# Patient Record
Sex: FEMALE | Race: WHITE | NOT HISPANIC OR LATINO | Employment: OTHER | URBAN - METROPOLITAN AREA
[De-identification: names, ages, dates, MRNs, and addresses within clinical notes are randomized per-mention and may not be internally consistent; named-entity substitution may affect disease eponyms.]

---

## 2017-02-27 DIAGNOSIS — K59.00 CONSTIPATION: ICD-10-CM

## 2017-02-27 DIAGNOSIS — G25.81 RESTLESS LEG SYNDROME: ICD-10-CM

## 2017-02-27 RX ORDER — PRAMIPEXOLE DIHYDROCHLORIDE 0.12 MG/1
0.12 TABLET ORAL AT BEDTIME
Qty: 30 TABLET | Refills: 1 | Status: SHIPPED | OUTPATIENT
Start: 2017-02-27 | End: 2017-05-01

## 2017-02-27 NOTE — TELEPHONE ENCOUNTER
Request for medication refill:    Date of last visit at clinic: 12/14/16    Please complete refill if appropriate and CLOSE ENCOUNTER.    Closing the encounter signifies the refill is complete.    If refill has been denied, please complete the smart phrase .smirefuse and route it to the [facility] RN TRIAGE pool to inform the patient and the pharmacy.    Elen Chinchilla, CMA

## 2017-03-01 ENCOUNTER — PRE VISIT (OUTPATIENT)
Dept: ORTHOPEDICS | Facility: CLINIC | Age: 82
End: 2017-03-01

## 2017-03-01 ENCOUNTER — OFFICE VISIT (OUTPATIENT)
Dept: ORTHOPEDICS | Facility: CLINIC | Age: 82
End: 2017-03-01

## 2017-03-01 VITALS — WEIGHT: 166 LBS | BODY MASS INDEX: 29.41 KG/M2 | HEIGHT: 63 IN

## 2017-03-01 DIAGNOSIS — M17.0 PRIMARY OSTEOARTHRITIS OF BOTH KNEES: Primary | ICD-10-CM

## 2017-03-01 RX ORDER — HYDROCODONE BITARTRATE AND ACETAMINOPHEN 5; 325 MG/1; MG/1
2 TABLET ORAL EVERY 6 HOURS PRN
Qty: 45 TABLET | Refills: 0 | Status: ON HOLD | OUTPATIENT
Start: 2017-03-01 | End: 2017-04-10

## 2017-03-01 NOTE — MR AVS SNAPSHOT
After Visit Summary   3/1/2017    Zaira Firero    MRN: [id number]           Patient Information     Date Of Birth          1935        Visit Information        Provider Department      3/1/2017 1:00 PM Leyda Garcia MD [facility]        Today's Diagnoses     Primary osteoarthritis of both knees    -  1       Follow-ups after your visit        Additional Services     ORTHOPEDICS ADULT REFERRAL       Your provider has referred you to: [facility]: Orthopaedic Clinic [facility] [phone]   [email or web address]      Dr. Sutton, knee replacement discussion    Please be aware that coverage of these services is subject to the terms and limitations of your health insurance plan.  Call member services at your health plan with any benefit or coverage questions.      Please bring the following to your appointment:    >>   Any x-rays, CTs or MRIs which have been performed.  Contact the facility where they were done to arrange for  prior to your scheduled appointment.    >>   List of current medications   >>   This referral request   >>   Any documents/labs given to you for this referral                  Your next 10 appointments already scheduled     Mar 06, 2017  8:45 AM CST   (Arrive by 8:30 AM)   NEW KNEE with Aamir Sutton MD   [facility] ([facility])    [address] [place] [id number] [phone]              Who to contact     Please call your clinic at [phone] to:    Ask questions about your health    Make or cancel appointments    Discuss your medicines    Learn about your test results    Speak to your doctor   If you have compliments or concerns about an experience at your clinic, or if you wish to file a complaint, please contact [facility] Patient Relations at [phone] or email us at  [email or web address]         Additional Information About Your Visit        [employer] Information     [employer] gives you secure access to your electronic health record. If you see a primary care provider, you can also send messages to your care team and make appointments. If you have questions, please call your primary care clinic.  If you do not have a primary care provider, please call [phone] and they will assist you.      Model[employer] is an electronic gateway that provides easy, online access to your medical records. With [employer], you can request a clinic appointment, read your test results, renew a prescription or communicate with your care team.     To access your existing account, please contact your [facility] or call [phone] for assistance.        Care EveryWhere ID     This is your Care EveryWhere ID. This could be used by other organizations to access your [facility] medical records  [id number]        Your Vitals Were     Height BMI (Body Mass Index)                5' 3\" (1.6 m) 29.41 kg/m2           Blood Pressure from Last 3 Encounters:   12/14/16 137/82   08/12/16 138/84   07/22/16 139/84    Weight from Last 3 Encounters:   03/01/17 166 lb (75.3 kg)   12/14/16 166 lb (75.3 kg)   08/29/16 166 lb (75.3 kg)              We Performed the Following     ORTHOPEDICS ADULT REFERRAL          Today's Medication Changes          These changes are accurate as of: 3/1/17  1:55 PM.  If you have any questions, ask your nurse or doctor.               Start taking these medicines.        Dose/Directions    HYDROcodone-acetaminophen 5-325 MG per tablet   Commonly known as:  NORCO   Used for:  Primary osteoarthritis of both knees        Dose:  2 tablet   Take 2 tablets by mouth every 6 hours as needed for pain maximum 4 tablet(s) per day   Quantity:  45 tablet   Refills:  0            Where to get your medicines      Some of these will need a paper prescription and others " can be bought over the counter.  Ask your nurse if you have questions.     Bring a paper prescription for each of these medications     HYDROcodone-acetaminophen 5-325 MG per tablet                Primary Care Provider Office Phone # Fax #    Ange Crespo [phone]       [facility] [address] [address]        Thank you!     Thank you for choosing [facility]  for your care. Our goal is always to provide you with excellent care. Hearing back from our patients is one way we can continue to improve our services. Please take a few minutes to complete the written survey that you may receive in the mail after your visit with us. Thank you!             Your Updated Medication List - Protect others around you: Learn how to safely use, store and throw away your medicines at www.disposemymeds.org.          This list is accurate as of: 3/1/17  1:55 PM.  Always use your most recent med list.                   Brand Name Dispense Instructions for use    acetaminophen 325 MG tablet    TYLENOL    250 tablet    Take 1-2 tablets (325-650 mg) by mouth every 6 hours as needed for pain       calcium carb 1250 mg (500 mg [place])/vitamin D 200 units 500-200 MG-UNIT per tablet    OSCAL with D    100 tablet    Take 1 tablet by mouth 2 times daily (with meals)       * CENTRUM SILVER per tablet      Take 1 tablet by mouth daily.       * multivitamin Tabs tablet     30 each    Take 1 tablet by mouth daily       hydrochlorothiazide 25 MG tablet    HYDRODIURIL    90 tablet    Take 1 tablet (25 mg) by mouth daily       HYDROcodone-acetaminophen 5-325 MG per tablet    NORCO    45 tablet    Take 2 tablets by mouth every 6 hours as needed for pain maximum 4 tablet(s) per day       ibuprofen 200 MG tablet    ADVIL/MOTRIN    60 tablet    Take 3 tablets (600 mg) by mouth every 8 hours as needed for pain       latanoprost 0.005 % ophthalmic solution    XALATAN     1 drop At Bedtime        losartan 100 MG tablet    COZAAR    90 tablet    Take 1 tablet (100 mg) by mouth daily       nortriptyline 25 MG capsule    PAMELOR    90 capsule    Take 1 capsule (25 mg) by mouth At Bedtime       polyethylene glycol powder    MIRALAX    510 g    Take 17 g by mouth daily       pramipexole 0.125 MG tablet    MIRAPEX    30 tablet    Take 1 tablet (0.125 mg) by mouth At Bedtime       * PRAVASTATIN SODIUM PO      Take  by mouth.       * pravastatin 20 MG tablet    PRAVACHOL    90 tablet    Take 1 tablet (20 mg) by mouth daily       * Notice:  This list has 4 medication(s) that are the same as other medications prescribed for you. Read the directions carefully, and ask your doctor or other care provider to review them with you.

## 2017-03-01 NOTE — LETTER
"  3/1/2017      RE: Zaira Fierro  [address]  [facility] [address]        Subjective:   Zaira Fierro is a 81 year old female who is here for follow up right knee pain. Synvisc injection lasted 2.5 months. CSI in right knee 12/2016.  Her LEFT knee is hurting some as well as her RIGHT knee has gotten much worse.  She notices that she is becoming very inactive due to the pain.  Dr. Crespo, her PCP, gave her a RIGHT knee CSI in Dec that lasted only 2 weeks or so.  She has never had her LEFT knee injected.  Wearing her medial  knee brace with walking that is not particularly helpful.        PAST MEDICAL, SOCIAL, SURGICAL AND FAMILY HISTORY: Past medical, surgical, family and social history was reviewed and unchanged since last visit.  ALLERGIES: She has No Known Allergies.    CURRENT MEDICATIONS: She has a current medication list which includes the following prescription(s): pramipexole, pravastatin, nortriptyline, losartan, hydrochlorothiazide, calcium carb 1250 mg (500 mg [place])/vitamin d 200 units, acetaminophen, latanoprost, polyethylene glycol, ibuprofen, multivitamin, centrum silver, and pravastatin sodium.          Exam:   Ht 5' 3\" (1.6 m)  Wt 166 lb (75.3 kg)  BMI 29.41 kg/m2           CONSTITUTIONIAL: healthy, alert and no distress  GAIT: antalgic  NEUROLOGIC: Non-focal  PSYCHIATRIC: affect normal/bright and mentation appears normal.    MUSCULOSKELETAL:   RIGHT knee:  No effusion, no warmth, no skin breakdown, patellofemoral crepitus, quad atrophy compared to the LEFT side.  Nttp over the lateral or patellofemoral compartments.  Mild ttp over the medial knee compartment.  ROM:  5-105    LEFT knee:  Mild effusion but no warmth, nttp, patellofemoral crepitus, 0-115 ROM         Assessment/Plan:   End stage RIGHT KNEE OA that is not responding well to injections, bracing, medications: Refer to Dr Sutton for consideration of TKA.  I gave her a prescription for vicodin to use sparingly at night " if her pain is keeping her awake or is severe.  She will need 6 foot long leg xrays AP, lateral and sunrise at her appt with Dr. Sutton as her last set is from 12/2015.      LEFT KNEE OA:  Consider CSI in the future.      Leyda Garcia MD, CAQ, FACSM, CCD  [facility]  Sports Medicine and Bone Health  Team Physician;  Athletics

## 2017-03-01 NOTE — TELEPHONE ENCOUNTER
1.  Date/reason for appt: 3/6/17 - Bilat Osteoarthritis of Knees  2.  Referring provider: Dr. Garcia  3.  Call to patient (Yes / No - short description): No, pt is referred  4.  Previous care at / records requested from:    - [facility] -- Records and referral in [facility], imaging in Pacs   - [facility] (Dr. Ange Crespo) -- Records in Epic   - PT notes are in [facility]

## 2017-03-01 NOTE — PROGRESS NOTES
" Subjective:   Zaira Fierro is a 81 year old female who is here for follow up right knee pain. Synvisc injection lasted 2.5 months. CSI in right knee 12/2016.  Her LEFT knee is hurting some as well as her RIGHT knee has gotten much worse.  She notices that she is becoming very inactive due to the pain.  Dr. Crespo, her PCP, gave her a RIGHT knee CSI in Dec that lasted only 2 weeks or so.  She has never had her LEFT knee injected.  Wearing her medial  knee brace with walking that is not particularly helpful.        PAST MEDICAL, SOCIAL, SURGICAL AND FAMILY HISTORY: Past medical, surgical, family and social history was reviewed and unchanged since last visit.  ALLERGIES: She has No Known Allergies.    CURRENT MEDICATIONS: She has a current medication list which includes the following prescription(s): pramipexole, pravastatin, nortriptyline, losartan, hydrochlorothiazide, calcium carb 1250 mg (500 mg [place])/vitamin d 200 units, acetaminophen, latanoprost, polyethylene glycol, ibuprofen, multivitamin, centrum silver, and pravastatin sodium.          Exam:   Ht 5' 3\" (1.6 m)  Wt 166 lb (75.3 kg)  BMI 29.41 kg/m2           CONSTITUTIONIAL: healthy, alert and no distress  GAIT: antalgic  NEUROLOGIC: Non-focal  PSYCHIATRIC: affect normal/bright and mentation appears normal.    MUSCULOSKELETAL:   RIGHT knee:  No effusion, no warmth, no skin breakdown, patellofemoral crepitus, quad atrophy compared to the LEFT side.  Nttp over the lateral or patellofemoral compartments.  Mild ttp over the medial knee compartment.  ROM:  5-105    LEFT knee:  Mild effusion but no warmth, nttp, patellofemoral crepitus, 0-115 ROM         Assessment/Plan:   End stage RIGHT KNEE OA that is not responding well to injections, bracing, medications: Refer to Dr Sutton for consideration of TKA.  I gave her a prescription for vicodin to use sparingly at night if her pain is keeping her awake or is severe.  She will need 6 foot long leg xrays AP, " lateral and sunrise at her appt with Dr. Sutton as her last set is from 12/2015.      LEFT KNEE OA:  Consider CSI in the future.      Leyda Garcia MD, CAQ, FACSM, CCD  [facility]  Sports Medicine and Bone Health  Team Physician;  Athletics

## 2017-03-02 DIAGNOSIS — M17.10 ARTHRITIS OF KNEE: Primary | ICD-10-CM

## 2017-03-02 ASSESSMENT — ENCOUNTER SYMPTOMS
NECK PAIN: 1
JOINT SWELLING: 0
MUSCLE CRAMPS: 1
ARTHRALGIAS: 1
BACK PAIN: 1
MUSCLE WEAKNESS: 0
STIFFNESS: 1
MYALGIAS: 1

## 2017-03-06 ENCOUNTER — OFFICE VISIT (OUTPATIENT)
Dept: ORTHOPEDICS | Facility: CLINIC | Age: 82
End: 2017-03-06

## 2017-03-06 VITALS — WEIGHT: 168.7 LBS | BODY MASS INDEX: 29.89 KG/M2 | HEIGHT: 63 IN

## 2017-03-06 DIAGNOSIS — M17.11 PRIMARY OSTEOARTHRITIS OF RIGHT KNEE: Primary | ICD-10-CM

## 2017-03-06 DIAGNOSIS — M17.0 PRIMARY OSTEOARTHRITIS OF BOTH KNEES: Primary | ICD-10-CM

## 2017-03-06 NOTE — NURSING NOTE
Teaching Flowsheet   Relevant Diagnosis: DJD right knee  Teaching Topic: Pre-op:  RIght total knee replacement     Person(s) involved in teaching:   Patient and Sister     Motivation Level:  Asks Questions: Yes  Eager to Learn: Yes  Cooperative: Yes  Receptive (willing/able to accept information): Yes  Any cultural factors/Denominational beliefs that may influence understanding or compliance? No       Patient and Family demonstrates understanding of the following:  Reason for the appointment, diagnosis and treatment plan: Yes  Knowledge of proper use of medications and conditions for which they are ordered (with special attention to potential side effects or drug interactions): Yes  Which situations necessitate calling provider and whom to contact: Yes       Teaching Concerns Addressed: Patient believes she will need to go to a TCU post-op.  She would like to consider [facility].  She will attend the Joint Replacement Class     Proper use and care of  (medical equip, care aids, etc.): NA  Nutritional needs and diet plan: Yes  Pain management techniques: Yes  Wound Care: Yes  How and/when to access community resources: NA     Instructional Materials Used/Given: Surgery folder     Time spent with patient: 15 minutes.

## 2017-03-06 NOTE — LETTER
"3/6/2017       RE: Zaira Fierro  [address]  [facility] [address]     Dear Colleague,    Thank you for referring your patient, Zaira Fierro, to the [facility] at [facility]. Please see a copy of my visit note below.        [facility], Orthopaedic Surgery Consultation  by Aamir Sutton M.D.    Zaira Fierro MRN# [id number]   Age: 81 year old YOB: 1935     Requesting physician: MD Dr. Ange Barnard [facility]              Assessment and Plan:   Assessment:  Severe end stage DJD both knees.     Plan:  1. Advised she consider TKA.    2. R knee is most symptomatic.  3. I discussed the risks, benefits, alternatives regarding the proposed surgery with the patient who both demonstrated understanding and indicated a desire to proceed with the surgery as planned.  4. Pt wants to proceed with R TKA  5. TJA preop class  6. Dental eval  7. PAC clinic visit  8. Preop H and P             History of Present Illness:   81 year old female  chief complaint    R knee pain x many years.  Had steroid and synvisc shots.  Does stairs in non-reciprocating fashion.  Has used many analgesics.  Uses a cane intermittently.  Was able to run errands.  Now cannot grocery shop or do routine activities, [employer], etc.   Quality of life and function has declined.    Current symptoms:    Awakens from sleep due to sx's:  Yes  Precipitating Injury:  No    Other joints or sites painful:  No  Fever: No  Appetite change or weight loss: No           Physical Exam:     EXAMINATION pertinent findings:   VITAL SIGNS: Height 1.59 m (5' 2.6\"), weight 76.5 kg (168 lb 11.2 oz), not currently breastfeeding.  Body mass index is 30.27 kg/(m^2).  RESP: non labored breathing   ABD: benign   SKIN: grossly normal   LYMPHATIC: grossly normal   NEURO: grossly normal   VASCULAR: satisfactory perfusion of all extremities. 2+ DP pulse " biaterally.  MUSCULOSKELETAL:   Gait: slightly antalgic  R knee:   ROM.  No effusion, increased warmth.  Medial crepitance.  Lig stable.  L knee   5-120 ROM. No effusion, minimal warmth.  Medial Crepitance.  Lig stable.    Hips nl             Data:   All laboratory data reviewed  All imaging studies reviewed by me     XR: severe end stage DJD both knees with complete cartilage loss, bone on bone medially      Aamir Sutton MD  [facility] Family Professor  Oncology and Adult Reconstructive Surgery  Dept Orthopaedic Surgery, [facility]  [phone] office, [phone] pager  [email or web address]            DATA for DOCUMENTATION:         Past Medical History:     Patient Active Problem List   Diagnosis     Health Care Home     Diverticulosis of large intestine     Hyperlipidemia     Obesity     Disorder of bone and cartilage     Prediabetes     Advanced directives, counseling/discussion     Macular degeneration (senile) of retina     Cataract     Primary osteoarthritis of both knees     Arthritis of knee     Essential hypertension with goal blood pressure less than 140/90     Past Medical History   Diagnosis Date     Arthritis      Asthma      Degenerative joint disease      Hearing loss      Hyperlipidaemia      Hypertension      Ringing in ears        Also see scanned health assessment forms.       Past Surgical History:     Past Surgical History   Procedure Laterality Date     No history of surgery              Social History:     Social History     Social History     Marital status: Single     Spouse name: N/A     Number of children: N/A     Years of education: N/A     Occupational History     Not on file.     Social History Main Topics     Smoking status: Never Smoker     Smokeless tobacco: Never Used     Alcohol use Yes      Comment: wine occasionally     Drug use: No     Sexual activity: No     Other Topics Concern     Not on file     Social History Narrative            Family History:       Family  History   Problem Relation Age of Onset     Coronary Artery Disease Father      Age 62 when      Cardiovascular Father      DIABETES Mother      Occurred when older     Hypertension Mother      Arthritis Mother      Eye Disorder Mother      Arthritis Sister      Eye Disorder Sister      Obesity Sister      Asthma Sister      Hypertension Sister      Hyperlipidemia Sister      Obesity Sister      Hypertension Sister      Asthma Sister      Has it since childhood     OSTEOPOROSIS Sister      Obesity Sister      Spine Problems Sister      Scoliosis            Medications:     Current Outpatient Prescriptions   Medication Sig     HYDROcodone-acetaminophen (NORCO) 5-325 MG per tablet Take 2 tablets by mouth every 6 hours as needed for pain maximum 4 tablet(s) per day     pramipexole (MIRAPEX) 0.125 MG tablet Take 1 tablet (0.125 mg) by mouth At Bedtime     pravastatin (PRAVACHOL) 20 MG tablet Take 1 tablet (20 mg) by mouth daily     nortriptyline (PAMELOR) 25 MG capsule Take 1 capsule (25 mg) by mouth At Bedtime     losartan (COZAAR) 100 MG tablet Take 1 tablet (100 mg) by mouth daily     hydrochlorothiazide (HYDRODIURIL) 25 MG tablet Take 1 tablet (25 mg) by mouth daily     calcium carb 1250 mg, 500 mg [place],/vitamin D 200 units (OSCAL WITH D) 500-200 MG-UNIT per tablet Take 1 tablet by mouth 2 times daily (with meals)     acetaminophen (TYLENOL) 325 MG tablet Take 1-2 tablets (325-650 mg) by mouth every 6 hours as needed for pain     latanoprost (XALATAN) 0.005 % ophthalmic solution 1 drop At Bedtime     polyethylene glycol (MIRALAX) powder Take 17 g by mouth daily     ibuprofen (ADVIL,MOTRIN) 200 MG tablet Take 3 tablets (600 mg) by mouth every 8 hours as needed for pain     multivitamin (OCUVITE) TABS Take 1 tablet by mouth daily     Multiple Vitamins-Minerals (CENTRUM SILVER) per tablet Take 1 tablet by mouth daily.     PRAVASTATIN SODIUM PO Take  by mouth.     No current facility-administered medications for  this visit.               Review of Systems:   A comprehensive 10 point review of systems (constitutional, ENT, cardiac, peripheral vascular, lymphatic, respiratory, GI, , Musculoskeletal, skin, Neurological) was performed and found to be negative except as described in this note.     See intake form completed by patient        Again, thank you for allowing me to participate in the care of your patient.      Sincerely,    Aamir Sutton MD

## 2017-03-06 NOTE — MR AVS SNAPSHOT
"              After Visit Summary   3/6/2017    Zaira Fierro    MRN: [id number]           Patient Information     Date Of Birth          1935        Visit Information        Provider Department      3/6/2017 8:45 AM Aamir Sutton MD [facility]        Today's Diagnoses     Primary osteoarthritis of both knees    -  1       Follow-ups after your visit        Who to contact     Please call your clinic at [phone] to:    Ask questions about your health    Make or cancel appointments    Discuss your medicines    Learn about your test results    Speak to your doctor   If you have compliments or concerns about an experience at your clinic, or if you wish to file a complaint, please contact [facility] Patient Relations at [phone] or email us at [email or web address]         Additional Information About Your Visit        MyChart Information     Workbookst gives you secure access to your electronic health record. If you see a primary care provider, you can also send messages to your care team and make appointments. If you have questions, please call your primary care clinic.  If you do not have a primary care provider, please call [phone] and they will assist you.      uShip is an electronic gateway that provides easy, online access to your medical records. With uShip, you can request a clinic appointment, read your test results, renew a prescription or communicate with your care team.     To access your existing account, please contact your [facility] or call [phone] for assistance.        Care EveryWhere ID     This is your Care EveryWhere ID. This could be used by other organizations to access your [facility] medical records  [id number]        Your Vitals Were     Height BMI (Body Mass Index)                1.59 m (5' 2.6\") 30.27 kg/m2           Blood Pressure from Last 3 Encounters:   12/14/16 137/82 "   08/12/16 138/84   07/22/16 139/84    Weight from Last 3 Encounters:   03/06/17 76.5 kg (168 lb 11.2 oz)   03/01/17 75.3 kg (166 lb)   12/14/16 75.3 kg (166 lb)              Today, you had the following     No orders found for display       Primary Care Provider Office Phone # Fax #    Ange Crespo [phone]       [facility] [address] [address]        Thank you!     Thank you for choosing [facility]  for your care. Our goal is always to provide you with excellent care. Hearing back from our patients is one way we can continue to improve our services. Please take a few minutes to complete the written survey that you may receive in the mail after your visit with us. Thank you!             Your Updated Medication List - Protect others around you: Learn how to safely use, store and throw away your medicines at www.disposemymeds.org.          This list is accurate as of: 3/6/17  9:39 AM.  Always use your most recent med list.                   Brand Name Dispense Instructions for use    acetaminophen 325 MG tablet    TYLENOL    250 tablet    Take 1-2 tablets (325-650 mg) by mouth every 6 hours as needed for pain       calcium carb 1250 mg (500 mg [place])/vitamin D 200 units 500-200 MG-UNIT per tablet    OSCAL with D    100 tablet    Take 1 tablet by mouth 2 times daily (with meals)       * CENTRUM SILVER per tablet      Take 1 tablet by mouth daily.       * multivitamin Tabs tablet     30 each    Take 1 tablet by mouth daily       hydrochlorothiazide 25 MG tablet    HYDRODIURIL    90 tablet    Take 1 tablet (25 mg) by mouth daily       HYDROcodone-acetaminophen 5-325 MG per tablet    NORCO    45 tablet    Take 2 tablets by mouth every 6 hours as needed for pain maximum 4 tablet(s) per day       ibuprofen 200 MG tablet    ADVIL/MOTRIN    60 tablet    Take 3 tablets (600 mg) by mouth every 8 hours as needed for pain       latanoprost 0.005 % ophthalmic  solution    XALATAN     1 drop At Bedtime       losartan 100 MG tablet    COZAAR    90 tablet    Take 1 tablet (100 mg) by mouth daily       nortriptyline 25 MG capsule    PAMELOR    90 capsule    Take 1 capsule (25 mg) by mouth At Bedtime       polyethylene glycol powder    MIRALAX    510 g    Take 17 g by mouth daily       pramipexole 0.125 MG tablet    MIRAPEX    30 tablet    Take 1 tablet (0.125 mg) by mouth At Bedtime       * PRAVASTATIN SODIUM PO      Take  by mouth.       * pravastatin 20 MG tablet    PRAVACHOL    90 tablet    Take 1 tablet (20 mg) by mouth daily       * Notice:  This list has 4 medication(s) that are the same as other medications prescribed for you. Read the directions carefully, and ask your doctor or other care provider to review them with you.

## 2017-03-06 NOTE — PROGRESS NOTES
"    [facility], Orthopaedic Surgery Consultation  by Aamir Sutton M.D.    Zaira Fierro MRN# [id number]   Age: 81 year old YOB: 1935     Requesting physician: MD Dr. Ange Barnard [facility]              Assessment and Plan:   Assessment:  Severe end stage DJD both knees.     Plan:  1. Advised she consider TKA.    2. R knee is most symptomatic.  3. I discussed the risks, benefits, alternatives regarding the proposed surgery with the patient who both demonstrated understanding and indicated a desire to proceed with the surgery as planned.  4. Pt wants to proceed with R TKA  5. TJA preop class  6. Dental eval  7. PAC clinic visit  8. Preop H and P             History of Present Illness:   81 year old female  chief complaint    R knee pain x many years.  Had steroid and synvisc shots.  Does stairs in non-reciprocating fashion.  Has used many analgesics.  Uses a cane intermittently.  Was able to run errands.  Now cannot grocery shop or do routine activities, Protestant, etc.   Quality of life and function has declined.    Current symptoms:    Awakens from sleep due to sx's:  Yes  Precipitating Injury:  No    Other joints or sites painful:  No  Fever: No  Appetite change or weight loss: No           Physical Exam:     EXAMINATION pertinent findings:   VITAL SIGNS: Height 1.59 m (5' 2.6\"), weight 76.5 kg (168 lb 11.2 oz), not currently breastfeeding.  Body mass index is 30.27 kg/(m^2).  RESP: non labored breathing   ABD: benign   SKIN: grossly normal   LYMPHATIC: grossly normal   NEURO: grossly normal   VASCULAR: satisfactory perfusion of all extremities. 2+ DP pulse biaterally.  MUSCULOSKELETAL:   Gait: slightly antalgic  R knee:   ROM.  No effusion, increased warmth.  Medial crepitance.  Lig stable.  L knee   5-120 ROM. No effusion, minimal warmth.  Medial Crepitance.  Lig stable.    Hips nl             Data:   All laboratory data reviewed  All " imaging studies reviewed by me     XR: severe end stage DJD both knees with complete cartilage loss, bone on bone medially      Aamir Sutton MD  [employer]  Oncology and Adult Reconstructive Surgery  Dept Orthopaedic Surgery, [facility]  [phone] office, [phone] pager  [email or web address]            DATA for DOCUMENTATION:         Past Medical History:     Patient Active Problem List   Diagnosis     Health Care Home     Diverticulosis of large intestine     Hyperlipidemia     Obesity     Disorder of bone and cartilage     Prediabetes     Advanced directives, counseling/discussion     Macular degeneration (senile) of retina     Cataract     Primary osteoarthritis of both knees     Arthritis of knee     Essential hypertension with goal blood pressure less than 140/90     Past Medical History   Diagnosis Date     Arthritis      Asthma      Degenerative joint disease      Hearing loss      Hyperlipidaemia      Hypertension      Ringing in ears        Also see scanned health assessment forms.       Past Surgical History:     Past Surgical History   Procedure Laterality Date     No history of surgery              Social History:     Social History     Social History     Marital status: Single     Spouse name: N/A     Number of children: N/A     Years of education: N/A     Occupational History     Not on file.     Social History Main Topics     Smoking status: Never Smoker     Smokeless tobacco: Never Used     Alcohol use Yes      Comment: wine occasionally     Drug use: No     Sexual activity: No     Other Topics Concern     Not on file     Social History Narrative            Family History:       Family History   Problem Relation Age of Onset     Coronary Artery Disease Father      Age 62 when      Cardiovascular Father      DIABETES Mother      Occurred when older     Hypertension Mother      Arthritis Mother      Eye Disorder Mother      Arthritis Sister      Eye Disorder Sister       Obesity Sister      Asthma Sister      Hypertension Sister      Hyperlipidemia Sister      Obesity Sister      Hypertension Sister      Asthma Sister      Has it since childhood     OSTEOPOROSIS Sister      Obesity Sister      Spine Problems Sister      Scoliosis            Medications:     Current Outpatient Prescriptions   Medication Sig     HYDROcodone-acetaminophen (NORCO) 5-325 MG per tablet Take 2 tablets by mouth every 6 hours as needed for pain maximum 4 tablet(s) per day     pramipexole (MIRAPEX) 0.125 MG tablet Take 1 tablet (0.125 mg) by mouth At Bedtime     pravastatin (PRAVACHOL) 20 MG tablet Take 1 tablet (20 mg) by mouth daily     nortriptyline (PAMELOR) 25 MG capsule Take 1 capsule (25 mg) by mouth At Bedtime     losartan (COZAAR) 100 MG tablet Take 1 tablet (100 mg) by mouth daily     hydrochlorothiazide (HYDRODIURIL) 25 MG tablet Take 1 tablet (25 mg) by mouth daily     calcium carb 1250 mg, 500 mg [place],/vitamin D 200 units (OSCAL WITH D) 500-200 MG-UNIT per tablet Take 1 tablet by mouth 2 times daily (with meals)     acetaminophen (TYLENOL) 325 MG tablet Take 1-2 tablets (325-650 mg) by mouth every 6 hours as needed for pain     latanoprost (XALATAN) 0.005 % ophthalmic solution 1 drop At Bedtime     polyethylene glycol (MIRALAX) powder Take 17 g by mouth daily     ibuprofen (ADVIL,MOTRIN) 200 MG tablet Take 3 tablets (600 mg) by mouth every 8 hours as needed for pain     multivitamin (OCUVITE) TABS Take 1 tablet by mouth daily     Multiple Vitamins-Minerals (CENTRUM SILVER) per tablet Take 1 tablet by mouth daily.     PRAVASTATIN SODIUM PO Take  by mouth.     No current facility-administered medications for this visit.               Review of Systems:   A comprehensive 10 point review of systems (constitutional, ENT, cardiac, peripheral vascular, lymphatic, respiratory, GI, , Musculoskeletal, skin, Neurological) was performed and found to be negative except as described in this note.     See  intake form completed by patient      Answers for HPI/ROS submitted by the patient on 3/2/2017   General Symptoms: No  Skin Symptoms: No  HENT Symptoms: No  EYE SYMPTOMS: No  HEART SYMPTOMS: No  LUNG SYMPTOMS: No  INTESTINAL SYMPTOMS: No  URINARY SYMPTOMS: No  GYNECOLOGIC SYMPTOMS: No  BREAST SYMPTOMS: No  SKELETAL SYMPTOMS: Yes  BLOOD SYMPTOMS: No  NERVOUS SYSTEM SYMPTOMS: No  MENTAL HEALTH SYMPTOMS: No  Back pain: Yes  Muscle aches: Yes  Neck pain: Yes  Swollen joints: No  Joint pain: Yes  Bone pain: Yes  Muscle cramps: Yes  Muscle weakness: No  Joint stiffness: Yes  Bone fracture: No

## 2017-03-06 NOTE — NURSING NOTE
"Reason For Visit:   Chief Complaint   Patient presents with     Consult     Pt states that she is here today to Discuss having a Right TKA. Referring:  BENTLEY GAITAN           Ht 1.59 m (5' 2.6\")  Wt 76.5 kg (168 lb 11.2 oz)  BMI 30.27 kg/m2                    Current Outpatient Prescriptions   Medication     HYDROcodone-acetaminophen (NORCO) 5-325 MG per tablet     pramipexole (MIRAPEX) 0.125 MG tablet     pravastatin (PRAVACHOL) 20 MG tablet     nortriptyline (PAMELOR) 25 MG capsule     losartan (COZAAR) 100 MG tablet     hydrochlorothiazide (HYDRODIURIL) 25 MG tablet     calcium carb 1250 mg, 500 mg [place],/vitamin D 200 units (OSCAL WITH D) 500-200 MG-UNIT per tablet     acetaminophen (TYLENOL) 325 MG tablet     latanoprost (XALATAN) 0.005 % ophthalmic solution     polyethylene glycol (MIRALAX) powder     ibuprofen (ADVIL,MOTRIN) 200 MG tablet     multivitamin (OCUVITE) TABS     Multiple Vitamins-Minerals (CENTRUM SILVER) per tablet     PRAVASTATIN SODIUM PO     No current facility-administered medications for this visit.        No Known Allergies    Lori Curtis C.M.A.             "

## 2017-03-31 ENCOUNTER — OFFICE VISIT (OUTPATIENT)
Dept: SURGERY | Facility: CLINIC | Age: 82
End: 2017-03-31

## 2017-03-31 ENCOUNTER — ALLIED HEALTH/NURSE VISIT (OUTPATIENT)
Dept: SURGERY | Facility: CLINIC | Age: 82
End: 2017-03-31

## 2017-03-31 ENCOUNTER — ANESTHESIA EVENT (OUTPATIENT)
Dept: SURGERY | Facility: CLINIC | Age: 82
DRG: 470 | End: 2017-03-31
Payer: COMMERCIAL

## 2017-03-31 VITALS
HEIGHT: 62 IN | RESPIRATION RATE: 16 BRPM | DIASTOLIC BLOOD PRESSURE: 75 MMHG | BODY MASS INDEX: 31.12 KG/M2 | TEMPERATURE: 98.4 F | WEIGHT: 169.1 LBS | HEART RATE: 104 BPM | OXYGEN SATURATION: 98 % | SYSTOLIC BLOOD PRESSURE: 140 MMHG

## 2017-03-31 DIAGNOSIS — Z01.818 PREOP EXAMINATION: Primary | ICD-10-CM

## 2017-03-31 DIAGNOSIS — Z01.818 PREOP EXAMINATION: ICD-10-CM

## 2017-03-31 LAB
ALBUMIN SERPL-MCNC: 3.8 G/DL (ref 3.4–5)
ALBUMIN UR-MCNC: NEGATIVE MG/DL
ALP SERPL-CCNC: 106 U/L (ref 40–150)
ALT SERPL W P-5'-P-CCNC: 22 U/L (ref 0–50)
ANION GAP SERPL CALCULATED.3IONS-SCNC: 9 MMOL/L (ref 3–14)
APPEARANCE UR: CLEAR
AST SERPL W P-5'-P-CCNC: 18 U/L (ref 0–45)
BILIRUB SERPL-MCNC: 0.3 MG/DL (ref 0.2–1.3)
BILIRUB UR QL STRIP: NEGATIVE
BUN SERPL-MCNC: 21 MG/DL (ref 7–30)
CALCIUM SERPL-MCNC: 9.3 MG/DL (ref 8.5–10.1)
CHLORIDE SERPL-SCNC: 104 MMOL/L (ref 94–109)
CO2 SERPL-SCNC: 30 MMOL/L (ref 20–32)
COLOR UR AUTO: YELLOW
CREAT SERPL-MCNC: 0.63 MG/DL (ref 0.52–1.04)
ERYTHROCYTE [DISTWIDTH] IN BLOOD BY AUTOMATED COUNT: 12.2 % (ref 10–15)
GFR SERPL CREATININE-BSD FRML MDRD: ABNORMAL ML/MIN/1.7M2
GLUCOSE SERPL-MCNC: 122 MG/DL (ref 70–99)
GLUCOSE UR STRIP-MCNC: NEGATIVE MG/DL
HCT VFR BLD AUTO: 42.3 % (ref 35–47)
HGB BLD-MCNC: 14.4 G/DL (ref 11.7–15.7)
HGB UR QL STRIP: NEGATIVE
KETONES UR STRIP-MCNC: NEGATIVE MG/DL
LEUKOCYTE ESTERASE UR QL STRIP: NEGATIVE
MCH RBC QN AUTO: 33.2 PG (ref 26.5–33)
MCHC RBC AUTO-ENTMCNC: 34 G/DL (ref 31.5–36.5)
MCV RBC AUTO: 98 FL (ref 78–100)
NITRATE UR QL: NEGATIVE
PH UR STRIP: 6 PH (ref 5–7)
PLATELET # BLD AUTO: 275 10E9/L (ref 150–450)
POTASSIUM SERPL-SCNC: 4.2 MMOL/L (ref 3.4–5.3)
PROT SERPL-MCNC: 7.6 G/DL (ref 6.8–8.8)
RBC # BLD AUTO: 4.34 10E12/L (ref 3.8–5.2)
SODIUM SERPL-SCNC: 142 MMOL/L (ref 133–144)
SP GR UR STRIP: 1.03 (ref 1–1.03)
URN SPEC COLLECT METH UR: NORMAL
UROBILINOGEN UR STRIP-MCNC: 0.2 MG/DL (ref 0–2)
WBC # BLD AUTO: 9.5 10E9/L (ref 4–11)

## 2017-03-31 ASSESSMENT — LIFESTYLE VARIABLES: TOBACCO_USE: 0

## 2017-03-31 NOTE — PATIENT INSTRUCTIONS
Preparing for Your Surgery      Name:  Zaira Fierro   MRN:  [id number]   :  1935   Today's Date:  3/31/2017     Arriving for surgery:  Surgery date: 2017  Surgery time:  11:15 am  Arrival time:  08:45 am  Please come to:     [facility] [address]  [address] [place]  [address]    - parking is available in front of [facility] from 5:15AM to 8:00PM. If you prefer, park your car in the Green Lot.    -Proceed to the 3rd floor, check in at the Adult Surgery Waiting Lounge. [phone]    If an escort is needed stop at the Information Desk in the lobby. Inform the information person that you are here for surgery. An escort to the Adult Surgery Waiting Lounge will be provided.        What can I eat or drink?  -  You may have solid food or milk products until 8 hours prior to your surgery.  -  You may have water, apple juice or 7up/Sprite until 2 hours prior to your surgery.    Which medicines can I take?  -  Do NOT take these medications in the morning, the day of surgery:        Please hold Losartan and hydrochlorothiazide,   Keep holding aspirin and stop ibuprofen 2 days before.        No vitamins or herbs until surgery.    -  Please take these medications the day of surgery:     Ok to take pain medicine in the morning and have your eye drops along.    How do I prepare myself?  -  Take two showers: one the night before surgery; and one the morning of surgery.         Use Scrubcare or Hibiclens to wash from neck down.  You may use your own shampoo and conditioner. No other hair products.   -  Do NOT use lotion, powder, deodorant, or antiperspirant the day of your surgery.  -  Do NOT wear any makeup, fingernail polish or jewelry.    -Do not bring your own medications to the hospital, except for inhalers and eye drops.  -  Bring your ID and insurance card.    Questions or Concerns:  If you have questions or concerns, please call  the  Preoperative Assessment Center, Monday-Friday 7AM-7PM:  [phone]          AFTER YOUR SURGERY  Breathing exercises   Breathing exercises help you recover faster. Take deep breaths and let the air out slowly. This will:     Help you wake up after surgery.    Help prevent complications like pneumonia.  Preventing complications will help you go home sooner.   We may give you a breathing device (incentive spirometer) to encourage you to breathe deeply.   Nausea and vomiting   You may feel sick to your stomach after surgery; if so, let your nurse know.    Pain control:  After surgery, you may have pain. Our goal is to help you manage your pain. Pain medicine will help you feel comfortable enough to do activities that will help you heal.  These activities may include breathing exercises, walking and physical therapy.   To help your health care team treat your pain we will ask: 1) If you have pain  2) where it is located 3) describe your pain in your words  Methods of pain control include medications given by mouth, vein or by nerve block for some surgeries.  We may give you a pain control pump that will:  1) Deliver the medicine through a tube placed in your vein  2) Control the amount of medicine you receive  3) Allow you to push a button to deliver a dose of pain medicine  Sequential Compression Device (SCD) or Pneumo Boots:  You may need to wear SCD S on your legs or feet. These are wraps connected to a machine that pumps in air and releases it. The repeated pumping helps prevent blood clots from forming.

## 2017-03-31 NOTE — PROGRESS NOTES
Preoperative Assessment Center medication history for March 31, 2017 is complete.  See Epic admission navigator for allergy information, pharmacy, prior to admission medications and immunization status.    Operating room staff will still need to confirm medications and last dose information on day of surgery.     Medication history interview sources:  patient    Changes made to PTA medication list (reason)  Added: aspirin added - but currently on hold d/t upcoming surgery,   Deleted: ibuprofen - no longer taking, duplicate of pravastatin  Changed: sig updated on Ca/Vit D, Norco dose updated, xalatan,       Additional medication history information (including reliability of information, actions taken by pharmacist):None  Aspirin on hold in preparation for surgery (started holding 5-7 days ago).     Prior to Admission medications    Medication Sig Last Dose Taking? Auth Provider   ASPIRIN PO Take 81 mg by mouth every morning Taking Yes Unknown, Entered By History   HYDROcodone-acetaminophen (NORCO) 5-325 MG per tablet Take 2 tablets by mouth every 6 hours as needed for pain maximum 4 tablet(s) per day  Patient taking differently: Take 0.5-1 tablets by mouth every 6 hours as needed for pain maximum 4 tablet(s) per day Taking Yes Leyda Garcia MD   pramipexole (MIRAPEX) 0.125 MG tablet Take 1 tablet (0.125 mg) by mouth At Bedtime Taking Yes Ange Crespo MD   pravastatin (PRAVACHOL) 20 MG tablet Take 1 tablet (20 mg) by mouth daily  Patient taking differently: Take 20 mg by mouth every evening  Taking Yes Ange Crespo MD   nortriptyline (PAMELOR) 25 MG capsule Take 1 capsule (25 mg) by mouth At Bedtime Taking Yes Ange Crespo MD   losartan (COZAAR) 100 MG tablet Take 1 tablet (100 mg) by mouth daily Taking Yes Ange Crespo MD   hydrochlorothiazide (HYDRODIURIL) 25 MG tablet Take 1 tablet (25 mg) by mouth daily Taking Yes Ange Crespo MD   calcium carb 1250 mg, 500 mg [place],/vitamin D 200  units (OSCAL WITH D) 500-200 MG-UNIT per tablet Take 1 tablet by mouth 2 times daily (with meals)  Patient taking differently: Take 1 tablet by mouth every morning  Taking Yes Ange Crespo MD   acetaminophen (TYLENOL) 325 MG tablet Take 1-2 tablets (325-650 mg) by mouth every 6 hours as needed for pain Taking Yes Ange Crespo MD   latanoprost (XALATAN) 0.005 % ophthalmic solution Place 1 drop into both eyes At Bedtime  Taking Yes Reported, Patient   polyethylene glycol (MIRALAX) powder Take 17 g by mouth daily  Patient taking differently: Take 1 capful by mouth daily as needed  Taking Yes Ange Crespo MD   multivitamin (OCUVITE) TABS Take 1 tablet by mouth daily Taking Yes Ange Crespo MD   Multiple Vitamins-Minerals (CENTRUM SILVER) per tablet Take 1 tablet by mouth daily. Taking Yes Reported, Patient           Medication history completed by: Zacarias Reeves, [facility]

## 2017-03-31 NOTE — ANESTHESIA PREPROCEDURE EVALUATION
Anesthesia Evaluation     . Pt has had prior anesthetic. Type: General    No history of anesthetic complications          ROS/MED HX    ENT/Pulmonary:     (+)MARILOU risk factors hypertension, , . .   (-) tobacco use   Neurologic:     (+)other neuro restless leg syndrome    Cardiovascular:     (+) Dyslipidemia, hypertension-range: unknown, ---. : . . . :. . Previous cardiac testing date:results:date: results:ECG reviewed date:8/12/2016 results:Sinus tachycardia date: results:          METS/Exercise Tolerance:  3 - Able to walk 1-2 blocks without stopping   Hematologic:  - neg hematologic  ROS       Musculoskeletal:   (+) arthritis, , , other musculoskeletal- multiple joint pain secondary to arthritis      GI/Hepatic:  - neg GI/hepatic ROS       Renal/Genitourinary:  - ROS Renal section negative       Endo:     (+) Obesity, .      Psychiatric:  - neg psychiatric ROS       Infectious Disease:  - neg infectious disease ROS       Malignancy:      - no malignancy   Other:    - neg other ROS                 Physical Exam  Normal systems: cardiovascular, pulmonary and dental    Airway   Mallampati: II  TM distance: >3 FB  Neck ROM: full    Dental     Cardiovascular   Rhythm and rate: regular and normal      Pulmonary    breath sounds clear to auscultation               PAC Discussion and Assessment    ASA Classification: 2  Case is suitable for:   Anesthetic techniques and relevant risks discussed: GA with regional block for post-op pain control  Invasive monitoring and risk discussed: No  Types:   Possibility and Risk of blood transfusion discussed: No  NPO instructions given: No solids 6 hours before surgery, stop clear liquids 2 hours before surgery  Additional anesthetic preparation and risks discussed:   Needs early admission to pre-op area:   Other:     PAC Resident/NP Anesthesia Assessment:  Zaira Fierro is an 81 year old female scheduled for a right total knee replacement on 4/7/2017 by Dr. Sutton in treatment of  right knee osteoarthritis.  PAC referral for risk assessment and optimization for anesthesia with comorbid conditions of :  Hypertension, hyperlipidemia, restless leg syndrome, macular degeneration, glaucoma, and multiple joint osteoarthritis.     Pre-operative considerations:  1.  Cardiac:  Functional status- METS 3.  The patient reports that her exercise tolerance is limited to to her right knee currently, but that she can walk at least a block and has no cardiopulmonary complications with that. Hypertension is managed with HCTZ and losartan; will hold both the DOS.  Intermediate risk surgery with 0.4% risk of major adverse cardiac event. No further cardiac evaluation needed per 2014 ACC/AHA guidelines for non-cardiac surgery.  2.  Pulm:  Airway feasible.  MARILOU risk: low  3.  GI:  Risk of PONV score = 3.  If > 2, anti-emetic intervention recommended.  4. Musculoskeletal:  The gait is slightly impaired currently due to the right knee pain.  Consider fall risk precautions.  She is taking norco only very sparingly for the pain.    5. VTE risk: 0.5%.  She notes that she stopped her aspirin on 3/26/2017 in preparation for the surgery.     Patient is optimized and is acceptable candidate for the proposed procedure.  No further diagnostic evaluation is needed.     Patient also evaluated by Dr. Freeman. See recommendations below.     For further details of assessment, testing, and physical exam please see H and P completed on same date.          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 Chart reviewed, patient seen and evaluated; agree with above assessment. Patient has no known cardiac or pulmonary disease, exercise is limited by her knee pain. We discussed possibility of block for postop pain. Patient  likely will need rehab, but has made arrangements already.    Patient is appropriate for the planned procedure without further workup or medical management change. The final anesthesia plan will be determined by the physician anesthesiologist caring for the patient on the day of surgery.      Reviewed and Signed by PAC Anesthesiologist  Anesthesiologist: JENNIFER  Date: 3/31/2017  Time:   Pass/Fail: Pass  Disposition:     PAC Pharmacist Assessment:        Pharmacist:   Date:   Time:      Anesthesia Plan      History & Physical Review  History and physical reviewed and following examination; no interval change.    ASA Status:  3 .    NPO Status:  > 8 hours and > 4 hours    Plan for General and ETT with Intravenous and Propofol induction. Maintenance will be Inhalation and Balanced.    PONV prophylaxis:  Ondansetron (or other 5HT-3) and Dexamethasone or Solumedrol  Pt's only other surgery was a tonsillectomy at 6 years old. Reviewed GA risks and benefits with pt. All questions answered. Pt agrees with plan and wishes to proceed.      Postoperative Care  Postoperative pain management:  IV analgesics, Oral pain medications and Multi-modal analgesia.      Consents  Anesthetic plan, risks, benefits and alternatives discussed with:  Patient..        ANESTHESIA PREOP EVALUATION    Procedure: Procedure(s):  Right Total Knee Replacement - Wound Class:     HPI: Zaira Fierro is a 81 year old female with HTN, HLD, RLS, and pre-diabetes presenting for above procedure.    PMHx/PSHx/ROS:  Past Medical History:   Diagnosis Date     Glaucoma      Hearing loss     doesn't wear hearing aids     Hyperlipidaemia      Hypertension      Macular degeneration      Multiple joint pain     secondary to arthritis     Obesity      Osteoarthritis involving multiple joints on both sides of body      Restless leg syndrome        Past Surgical History:   Procedure Laterality Date     TONSILLECTOMY  1941         Past Anes Hx: No personal or family  h/o anesthesia problems    Soc Hx:   Social History   Substance Use Topics     Smoking status: Never Smoker     Smokeless tobacco: Never Used     Alcohol use No       Allergies: No Known Allergies    Meds:   No prescriptions prior to admission.       Current Outpatient Prescriptions   Medication Sig Dispense Refill     ASPIRIN PO Take 81 mg by mouth every morning       HYDROcodone-acetaminophen (NORCO) 5-325 MG per tablet Take 2 tablets by mouth every 6 hours as needed for pain maximum 4 tablet(s) per day (Patient taking differently: Take 0.5-1 tablets by mouth every 6 hours as needed for pain maximum 4 tablet(s) per day) 45 tablet 0     pramipexole (MIRAPEX) 0.125 MG tablet Take 1 tablet (0.125 mg) by mouth At Bedtime 30 tablet 1     pravastatin (PRAVACHOL) 20 MG tablet Take 1 tablet (20 mg) by mouth daily (Patient taking differently: Take 20 mg by mouth every evening ) 90 tablet 2     nortriptyline (PAMELOR) 25 MG capsule Take 1 capsule (25 mg) by mouth At Bedtime 90 capsule 1     losartan (COZAAR) 100 MG tablet Take 1 tablet (100 mg) by mouth daily 90 tablet 1     hydrochlorothiazide (HYDRODIURIL) 25 MG tablet Take 1 tablet (25 mg) by mouth daily 90 tablet 1     calcium carb 1250 mg, 500 mg [place],/vitamin D 200 units (OSCAL WITH D) 500-200 MG-UNIT per tablet Take 1 tablet by mouth 2 times daily (with meals) (Patient taking differently: Take 1 tablet by mouth every morning ) 100 tablet 3     acetaminophen (TYLENOL) 325 MG tablet Take 1-2 tablets (325-650 mg) by mouth every 6 hours as needed for pain 250 tablet 11     latanoprost (XALATAN) 0.005 % ophthalmic solution Place 1 drop into both eyes At Bedtime        polyethylene glycol (MIRALAX) powder Take 17 g by mouth daily (Patient taking differently: Take 1 capful by mouth daily as needed ) 510 g 11     multivitamin (OCUVITE) TABS Take 1 tablet by mouth daily 30 each 6     Multiple Vitamins-Minerals (CENTRUM SILVER) per tablet Take 1 tablet by mouth daily.          Physical Exam:  Vitals: There were no vitals taken for this visit.  BMI= There is no height or weight on file to calculate BMI.      Labs:  UPT: No results found for: HCGQUANT      BMP:  Recent Labs   Lab Test  03/31/17   1120   NA  142   POTASSIUM  4.2   CHLORIDE  104   CO2  30   BUN  21   CR  0.63   GLC  122*   ANDRE  9.3     CBC:   Recent Labs   Lab Test  03/31/17   1120   WBC  9.5   RBC  4.34   HGB  14.4   HCT  42.3   MCV  98   MCH  33.2*   MCHC  34.0   RDW  12.2   PLT  275     Coags:  No results for input(s): INR, PTT, FIBR in the last 88678 hours.    Assessment/Plan:  - ASA 3  - GETA with standard ASA monitors, IV induction, balanced anesthetic  -Pre-op   -Acetaminophen & Gabapentin  - Pre-induction:   - Versed 0-1 mg   - PIVx1   - No arterial line, No central line   - Antibiotics per surgeon  - Induction:   - Mac 3   - ETT 7.0   - Propofol, Rocuronium, Fentanyl, Lidocaine  - Maintenance:   - Sevoflurane   - Analgesia: Fentanyl, Ketorolac   - Fluids: LR 1 mL/kg/hr maint  - Emergence:   - Reversal: Sugammadex   - PONV prophylaxis: Mavis Prieto Jr., MD  Anesthesia Resident - [facility]    4/6/2017  1:28 PM

## 2017-03-31 NOTE — MR AVS SNAPSHOT
After Visit Summary   3/31/2017    Zaira Fierro    MRN: [id number]           Patient Information     Date Of Birth          1935        Visit Information        Provider Department      3/31/2017 9:30 AM Rn, [facility] Pre[facility]        Care Instructions    Preparing for Your Surgery      Name:  Zaira Fierro   MRN:  [id number]   :  1935   Today's Date:  3/31/2017     Arriving for surgery:  Surgery date: 2017  Surgery time:  11:15 am  Arrival time:  08:45 am  Please come to:     [facility] [address]  [address] [place]  [address]    - parking is available in front of [facility] from 5:15AM to 8:00PM. If you prefer, park your car in the Green Lot.    -Proceed to the 3rd floor, check in at the Adult Surgery Waiting Lounge. [phone]    If an escort is needed stop at the Information Desk in the lobby. Inform the information person that you are here for surgery. An escort to the Adult Surgery Waiting Lounge will be provided.        What can I eat or drink?  -  You may have solid food or milk products until 8 hours prior to your surgery.  -  You may have water, apple juice or 7up/Sprite until 2 hours prior to your surgery.    Which medicines can I take?  -  Do NOT take these medications in the morning, the day of surgery:        Please hold Losartan and hydrochlorothiazide,   Keep holding aspirin and stop ibuprofen 2 days before.        No vitamins or herbs until surgery.    -  Please take these medications the day of surgery:     Ok to take pain medicine in the morning and have your eye drops along.    How do I prepare myself?  -  Take two showers: one the night before surgery; and one the morning of surgery.         Use Scrubcare or Hibiclens to wash from neck down.  You may use your own shampoo and conditioner. No other hair products.   -  Do NOT use lotion, powder, deodorant, or  antiperspirant the day of your surgery.  -  Do NOT wear any makeup, fingernail polish or jewelry.    -Do not bring your own medications to the hospital, except for inhalers and eye drops.  -  Bring your ID and insurance card.    Questions or Concerns:  If you have questions or concerns, please call the  Preoperative Assessment Center, Monday-Friday 7AM-7PM:  [phone]          AFTER YOUR SURGERY  Breathing exercises   Breathing exercises help you recover faster. Take deep breaths and let the air out slowly. This will:     Help you wake up after surgery.    Help prevent complications like pneumonia.  Preventing complications will help you go home sooner.   We may give you a breathing device (incentive spirometer) to encourage you to breathe deeply.   Nausea and vomiting   You may feel sick to your stomach after surgery; if so, let your nurse know.    Pain control:  After surgery, you may have pain. Our goal is to help you manage your pain. Pain medicine will help you feel comfortable enough to do activities that will help you heal.  These activities may include breathing exercises, walking and physical therapy.   To help your health care team treat your pain we will ask: 1) If you have pain  2) where it is located 3) describe your pain in your words  Methods of pain control include medications given by mouth, vein or by nerve block for some surgeries.  We may give you a pain control pump that will:  1) Deliver the medicine through a tube placed in your vein  2) Control the amount of medicine you receive  3) Allow you to push a button to deliver a dose of pain medicine  Sequential Compression Device (SCD) or Pneumo Boots:  You may need to wear SCD S on your legs or feet. These are wraps connected to a machine that pumps in air and releases it. The repeated pumping helps prevent blood clots from forming.         Follow-ups after your visit        Your next 10 appointments already scheduled     Mar 31, 2017 11:00 AM  CDT   (Arrive by 10:45 AM)   PAC Anesthesia Consult with  Pac Anesthesiologist   [facility] Preoperative Assessment Center ([facility])    [address]  4th Floor  [facility] [address] [phone]            Mar 31, 2017 11:15 AM CDT   LAB with  LAB   [facility] Lab ([facility])    [address]  1st Floor  [facility] [address] [phone]           Patient must bring picture ID.  Patient should be prepared to give a urine specimen  Please do not eat 10-12 hours before your appointment if you are coming in fasting for labs on lipids, cholesterol, or glucose (sugar).  Pregnant women should follow their Care Team instructions. Water with medications is okay. Do not drink coffee or other fluids.   If you have concerns about taking  your medications, please ask at office or if scheduling via [employer], send a message by clicking on Secure Messaging, Message Your Care Team.            Apr 07, 2017   Procedure with Aamir Sutton MD   [facility], [place], Same Day Surgery (--)    [address] [id number] [phone]              Future tests that were ordered for you today     Open Future Orders        Priority Expected Expires Ordered    ABO/Rh type and screen Routine 3/31/2017 4/30/2017 3/31/2017    CBC with platelets Routine 3/31/2017 4/30/2017 3/31/2017    Comprehensive metabolic panel Routine 3/31/2017 4/30/2017 3/31/2017    UA reflex to Microscopic and Culture Routine 3/31/2017 4/30/2017 3/31/2017            Who to contact     Please call your clinic at [phone] to:    Ask questions about your health    Make or cancel appointments    Discuss your medicines    Learn about your test results    Speak to your doctor   If you have compliments or concerns about an experience at your clinic, or if you wish to file a complaint, please contact [facility] Patient Relations at [phone] or email us at  [email or web address]         Additional Information About Your Visit        [employer] Information     [employer] gives you secure access to your electronic health record. If you see a primary care provider, you can also send messages to your care team and make appointments. If you have questions, please call your primary care clinic.  If you do not have a primary care provider, please call [phone] and they will assist you.      [employer] is an electronic gateway that provides easy, online access to your medical records. With [employer], you can request a clinic appointment, read your test results, renew a prescription or communicate with your care team.     To access your existing account, please contact your [facility] or call [phone] for assistance.        Care EveryWhere ID     This is your Care EveryWhere ID. This could be used by other organizations to access your [place] medical records  [id number]         Blood Pressure from Last 3 Encounters:   03/31/17 140/75   12/14/16 137/82   08/12/16 138/84    Weight from Last 3 Encounters:   03/31/17 76.7 kg (169 lb 1.6 oz)   03/06/17 76.5 kg (168 lb 11.2 oz)   03/01/17 75.3 kg (166 lb)              Today, you had the following     No orders found for display         Today's Medication Changes          These changes are accurate as of: 3/31/17 10:24 AM.  If you have any questions, ask your nurse or doctor.               These medicines have changed or have updated prescriptions.        Dose/Directions    calcium carb 1250 mg (500 mg [place] IRA)/vitamin D 200 units 500-200 MG-UNIT per tablet   Commonly known as:  OSCAL with D   This may have changed:  when to take this   Used for:  Nutrition disorder, Arthritis of knee        Dose:  1 tablet   Take 1 tablet by mouth 2 times daily (with meals)   Quantity:  100 tablet   Refills:  3       HYDROcodone-acetaminophen 5-325 MG per tablet   Commonly known as:  NORCO   This may have  changed:    - how much to take  - additional instructions   Used for:  Primary osteoarthritis of both knees        Dose:  2 tablet   Take 2 tablets by mouth every 6 hours as needed for pain maximum 4 tablet(s) per day   Quantity:  45 tablet   Refills:  0       polyethylene glycol powder   Commonly known as:  MIRALAX   This may have changed:    - when to take this  - reasons to take this   Used for:  Constipation        Dose:  1 capful   Take 17 g by mouth daily   Quantity:  510 g   Refills:  11       pravastatin 20 MG tablet   Commonly known as:  PRAVACHOL   This may have changed:    - when to take this  - Another medication with the same name was removed. Continue taking this medication, and follow the directions you see here.   Used for:  Hyperlipidemia LDL goal <160   Changed by:  Ange Crespo MD        Dose:  20 mg   Take 1 tablet (20 mg) by mouth daily   Quantity:  90 tablet   Refills:  2         Stop taking these medicines if you haven't already. Please contact your care team if you have questions.     ibuprofen 200 MG tablet   Commonly known as:  ADVIL/MOTRIN   Stopped by:  Pharmacist, [facility]                    Primary Care Provider Office Phone # Fax #    Ange Crespo [phone]       [facility] [address] [address]        Thank you!     Thank you for choosing [facility] PREOPERAT[facility]  for your care. Our goal is always to provide you with excellent care. Hearing back from our patients is one way we can continue to improve our services. Please take a few minutes to complete the written survey that you may receive in the mail after your visit with us. Thank you!             Your Updated Medication List - Protect others around you: Learn how to safely use, store and throw away your medicines at www.disposemymeds.org.          This list is accurate as of: 3/31/17 10:24 AM.  Always use your most recent med list.                   Brand Name  Dispense Instructions for use    acetaminophen 325 MG tablet    TYLENOL    250 tablet    Take 1-2 tablets (325-650 mg) by mouth every 6 hours as needed for pain       ASPIRIN PO      Take 81 mg by mouth every morning       calcium carb 1250 mg (500 mg [place])/vitamin D 200 units 500-200 MG-UNIT per tablet    OSCAL with D    100 tablet    Take 1 tablet by mouth 2 times daily (with meals)       * CENTRUM SILVER per tablet      Take 1 tablet by mouth daily.       * multivitamin Tabs tablet     30 each    Take 1 tablet by mouth daily       hydrochlorothiazide 25 MG tablet    HYDRODIURIL    90 tablet    Take 1 tablet (25 mg) by mouth daily       HYDROcodone-acetaminophen 5-325 MG per tablet    NORCO    45 tablet    Take 2 tablets by mouth every 6 hours as needed for pain maximum 4 tablet(s) per day       latanoprost 0.005 % ophthalmic solution    XALATAN     Place 1 drop into both eyes At Bedtime       losartan 100 MG tablet    COZAAR    90 tablet    Take 1 tablet (100 mg) by mouth daily       nortriptyline 25 MG capsule    PAMELOR    90 capsule    Take 1 capsule (25 mg) by mouth At Bedtime       polyethylene glycol powder    MIRALAX    510 g    Take 17 g by mouth daily       pramipexole 0.125 MG tablet    MIRAPEX    30 tablet    Take 1 tablet (0.125 mg) by mouth At Bedtime       pravastatin 20 MG tablet    PRAVACHOL    90 tablet    Take 1 tablet (20 mg) by mouth daily       * Notice:  This list has 2 medication(s) that are the same as other medications prescribed for you. Read the directions carefully, and ask your doctor or other care provider to review them with you.

## 2017-03-31 NOTE — MR AVS SNAPSHOT
After Visit Summary   3/31/2017    Zaira Fierro    MRN: [id number]           Patient Information     Date Of Birth          1935        Visit Information        Provider Department      3/31/2017 9:00 AM Pharmacist, Maya Lowe [facility] Assessment [place]        Today's Diagnoses     Preop examination    -  1       Follow-ups after your visit        Your next 10 appointments already scheduled     Mar 31, 2017 10:00 AM CDT   (Arrive by 9:45 AM)   PAC EVALUATION with Maya Pac Barrett 8   [facility] Preoperative [facility] ([facility])    [address] [place] [address]  4th [facility] [address] [phone]            Mar 31, 2017 11:00 AM CDT   (Arrive by 10:45 AM)   PAC Anesthesia Consult with Maya Pac Anesthesiologist   [facility] Preoperative Assessment [place] ([facility])    [address] [place] [id number] [phone]            Mar 31, 2017 11:15 AM CDT   LAB with MAYA LAB   [facility] Lab [facility])    [address] [place] [id number] [phone]           Patient must bring picture ID.  Patient should be prepared to give a urine specimen  Please do not eat 10-12 hours before your appointment if you are coming in fasting for labs on lipids, cholesterol, or glucose (sugar).  Pregnant women should follow their Care Team instructions. Water with medications is okay. Do not drink coffee or other fluids.   If you have concerns about taking  your medications, please ask at office or if scheduling via Populrhart, send a message by clicking on Secure Messaging, Message Your Care Team.            Apr 07, 2017   Procedure with Aamir Sutton MD   [facility] [place], [place], Same Day Surgery (--)    [address] [facility] [id number] [phone]              Who to contact     Please call your clinic at [phone] to:    Ask questions about your health    Make or  cancel appointments    Discuss your medicines    Learn about your test results    Speak to your doctor   If you have compliments or concerns about an experience at your clinic, or if you wish to file a complaint, please contact [facility] Patient Relations at [phone] or email us at [email or web address]         Additional Information About Your Visit        ALICE Apphart Information     ALICE Apphart gives you secure access to your electronic health record. If you see a primary care provider, you can also send messages to your care team and make appointments. If you have questions, please call your primary care clinic.  If you do not have a primary care provider, please call [phone] and they will assist you.      tsumobi is an electronic gateway that provides easy, online access to your medical records. With tsumobi, you can request a clinic appointment, read your test results, renew a prescription or communicate with your care team.     To access your existing account, please contact your [facility] or call [phone] for assistance.        Care EveryWhere ID     This is your Care EveryWhere ID. This could be used by other organizations to access your [place] medical records  [id number]         Blood Pressure from Last 3 Encounters:   12/14/16 137/82   08/12/16 138/84   07/22/16 139/84    Weight from Last 3 Encounters:   03/06/17 76.5 kg (168 lb 11.2 oz)   03/01/17 75.3 kg (166 lb)   12/14/16 75.3 kg (166 lb)              Today, you had the following     No orders found for display         Today's Medication Changes          These changes are accurate as of: 3/31/17  9:42 AM.  If you have any questions, ask your nurse or doctor.               These medicines have changed or have updated prescriptions.        Dose/Directions    calcium carb 1250 mg (500 mg [place])/vitamin D 200 units 500-200 MG-UNIT per tablet   Commonly known as:  OSCAL with D    This may have changed:  when to take this   Used for:  Nutrition disorder, Arthritis of knee        Dose:  1 tablet   Take 1 tablet by mouth 2 times daily (with meals)   Quantity:  100 tablet   Refills:  3       HYDROcodone-acetaminophen 5-325 MG per tablet   Commonly known as:  NORCO   This may have changed:    - how much to take  - additional instructions   Used for:  Primary osteoarthritis of both knees        Dose:  2 tablet   Take 2 tablets by mouth every 6 hours as needed for pain maximum 4 tablet(s) per day   Quantity:  45 tablet   Refills:  0       polyethylene glycol powder   Commonly known as:  MIRALAX   This may have changed:    - when to take this  - reasons to take this   Used for:  Constipation        Dose:  1 capful   Take 17 g by mouth daily   Quantity:  510 g   Refills:  11       pravastatin 20 MG tablet   Commonly known as:  PRAVACHOL   This may have changed:    - when to take this  - Another medication with the same name was removed. Continue taking this medication, and follow the directions you see here.   Used for:  Hyperlipidemia LDL goal <160   Changed by:  Ange Crespo MD        Dose:  20 mg   Take 1 tablet (20 mg) by mouth daily   Quantity:  90 tablet   Refills:  2         Stop taking these medicines if you haven't already. Please contact your care team if you have questions.     ibuprofen 200 MG tablet   Commonly known as:  ADVIL/MOTRIN   Stopped by:  Pharmacist, [facility]                    Primary Care Provider Office Phone # Fax #    Ange Crespo [phone]       [facility] [address] [address]        Thank you!     Thank you for choosing [facility] PREOPERAT[facility]  for your care. Our goal is always to provide you with excellent care. Hearing back from our patients is one way we can continue to improve our services. Please take a few minutes to complete the written survey that you may receive in the mail after your visit  with us. Thank you!             Your Updated Medication List - Protect others around you: Learn how to safely use, store and throw away your medicines at www.disposemymeds.org.          This list is accurate as of: 3/31/17  9:42 AM.  Always use your most recent med list.                   Brand Name Dispense Instructions for use    acetaminophen 325 MG tablet    TYLENOL    250 tablet    Take 1-2 tablets (325-650 mg) by mouth every 6 hours as needed for pain       ASPIRIN PO      Take 81 mg by mouth every morning       calcium carb 1250 mg (500 mg [place])/vitamin D 200 units 500-200 MG-UNIT per tablet    OSCAL with D    100 tablet    Take 1 tablet by mouth 2 times daily (with meals)       * CENTRUM SILVER per tablet      Take 1 tablet by mouth daily.       * multivitamin Tabs tablet     30 each    Take 1 tablet by mouth daily       hydrochlorothiazide 25 MG tablet    HYDRODIURIL    90 tablet    Take 1 tablet (25 mg) by mouth daily       HYDROcodone-acetaminophen 5-325 MG per tablet    NORCO    45 tablet    Take 2 tablets by mouth every 6 hours as needed for pain maximum 4 tablet(s) per day       latanoprost 0.005 % ophthalmic solution    XALATAN     Place 1 drop into both eyes At Bedtime       losartan 100 MG tablet    COZAAR    90 tablet    Take 1 tablet (100 mg) by mouth daily       nortriptyline 25 MG capsule    PAMELOR    90 capsule    Take 1 capsule (25 mg) by mouth At Bedtime       polyethylene glycol powder    MIRALAX    510 g    Take 17 g by mouth daily       pramipexole 0.125 MG tablet    MIRAPEX    30 tablet    Take 1 tablet (0.125 mg) by mouth At Bedtime       pravastatin 20 MG tablet    PRAVACHOL    90 tablet    Take 1 tablet (20 mg) by mouth daily       * Notice:  This list has 2 medication(s) that are the same as other medications prescribed for you. Read the directions carefully, and ask your doctor or other care provider to review them with you.

## 2017-03-31 NOTE — H&P
Pre-Operative H & P     CC:  Preoperative exam to assess for increased cardiopulmonary risk while undergoing surgery and anesthesia.    Date of Encounter: 3/31/2017  Primary Care Physician:  Ange Crespo  Zaira Fierro is a 81 year old female who presents for pre-operative H & P in preparation for a right total knee replacement with Dr. Sutton on 4/7/2017 at [facility].     Zaira Fierro is an 81 year old female with hypertension, hyperlipidemia, restless leg syndrome, macular degeneration, glaucoma, and multiple joint osteoarthritis that has been having gradually worsening symptoms of right knee OA.  She has done physical therapy, had an injection, and tried pain medications but nothing has resulted in long term relief.  She has elected to proceed with the above listed procedure as recommended.     History is obtained from the patient.     Past Medical History  Past Medical History:   Diagnosis Date     Glaucoma      Hearing loss     doesn't wear hearing aids     Hyperlipidaemia      Hypertension      Macular degeneration      Multiple joint pain     secondary to arthritis     Obesity      Osteoarthritis involving multiple joints on both sides of body      Restless leg syndrome        Past Surgical History  Past Surgical History:   Procedure Laterality Date     TONSILLECTOMY  1941       Hx of Blood transfusions/reactions: none    Hx of abnormal bleeding or anti-platelet use: none    Menstrual history: No LMP recorded. Patient is postmenopausal.:    Steroid use in the last year: none    Personal or FH with difficulty with Anesthesia:  none    Prior to Admission Medications  Current Outpatient Prescriptions   Medication Sig Dispense Refill     ASPIRIN PO Take 81 mg by mouth every morning       HYDROcodone-acetaminophen (NORCO) 5-325 MG per tablet Take 2 tablets by mouth every 6 hours as needed for pain maximum 4 tablet(s) per day (Patient taking differently:  Take 0.5-1 tablets by mouth every 6 hours as needed for pain maximum 4 tablet(s) per day) 45 tablet 0     pramipexole (MIRAPEX) 0.125 MG tablet Take 1 tablet (0.125 mg) by mouth At Bedtime 30 tablet 1     pravastatin (PRAVACHOL) 20 MG tablet Take 1 tablet (20 mg) by mouth daily (Patient taking differently: Take 20 mg by mouth every evening ) 90 tablet 2     nortriptyline (PAMELOR) 25 MG capsule Take 1 capsule (25 mg) by mouth At Bedtime 90 capsule 1     losartan (COZAAR) 100 MG tablet Take 1 tablet (100 mg) by mouth daily 90 tablet 1     hydrochlorothiazide (HYDRODIURIL) 25 MG tablet Take 1 tablet (25 mg) by mouth daily 90 tablet 1     calcium carb 1250 mg, 500 mg [place],/vitamin D 200 units (OSCAL WITH D) 500-200 MG-UNIT per tablet Take 1 tablet by mouth 2 times daily (with meals) (Patient taking differently: Take 1 tablet by mouth every morning ) 100 tablet 3     acetaminophen (TYLENOL) 325 MG tablet Take 1-2 tablets (325-650 mg) by mouth every 6 hours as needed for pain 250 tablet 11     latanoprost (XALATAN) 0.005 % ophthalmic solution Place 1 drop into both eyes At Bedtime        polyethylene glycol (MIRALAX) powder Take 17 g by mouth daily (Patient taking differently: Take 1 capful by mouth daily as needed ) 510 g 11     multivitamin (OCUVITE) TABS Take 1 tablet by mouth daily 30 each 6     Multiple Vitamins-Minerals (CENTRUM SILVER) per tablet Take 1 tablet by mouth daily.       [DISCONTINUED] PRAVASTATIN SODIUM PO Take  by mouth.         Allergies  No Known Allergies    Social History  Social History     Social History     Marital status: Single     Spouse name: N/A     Number of children: N/A     Years of education: N/A     Occupational History     retired teacher      Social History Main Topics     Smoking status: Never Smoker     Smokeless tobacco: Never Used     Alcohol use No     Drug use: No     Sexual activity: No     Other Topics Concern     Not on file     Social History Narrative    Belongs to  "Noe Upton Sisters. Lives with her sister Rosi.        Family History  Family History   Problem Relation Age of Onset     Coronary Artery Disease Father      Age 62 when      Cardiovascular Father      Myocardial Infarction Father      DIABETES Mother      Occurred when older     Hypertension Mother      Arthritis Mother      Eye Disorder Mother      Arthritis Sister      Asthma Sister      Hypertension Sister      OSTEOPOROSIS Sister      Hypertension Sister      Hyperlipidemia Sister      Obesity Sister      Spine Problems Sister      Scoliosis     KIDNEY DISEASE Brother              ROS/MED HX  The complete review of systems is negative other than noted in the HPI or here.     ENT/Pulmonary:     (+)MARILOU risk factors hypertension, , . .   (-) tobacco use   Neurologic:     (+)other neuro restless leg syndrome    Cardiovascular:     (+) Dyslipidemia, hypertension-range: unknown, ---. : . . . :. . Previous cardiac testing date:results:date: results:ECG reviewed date: results:Sinus tachycardia date: results:          METS/Exercise Tolerance:  3 - Able to walk 1-2 blocks without stopping   Hematologic:  - neg hematologic  ROS       Musculoskeletal:   (+) arthritis, , , other musculoskeletal- multiple joint pain secondary to arthritis      GI/Hepatic:  - neg GI/hepatic ROS       Renal/Genitourinary:  - ROS Renal section negative       Endo:     (+) Obesity, .      Psychiatric:  - neg psychiatric ROS       Infectious Disease:  - neg infectious disease ROS       Malignancy:      - no malignancy   Other:    - neg other ROS           Temp: 98.4  F (36.9  C) Temp src: Oral BP: 140/75 Pulse: 104   Resp: 16 SpO2: 98 %         169 lbs 1.6 oz  5' 2\"   Body mass index is 30.93 kg/(m^2).       Physical Exam  Constitutional: Awake, alert, cooperative, no apparent distress, and appears stated age.  Eyes: Pupils equal, round and reactive to light, extra ocular muscles intact, sclera clear, conjunctiva normal.  HENT: " Normocephalic, oral pharynx with moist mucus membranes, good dentition. No goiter appreciated.   Respiratory: Clear to auscultation bilaterally, no crackles or wheezing.  Cardiovascular: Regular rate and rhythm, normal S1 and S2, and no murmur noted.  Carotids +2, no bruits. Trace BLE edema. Palpable pulses to radial  DP and PT arteries.   GI: Normal bowel sounds, soft, non-distended, non-tender, no masses palpated, no hepatosplenomegaly.    Lymph/Hematologic: No cervical lymphadenopathy and no supraclavicular lymphadenopathy.  Genitourinary:  deferred  Skin: Warm and dry.  No rashes at anticipated surgical site.   Musculoskeletal: Full ROM of neck. There is no redness, warmth, or swelling of the exposed joints. There is right knee bony enlargement.  Gross motor strength is normal.    Neurologic: Awake, alert, oriented to name, place and time. Cranial nerves II-XII are grossly intact. Gait is slightly impaired.  Neuropsychiatric: Calm, cooperative. Normal affect.     Labs: (personally reviewed)   Component      Latest Ref Rng & Units 3/31/2017   Sodium      133 - 144 mmol/L 142   Potassium      3.4 - 5.3 mmol/L 4.2   Chloride      94 - 109 mmol/L 104   Carbon Dioxide      20 - 32 mmol/L 30   Anion Gap      3 - 14 mmol/L 9   Glucose      70 - 99 mg/dL 122 (H)   Urea Nitrogen      7 - 30 mg/dL 21   Creatinine      0.52 - 1.04 mg/dL 0.63   GFR Estimate      >60 mL/min/1.7m2 >90 . . .   GFR Estimate If Black      >60 mL/min/1.7m2 >90 . . .   Calcium      8.5 - 10.1 mg/dL 9.3   Bilirubin Total      0.2 - 1.3 mg/dL 0.3   Albumin      3.4 - 5.0 g/dL 3.8   Protein Total      6.8 - 8.8 g/dL 7.6   Alkaline Phosphatase      40 - 150 U/L 106   ALT      0 - 50 U/L 22   AST      0 - 45 U/L 18   Color Urine       Yellow   Appearance Urine       Clear   Glucose Urine      NEG mg/dL Negative   Bilirubin Urine      NEG Negative   Ketones Urine      NEG mg/dL Negative   Specific Gravity Urine      1.003 - 1.035 1.030   Blood Urine       NEG Negative   pH Urine      5.0 - 7.0 pH 6.0   Protein Albumin Urine      NEG mg/dL Negative   Urobilinogen mg/dL      0.0 - 2.0 mg/dL 0.2   Nitrite Urine      NEG Negative   Leukocyte Esterase Urine      NEG Negative   Source       Midstream Urine   WBC      4.0 - 11.0 10e9/L 9.5   RBC Count      3.8 - 5.2 10e12/L 4.34   Hemoglobin      11.7 - 15.7 g/dL 14.4   Hematocrit      35.0 - 47.0 % 42.3   MCV      78 - 100 fl 98   MCH      26.5 - 33.0 pg 33.2 (H)   MCHC      31.5 - 36.5 g/dL 34.0   RDW      10.0 - 15.0 % 12.2   Platelet Count      150 - 450 10e9/L 275       EK  Sinus tachycardia          ASSESSMENT and PLAN  Zaira Fierro is an 81 year old female scheduled for a right total knee replacement on 2017 by Dr. Sutton in treatment of right knee osteoarthritis.  PAC referral for risk assessment and optimization for anesthesia with comorbid conditions of :  Hypertension, hyperlipidemia, restless leg syndrome, macular degeneration, glaucoma, and multiple joint osteoarthritis.     Pre-operative considerations:  1.  Cardiac:  Functional status- METS 3.  The patient reports that her exercise tolerance is limited to to her right knee currently, but that she can walk at least a block and has no cardiopulmonary complications with that. Hypertension is managed with HCTZ and losartan; will hold both the DOS.  Intermediate risk surgery with 0.4% risk of major adverse cardiac event. No further cardiac evaluation needed per 2014 ACC/AHA guidelines for non-cardiac surgery.  2.  Pulm:  Airway feasible.  MARILOU risk: low  3.  GI:  Risk of PONV score = 3.  If > 2, anti-emetic intervention recommended.  4. Musculoskeletal:  The gait is slightly impaired currently due to the right knee pain.  Consider fall risk precautions.  She is taking norco only very sparingly for the pain.    5. VTE risk: 0.5%.  She notes that she stopped her aspirin on 3/26/2017 in preparation for the surgery.     Patient is optimized and is  acceptable candidate for the proposed procedure.  No further diagnostic evaluation is needed.     Patient also evaluated by Dr. Freeman.           Rose Aguilar, REMEDIOS, RN, APRN  Preoperative Assessment Center  [facility]  Clinic and Surgery Center  Phone: [phone]  Fax: [phone]

## 2017-04-07 ENCOUNTER — HOSPITAL ENCOUNTER (INPATIENT)
Facility: CLINIC | Age: 82
LOS: 3 days | Discharge: NURSING, REHAB OR LONG-TERM CARE | DRG: 470 | End: 2017-04-10
Attending: ORTHOPAEDIC SURGERY | Admitting: ORTHOPAEDIC SURGERY
Payer: COMMERCIAL

## 2017-04-07 ENCOUNTER — ANESTHESIA (OUTPATIENT)
Dept: SURGERY | Facility: CLINIC | Age: 82
DRG: 470 | End: 2017-04-07
Payer: COMMERCIAL

## 2017-04-07 ENCOUNTER — APPOINTMENT (OUTPATIENT)
Dept: GENERAL RADIOLOGY | Facility: CLINIC | Age: 82
DRG: 470 | End: 2017-04-07
Attending: ORTHOPAEDIC SURGERY
Payer: COMMERCIAL

## 2017-04-07 DIAGNOSIS — Z98.890 STATUS POST KNEE SURGERY: Primary | ICD-10-CM

## 2017-04-07 DIAGNOSIS — R52 PAIN: ICD-10-CM

## 2017-04-07 LAB
ABO + RH BLD: NORMAL
ABO + RH BLD: NORMAL
BLD GP AB SCN SERPL QL: NORMAL
BLOOD BANK CMNT PATIENT-IMP: NORMAL
BLOOD BANK CMNT PATIENT-IMP: NORMAL
INR PPP: 1 (ref 0.86–1.14)
SPECIMEN EXP DATE BLD: NORMAL

## 2017-04-07 PROCEDURE — 25000132 ZZH RX MED GY IP 250 OP 250 PS 637: Performed by: STUDENT IN AN ORGANIZED HEALTH CARE EDUCATION/TRAINING PROGRAM

## 2017-04-07 PROCEDURE — 25000566 ZZH SEVOFLURANE, EA 15 MIN: Performed by: ORTHOPAEDIC SURGERY

## 2017-04-07 PROCEDURE — 37000009 ZZH ANESTHESIA TECHNICAL FEE, EACH ADDTL 15 MIN: Performed by: ORTHOPAEDIC SURGERY

## 2017-04-07 PROCEDURE — 85610 PROTHROMBIN TIME: CPT | Performed by: ORTHOPAEDIC SURGERY

## 2017-04-07 PROCEDURE — 25000125 ZZHC RX 250: Performed by: ORTHOPAEDIC SURGERY

## 2017-04-07 PROCEDURE — 25800025 ZZH RX 258: Performed by: ORTHOPAEDIC SURGERY

## 2017-04-07 PROCEDURE — 27810169 ZZH OR IMPLANT GENERAL: Performed by: ORTHOPAEDIC SURGERY

## 2017-04-07 PROCEDURE — 36000064 ZZH SURGERY LEVEL 4 EA 15 ADDTL MIN - UMMC: Performed by: ORTHOPAEDIC SURGERY

## 2017-04-07 PROCEDURE — 25000128 H RX IP 250 OP 636: Performed by: ORTHOPAEDIC SURGERY

## 2017-04-07 PROCEDURE — 40000170 ZZH STATISTIC PRE-PROCEDURE ASSESSMENT II: Performed by: ORTHOPAEDIC SURGERY

## 2017-04-07 PROCEDURE — 71000014 ZZH RECOVERY PHASE 1 LEVEL 2 FIRST HR: Performed by: ORTHOPAEDIC SURGERY

## 2017-04-07 PROCEDURE — 25000128 H RX IP 250 OP 636: Performed by: NURSE ANESTHETIST, CERTIFIED REGISTERED

## 2017-04-07 PROCEDURE — 37000008 ZZH ANESTHESIA TECHNICAL FEE, 1ST 30 MIN: Performed by: ORTHOPAEDIC SURGERY

## 2017-04-07 PROCEDURE — 0SRC0J9 REPLACEMENT OF RIGHT KNEE JOINT WITH SYNTHETIC SUBSTITUTE, CEMENTED, OPEN APPROACH: ICD-10-PCS | Performed by: ORTHOPAEDIC SURGERY

## 2017-04-07 PROCEDURE — 12000001 ZZH R&B MED SURG/OB UMMC

## 2017-04-07 PROCEDURE — 36000062 ZZH SURGERY LEVEL 4 1ST 30 MIN - UMMC: Performed by: ORTHOPAEDIC SURGERY

## 2017-04-07 PROCEDURE — 40000940 XR KNEE PORT RT 1/2 VW: Mod: RT

## 2017-04-07 PROCEDURE — 25000125 ZZHC RX 250: Performed by: NURSE ANESTHETIST, CERTIFIED REGISTERED

## 2017-04-07 PROCEDURE — 25000128 H RX IP 250 OP 636: Performed by: STUDENT IN AN ORGANIZED HEALTH CARE EDUCATION/TRAINING PROGRAM

## 2017-04-07 PROCEDURE — 36415 COLL VENOUS BLD VENIPUNCTURE: CPT | Performed by: ORTHOPAEDIC SURGERY

## 2017-04-07 PROCEDURE — 25800025 ZZH RX 258: Performed by: ANESTHESIOLOGY

## 2017-04-07 PROCEDURE — C1776 JOINT DEVICE (IMPLANTABLE): HCPCS | Performed by: ORTHOPAEDIC SURGERY

## 2017-04-07 PROCEDURE — 25000132 ZZH RX MED GY IP 250 OP 250 PS 637: Performed by: INTERNAL MEDICINE

## 2017-04-07 PROCEDURE — 25000132 ZZH RX MED GY IP 250 OP 250 PS 637: Performed by: ORTHOPAEDIC SURGERY

## 2017-04-07 PROCEDURE — 27210794 ZZH OR GENERAL SUPPLY STERILE: Performed by: ORTHOPAEDIC SURGERY

## 2017-04-07 DEVICE — IMP COMP PATELLA BIOM ARCM MED 34MM 11-150842: Type: IMPLANTABLE DEVICE | Site: KNEE | Status: FUNCTIONAL

## 2017-04-07 DEVICE — BONE CEMENT SIMPLEX FULL DOSE 6191-1-001: Type: IMPLANTABLE DEVICE | Site: KNEE | Status: FUNCTIONAL

## 2017-04-07 DEVICE — IMP COMP TIBIAL KNEE ASCENT 71MM 141233: Type: IMPLANTABLE DEVICE | Site: KNEE | Status: FUNCTIONAL

## 2017-04-07 DEVICE — IMPLANTABLE DEVICE: Type: IMPLANTABLE DEVICE | Site: KNEE | Status: FUNCTIONAL

## 2017-04-07 DEVICE — IMP COMP FEMORAL VANGARD BIOM RT 65MM 183008: Type: IMPLANTABLE DEVICE | Site: KNEE | Status: FUNCTIONAL

## 2017-04-07 RX ORDER — GABAPENTIN 300 MG/1
300 CAPSULE ORAL ONCE
Status: COMPLETED | OUTPATIENT
Start: 2017-04-07 | End: 2017-04-07

## 2017-04-07 RX ORDER — NALOXONE HYDROCHLORIDE 0.4 MG/ML
.1-.4 INJECTION, SOLUTION INTRAMUSCULAR; INTRAVENOUS; SUBCUTANEOUS
Status: DISCONTINUED | OUTPATIENT
Start: 2017-04-07 | End: 2017-04-10 | Stop reason: HOSPADM

## 2017-04-07 RX ORDER — HYDROMORPHONE HYDROCHLORIDE 1 MG/ML
.3-.5 INJECTION, SOLUTION INTRAMUSCULAR; INTRAVENOUS; SUBCUTANEOUS EVERY 5 MIN PRN
Status: DISCONTINUED | OUTPATIENT
Start: 2017-04-07 | End: 2017-04-07 | Stop reason: HOSPADM

## 2017-04-07 RX ORDER — SODIUM CHLORIDE, SODIUM LACTATE, POTASSIUM CHLORIDE, CALCIUM CHLORIDE 600; 310; 30; 20 MG/100ML; MG/100ML; MG/100ML; MG/100ML
INJECTION, SOLUTION INTRAVENOUS CONTINUOUS
Status: DISCONTINUED | OUTPATIENT
Start: 2017-04-07 | End: 2017-04-07 | Stop reason: HOSPADM

## 2017-04-07 RX ORDER — ACETAMINOPHEN 325 MG/1
650 TABLET ORAL EVERY 4 HOURS PRN
Status: DISCONTINUED | OUTPATIENT
Start: 2017-04-10 | End: 2017-04-10 | Stop reason: HOSPADM

## 2017-04-07 RX ORDER — CEFAZOLIN SODIUM 1 G/3ML
INJECTION, POWDER, FOR SOLUTION INTRAMUSCULAR; INTRAVENOUS PRN
Status: DISCONTINUED | OUTPATIENT
Start: 2017-04-07 | End: 2017-04-07

## 2017-04-07 RX ORDER — ONDANSETRON 2 MG/ML
4 INJECTION INTRAMUSCULAR; INTRAVENOUS EVERY 6 HOURS PRN
Status: DISCONTINUED | OUTPATIENT
Start: 2017-04-07 | End: 2017-04-10 | Stop reason: HOSPADM

## 2017-04-07 RX ORDER — LOSARTAN POTASSIUM 100 MG/1
100 TABLET ORAL DAILY
Status: DISCONTINUED | OUTPATIENT
Start: 2017-04-08 | End: 2017-04-08

## 2017-04-07 RX ORDER — DEXAMETHASONE SODIUM PHOSPHATE 4 MG/ML
INJECTION, SOLUTION INTRA-ARTICULAR; INTRALESIONAL; INTRAMUSCULAR; INTRAVENOUS; SOFT TISSUE PRN
Status: DISCONTINUED | OUTPATIENT
Start: 2017-04-07 | End: 2017-04-07

## 2017-04-07 RX ORDER — ACETAMINOPHEN 325 MG/1
975 TABLET ORAL ONCE
Status: COMPLETED | OUTPATIENT
Start: 2017-04-07 | End: 2017-04-07

## 2017-04-07 RX ORDER — FENTANYL CITRATE 50 UG/ML
25-50 INJECTION, SOLUTION INTRAMUSCULAR; INTRAVENOUS
Status: DISCONTINUED | OUTPATIENT
Start: 2017-04-07 | End: 2017-04-07 | Stop reason: HOSPADM

## 2017-04-07 RX ORDER — PRAMIPEXOLE DIHYDROCHLORIDE 0.12 MG/1
0.12 TABLET ORAL AT BEDTIME
Status: DISCONTINUED | OUTPATIENT
Start: 2017-04-07 | End: 2017-04-07

## 2017-04-07 RX ORDER — WARFARIN SODIUM 2.5 MG/1
2.5 TABLET ORAL
Status: COMPLETED | OUTPATIENT
Start: 2017-04-07 | End: 2017-04-07

## 2017-04-07 RX ORDER — LIDOCAINE 40 MG/G
CREAM TOPICAL
Status: DISCONTINUED | OUTPATIENT
Start: 2017-04-07 | End: 2017-04-07 | Stop reason: HOSPADM

## 2017-04-07 RX ORDER — ONDANSETRON 2 MG/ML
4 INJECTION INTRAMUSCULAR; INTRAVENOUS EVERY 30 MIN PRN
Status: DISCONTINUED | OUTPATIENT
Start: 2017-04-07 | End: 2017-04-07

## 2017-04-07 RX ORDER — ONDANSETRON 2 MG/ML
4 INJECTION INTRAMUSCULAR; INTRAVENOUS EVERY 30 MIN PRN
Status: DISCONTINUED | OUTPATIENT
Start: 2017-04-07 | End: 2017-04-07 | Stop reason: HOSPADM

## 2017-04-07 RX ORDER — LATANOPROST 50 UG/ML
1 SOLUTION/ DROPS OPHTHALMIC AT BEDTIME
Status: DISCONTINUED | OUTPATIENT
Start: 2017-04-07 | End: 2017-04-10 | Stop reason: HOSPADM

## 2017-04-07 RX ORDER — HYDROXYZINE HYDROCHLORIDE 10 MG/1
10 TABLET, FILM COATED ORAL EVERY 6 HOURS PRN
Status: DISCONTINUED | OUTPATIENT
Start: 2017-04-07 | End: 2017-04-10 | Stop reason: HOSPADM

## 2017-04-07 RX ORDER — FENTANYL CITRATE 50 UG/ML
INJECTION, SOLUTION INTRAMUSCULAR; INTRAVENOUS PRN
Status: DISCONTINUED | OUTPATIENT
Start: 2017-04-07 | End: 2017-04-07

## 2017-04-07 RX ORDER — PRAMIPEXOLE DIHYDROCHLORIDE 0.12 MG/1
0.12 TABLET ORAL
Status: DISCONTINUED | OUTPATIENT
Start: 2017-04-07 | End: 2017-04-10 | Stop reason: HOSPADM

## 2017-04-07 RX ORDER — PROPOFOL 10 MG/ML
INJECTION, EMULSION INTRAVENOUS PRN
Status: DISCONTINUED | OUTPATIENT
Start: 2017-04-07 | End: 2017-04-07

## 2017-04-07 RX ORDER — LABETALOL HYDROCHLORIDE 5 MG/ML
10 INJECTION, SOLUTION INTRAVENOUS
Status: DISCONTINUED | OUTPATIENT
Start: 2017-04-07 | End: 2017-04-07 | Stop reason: HOSPADM

## 2017-04-07 RX ORDER — SODIUM CHLORIDE 9 MG/ML
INJECTION, SOLUTION INTRAVENOUS CONTINUOUS
Status: DISCONTINUED | OUTPATIENT
Start: 2017-04-07 | End: 2017-04-08

## 2017-04-07 RX ORDER — ACETAMINOPHEN 325 MG/1
975 TABLET ORAL EVERY 8 HOURS
Status: DISCONTINUED | OUTPATIENT
Start: 2017-04-07 | End: 2017-04-10 | Stop reason: HOSPADM

## 2017-04-07 RX ORDER — LABETALOL HYDROCHLORIDE 5 MG/ML
INJECTION, SOLUTION INTRAVENOUS PRN
Status: DISCONTINUED | OUTPATIENT
Start: 2017-04-07 | End: 2017-04-07

## 2017-04-07 RX ORDER — ACETAMINOPHEN 325 MG/1
325-650 TABLET ORAL EVERY 6 HOURS PRN
Status: DISCONTINUED | OUTPATIENT
Start: 2017-04-07 | End: 2017-04-10 | Stop reason: HOSPADM

## 2017-04-07 RX ORDER — ONDANSETRON 2 MG/ML
INJECTION INTRAMUSCULAR; INTRAVENOUS PRN
Status: DISCONTINUED | OUTPATIENT
Start: 2017-04-07 | End: 2017-04-07

## 2017-04-07 RX ORDER — SODIUM CHLORIDE, SODIUM LACTATE, POTASSIUM CHLORIDE, CALCIUM CHLORIDE 600; 310; 30; 20 MG/100ML; MG/100ML; MG/100ML; MG/100ML
INJECTION, SOLUTION INTRAVENOUS CONTINUOUS
Status: DISCONTINUED | OUTPATIENT
Start: 2017-04-07 | End: 2017-04-07

## 2017-04-07 RX ORDER — OXYCODONE HYDROCHLORIDE 5 MG/1
5-10 TABLET ORAL EVERY 4 HOURS PRN
Status: DISCONTINUED | OUTPATIENT
Start: 2017-04-07 | End: 2017-04-08

## 2017-04-07 RX ORDER — ONDANSETRON 4 MG/1
4 TABLET, ORALLY DISINTEGRATING ORAL EVERY 30 MIN PRN
Status: DISCONTINUED | OUTPATIENT
Start: 2017-04-07 | End: 2017-04-07

## 2017-04-07 RX ORDER — POLYETHYLENE GLYCOL 3350 17 G/17G
17 POWDER, FOR SOLUTION ORAL DAILY PRN
Status: DISCONTINUED | OUTPATIENT
Start: 2017-04-07 | End: 2017-04-10 | Stop reason: HOSPADM

## 2017-04-07 RX ORDER — NORTRIPTYLINE HCL 25 MG
25 CAPSULE ORAL AT BEDTIME
Status: DISCONTINUED | OUTPATIENT
Start: 2017-04-07 | End: 2017-04-10 | Stop reason: HOSPADM

## 2017-04-07 RX ORDER — AMOXICILLIN 250 MG
1-2 CAPSULE ORAL 2 TIMES DAILY
Status: DISCONTINUED | OUTPATIENT
Start: 2017-04-07 | End: 2017-04-10 | Stop reason: HOSPADM

## 2017-04-07 RX ORDER — CEFAZOLIN SODIUM 2 G/100ML
2 INJECTION, SOLUTION INTRAVENOUS
Status: DISCONTINUED | OUTPATIENT
Start: 2017-04-07 | End: 2017-04-07 | Stop reason: HOSPADM

## 2017-04-07 RX ORDER — LIDOCAINE 40 MG/G
CREAM TOPICAL
Status: DISCONTINUED | OUTPATIENT
Start: 2017-04-07 | End: 2017-04-10 | Stop reason: HOSPADM

## 2017-04-07 RX ORDER — GLYCOPYRROLATE 0.2 MG/ML
INJECTION, SOLUTION INTRAMUSCULAR; INTRAVENOUS PRN
Status: DISCONTINUED | OUTPATIENT
Start: 2017-04-07 | End: 2017-04-07

## 2017-04-07 RX ORDER — ONDANSETRON 4 MG/1
4 TABLET, ORALLY DISINTEGRATING ORAL EVERY 30 MIN PRN
Status: DISCONTINUED | OUTPATIENT
Start: 2017-04-07 | End: 2017-04-07 | Stop reason: HOSPADM

## 2017-04-07 RX ORDER — ALBUTEROL SULFATE 0.83 MG/ML
2.5 SOLUTION RESPIRATORY (INHALATION) EVERY 4 HOURS PRN
Status: DISCONTINUED | OUTPATIENT
Start: 2017-04-07 | End: 2017-04-07 | Stop reason: HOSPADM

## 2017-04-07 RX ORDER — HYDROCHLOROTHIAZIDE 25 MG/1
25 TABLET ORAL DAILY
Status: DISCONTINUED | OUTPATIENT
Start: 2017-04-08 | End: 2017-04-08

## 2017-04-07 RX ORDER — NEOSTIGMINE METHYLSULFATE 1 MG/ML
VIAL (ML) INJECTION PRN
Status: DISCONTINUED | OUTPATIENT
Start: 2017-04-07 | End: 2017-04-07

## 2017-04-07 RX ORDER — CEFAZOLIN SODIUM 1 G/3ML
1 INJECTION, POWDER, FOR SOLUTION INTRAMUSCULAR; INTRAVENOUS EVERY 8 HOURS
Status: COMPLETED | OUTPATIENT
Start: 2017-04-08 | End: 2017-04-08

## 2017-04-07 RX ORDER — LIDOCAINE HYDROCHLORIDE 20 MG/ML
INJECTION, SOLUTION INFILTRATION; PERINEURAL PRN
Status: DISCONTINUED | OUTPATIENT
Start: 2017-04-07 | End: 2017-04-07

## 2017-04-07 RX ORDER — ONDANSETRON 4 MG/1
4 TABLET, ORALLY DISINTEGRATING ORAL EVERY 6 HOURS PRN
Status: DISCONTINUED | OUTPATIENT
Start: 2017-04-07 | End: 2017-04-10 | Stop reason: HOSPADM

## 2017-04-07 RX ADMIN — HYDROMORPHONE HYDROCHLORIDE 0.3 MG: 1 INJECTION, SOLUTION INTRAMUSCULAR; INTRAVENOUS; SUBCUTANEOUS at 18:20

## 2017-04-07 RX ADMIN — SENNOSIDES AND DOCUSATE SODIUM 1 TABLET: 8.6; 5 TABLET ORAL at 20:47

## 2017-04-07 RX ADMIN — SODIUM CHLORIDE, POTASSIUM CHLORIDE, SODIUM LACTATE AND CALCIUM CHLORIDE: 600; 310; 30; 20 INJECTION, SOLUTION INTRAVENOUS at 16:30

## 2017-04-07 RX ADMIN — Medication 20 MG: at 15:20

## 2017-04-07 RX ADMIN — FENTANYL CITRATE 50 MCG: 50 INJECTION, SOLUTION INTRAMUSCULAR; INTRAVENOUS at 15:23

## 2017-04-07 RX ADMIN — PHENYLEPHRINE HYDROCHLORIDE 50 MCG: 10 INJECTION, SOLUTION INTRAMUSCULAR; INTRAVENOUS; SUBCUTANEOUS at 15:09

## 2017-04-07 RX ADMIN — LABETALOL HYDROCHLORIDE 5 MG: 5 INJECTION, SOLUTION INTRAVENOUS at 15:39

## 2017-04-07 RX ADMIN — LIDOCAINE HYDROCHLORIDE 20 MG: 20 INJECTION, SOLUTION INFILTRATION; PERINEURAL at 14:37

## 2017-04-07 RX ADMIN — SODIUM CHLORIDE: 9 INJECTION, SOLUTION INTRAVENOUS at 20:47

## 2017-04-07 RX ADMIN — LATANOPROST 1 DROP: 50 SOLUTION/ DROPS OPHTHALMIC at 22:10

## 2017-04-07 RX ADMIN — HYDROMORPHONE HYDROCHLORIDE 0.4 MG: 1 INJECTION, SOLUTION INTRAMUSCULAR; INTRAVENOUS; SUBCUTANEOUS at 18:46

## 2017-04-07 RX ADMIN — GLYCOPYRROLATE 0.2 MG: 0.2 INJECTION, SOLUTION INTRAMUSCULAR; INTRAVENOUS at 17:06

## 2017-04-07 RX ADMIN — CEFAZOLIN 1 G: 1 INJECTION, POWDER, FOR SOLUTION INTRAMUSCULAR; INTRAVENOUS at 16:30

## 2017-04-07 RX ADMIN — PROPOFOL 150 MG: 10 INJECTION, EMULSION INTRAVENOUS at 14:37

## 2017-04-07 RX ADMIN — WARFARIN SODIUM 2.5 MG: 2.5 TABLET ORAL at 22:05

## 2017-04-07 RX ADMIN — ACETAMINOPHEN 975 MG: 325 TABLET, FILM COATED ORAL at 11:20

## 2017-04-07 RX ADMIN — Medication 30 MG: at 14:37

## 2017-04-07 RX ADMIN — FENTANYL CITRATE 100 MCG: 50 INJECTION, SOLUTION INTRAMUSCULAR; INTRAVENOUS at 15:08

## 2017-04-07 RX ADMIN — DEXAMETHASONE SODIUM PHOSPHATE 4 MG: 4 INJECTION, SOLUTION INTRAMUSCULAR; INTRAVENOUS at 14:54

## 2017-04-07 RX ADMIN — HYDROMORPHONE HYDROCHLORIDE 0.2 MG: 1 INJECTION, SOLUTION INTRAMUSCULAR; INTRAVENOUS; SUBCUTANEOUS at 16:53

## 2017-04-07 RX ADMIN — GABAPENTIN 300 MG: 300 CAPSULE ORAL at 11:20

## 2017-04-07 RX ADMIN — PROPOFOL 50 MG: 10 INJECTION, EMULSION INTRAVENOUS at 15:15

## 2017-04-07 RX ADMIN — CEFAZOLIN 2 G: 1 INJECTION, POWDER, FOR SOLUTION INTRAMUSCULAR; INTRAVENOUS at 14:43

## 2017-04-07 RX ADMIN — NEOSTIGMINE METHYLSULFATE 2.5 MG: 1 INJECTION INTRAMUSCULAR; INTRAVENOUS; SUBCUTANEOUS at 16:57

## 2017-04-07 RX ADMIN — NORTRIPTYLINE HYDROCHLORIDE 25 MG: 25 CAPSULE ORAL at 22:05

## 2017-04-07 RX ADMIN — LABETALOL HYDROCHLORIDE 5 MG: 5 INJECTION, SOLUTION INTRAVENOUS at 15:44

## 2017-04-07 RX ADMIN — ONDANSETRON 4 MG: 2 INJECTION INTRAMUSCULAR; INTRAVENOUS at 16:55

## 2017-04-07 RX ADMIN — HYDROMORPHONE HYDROCHLORIDE 0.3 MG: 1 INJECTION, SOLUTION INTRAMUSCULAR; INTRAVENOUS; SUBCUTANEOUS at 17:00

## 2017-04-07 RX ADMIN — SODIUM CHLORIDE 1 G: 9 INJECTION, SOLUTION INTRAVENOUS at 14:52

## 2017-04-07 RX ADMIN — SODIUM CHLORIDE, POTASSIUM CHLORIDE, SODIUM LACTATE AND CALCIUM CHLORIDE: 600; 310; 30; 20 INJECTION, SOLUTION INTRAVENOUS at 14:28

## 2017-04-07 RX ADMIN — FENTANYL CITRATE 50 MCG: 50 INJECTION, SOLUTION INTRAMUSCULAR; INTRAVENOUS at 14:37

## 2017-04-07 RX ADMIN — OXYCODONE HYDROCHLORIDE 5 MG: 5 TABLET ORAL at 18:39

## 2017-04-07 RX ADMIN — HYDROMORPHONE HYDROCHLORIDE 0.3 MG: 1 INJECTION, SOLUTION INTRAMUSCULAR; INTRAVENOUS; SUBCUTANEOUS at 18:01

## 2017-04-07 RX ADMIN — FENTANYL CITRATE 50 MCG: 50 INJECTION, SOLUTION INTRAMUSCULAR; INTRAVENOUS at 15:15

## 2017-04-07 RX ADMIN — GLYCOPYRROLATE 0.2 MG: 0.2 INJECTION, SOLUTION INTRAMUSCULAR; INTRAVENOUS at 16:57

## 2017-04-07 NOTE — IP AVS SNAPSHOT
` ` Patient Information     Patient Name Sex     Zaira Fierro ([id number]) Female 1935       Room Bed    [address] [place] [address]      Patient Demographics     Address Phone E-mail Address    [address] [place] [address] [phone] (Home) *Preferred* [email or web address]      Patient Ethnicity & Race     Ethnic Group Patient Race     White      Emergency Contact(s)     Name Relation Home Work Mobile    MARY KAY FIERRO Sister [phone]      LU KIRK Sister   [phone]      Documents on File        Status Date Received Description       Documents for the Patient    Privacy Notice - [place]  05     GAIL Face Sheet Received () 09     Insurance Card  09     GAIL Face Sheet Received () 09     Waiver - Payment  09     Privacy Notice - [place]  09     External Medication Information Consent       Patient ID Received 12     Consent for Services - Hospital/Clinic       Privacy Notice - [facility] Received 11     Face Sheet Received () 11     Consent for Services - Hospital/Clinic Received () 11     CMS IM for Patient Signature       Insurance Card Received 12     Consent for Services - Hospital/Clinic Received () 12     Privacy Notice - [place] Received 12     Consent for Services - [facility] Received 10/16/12     Consent for Services - [facility]  10/19/12 GENERAL CONSENT FORM: SHARED EHR - MELCHOR AARON, 10/16/12    Consent for EHR Access  13 Copied from existing Consent for services - C/HOD collected on 2012    Advance Directives and Living Will Received 10/07/13 PROVIDER ORDERS FOR LIFE SUSTAINING TREATMENT (POLST)    [facility] Specified Other       Consent for Services - Hospital/Clinic Received () 02/03/15     Consent for Services - Hospital/Clinic  () 02/04/15 CONSENT FOR SERVICE    Consent for Services/Privacy Notice - Hospital/Clinic Received ()  16     Consent for Services/Privacy Notice - Hospital/Clinic  () 16 CONSENT FOR SERVICES/PRIVACY NOTICE    Consent for Services/Privacy Notice - Hospital/Clinic-Esign       Consent for Services/Privacy Notice - Hospital/Clinic Received 17     Insurance Card Received 17 [facility]    Consent for Services/Privacy Notice - Hospital/Clinic  (Deleted) 16 CONSENT FOR SERVICES/PRIVACY NOTICE       Documents for the Encounter    CMS IM for Patient Signature Received 17 DATE/TIME    H/P - History and Physical  17 PREOP ASSESSMENT [facility]     PREOP H/P   3/31/17      Admission Information     Attending Provider Admitting Provider Admission Type Admission Date/Time    Aamir Sutton MD Cheng, Edward Y, MD Elective 17  1029    Discharge Date Hospital Service Auth/Cert Status Service Area     [facility]    Unit Room/Bed Admission Status       UR 8A  Admission (Confirmed)       Admission     Complaint    Osteoarthritis Right Knee, Status post knee surgery      Hospital Account     Name Acct ID Class Status Primary Coverage    Zaira Fierro [id number] Inpatient Open [facility]            Guarantor Account (for Hospital Account #[id number])     Name Relation to Pt Service Area Active? Acct Type    Zaira Fierro  FCS Yes Personal/Family    Address Phone          [address] [address] [phone](H)              Coverage Information (for Hospital Account #[id number])     F/O Payor/Plan Precert #    [facility]     Subscriber Subscriber #    Zaira Fierro [id number]    Address Phone    [address]  [place] [address] [phone]

## 2017-04-07 NOTE — IP AVS SNAPSHOT
"` `           UR 8A: [phone]                                              INTERAGENCY TRANSFER FORM - NURSING   2017                    Hospital Admission Date: 2017  MAYCO GUY   : 1935  Sex: Female        Attending Provider: Aamir Sutton MD     Allergies:  No Known Allergies    Infection:  None   Service:  ORTHOPEDICS    Ht:  1.575 m (5' 2\")   Wt:  76.6 kg (168 lb 14 oz)   Admission Wt:  76.6 kg (168 lb 14 oz)    BMI:  30.89 kg/m 2   BSA:  1.83 m 2            Patient PCP Information     Provider PCP Type    Ange Crespo MD General      Current Code Status     Date Active Code Status Order ID Comments User Context       Prior      Code Status History     Date Active Date Inactive Code Status Order ID Comments User Context    4/10/2017  9:15 AM  Full Code [id number]  Aniket Carmichael MD Outpatient    2017  7:57 PM 4/10/2017  9:15 AM Full Code [id number]  Aniket Carmichael MD Inpatient      Advance Directives        Does patient have a scanned Advance Directive/ACP document in EPIC?           Yes        Hospital Problems as of 4/10/2017              Priority Class Noted POA    Status post knee surgery Medium  2017 Yes      Non-Hospital Problems as of 4/10/2017              Priority Class Noted    Health Care Home   10/29/2012    Diverticulosis of large intestine   10/29/2012    Hyperlipidemia   10/29/2012    Obesity   10/29/2012    Disorder of bone and cartilage   10/29/2012    Prediabetes   10/29/2012    Advanced directives, counseling/discussion   2013    Cataract Medium  2015    Macular degeneration (senile) of retina   2015    Primary osteoarthritis of both knees Medium  10/17/2015    Arthritis of knee Medium  2015    Essential hypertension with goal blood pressure less than 140/90 Medium  2016      Immunizations     Name Date      Influenza (High Dose) 3 valent vaccine 16     Influenza (High Dose) 3 valent vaccine 10/16/15     " Influenza (IIV3) 11/15/13     Influenza (IIV3) 10/16/12     Influenza (IIV3) 12/06/11     Influenza (IIV3) 12/15/10     Influenza (IIV3) 09/09/08     Influenza (IIV3) 11/01/07     Influenza (IIV3) 11/06/06     Influenza (IIV3) 12/09/04     Influenza Vaccine, 3 YRS +, IM (QUADRIVALENT W/PRESERVATIVES) 11/05/14     Pneumococcal (PCV 13) 07/22/16     Pneumococcal 23 valent 11/25/03     TD (ADULT, 7+) 09/14/06     TD (ADULT, 7+) 08/15/96     TDAP Vaccine (Boostrix) 12/14/16          END      ASSESSMENT     Discharge Profile Flowsheet     DISCHARGE NEEDS ASSESSMENT     Inspection  Partial (identify areas NOT inspected) (All areas assessed except under incisional dressing.) 04/10/17 0910    Anticipated Changes Related to Illness  none 04/07/17 2015   Procedural focused assessment (identify areas inspected)   Nose;Cheek, left;Cheek, right;Hip, right;Hip, left;Knee, left;Knee, right 04/07/17 1501    Equipment Currently Used at Home  cane, straight 04/08/17 1344   Skin WDL  ex 04/10/17 0910    Transportation Available  car;family or friend will provide 04/08/17 0935   Skin areas NOT inspected  -- (All areas assessed except under incisional dressing.) 04/10/17 0910    GASTROINTESTINAL (ADULT,PEDIATRIC,OB)     Skin Color/Characteristics  without discoloration 04/10/17 0910    GI WDL  WDL 04/10/17 0910   Skin Temperature  warm 04/10/17 0910    Last Bowel Movement  04/09/17 (Per pt report) 04/10/17 0910   Skin Moisture  dry 04/10/17 0910    COMMUNICATION ASSESSMENT     Skin Elasticity  quick return to original state 04/10/17 0910    Patient's communication style  spoken language (English or Bilingual) 04/07/17 2015   Skin Integrity  incision(s) 04/10/17 0910    Patient's primary language  English 04/07/17 2015   SAFETY       services offered to the patient? (Install  services phone or TTY, if applicable)  no 04/07/17 2015   Safety WDL  WDL 04/10/17 1241    SKIN     Safety Factors  bed in low position;wheels  "locked;call light in reach;upper side rails raised x 2;ID band on 04/10/17 0910                 Assessment WDL (Within Defined Limits) Definitions           Safety WDL     Effective: 09/28/15    Row Information: <b>WDL Definition:</b> Bed in low position, wheels locked; call light in reach; upper side rails up x 2; ID band on<br> <font color=\"gray\"><i>Item=AS safety wdl>>List=AS safety wdl>>Version=F14</i></font>      Skin WDL     Effective: 09/28/15    Row Information: <b>WDL Definition:</b> Warm; dry; intact; elastic; without discoloration; pressure points without redness<br> <font color=\"gray\"><i>Item=AS skin wdl>>List=AS skin wdl>>Version=F14</i></font>      Vitals     Vital Signs Flowsheet     VITAL SIGNS     Additional Documentation  CAPA (Group) 04/10/17 0609    Temp  99.2  F (37.3  C) 04/10/17 1241   CLINICALLY ALIGNED PAIN ASSESSMENT (CAPA) ([facility], [facility] AND [facility] ADULTS ONLY)      Temp src  Oral 04/10/17 1241   Comfort  tolerable with discomfort 04/10/17 1405    Resp  16 04/10/17 1241   Change in Pain  getting worse 04/10/17 1405    Pulse  103 04/09/17 2241   Pain Control  partially effective 04/10/17 1405    Heart Rate  99 04/10/17 1241   Functioning  can do most things, but pain gets in the way of some 04/10/17 1405    Pulse/Heart Rate Source  Monitor 04/10/17 1241   Sleep  awake with occasional pain 04/10/17 1405    BP  142/59 04/10/17 1241   ANALGESIA SIDE EFFECTS MONITORING      BP Location  Right arm 04/10/17 1241   Side Effects Monitoring: Respiratory Quality  R 04/10/17 1405    Patient Position  Sitting 04/07/17 1844   Side Effects Monitoring: Respiratory Depth  N 04/10/17 1405    SITTING ORTHOSTATIC BP     Side Effects Monitoring: Sedation Level  1 04/10/17 1405    Sitting Orthostatic BP  155/57 04/08/17 1348   HEIGHT AND WEIGHT      OXYGEN THERAPY     Height  1.575 m (5' 2\") 04/07/17 1053    SpO2  92 % 04/10/17 1241   Weight  76.6 kg (168 lb 14 oz) 04/07/17 1053    O2 Device  None (Room air) " 04/10/17 1241   BSA (Calculated - sq m)  1.83 04/07/17 1053    Oxygen Delivery  2 LPM 04/09/17 0647   BMI (Calculated)  30.95 04/07/17 1053    RESPIRATORY MONITORING     POSITIONING      Respiratory Monitoring (EtCO2)  30 mmHg 04/08/17 1451   Body Position  independently positioning;supine, head elevated 04/10/17 0910    Integrated Pulmonary Index (IPI)  8-9 04/08/17 1451   Head of Bed (HOB)  HOB at 30-45 degrees 04/10/17 0910    PAIN/COMFORT     DAILY CARE      Patient Currently in Pain  yes 04/10/17 0751   Activity Type  activity adjusted per tolerance;ambulated to bathroom;activity encouraged;dorsiflexion, plantar flexion encouraged 04/10/17 0910    Preferred Pain Scale  CAPA (Clinically Aligned Pain Assessment) ([facility], [facility] and [facility] Adults Only) 04/10/17 0751   Activity Level of Assistance  assistance, 1 person 04/10/17 0910    Pain Location  Knee 04/10/17 0751   ECG      Pain Orientation  Right 04/10/17 0751   ECG Rhythm  Normal sinus rhythm 04/07/17 1851    Pain Descriptors  Sore 04/10/17 0751   Ectopy  None 04/07/17 1851    Pain Intervention(s)  Medication (See eMAR) 04/10/17 1405   Lead Monitored  Lead II 04/07/17 1851    Response to Interventions  Decrease in pain 04/10/17 0609                 Patient Lines/Drains/Airways Status    Active LINES/DRAINS/AIRWAYS     Name: Placement date: Placement time: Site: Days: Last dressing change:    Closed/Suction Drain 1 Right Knee Bulb 15 [place] 04/07/17   1645   Knee   2     Incision/Surgical Site 04/07/17 Right Knee 04/07/17   1525    2             Patient Lines/Drains/Airways Status    Active PICC/CVC     None            Intake/Output Detail Report     Date Intake     Output     Net    Shift P.O. I.V. IV Piggyback Total Urine Drains Blood Total       Day 04/09/17 0000 - 04/09/17 0659 -- -- -- -- 450 100 -- 550 -550    Liat 04/09/17 0700 - 04/09/17 1459 -- -- -- -- -- -- -- -- 0    Noc 04/09/17 1500 - 04/09/17 2359 -- -- -- -- -- -- -- -- 0    Day 04/10/17 0000  - 04/10/17 0659 -- -- -- -- -- -- -- -- 0    Liat 04/10/17 0700 - 04/10/17 1459 -- -- -- -- -- -- -- -- 0      Last Void/BM       Most Recent Value    Urine Occurrence 1 at 04/09/2017 2146    Stool Occurrence       Case Management/Discharge Planning     Case Management/Discharge Planning Flowsheet     REFERRAL INFORMATION     Transportation Available  car;family or friend will provide 04/08/17 0935    Arrived From  admitted as an inpatient 04/07/17 2005   FINAL RESOURCES      LIVING ENVIRONMENT     Equipment Currently Used at Home  cane, straight 04/08/17 1344    Lives With  sibling(s) 04/08/17 1344   ABUSE RISK SCREEN      Living Arrangements  house 04/08/17 1344   QUESTION TO PATIENT:  Has a member of your family or a partner(now or in the past) intimidated, hurt, manipulated, or controlled you in any way?  no 04/08/17 2143    Provides Primary Care For  no one 04/07/17 2015   QUESTION TO PATIENT: Do you feel safe going back to the place where you are living?  yes 04/08/17 2143    COPING/STRESS     OBSERVATION: Is there reason to believe there has been maltreatment of a vulnerable adult (ie. Physical/Sexual/Emotional abuse, self neglect, lack of adequate food, shelter, medical care, or financial exploitation)?  no 04/08/17 2143    Major Change/Loss/Stressor  none 03/31/17 1820   HOMICIDE RISK      Patient Personal Strengths  able to adapt;motivated 04/07/17 2122   Homicidal Ideation  no 04/07/17 2122    DISCHARGE PLANNING     (R) MENTAL HEALTH SUICIDE RISK      Anticipated Changes Related to Illness  none 04/07/17 2015   Are you depressed or being treated for depression?  No 04/08/17 2158

## 2017-04-07 NOTE — LETTER
Transition Communication Hand-off for Care Transitions to Next Level of Care Provider    Name: Zaira Fierro  MRN #: [id number]  Primary Care Provider: Ange Crespo     Primary Clinic[facility] [address]   [facility] [address]     Reason for Hospitalization:  Osteoarthritis Right Knee  Status post knee surgery  Admit Date/Time: 4/7/2017 10:29 AM  Discharge Date: 4/10/17  Payor Source: Payor: [employer] / Plan: [employer] [facility] [place] / Product Type: HMO            Discharge Plan: [facility] [phone]. Will be followed by  [facility], Dr. Montano and Suzan Hung, [phone]       Concern for non-adherence with plan of care:   Y/N N  Discharge Needs Assessment:  Needs       Most Recent Value    Anticipated Changes Related to Illness none    Equipment Currently Used at Home cane, straight    Transportation Available car, family or friend will provide            Date Time Provider Department Center   4/10/2017 2:15 PM Apoorva Palmer, PT [facility] [place]   4/11/2017 7:30 AM Jennifer Walters, OT [facility] [place]       Any outstanding tests or procedures:        Referrals     Future Labs/Procedures    Occupational Therapy Adult Consult     Comments:    Evaluate and treat as clinically indicated.    Reason:  eval and treat    Physical Therapy Adult Consult     Comments:    Evaluate and treat as clinically indicated.    Reason:  eval and treat            Key Recommendations:      CASANDRA Casanova  10A Med/Surg and Adult [facility] ED  [address] [address]    [phone]    AVS/Discharge Summary is the source of truth; this is a helpful guide for improved communication of patient story

## 2017-04-07 NOTE — IP AVS SNAPSHOT
"          UR 8A: [phone]                                              INTERAGENCY TRANSFER FORM - LAB / IMAGING / EKG / EMG RESULTS   2017                    Hospital Admission Date: 2017  MAYCO GUY   : 1935  Sex: Female        Attending Provider: Aamir Sutton MD     Allergies:  No Known Allergies    Infection:  None   Service:  ORTHOPEDICS    Ht:  1.575 m (5' 2\")   Wt:  76.6 kg (168 lb 14 oz)   Admission Wt:  76.6 kg (168 lb 14 oz)    BMI:  30.89 kg/m 2   BSA:  1.83 m 2            Patient PCP Information     Provider PCP Type    Ange Crespo MD General         Lab Results - 3 Days      INR [[id number]] (Abnormal)  Resulted: 04/10/17 0606, Result status: Final result    Ordering provider: Aamir Sutton MD  04/10/17 0000 Resulting lab: [facility]    Specimen Information    Type Source Collected On   Blood  04/10/17 0539          Components       Value Reference Range Flag Lab   INR 1.31 0.86 - 1.14 H 13            INR [[id number]] (Abnormal)  Resulted: 17 0624, Result status: Final result    Ordering provider: Aamir Sutton MD  17 0001 Resulting lab: [facility]    Specimen Information    Type Source Collected On   Blood  17 0601          Components       Value Reference Range Flag Lab   INR 1.28 0.86 - 1.14 H 13            Hemoglobin [[id number]] (Abnormal)  Resulted: 17 0616, Result status: Final result    Ordering provider: Aamir Sutton MD  17 0001 Resulting lab: [facility]    Specimen Information    Type Source Collected On   Blood  17 0601          Components       Value Reference Range Flag Lab   Hemoglobin 10.5 11.7 - 15.7 g/dL L 13            Glucose by meter [[id number]] (Abnormal)  Resulted: 17 1240, Result status: Final result    Ordering provider: Aamir Sutton MD  17 1227 Resulting lab: POINT OF CARE TEST, GLUCOSE    " Specimen Information    Type Source Collected On     04/08/17 1227          Components       Value Reference Range Flag Lab   Glucose 145 70 - 99 mg/dL H 170            Glucose by meter [[id number]] (Abnormal)  Resulted: 04/08/17 1125, Result status: Final result    Ordering provider: Aamir Sutton MD  04/08/17 1017 Resulting lab: POINT OF CARE TEST, GLUCOSE    Specimen Information    Type Source Collected On     04/08/17 1017          Components       Value Reference Range Flag Lab   Glucose 106 70 - 99 mg/dL H 170            INR [[id number]]  Resulted: 04/08/17 0612, Result status: Final result    Ordering provider: Aamir Sutton MD  04/08/17 0000 Resulting lab: [facility]    Specimen Information    Type Source Collected On   Blood  04/08/17 0546          Components       Value Reference Range Flag Lab   INR 1.09 0.86 - 1.14  13            Hemoglobin [[id number]] (Abnormal)  Resulted: 04/08/17 0606, Result status: Final result    Ordering provider: Aamir Sutton MD  04/08/17 0000 Resulting lab: [facility]    Specimen Information    Type Source Collected On   Blood  04/08/17 0546          Components       Value Reference Range Flag Lab   Hemoglobin 11.0 11.7 - 15.7 g/dL L 13            INR [[id number]]  Resulted: 04/07/17 2131, Result status: Final result    Ordering provider: Aamir Sutton MD  04/07/17 2008 Resulting lab: [facility]    Specimen Information    Type Source Collected On   Blood  04/07/17 2102          Components       Value Reference Range Flag Lab   INR 1.00 0.86 - 1.14  13            Testing Performed By     Lab - Abbreviation Name Director Address Valid Date Range    13 - Unknown [facility] Unknown [address] [facility] [id number] 01/15/15 0916 - Present    170 - Unknown POINT OF CARE TEST, GLUCOSE Unknown Unknown 10/31/11 1114 - Present            Unresulted  Labs (24h ago through future)    Start       Ordered    04/08/17 0600  INR  (warfarin (COUMADIN) Pharmacy Consult  )  DAILY,   Routine      04/07/17 1957    Unscheduled  Hemoglobin  CONDITIONAL X 2,   Routine     Comments:  Release on POD 1 and POD 2 if the morning Hgb is less than 8.0    04/07/17 1957         Imaging Results - 3 Days      XR Knee Port Right 1/2 Views [[id number]]  Resulted: 04/07/17 2011, Result status: Final result    Ordering provider: Aamir Sutton MD  04/07/17 1812 Resulted by: Patrice Esquivel MD    Performed: 04/07/17 1815 - 04/07/17 1842 Resulting lab: RADIOLOGY RESULTS    Narrative:       XR KNEE PORT RT 1/2 VW  4/7/2017 6:42 PM     HISTORY:  Post-Op Total Knee    COMPARISON: None.    FINDINGS: 2 views. Right total knee arthroplasty has been performed.  The components are in good position. No postoperative complications  are identified.    NINFA ESQUIVEL MD      Testing Performed By     Lab - Abbreviation Name Director Address Valid Date Range    104 - Rad Rslts RADIOLOGY RESULTS Unknown Unknown 02/16/05 1553 - Present            Encounter-Level Documents:     There are no encounter-level documents.      Order-Level Documents:     There are no order-level documents.

## 2017-04-07 NOTE — OR NURSING
Pt brought eye drops because she was told to bring today. They need to be refrigerated; talked w/ 8A charge and meds were tubed to 8A

## 2017-04-07 NOTE — PROGRESS NOTES
Orthopaedic Surgery Brief Op-Note     Patient: Zaira Fierro  : 1935  Date of Service: 2017 6:12 PM    Pre-operative Diagnosis: right knee end stage osteoarthritis  Post-operative Diagnosis: same    Procedure(s) Performed: right total knee arthroplasty    Staff: Dr. Sutton  Resident: Amol    Anesthesia: General  EBL: 20 cc  Tourniquet Time: 125 minutes at 250 mmHg    Implants:   1.  [employer]   -Tibial component 71 mm   -CR femoral Right 65 mm   -Patella 34 x 9 mm   -CR tibial bearing PE 10 mm  Drains: TRINIDAD drain  Intra-op Labs/Cxs/Specimens: none  Complications: none apparent  Findings: diffuse tricompartment osteoarthritis    Dispo: Stable to PACU, then to floor.    Post-Op Plan:  Assessment/Plan: Zaira Fierro is a 81F s/p right TKA on 2017 with Dr. Sutton.    Activity: Up with assist.  Weight bearing status: WBAT RLE   Antibiotics: Ancef x 24 hours.  Diet: Begin with clear fluids and progress diet as tolerated.   DVT prophylaxis: Coumadin x 4 weeks, INR goal 1.5-2.5 and mechanical while in the hospital  Bracing/Splinting: none   Elevation: bridge knee with pillows under ankle  CPM: at least 8 hours per day, advance 5 degrees every hour  Wound Care: keep c/d/i.   Drains: Document output per shift, ortho to remove POD #1  Pain management: transition from IV to orals as tolerated.   X-rays: XR AP/lat right knee PACU  Physical Therapy: eval and treat, ROM, ADL's.   Occupational Therapy: eval and treat  Labs: Trend Hgb on POD #1, 2, 3   Cultures: none   Consults: PT, OT. medicine, appreciate assistance in caring for this patient.   Follow-up: Clinic with Dr. Sutton in 4 weeks with XR     Disposition: Pending progress with therapies, pain control on orals, and medical stability, anticipate discharge to home vs TCU on POD #2-4.    Future Appointments  Date Time Provider Department Center   2017 8:30 AM Apoorva Palmer, PT [facility] [place]   2017 11:00 AM Jennifer Walters, [facility] [place]        Aniket Carmichael MD  Orthopaedic Surgery PGY-4  [phone]      For questions about this patient, please attempt to contact me at my pager prior to contacting the orthopaedics resident on call. Thank you!

## 2017-04-07 NOTE — IP AVS SNAPSHOT
"    UR 8A: [phone]                                              INTERAGENCY TRANSFER FORM - PHYSICIAN ORDERS   2017                    Hospital Admission Date: 2017  MAYCO GUY   : 1935  Sex: Female        Attending Provider: (none)    Allergies:  No Known Allergies    Infection:  None   Service:  ORTHOPEDICS    Ht:  1.575 m (5' 2\")   Wt:  76.6 kg (168 lb 14 oz)   Admission Wt:  76.6 kg (168 lb 14 oz)    BMI:  30.89 kg/m 2   BSA:  1.83 m 2            Patient PCP Information     Provider PCP Type    Ange Crespo MD General      ED Clinical Impression     Diagnosis Description Comment Added By Time Added    Status post knee surgery [Z98.890] Status post knee surgery [Z98.890]  Aniket Carmichael MD 4/10/2017  9:15 AM    Pain [R52] Pain [R52]  Jeimy Lacy, APRN CNP 4/10/2017  9:58 AM      Hospital Problems as of 4/10/2017              Priority Class Noted POA    Status post knee surgery Medium  2017 Yes      Non-Hospital Problems as of 4/10/2017              Priority Class Noted    Health Care Home   10/29/2012    Diverticulosis of large intestine   10/29/2012    Hyperlipidemia   10/29/2012    Obesity   10/29/2012    Disorder of bone and cartilage   10/29/2012    Prediabetes   10/29/2012    Advanced directives, counseling/discussion   2013    Cataract Medium  2015    Macular degeneration (senile) of retina   2015    Primary osteoarthritis of both knees Medium  10/17/2015    Arthritis of knee Medium  2015    Essential hypertension with goal blood pressure less than 140/90 Medium  2016      Code Status History     Date Active Date Inactive Code Status Order ID Comments User Context    4/10/2017  9:15 AM  Full Code [id number]  Aniket Carmichael MD Outpatient    2017  7:57 PM 4/10/2017  9:15 AM Full Code [id number]  Aniket Carmichael MD Inpatient         Medication Review      START taking        Dose / Directions Comments    HYDROmorphone 2 MG " tablet   Commonly known as:  DILAUDID   Used for:  Status post knee surgery        For pain complaints of 1-5 take 1  tablets ( 1 mg), for pain complaints of 6-10 take 2 tablets ( 4 mg)  Every 4 hours as needed for pain.   Quantity:  80 tablet   Refills:  0        senna-docusate 8.6-50 MG per tablet   Commonly known as:  SENOKOT-S;PERICOLACE   Used for:  Status post knee surgery        Dose:  1-2 tablet   Take 1-2 tablets by mouth 2 times daily   Quantity:  100 tablet   Refills:  1          CONTINUE these medications which may have CHANGED, or have new prescriptions. If we are uncertain of the size of tablets/capsules you have at home, strength may be listed as something that might have changed.        Dose / Directions Comments    acetaminophen 325 MG tablet   Commonly known as:  TYLENOL   This may have changed:  how much to take   Used for:  Pain        Dose:  650 mg   Take 2 tablets (650 mg) by mouth every 6 hours as needed for pain   Quantity:  250 tablet   Refills:  11        calcium carb 1250 mg (500 mg [place])/vitamin D 200 units 500-200 MG-UNIT per tablet   Commonly known as:  OSCAL with D   This may have changed:  when to take this   Used for:  Nutrition disorder, Arthritis of knee        Dose:  1 tablet   Take 1 tablet by mouth 2 times daily (with meals)   Quantity:  100 tablet   Refills:  3        polyethylene glycol powder   Commonly known as:  MIRALAX   This may have changed:    - when to take this  - reasons to take this   Used for:  Constipation        Dose:  1 capful   Take 17 g by mouth daily   Quantity:  510 g   Refills:  11        pravastatin 20 MG tablet   Commonly known as:  PRAVACHOL   This may have changed:  when to take this   Used for:  Hyperlipidemia LDL goal <160        Dose:  20 mg   Take 1 tablet (20 mg) by mouth daily   Quantity:  90 tablet   Refills:  2          CONTINUE these medications which have NOT CHANGED        Dose / Directions Comments    ASPIRIN PO        Dose:  81 mg   Take 81  mg by mouth every morning   Refills:  0        * CENTRUM SILVER per tablet        Dose:  1 tablet   Take 1 tablet by mouth daily.   Refills:  0        * multivitamin Tabs tablet   Used for:  Cataract        Dose:  1 tablet   Take 1 tablet by mouth daily   Quantity:  30 each   Refills:  6        hydrochlorothiazide 25 MG tablet   Commonly known as:  HYDRODIURIL   Used for:  Essential hypertension with goal blood pressure less than 140/90        Dose:  25 mg   Take 1 tablet (25 mg) by mouth daily   Quantity:  90 tablet   Refills:  1        latanoprost 0.005 % ophthalmic solution   Commonly known as:  XALATAN        Dose:  1 drop   Place 1 drop into both eyes At Bedtime   Refills:  0        losartan 100 MG tablet   Commonly known as:  COZAAR   Used for:  Benign essential hypertension        Dose:  100 mg   Take 1 tablet (100 mg) by mouth daily   Quantity:  90 tablet   Refills:  1        nortriptyline 25 MG capsule   Commonly known as:  PAMELOR   Used for:  Nutrition disorder, Arthritis of knee        Dose:  25 mg   Take 1 capsule (25 mg) by mouth At Bedtime   Quantity:  90 capsule   Refills:  1        pramipexole 0.125 MG tablet   Commonly known as:  MIRAPEX   Used for:  Restless leg syndrome        Dose:  0.125 mg   Take 1 tablet (0.125 mg) by mouth At Bedtime   Quantity:  30 tablet   Refills:  1        * Notice:  This list has 2 medication(s) that are the same as other medications prescribed for you. Read the directions carefully, and ask your doctor or other care provider to review them with you.      STOP taking     HYDROcodone-acetaminophen 5-325 MG per tablet   Commonly known as:  NORCO                   Summary of Visit     Reason for your hospital stay       81F s/p right TKA             After Care     Additional Discharge Instructions       Post Operative Instructions for Zaira Fierro is a 81 year old female    Surgery: Right Total Knee Replacement on 4/7/2017.    If you have any questions contact   "[facility] at the following numbers: [facility] call [phone].      Follow up appointment:   You are scheduled to follow up with Dr. Sutton approximately 4 weeks after your surgery.  Contact clinic if you have any questions about the date or time.    Anticoagulation:   Take coumadin x 4 weeks with goal INR of 2.0. This is given to help minimize your risk of blood clot.    Activity:   -Continue to weight bear on your operative leg as tolerated by pain.  As you are more comfortable, you can discontinue use of the walker and transition to a cane.  Once you are comfortable with the cane, you can also wean from the cane and progress to using no assistive devices.  Do not transition until you are comfortable and have good balance.      -Continue to work on knee range of motion to prevent stiffness.      -When you are sitting, \"bridge the leg\" and keep the leg fully straight, with a bump under the heel to prevent a flexion contracture and ensure that you can fully straighten the knee.    -Work on getting your leg fully straight while walking - landing on your heel and progressing over the knee normally to prevent a flexion contracture.        Pain Medications:   -Take pain medications as prescribed.  Wean off the the narcotic pain medications (Oxycodone, Dilaudid, etc) as tolerated by pain.  Only take the narcotic pain medication if not relieved by the other medications.  To help with this, take the Tylenol and Celebrex (if prescribed) to minimize the amount of narcotic pain medication you need to take.      -Do not drive while on pain medications or until your leg feels well enough to do so.      Bandage Care and Showering:   -Please keep the dressings clean/dry/intact until follow up.  Ok to change as needed after POD #5.  No soaking/scrubbing/submerging until follow up.    --Contact Dr. Sutton or his staff if there is any drainage from the incision or redness.    Leg Swelling and Icing  -Continue icing the knee 5-6 times " per day for approximately 20 minutes each time.  This will help with swelling.    -Some swelling in the leg is normal after surgery.  This type of swelling is usually gravity dependent and is improved in the morning after sleeping.  If the swelling is painful in the calf or the swelling is getting worse, contact Dr. Sutton's office.  We may recommend presenting to the Emergency Department to obtain an ultrasound of the leg if there is concern for a DVT.      -Elevate the leg if you are sitting as this will help reduce swelling.    Contact clinic if you note any of the following:  -Fevers greater than 101.5 chills  -Increased pain, redness, swelling or discharge at the surgical site.   -During regular business hours call Dr Sutton's office and request to speak with his nurse or the triage nurses.   -After hours or on weekends call the hospital  at [phone] and ask to speak with the Orthopaedic resident on call.       General info for SNF       Length of Stay Estimate: Short Term Care: Estimated # of Days <30  Condition at Discharge: Improving  Level of care:skilled   Rehabilitation Potential: Excellent  Admission H&P remains valid and up-to-date: Yes  Recent Chemotherapy: N/A  Use Nursing Home Standing Orders: Yes       Mantoux instructions       Give two-step Mantoux (PPD) Per Facility Policy Yes       Weight bearing status                 Referrals     Occupational Therapy Adult Consult       Evaluate and treat as clinically indicated.    Reason:  eval and treat       Physical Therapy Adult Consult       Evaluate and treat as clinically indicated.    Reason:  eval and treat             Follow-Up Appointment Instructions     Future Labs/Procedures    Follow Up and recommended labs and tests     Comments:    Follow up with Dr Sutton in 4 weeks with repeat XR.      Follow-Up Appointment Instructions     Follow Up and recommended labs and tests       Follow up with Dr Sutton in 4 weeks with repeat XR.

## 2017-04-07 NOTE — IP AVS SNAPSHOT
` `     UR 8A: [phone]                 INTERAGENCY TRANSFER FORM - NOTES (H&P, Discharge Summary, Consults, Procedures, Therapies)   2017                    Hospital Admission Date: 2017  ZAIRA FIERRO   : 1935  Sex: Female        Patient PCP Information     Provider PCP Type    Ange Crespo MD General         History & Physicals      H&P signed by Kamryn Marshall at 2017 11:24 AM      Author:  Kamryn Marshall Service:  (none) Author Type:  Physician    Filed:  2017 11:24 AM Date of Service:  2017  4:23 AM Note Created:  2017 11:24 AM    Status:  Signed :  Rolando Provider (Physician)     Scan on 2017 11:24 AM by Rolando Provider : PREOP ASSESSMENT CTR     PREOP H/P   3/31/17          Revision History        User Key Date/Time User Provider Type Action    > [N/A] 2017 11:24 AM Rolando, Provider Physician Sign            H&P by Rose Aguilar APRN CNP at 3/31/2017 10:00 AM     Author:  Rose Aguilar APRN CNP Service:  (none) Author Type:  Nurse Practitioner    Filed:  3/31/2017  1:21 PM Encounter Date:  3/31/2017 Status:  Signed    :  Rose Aguilar APRN CNP (Nurse Practitioner)             Pre-Operative H & P     CC:  Preoperative exam to assess for increased cardiopulmonary risk while undergoing surgery and anesthesia.    Date of Encounter: 3/31/2017  Primary Care Physician:  Ange Crespo  Zaira Fierro is a 81 year old female who presents for pre-operative H & P in preparation for a right total knee replacement with Dr. Sutton on 2017 at [facility].     Zaira Fierro is an 81 year old female with hypertension, hyperlipidemia, restless leg syndrome, macular degeneration, glaucoma, and multiple joint osteoarthritis that has been having gradually worsening symptoms of right knee OA.  She has done physical therapy, had an injection, and tried pain medications but nothing has  resulted in long term relief.  She has elected to proceed with the above listed procedure as recommended.     History is obtained from the patient.     Past Medical History  Past Medical History:   Diagnosis Date     Glaucoma      Hearing loss     doesn't wear hearing aids     Hyperlipidaemia      Hypertension      Macular degeneration      Multiple joint pain     secondary to arthritis     Obesity      Osteoarthritis involving multiple joints on both sides of body      Restless leg syndrome        Past Surgical History  Past Surgical History:   Procedure Laterality Date     TONSILLECTOMY  1941       Hx of Blood transfusions/reactions: none    Hx of abnormal bleeding or anti-platelet use: none    Menstrual history: No LMP recorded. Patient is postmenopausal.:    Steroid use in the last year: none    Personal or FH with difficulty with Anesthesia:  none    Prior to Admission Medications  Current Outpatient Prescriptions   Medication Sig Dispense Refill     ASPIRIN PO Take 81 mg by mouth every morning       HYDROcodone-acetaminophen (NORCO) 5-325 MG per tablet Take 2 tablets by mouth every 6 hours as needed for pain maximum 4 tablet(s) per day (Patient taking differently: Take 0.5-1 tablets by mouth every 6 hours as needed for pain maximum 4 tablet(s) per day) 45 tablet 0     pramipexole (MIRAPEX) 0.125 MG tablet Take 1 tablet (0.125 mg) by mouth At Bedtime 30 tablet 1     pravastatin (PRAVACHOL) 20 MG tablet Take 1 tablet (20 mg) by mouth daily (Patient taking differently: Take 20 mg by mouth every evening ) 90 tablet 2     nortriptyline (PAMELOR) 25 MG capsule Take 1 capsule (25 mg) by mouth At Bedtime 90 capsule 1     losartan (COZAAR) 100 MG tablet Take 1 tablet (100 mg) by mouth daily 90 tablet 1     hydrochlorothiazide (HYDRODIURIL) 25 MG tablet Take 1 tablet (25 mg) by mouth daily 90 tablet 1     calcium carb 1250 mg, 500 mg [place],/vitamin D 200 units (OSCAL WITH D) 500-200 MG-UNIT per tablet Take 1 tablet by  mouth 2 times daily (with meals) (Patient taking differently: Take 1 tablet by mouth every morning ) 100 tablet 3     acetaminophen (TYLENOL) 325 MG tablet Take 1-2 tablets (325-650 mg) by mouth every 6 hours as needed for pain 250 tablet 11     latanoprost (XALATAN) 0.005 % ophthalmic solution Place 1 drop into both eyes At Bedtime        polyethylene glycol (MIRALAX) powder Take 17 g by mouth daily (Patient taking differently: Take 1 capful by mouth daily as needed ) 510 g 11     multivitamin (OCUVITE) TABS Take 1 tablet by mouth daily 30 each 6     Multiple Vitamins-Minerals (CENTRUM SILVER) per tablet Take 1 tablet by mouth daily.       [DISCONTINUED] PRAVASTATIN SODIUM PO Take  by mouth.         Allergies  No Known Allergies    Social History  Social History     Social History     Marital status: Single     Spouse name: N/A     Number of children: N/A     Years of education: N/A     Occupational History     retired teacher      Social History Main Topics     Smoking status: Never Smoker     Smokeless tobacco: Never Used     Alcohol use No     Drug use: No     Sexual activity: No     Other Topics Concern     Not on file     Social History Narrative    Belongs to [facility]. Lives with her sister Rosi.        Family History  Family History   Problem Relation Age of Onset     Coronary Artery Disease Father      Age 62 when      Cardiovascular Father      Myocardial Infarction Father      DIABETES Mother      Occurred when older     Hypertension Mother      Arthritis Mother      Eye Disorder Mother      Arthritis Sister      Asthma Sister      Hypertension Sister      OSTEOPOROSIS Sister      Hypertension Sister      Hyperlipidemia Sister      Obesity Sister      Spine Problems Sister      Scoliosis     KIDNEY DISEASE Brother              ROS/MED HX  The complete review of systems is negative other than noted in the HPI or here.     ENT/Pulmonary:     (+)MARILOU risk factors hypertension, , . .  "  (-) tobacco use   Neurologic:     (+)other neuro restless leg syndrome    Cardiovascular:     (+) Dyslipidemia, hypertension-range: unknown, ---. : . . . :. . Previous cardiac testing date:results:date: results:ECG reviewed date: results:Sinus tachycardia date: results:          METS/Exercise Tolerance:  3 - Able to walk 1-2 blocks without stopping   Hematologic:  - neg hematologic  ROS       Musculoskeletal:   (+) arthritis, , , other musculoskeletal- multiple joint pain secondary to arthritis      GI/Hepatic:  - neg GI/hepatic ROS       Renal/Genitourinary:  - ROS Renal section negative       Endo:     (+) Obesity, .      Psychiatric:  - neg psychiatric ROS       Infectious Disease:  - neg infectious disease ROS       Malignancy:      - no malignancy   Other:    - neg other ROS           Temp: 98.4  F (36.9  C) Temp src: Oral BP: 140/75 Pulse: 104   Resp: 16 SpO2: 98 %         169 lbs 1.6 oz  5' 2\"   Body mass index is 30.93 kg/(m^2).       Physical Exam  Constitutional: Awake, alert, cooperative, no apparent distress, and appears stated age.  Eyes: Pupils equal, round and reactive to light, extra ocular muscles intact, sclera clear, conjunctiva normal.  HENT: Normocephalic, oral pharynx with moist mucus membranes, good dentition. No goiter appreciated.   Respiratory: Clear to auscultation bilaterally, no crackles or wheezing.  Cardiovascular: Regular rate and rhythm, normal S1 and S2, and no murmur noted.  Carotids +2, no bruits. Trace BLE edema. Palpable pulses to radial  DP and PT arteries.   GI: Normal bowel sounds, soft, non-distended, non-tender, no masses palpated, no hepatosplenomegaly.    Lymph/Hematologic: No cervical lymphadenopathy and no supraclavicular lymphadenopathy.  Genitourinary:  deferred  Skin: Warm and dry.  No rashes at anticipated surgical site.   Musculoskeletal: Full ROM of neck. There is no redness, warmth, or swelling of the exposed joints. There is right knee bony enlargement.  Gross " motor strength is normal.    Neurologic: Awake, alert, oriented to name, place and time. Cranial nerves II-XII are grossly intact. Gait is slightly impaired.  Neuropsychiatric: Calm, cooperative. Normal affect.     Labs: (personally reviewed)[AJ1.1]   Component      Latest Ref Rng & Units 3/31/2017   Sodium      133 - 144 mmol/L 142   Potassium      3.4 - 5.3 mmol/L 4.2   Chloride      94 - 109 mmol/L 104   Carbon Dioxide      20 - 32 mmol/L 30   Anion Gap      3 - 14 mmol/L 9   Glucose      70 - 99 mg/dL 122 (H)   Urea Nitrogen      7 - 30 mg/dL 21   Creatinine      0.52 - 1.04 mg/dL 0.63   GFR Estimate      >60 mL/min/1.7m2 >90 . . .   GFR Estimate If Black      >60 mL/min/1.7m2 >90 . . .   Calcium      8.5 - 10.1 mg/dL 9.3   Bilirubin Total      0.2 - 1.3 mg/dL 0.3   Albumin      3.4 - 5.0 g/dL 3.8   Protein Total      6.8 - 8.8 g/dL 7.6   Alkaline Phosphatase      40 - 150 U/L 106   ALT      0 - 50 U/L 22   AST      0 - 45 U/L 18   Color Urine       Yellow   Appearance Urine       Clear   Glucose Urine      NEG mg/dL Negative   Bilirubin Urine      NEG Negative   Ketones Urine      NEG mg/dL Negative   Specific Gravity Urine      1.003 - 1.035 1.030   Blood Urine      NEG Negative   pH Urine      5.0 - 7.0 pH 6.0   Protein Albumin Urine      NEG mg/dL Negative   Urobilinogen mg/dL      0.0 - 2.0 mg/dL 0.2   Nitrite Urine      NEG Negative   Leukocyte Esterase Urine      NEG Negative   Source       Midstream Urine   WBC      4.0 - 11.0 10e9/L 9.5   RBC Count      3.8 - 5.2 10e12/L 4.34   Hemoglobin      11.7 - 15.7 g/dL 14.4   Hematocrit      35.0 - 47.0 % 42.3   MCV      78 - 100 fl 98   MCH      26.5 - 33.0 pg 33.2 (H)   MCHC      31.5 - 36.5 g/dL 34.0   RDW      10.0 - 15.0 % 12.2   Platelet Count      150 - 450 10e9/L 275[AJ1.2]       EK  Sinus tachycardia          ASSESSMENT and PLAN  Zaira Fierro is an 81 year old female scheduled for a right total knee replacement on 2017 by Dr. Sutton in  treatment of right knee osteoarthritis.  PAC referral for risk assessment and optimization for anesthesia with comorbid conditions of :  Hypertension, hyperlipidemia, restless leg syndrome, macular degeneration, glaucoma, and multiple joint osteoarthritis.     Pre-operative considerations:  1.  Cardiac:  Functional status- METS 3.  The patient reports that her exercise tolerance is limited to to her right knee currently, but that she can walk at least a block and has no cardiopulmonary complications with that. Hypertension is managed with HCTZ and losartan; will hold both the DOS.  Intermediate risk surgery with 0.4% risk of major adverse cardiac event. No further cardiac evaluation needed per 2014 ACC/AHA guidelines for non-cardiac surgery.  2.  Pulm:  Airway feasible.  MARILOU risk: low  3.  GI:  Risk of PONV score = 3.  If > 2, anti-emetic intervention recommended.  4. Musculoskeletal:  The gait is slightly impaired currently due to the right knee pain.  Consider fall risk precautions.  She is taking norco only very sparingly for the pain.    5. VTE risk: 0.5%.  She notes that she stopped her aspirin on 3/26/2017 in preparation for the surgery.     Patient is optimized and is acceptable candidate for the proposed procedure.  No further diagnostic evaluation is needed.     Patient also evaluated by Dr. Freeman.           Rose Aguilar DNP, RN, APRN  Preoperative Assessment Center  [facility]  [facility]  Phone: [phone]  Fax: [phone][AJ1.1]     Revision History        User Key Date/Time User Provider Type Action    > AJ1.2 3/31/2017  1:21 PM Rose Aguilar APRN CNP Nurse Practitioner Sign     AJ1.1 3/31/2017 11:42 AM Rose Aguilar APRN CNP Nurse Practitioner                      Discharge Summaries      Discharge Summaries by Jeimy Lacy APRN CNP at 4/10/2017  9:16 AM     Author:  Jeimy Lacy APRN CNP Service:  Orthopedics Author Type:  Nurse  Practitioner    Filed:  4/10/2017 10:02 AM Date of Service:  4/10/2017  9:16 AM Note Created:  4/10/2017  9:15 AM    Status:  Cosign Needed :  Jeimy Lacy APRN CNP (Nurse Practitioner)    Cosign Required:  Yes             Orthopaedic Surgery  Discharge Summary    Zaira Fierro  : 1935    Date of Service: 4/10/2017 9:16 AM      Date of Admission:  2017  Date of Discharge::[BT1.1]  4/10/2017[MB1.1]  Attending Physician:  Herman    Discharge Diagnosis:  S/p TKA    Procedures Performed:  Right total knee arthroplasty on 2017 with Dr Sutton    Future Appointments:  Date Time Provider Department Center   4/10/2017 9:30 AM Jovita Bullard, PT [facility]   4/10/2017 2:15 PM Apoorva Palmer, PT [facility]       Medications Prior to Admission:[BT1.1]  Prescriptions Prior to Admission   Medication Sig Dispense Refill Last Dose     ASPIRIN PO Take 81 mg by mouth every morning   Past Month at Unknown time     pramipexole (MIRAPEX) 0.125 MG tablet Take 1 tablet (0.125 mg) by mouth At Bedtime 30 tablet 1 2017 at 2100     pravastatin (PRAVACHOL) 20 MG tablet Take 1 tablet (20 mg) by mouth daily (Patient taking differently: Take 20 mg by mouth every evening ) 90 tablet 2 2017 at 2100     nortriptyline (PAMELOR) 25 MG capsule Take 1 capsule (25 mg) by mouth At Bedtime 90 capsule 1 2017 at 2100     losartan (COZAAR) 100 MG tablet Take 1 tablet (100 mg) by mouth daily 90 tablet 1 2017 at 0800     hydrochlorothiazide (HYDRODIURIL) 25 MG tablet Take 1 tablet (25 mg) by mouth daily 90 tablet 1 2017 at 0800     calcium carb 1250 mg, 500 mg [place],/vitamin D 200 units (OSCAL WITH D) 500-200 MG-UNIT per tablet Take 1 tablet by mouth 2 times daily (with meals) (Patient taking differently: Take 1 tablet by mouth every morning ) 100 tablet 3 2017 at 0800     latanoprost (XALATAN) 0.005 % ophthalmic solution Place 1 drop into both eyes At Bedtime    2017 at 2200      polyethylene glycol (MIRALAX) powder Take 17 g by mouth daily (Patient taking differently: Take 1 capful by mouth daily as needed ) 510 g 11 Past Month at Unknown time     multivitamin (OCUVITE) TABS Take 1 tablet by mouth daily 30 each 6 4/6/2017 at 0800     Multiple Vitamins-Minerals (CENTRUM SILVER) per tablet Take 1 tablet by mouth daily.   4/6/2017 at 0800     [DISCONTINUED] HYDROcodone-acetaminophen (NORCO) 5-325 MG per tablet Take 2 tablets by mouth every 6 hours as needed for pain maximum 4 tablet(s) per day (Patient taking differently: Take 0.5-1 tablets by mouth every 6 hours as needed for pain maximum 4 tablet(s) per day) 45 tablet 0 Past Month at Unknown time[MB1.2]       Discharge Medications:[BT1.1]  Current Discharge Medication List      START taking these medications    Details   senna-docusate (SENOKOT-S;PERICOLACE) 8.6-50 MG per tablet Take 1-2 tablets by mouth 2 times daily  Qty: 100 tablet, Refills: 1    Associated Diagnoses: Status post knee surgery      HYDROmorphone (DILAUDID) 2 MG tablet For pain complaints of 1-5 take 1  tablets ( 1 mg), for pain complaints of 6-10 take 2 tablets ( 4 mg)  Every 4 hours as needed for pain.  Qty: 80 tablet, Refills: 0    Associated Diagnoses: Status post knee surgery         CONTINUE these medications which have CHANGED    Details   acetaminophen (TYLENOL) 325 MG tablet Take 2 tablets (650 mg) by mouth every 6 hours as needed for pain  Qty: 250 tablet, Refills: 11    Associated Diagnoses: Pain         CONTINUE these medications which have NOT CHANGED    Details   ASPIRIN PO Take 81 mg by mouth every morning      pramipexole (MIRAPEX) 0.125 MG tablet Take 1 tablet (0.125 mg) by mouth At Bedtime  Qty: 30 tablet, Refills: 1    Associated Diagnoses: Restless leg syndrome      pravastatin (PRAVACHOL) 20 MG tablet Take 1 tablet (20 mg) by mouth daily  Qty: 90 tablet, Refills: 2    Associated Diagnoses: Hyperlipidemia LDL goal <160      nortriptyline (PAMELOR) 25 MG  capsule Take 1 capsule (25 mg) by mouth At Bedtime  Qty: 90 capsule, Refills: 1    Associated Diagnoses: Nutrition disorder; Arthritis of knee      losartan (COZAAR) 100 MG tablet Take 1 tablet (100 mg) by mouth daily  Qty: 90 tablet, Refills: 1    Associated Diagnoses: Benign essential hypertension      hydrochlorothiazide (HYDRODIURIL) 25 MG tablet Take 1 tablet (25 mg) by mouth daily  Qty: 90 tablet, Refills: 1    Associated Diagnoses: Essential hypertension with goal blood pressure less than 140/90      calcium carb 1250 mg, 500 mg [place],/vitamin D 200 units (OSCAL WITH D) 500-200 MG-UNIT per tablet Take 1 tablet by mouth 2 times daily (with meals)  Qty: 100 tablet, Refills: 3    Associated Diagnoses: Nutrition disorder; Arthritis of knee      latanoprost (XALATAN) 0.005 % ophthalmic solution Place 1 drop into both eyes At Bedtime       polyethylene glycol (MIRALAX) powder Take 17 g by mouth daily  Qty: 510 g, Refills: 11    Associated Diagnoses: Constipation      !! multivitamin (OCUVITE) TABS Take 1 tablet by mouth daily  Qty: 30 each, Refills: 6    Associated Diagnoses: Cataract      !! Multiple Vitamins-Minerals (CENTRUM SILVER) per tablet Take 1 tablet by mouth daily.       !! - Potential duplicate medications found. Please discuss with provider.      STOP taking these medications       HYDROcodone-acetaminophen (NORCO) 5-325 MG per tablet Comments:   Reason for Stopping:[MB1.2]               Brief History of Presenting Illness:  81F who failed non op management for end stage knee OA, elected to proceed with TKA.     Hospital Course:  Uncomplicated.  Admitted to hospital for routine post-op cares. Drain was removed POD # 2.  Physical and occupational therapy both worked with the patient.  On the day of discharge she had adequate pain control on oral meds, was tolerating a diet, voiding independently, and was ambulating safely.  The patient was determined to be safe to d/c TCU      Discharge  "Instructions[BT1.1]    Discharge Procedure Orders  General info for SNF   Order Comments: Length of Stay Estimate: Short Term Care: Estimated # of Days <30  Condition at Discharge: Improving  Level of care:skilled   Rehabilitation Potential: Excellent  Admission H&P remains valid and up-to-date: Yes  Recent Chemotherapy: N/A  Use Nursing Home Standing Orders: Yes     Mantoux instructions   Order Comments: Give two-step Mantoux (PPD) Per Facility Policy Yes     Reason for your hospital stay   Order Comments: 81F s/p right TKA     Follow Up and recommended labs and tests   Order Comments: Follow up with Dr Sutton in 4 weeks with repeat XR.     Additional Discharge Instructions   Order Comments: Post Operative Instructions for Zaira Fierro is a 81 year old female    Surgery: Right Total Knee Replacement on 4/7/2017.    If you have any questions contact Dr. Sutton at the following numbers: [facility] call [phone].      Follow up appointment:   You are scheduled to follow up with Dr. Sutton approximately 4 weeks after your surgery.  Contact clinic if you have any questions about the date or time.    Anticoagulation:   Take coumadin x 4 weeks with goal INR of 2.0. This is given to help minimize your risk of blood clot.    Activity:   -Continue to weight bear on your operative leg as tolerated by pain.  As you are more comfortable, you can discontinue use of the walker and transition to a cane.  Once you are comfortable with the cane, you can also wean from the cane and progress to using no assistive devices.  Do not transition until you are comfortable and have good balance.      -Continue to work on knee range of motion to prevent stiffness.      -When you are sitting, \"bridge the leg\" and keep the leg fully straight, with a bump under the heel to prevent a flexion contracture and ensure that you can fully straighten the knee.    -Work on getting your leg fully straight while walking - landing on your heel and " progressing over the knee normally to prevent a flexion contracture.        Pain Medications:   -Take pain medications as prescribed.  Wean off the the narcotic pain medications (Oxycodone, Dilaudid, etc) as tolerated by pain.  Only take the narcotic pain medication if not relieved by the other medications.  To help with this, take the Tylenol and Celebrex (if prescribed) to minimize the amount of narcotic pain medication you need to take.      -Do not drive while on pain medications or until your leg feels well enough to do so.      Bandage Care and Showering:   -Please keep the dressings clean/dry/intact until follow up.  Ok to change as needed after POD #5.  No soaking/scrubbing/submerging until follow up.    --Contact Dr. Sutton or his staff if there is any drainage from the incision or redness.    Leg Swelling and Icing  -Continue icing the knee 5-6 times per day for approximately 20 minutes each time.  This will help with swelling.    -Some swelling in the leg is normal after surgery.  This type of swelling is usually gravity dependent and is improved in the morning after sleeping.  If the swelling is painful in the calf or the swelling is getting worse, contact Dr. Sutton's office.  We may recommend presenting to the Emergency Department to obtain an ultrasound of the leg if there is concern for a DVT.      -Elevate the leg if you are sitting as this will help reduce swelling.    Contact clinic if you note any of the following:  -Fevers greater than 101.5 chills  -Increased pain, redness, swelling or discharge at the surgical site.   -During regular business hours call Dr Sutton's office and request to speak with his nurse or the triage nurses.   -After hours or on weekends call the hospital  at [phone] and ask to speak with the Orthopaedic resident on call.     Weight bearing status     Full Code     Physical Therapy Adult Consult   Order Comments: Evaluate and treat as clinically  indicated.    Reason:  eval and treat     Occupational Therapy Adult Consult   Order Comments: Evaluate and treat as clinically indicated.    Reason:  eval and treat[MB1.2]         Discharge Disposition:  TCU    Aniket Carmichael MD  Orthopaedic Surgery PGY-4  [phone][BT1.1]    Discharge summary updated by me to reflect changes in medications/plan of care.[MB1.1]     Revision History        User Key Date/Time User Provider Type Action    > MB1.2 4/10/2017 10:02 AM Jeimy Lacy APRN CNP Nurse Practitioner Sign     MB1.1 4/10/2017 10:00 AM Jeimy Lacy APRN CNP Nurse Practitioner      BT1.1 4/10/2017  9:17 AM Aniket Carmichael MD Resident Share                     Consult Notes      Consults by Yonatan Leslie MD at 4/7/2017  7:10 PM     Author:  Yonatan Leslie MD Service:  Internal Medicine Author Type:  Resident    Filed:  4/7/2017  9:36 PM Date of Service:  4/7/2017  7:10 PM Note Created:  4/7/2017  7:10 PM    Status:  Attested :  Yonatan Leslie MD (Resident)    Cosigner:  Paras Hernandez MD at 4/8/2017  2:16 PM         Consult Orders:    1. Internal Medicine Hospitalist Adult IP Consult - [facility]: Patient to be seen: Routine within 24 hrs; Call back #: [phone]; perioperative management; Consultant may enter orders: Yes [[id number]] ordered by Aamir Sutton MD at 04/07/17 1811           Attestation signed by Paras Hernandez MD at 4/8/2017  2:16 PM        Attestation:  Physician Attestation   Paras FERNANDO, saw and evaluated Zaira Fierro as part of a shared visit.  I have reviewed and discussed with the advanced practice provider their history, physical and plan.    I personally reviewed the vital signs, medications, labs and imaging.    My key history or physical exam findings:   Admitted after Right TKA on 04/07  Currently having issues with pain control. No chest pain, shortness of breath lightheadedness or dizziness. No fever, chills,  nausea, vomiting    PMH: HTN, Insomnia, Restless leg syndrome    Social, and Famil hx reviewed    Alert, awake, and oriented  S1, S2 normal  B/L CTA  Soft, NT, BS+    Key management decisions made by me:   # S/P Right TKA 04/07/17  Management per primary team- Surgical wound care, dressings, Drain cares, Operative pain, DVT prophylaxis, sonia operative antibiotics per Ortho.   Will give one dose of IV Hydromorphone as she is having severe pain now.   -Monitor Hg for acute blood loss anemia, hemodynamics  - Incentive spirometry, Bowel regimen to prevent constipation/ileus  -PT/OT Consult     Rest as outlined.     Paras Hernandez  Date of Service (when I saw the patient): 04/08/17                                 Hospitalist Consult      Patient Name: Zaira Fierro MRN# [id number]   Age: 81 year old YOB: 1935     Date of Admission:4/7/2017  Primary care provider: Ange [place]  Date of Service: 4/7/2017  Admitting Team: Orthopedics, Hospitalist [facility]         Assessment and Plan:   Zaira Fierro is a 81yM w/ PMHx significant for osteoarthritis and HTN who is admitted following R TKA w/ Dr. Sutton on 4/7/17.     ## R knee OA, s/p R TKA 4/7/17 - pt underwent R TKA w/ Dr. Sutton which was well tolerated.    -- Post operative cares per ortho (ancef x24hrs, WBAT, PT/OT, TRINIDAD drain in place, f/u w/ Dr. Sutton in 4 weeks)  -- Warfarin for 4 weeks, goal INR 1.5-2.5, mechanical prophylaxis while inpatient  -- Pain control w/[WH1.1] tylenol, oxycodone, IV dilaudid for breakthrough.[WH1.2]     Chronic Medical Issues  # HTN -[WH1.1] will resume home[WH1.2] HCTZ/losartan[WH1.1] given her being mildy hypertensive[WH1.2]  # Mental health - cont nortryptiline  # RLS- cont pramipexole[WH1.1], takes PRN per her report[WH1.2]      CODE: full code  Diet/IVF: ADAT, mIVFs until taking PO  DVT ppx: mechanical, initiate warfarin  Disposition/Admission Status: inpatient admit, dispo pending PT/OT    Patient was admitted  overnight, to be staffed in AM.    Manuel Leslie MD  [facility]  [phone]           Chief Complaint:   R knee pain, s/p R TKA         HPI:   Zaira Fierro is a 81yM w/ PMHx significant for osteoarthritis and HTN who is admitted following R TKA w/ Dr. Sutton on 17.[WH1.1]     Patient reports long standing hx of R knee pain, s/p multiple injections and attempts at conservative management which were no longer effective. She reports tolerating surgery well, no complaints post-operatively. Denies pain, no nausea or vomiting, reporting she is already tolerating PO intake. Denies any CP, palpitations, no SOB or cough, no abdominal pain.[WH1.2]            Past Medical History:[WH1.1]     Past Medical History:   Diagnosis Date     Glaucoma      Hearing loss     doesn't wear hearing aids     Hyperlipidaemia      Hypertension      Macular degeneration      Multiple joint pain     secondary to arthritis     Obesity      Osteoarthritis involving multiple joints on both sides of body      Restless leg syndrome[WH1.3]           Past Surgical History:[WH1.1]     Past Surgical History:   Procedure Laterality Date     TONSILLECTOMY  [WH1.3]          Social History:[WH1.1]     Social History     Social History     Marital status: Single     Spouse name: N/A     Number of children: N/A     Years of education: N/A     Occupational History     retired teacher      Social History Main Topics     Smoking status: Never Smoker     Smokeless tobacco: Never Used     Alcohol use No     Drug use: No     Sexual activity: No     Other Topics Concern     Not on file     Social History Narrative    Belongs to [facility]. Lives with her sister Rosi.[WH1.3]           Family History:[WH1.1]     Family History   Problem Relation Age of Onset     Coronary Artery Disease Father      Age 62 when      Cardiovascular Father      Myocardial Infarction Father      DIABETES Mother      Occurred when older      Hypertension Mother      Arthritis Mother      Eye Disorder Mother      Arthritis Sister      Asthma Sister      Hypertension Sister      OSTEOPOROSIS Sister      Hypertension Sister      Hyperlipidemia Sister      Obesity Sister      Spine Problems Sister      Scoliosis     KIDNEY DISEASE Brother[WH1.3]             Immunizations:[WH1.1]     Immunization History   Administered Date(s) Administered     Influenza (High Dose) 3 valent vaccine 10/16/2015, 12/01/2016     Influenza (IIV3) 12/09/2004, 11/06/2006, 11/01/2007, 09/09/2008, 12/15/2010, 12/06/2011, 10/16/2012, 11/15/2013     Influenza Vaccine, 3 YRS +, IM (QUADRIVALENT W/PRESERVATIVES) 11/05/2014     Pneumococcal (PCV 13) 07/22/2016     Pneumococcal 23 valent 11/25/2003     TD (ADULT, 7+) 08/15/1996, 09/14/2006     TDAP Vaccine (Boostrix) 12/14/2016[WH1.3]              Allergies:[WH1.1]    No Known Allergies[WH1.3]         Medications:[1.1]     Prior to Admission Medications   Prescriptions Last Dose Informant Patient Reported? Taking?   ASPIRIN PO Past Month at Unknown time Self Yes Yes   Sig: Take 81 mg by mouth every morning   HYDROcodone-acetaminophen (NORCO) 5-325 MG per tablet Past Month at Unknown time Self No Yes   Sig: Take 2 tablets by mouth every 6 hours as needed for pain maximum 4 tablet(s) per day   Patient taking differently: Take 0.5-1 tablets by mouth every 6 hours as needed for pain maximum 4 tablet(s) per day   Multiple Vitamins-Minerals (CENTRUM SILVER) per tablet 4/6/2017 at 0800 Self Yes Yes   Sig: Take 1 tablet by mouth daily.   acetaminophen (TYLENOL) 325 MG tablet 4/7/2017 at 0700 Self No Yes   Sig: Take 1-2 tablets (325-650 mg) by mouth every 6 hours as needed for pain   calcium carb 1250 mg, 500 mg [place],/vitamin D 200 units (OSCAL WITH D) 500-200 MG-UNIT per tablet 4/6/2017 at 0800 Self No Yes   Sig: Take 1 tablet by mouth 2 times daily (with meals)   Patient taking differently: Take 1 tablet by mouth every morning   "  hydrochlorothiazide (HYDRODIURIL) 25 MG tablet 4/6/2017 at 0800 Self No Yes   Sig: Take 1 tablet (25 mg) by mouth daily   latanoprost (XALATAN) 0.005 % ophthalmic solution 4/6/2017 at 2200 Self Yes Yes   Sig: Place 1 drop into both eyes At Bedtime    losartan (COZAAR) 100 MG tablet 4/6/2017 at 0800 Self No Yes   Sig: Take 1 tablet (100 mg) by mouth daily   multivitamin (OCUVITE) TABS 4/6/2017 at 0800 Self No Yes   Sig: Take 1 tablet by mouth daily   nortriptyline (PAMELOR) 25 MG capsule 4/6/2017 at 2100 Self No Yes   Sig: Take 1 capsule (25 mg) by mouth At Bedtime   polyethylene glycol (MIRALAX) powder Past Month at Unknown time Self No Yes   Sig: Take 17 g by mouth daily   Patient taking differently: Take 1 capful by mouth daily as needed    pramipexole (MIRAPEX) 0.125 MG tablet 4/6/2017 at 2100 Self No Yes   Sig: Take 1 tablet (0.125 mg) by mouth At Bedtime   pravastatin (PRAVACHOL) 20 MG tablet 4/6/2017 at 2100 Self No Yes   Sig: Take 1 tablet (20 mg) by mouth daily   Patient taking differently: Take 20 mg by mouth every evening       Facility-Administered Medications: None[WH1.3]             Review of Systems:   A complete ROS was performed and is negative other than what is stated in the HPI.         Physical Exam:[WH1.1]   Blood pressure (!) 141/100, temperature 97.9  F (36.6  C), resp. rate 10, height 1.575 m (5' 2\"), weight 76.6 kg (168 lb 14 oz), SpO2 93 %, not currently breastfeeding.[WH1.3]  General:[WH1.1] sitting up in bed, comfortable and conversant, NAD[WH1.2]  HEENT:[WH1.1] nc, at, EOMI, oral mucosa moist[WH1.2]  Neck:[WH1.1] supple[WH1.2]  Chest/Resp:[WH1.1] breathing comfortable on RA, clear in anterior fields[WH1.2]  Heart/CV:[WH1.1] RRR[WH1.2]  Abdomen/GI:[WH1.1] soft, nt, nd[WH1.2]  :[WH1.1] deferred[WH1.2]  Extremities/MSK:[WH1.1] WWP, RLE in cast w/ TRINIDAD drain in place, LLE w/o edema[WH1.2]  Skin:[WH1.1] no rashes on gross exam[WH1.2]  Neuro:[WH1.1] CNs II-XII grossly " "intact[WH1.2]  Psych:[WH1.1] affect appropriate[WH1.2]           Data:   All labs and imaging reviewed by me in Epic.    Labs 3/31  CBC wnl  CMP wnl  UA wnl    R knee XR on 3/6/17  Impression:  1. Severe medial compartment osteoarthritis of bilateral knees.  2. Moderate right knee joint effusion.    Manuel Leslie MD  [facility]  [phone][WH1.1]       Revision History        User Key Date/Time User Provider Type Action    > WH1.2 4/7/2017  9:36 PM Yonatan Leslie MD Resident Sign     WH1.3 4/7/2017  7:11 PM Yonatan Leslie MD Resident      WH1.1 4/7/2017  7:10 PM Yonatan Leslie MD Resident                      Progress Notes - Physician (Notes from 04/07/17 through 04/10/17)      Progress Notes by Nicole Llanos PA-C at 4/10/2017 11:59 AM     Author:  Nicole Llanos PA-C Service:  Internal Medicine Author Type:  Physician Assistant    Filed:  4/10/2017 12:12 PM Date of Service:  4/10/2017 11:59 AM Note Created:  4/10/2017 11:59 AM    Status:  Signed :  Nicole Llanos PA-C (Physician Assistant)                   [facility] - Internal Medicine Daily Note   Date of Service: 4/10/2017  Patient: Zaira Fierro  MRN: [id number]  Admission Date: 4/7/2017  Hospital Day #[RB1.1] 3[RB1.2]    Assessment & Plan    Zaira \"Deborah\" Sri is an 81 year old female with a history of PA, RLS, insomnia, and HTN who is s/p R TKA w/ Dr. Sutton on 4/7/17.     R Knee OA, s/p R TKA 4/7/1/7.   -F/u with Dr. Sutton in 4 weeks  -Warfarin x 4 weeks, goal INR 1.5-2.5  - Pain control with Tylenol, PO Dilaudid    HTN. Managed PTA on HCTZ and Losartan, both held post-op. Restarted Losartan 4/9. /66. If BP continues to climb, would recommend resuming PTA HCTZ.     Acute Anemia. BL hgb normal, 10.5 4/9 in the setting of blood loss 2/2 to surgery (above) and dilution with IVF. No s/s of active bleeding.     Insomnia. Continue PTA nortriptyline     RLS. " "Continue PTA pramipexole    HLD. Statin held post op, may resume at discharge.      CODE: FULL   DVT: Coumadin  Diet/fluids: Regular diet  Disposition: Transfer to TCU, likely today      Nicole Llanos PA-C  Internal Medicine MEL Hospitalist   [facility]   Pager: [phone]  ___________________________________________________________________    Subjective & Interval Hx:  Doing well today. Good appetite, bowels moving. Able to participate in PT with \"some pain, but I expect that\". Feels comfortable to transfer to TCU today. Denies chest pain, sob or difficulty breathing. No abdominal pain, nausea, vomiting or diarrhea. Denies fevers or chills.     Last 24 hr care team notes reviewed.   ROS:  4 point ROS including Respiratory, CV, GI and , other than that noted in the HPI, is negative.    Medications: Reviewed in EPIC. List below for reference    Physical Exam:[RB1.1]    /66 (BP Location: Right arm)  Pulse 103  Temp 98.3  F (36.8  C) (Oral)  Resp 15  Ht 1.575 m (5' 2\")  Wt 76.6 kg (168 lb 14 oz)  SpO2 96%  BMI 30.89 kg/m2[RB1.2]     GENERAL: Alert and oriented x 3. NAD.   HEENT: Anicteric sclera. Mucous membranes moist.   CV: RRR. S1, S2. No murmurs appreciated.   RESPIRATORY: Effort normal on room air. Lungs CTAB with no wheezing, rales, rhonchi.   GI: Abdomen soft and non distended with normoactive bowel sounds present in all quadrants. No tenderness, rebound, guarding.   NEUROLOGICAL: No focal deficits. Moves all extremities.    EXTREMITIES: No peripheral edema. Intact bilateral pedal pulses. Right leg wrapped.   SKIN: No jaundice. No rashes.[RB1.1]          Revision History        User Key Date/Time User Provider Type Action    > RB1.2 4/10/2017 12:12 PM Nicole Llanos PA-C Physician Assistant Sign     RB1.1 4/10/2017 11:59 AM Nicole Llanos PA-C Physician Assistant             Progress Notes by Alana Fitzgerald LSW at 4/10/2017 10:22 AM     " Author:  Alana Fitzgerald LSW Service:  Social Work Author Type:      Filed:  4/10/2017 11:49 AM Date of Service:  4/10/2017 10:22 AM Note Created:  4/10/2017 10:22 AM    Status:  Addendum :  Alana Fitzgerald LSW ()         Social Work Services Discharge Note      Patient Name:  Zaira Fierro     Anticipated Discharge Date:  4/10/17    Discharge Disposition:   TCU:  [facility] [phone] F: [phone]    Following MD:[MK1.1]  Dr. Montano &[MK1.2] Suzan Hung CNP  Geriatric Services[MK1.1] [phone][MK1.2]     Pre-Admission Screening (PAS) online form has been completed.  The Level of Care (LOC) is:  Determined  Confirmation Code is:  [id number]  Patient/caregiver informed of referral to [facility] Line for Pre-Admission Screening for skilled nursing facility (SNF) placement and to expect a phone call post discharge from SNF.     Additional Services/Equipment Arranged:   [facility] [phone] 1445     Patient / Family response to discharge plan:  agreeable     Persons notified of above discharge plan:  Pt, bedside RN Maria C Sainz 8A Charge RN, Jeimy Lacy CNp    Staff Discharge Instructions:  Please fax discharge orders and signed hard scripts for any controlled substances.  Please print a packet and send with patient.     CTS Handoff completed:  YES    Medicare Notice of Rights provided to the patient/family:  YES[MK1.1]         Revision History        User Key Date/Time User Provider Type Action    > MK1.2 4/10/2017 11:49 AM Alana Fitzgerald LSW  Addend     MK1.1 4/10/2017 11:44 AM Alana Fitzgerald LSW  Sign            Progress Notes by Aniket Carmichael MD at 4/10/2017  8:08 AM     Author:  Aniket Carmichael MD Service:  Orthopedics Author Type:  Resident    Filed:  4/10/2017  8:09 AM Date of Service:  4/10/2017  8:08 AM Note Created:  4/10/2017  8:08 AM    Status:  Signed :   Aniket Carmichael MD (Resident)         Orthopaedic Surgery Progress Note    E:  No acute issues overnight.      S: comfortable this morning, did much better with dilaudid PO vs oxycodone.  Denies any new n/t/weakness.  Plan for TCU today.      O:      Vitals:    04/09/17 0700 04/09/17 1300 04/09/17 2241 04/10/17 0750   BP: 131/58 132/49 143/60 139/66   BP Location: Right arm  Right arm Right arm   Pulse: 93 107 103    Resp: 18 18 16 15   Temp: 96.7  F (35.9  C) 98.8  F (37.1  C) 97.1  F (36.2  C) 98.3  F (36.8  C)   TempSrc: Oral Oral Oral Oral   SpO2:    96%   Weight:       Height:               Exam:  Gen: No acute distress, resting comfortably in bed.  Resp: Non-labored breathing  RLE:    -dressing is c/d/i   -thigh/calf is soft/compressible   -drain site c/d/i   --CMS intact    -motor intact to ehl/fhl/ta/gsc    -SILT to sp/dp/sural/saph/tibial    -foot wwp, cap refill <2 sec, DP 2+ palpable  Drain output: removed    Post-Op Plan:  Zaira Fierro is a 81F s/p right TKA on 4/7/2017 with Dr. Sutton.    Activity: Up with assist.  Weight bearing status: WBAT RLE   Antibiotics: Ancef x 24 hours.  Diet: Begin with clear fluids and progress diet as tolerated.   DVT prophylaxis: Coumadin x 4 weeks, INR goal 1.5-2.5 and mechanical while in the hospital  Bracing/Splinting: none   Elevation: bridge knee with pillows under ankle  CPM: at least 8 hours per day, advance 5 degrees every hour  Wound Care: keep c/d/i.   Drains: Document output per shift, ortho to remove POD #1  Pain management: transition from IV to orals as tolerated.   X-rays: XR AP/lat right knee PACU  Physical Therapy: eval and treat, ROM, ADL's.   Occupational Therapy: eval and treat  Labs: Trend Hgb on POD #1, 2, 3   Cultures: none   Consults: PT, OT. medicine, appreciate assistance in caring for this patient.   Follow-up: Clinic with Dr. Sutton in 4 weeks with XR     Disposition: Pending progress with therapies, pain control on orals, and medical  stability, anticipate discharge to home vs TCU on POD #2-4.    Future Appointments  Date Time Provider Department Center   4/8/2017 8:30 AM Apoorva Palmer, PT [facility] [place]   4/8/2017 11:00 AM Jennifer Walters, OT [facility] [place]       Aniket Carmichael MD  Orthopaedic Surgery PGY-4  [phone]      For questions about this patient, please attempt to contact me at my pager prior to contacting the orthopaedics resident on call. Thank you![BT1.1]         Revision History        User Key Date/Time User Provider Type Action    > BT1.1 4/10/2017  8:09 AM Aniket Carmichael MD Resident Sign            Progress Notes by Edgard Leyva at 4/9/2017  2:16 PM     Author:  Edgard Leyva Service:  Spiritual Health Author Type:      Filed:  4/9/2017  2:20 PM Date of Service:  4/9/2017  2:16 PM Note Created:  4/9/2017  2:16 PM    Status:  Signed :  Edgard Leyva ()         SPIRITUAL HEALTH SERVICES   [facility] ([facility] 8A   ON-CALL VISIT   REFERRAL SOURCE: request on admission   Zaira was joined by two sisters, one of whom is a Sikh nun.  They spoke of their history with this hospital and chaplains and priests whom they know in the area.  In response to Zaira's request for a blessing, offered prayer, closing with an Our Father.   After reviewing the schedule of eucharistic ministers with her, she expressed no further needs at this time.   PLAN: Will notify the unit  of our visit.     AMINATA Jacques  Staff   Pager [phone][KJ1.1]       Revision History        User Key Date/Time User Provider Type Action    > KJ1.1 4/9/2017  2:20 PM Edgard Leyva  Sign            Progress Notes by Gauri Shepherd PA-C at 4/9/2017  8:01 AM     Author:  Gauri Shepherd PA-C Service:  Internal Medicine Author Type:  Physician Assistant - C    Filed:  4/9/2017 12:06 PM Date of Service:  4/9/2017  8:01 AM Note Created:  4/9/2017  8:01 AM    Status:  Attested :  Joao  Gauri Moss PA-C (Physician Assistant - C)    Cosigner:  Paras Hernandez MD at 4/9/2017  1:25 PM        Attestation signed by Paras Hernandez MD at 4/9/2017  1:25 PM        Attestation:  Physician Attestation   Paras FERNANDO, saw and evaluated Zaira Fierro as part of a shared visit.  I have reviewed and discussed with the advanced practice provider their history, physical and plan.    I personally reviewed the vital signs, medications, labs and imaging.    My key history or physical exam findings: Reports doing well. Pain controlled. No chest pain, shortness of breath. Tolerating diet well.   Alert, awake,   S1, S2 normal  B/L CTA  Soft, NT, BS+    Key management decisions made by me:   Right Knee OA s/p R TKA: Pain controlled with adjustments.   Resume PTA Losartan  Acute blood loss and hemodilution anemia  Rest as outlined    Paras Hernandez  Date of Service (when I saw the patient): 04/09/17                                        Internal Medicine Daily Note   Patient: Zaira Fierro  MRN: [id number]  Admission Date: 4/7/2017  Hospital Day # 2    Assessment & Plan: Zaira Fierro is a 81y female with history of osteoarthritis, RLS, insomnia, and HTN who was admitted following R TKA w/ Dr. Sutton on 4/7/17.      R knee OA, s/p R TKA 4/7/17: Underwent R TKA w/ Dr. Sutton, tolerated well. Post operative cares per ortho- ancef x24hrs, WBAT, PT/OT, TRINIDAD drain[GR1.1] removed per ortho 4/9[GR1.2]. IV dilaudid stopped 4/7, pain increased 4/8, transitioned to oral[GR1.1] and IV[GR1.2] dilaudid per ortho[GR1.1].[GR1.2]   - Follow up with Dr. Sutton in 4 weeks  - Warfarin for 4 weeks, goal INR 1.5-2.5, mechanical prophylaxis   - Pain control w/ tylenol, oral dilaudid[GR1.1]  - Decrease IV dilaudid to q3h for 4 more doses, then stop as patient tolerating orals[GR1.2]     Vasovagal event: Occurred 4/8 following PT.[GR1.1] Patient reports she was feeling tired, then got weak. Did not pass out. Was seated in a  "wheelchair and symptoms resolved. No further events. Describes intermittent vasovagal events PTA  - Monitor[GR1.2]     HTN - PTA on HCTZ and losartan, mildly hypertensive following surgery, BPs trending up to 150s-160s/50s-70s.  - Resume PTA losartan  - Continue to hold HCTZ, will resume pending pressures  - Hydralazine prn for SBP>175 or DBP>110[GR1.1]    Acute anemia: BL hgb normal, 10.5 4/9 in the setting of BL 2/2 surgery and dilution with IVF  - Trend in am[GR1.3]     Insomnia: Stable, PTA on nortriptyline, continue    RLS: PTA on prn pramipexole, continue     HLD: PTA on statin. Hold, may resume on d/c      CODE: Full   DVT: Coumadin, mechanical   Diet/fluids: Regular diet   Disposition: Per primary, likely to TCU    Plan discussed with attending physician, Dr. Mary Shepherd  Internal Medicine MEL Hospitalist   [facility]   Pager: [phone]    ___________________________________________________________________    Subjective & Interval Hx:[GR1.1]  Feels better this morning. Reports negative effects to oral oxycodone including restlessness, agitation. Was transitioned to oral and IV dilaudid per primary. Pain adequately controlled at this time. Vasovagal event x1 yesterday as above. No further events. Tolerating regular diet. No dyspnea, chest pain.[GR1.2]    Last 24 hr care team notes reviewed.   ROS:  4 point ROS including Respiratory, CV, GI and , other than that noted in the HPI, is negative.    Medications: Reviewed in EPIC. List below for reference    Physical Exam:    /82 mmHg  Pulse 86  Temp(Src) 98.8  F (37.1  C) (Oral)  Resp 16  Ht 1.778 m (5' 10\")  Wt 87.4 kg (192 lb 10.9 oz)  BMI 27.65 kg/m2  SpO2 99%     GENERAL: Alert and oriented x 3. NAD. Pleasant  HEENT: Anicteric sclera. Mucous membranes moist.   CV: RRR. S1, S2. No murmurs appreciated.   RESPIRATORY: Effort normal on room air. Lungs CTAB with no wheezing, rales, rhonchi.   GI: Abdomen soft " and non distended with normoactive bowel sounds present in all quadrants. No tenderness, rebound, guarding.   NEUROLOGICAL: No focal deficits. Moves all extremities.    EXTREMITIES: No peripheral edema. Intact bilateral pedal pulses. Righ[GR1.1]t leg wrapped per ortho[GR1.2].   SKIN: No jaundice. No rashes.     Labs & Studies of Note: I personally pertinent labs and procedures.[GR1.1]         Revision History        User Key Date/Time User Provider Type Action    > [N/A] 4/9/2017 12:06 PM Gauri Shepherd PA-C Physician Assistant - NEAL Sign     GR1.3 4/9/2017 12:05 PM Gauri Shepherd PA-C Physician Assistant - NEAL Sign     GR1.2 4/9/2017 12:00 PM Gauri Shepherd PA-C Physician Assistant - C      GR1.1 4/9/2017  8:01 AM Gauri Shepherd PA-C Physician Assistant - C             Progress Notes by Aniket Carmichael MD at 4/9/2017  6:52 AM     Author:  Aniket Carmichael MD Service:  Orthopedics Author Type:  Resident    Filed:  4/9/2017  7:57 AM Date of Service:  4/9/2017  6:52 AM Note Created:  4/9/2017  6:52 AM    Status:  Signed :  Aniket Carmichael MD (Resident)         Orthopaedic Surgery Progress Note    E:  Vasovagal episode with PT yesterday.  CPM 0-70.  Increased anxiety/nausea with oxycodone.     S: no acute issues this AM.  Denies any new n/t/weakness.  Discussed switching to dilaudid PO, patient agreeable.  Plan for TCU, discussed with patient.   O:      Vitals:    04/09/17 0200 04/09/17 0245 04/09/17 0302 04/09/17 0630   BP:       BP Location:       Pulse:       Resp: 16  18    Temp:       TempSrc:       SpO2: 98% 99% 98% 98%   Weight:       Height:               Exam:  Gen: No acute distress, resting comfortably in bed.  Resp: Non-labored breathing  RLE:    -dressing is c/d/i   -thigh/calf is soft/compressible   -drain site c/d/i   --CMS intact    -motor intact to ehl/fhl/ta/gsc    -SILT to sp/dp/sural/saph/tibial    -foot wwp, cap refill <2 sec, DP 2+  palpable  Drain output: 45/100    Post-Op Plan:  Zaira Fierro is a 81F s/p right TKA on 4/7/2017 with Dr. Sutton.    Activity: Up with assist.  Weight bearing status: WBAT RLE   Antibiotics: Ancef x 24 hours.  Diet: Begin with clear fluids and progress diet as tolerated.   DVT prophylaxis: Coumadin x 4 weeks, INR goal 1.5-2.5 and mechanical while in the hospital  Bracing/Splinting: none   Elevation: bridge knee with pillows under ankle  CPM: at least 8 hours per day, advance 5 degrees every hour  Wound Care: keep c/d/i.   Drains: Document output per shift, ortho to remove POD #1  Pain management: transition from IV to orals as tolerated.   X-rays: XR AP/lat right knee PACU  Physical Therapy: eval and treat, ROM, ADL's.   Occupational Therapy: eval and treat  Labs: Trend Hgb on POD #1, 2, 3   Cultures: none   Consults: PT, OT. medicine, appreciate assistance in caring for this patient.   Follow-up: Clinic with Dr. Sutton in 4 weeks with XR     Disposition: Pending progress with therapies, pain control on orals, and medical stability, anticipate discharge to home vs TCU on POD #2-4.    Future Appointments  Date Time Provider Department Center   4/8/2017 8:30 AM Apoorva Palmer, PT [facility]   4/8/2017 11:00 AM Jennifer Walters, OT UR[facility] [place]       Aniket Carmichael MD  Orthopaedic Surgery PGY-4  [phone]      For questions about this patient, please attempt to contact me at my pager prior to contacting the orthopaedics resident on call. Thank you![BT1.1]         Revision History        User Key Date/Time User Provider Type Action    > BT1.1 4/9/2017  7:57 AM Aniket Carmichael MD Resident Sign            Progress Notes by Risa Thompson BSW at 4/8/2017  2:57 PM     Author:  Risa Thompson BSW Service:  Social Work Author Type:      Filed:  4/8/2017  3:31 PM Date of Service:  4/8/2017  2:57 PM Note Created:  4/8/2017  2:57 PM    Status:  Addendum :  Risa Thompson BSW (Social  Worker)         Social Work: Assessment with Discharge Plan    Patient Name:  Zaira Fierro  :  1935  Age:  81 year old  MRN:  [id number]  Risk/Complexity Score:     Completed assessment with:      Presenting Information   Reason for Referral:  Potential need for community services upon discharge  Date of Intake:  2017  Referral Source:  Physician  Decision Maker:  Pt  Alternate Decision Maker:  SisterNandini  Health Care Directive:  POSLT  Living Situation:  House  Previous Functional Status:  Independent  Patient and family understanding of hospitalization:  Yes  Cultural/Language/Spiritual Considerations:  None expressed  Adjustment to Illness:  Yes    Physical Health  Reason for Admission:[JV1.1]  Osteoarthritis Right Knee  Status post knee surgery[JV1.2]    Services Needed/Recommended:  TCU    Mental Health/Chemical Dependency  Diagnosis:  None  Support/Services in Place:  NA  Services Needed/Recommended:  NA    Support System  Significant relationship at present time:  No  Family of origin is available for support:  Yes, lives with sister  Other support available:  None  Gaps in support system:  None  Patient is caregiver to:  None     Provider Information   Primary Care Physician:  Ange Crespo   [phone]   Clinic:  [facility] [address] [address] / [facility]      :  None    Financial   Income Source:  Retired  Financial Concerns:  None  Insurance:    Payor/Plan Subscriber Name Rel Member # Group #   UCARE - UC[employer]SENIORS* ZAIRA FIERRO  [id number] [facility]      [address]       Discharge Plan   Patient and family discharge goal:  TCU.  Pt [facility] or [facility]  Provided education on discharge plan:  YES  Patient agreeable to discharge plan:  YES  A list of Medicare Certified Facilities was provided to the patient and/or family to encourage patient choice. Patient's choices for facility are:   TCU as first choice and [facility] as second option.   Will NH provide Skilled rehabilitation or complex medical:  YES  General information regarding anticipated insurance coverage and possible out of pocket cost was discussed. Patient and patient's family are aware patient may incur the cost of transportation to the facility, pending insurance payment: YES  Barriers to discharge:  Likely d/c Monday    Discharge Recommendations   Anticipated Disposition:  Facility:  First choice  TCU and second choice [facility]  Transportation Needs:  Medical:  Wheelchair  Name of Transportation Company and Phone:  No preference    Additional comments   SANDRA met with pt and her friends.  She states she pre-registered at [facility] and spoke to someone in intake last week.  [facility]. She also spoke to a rep at [facility].  She is open to having referrals sent to both place.  SW called referral over to [facility] and also faxed info to [place] at this time.[JV1.1]    PAS completed but must call senior linkage line on Monday.  This is the message received: There was a problem submitting your referral. Please contact the Senior LinkAge Line  at [phone] with your confirmation number [id number] and they will help you figure it out.[JV1.3]       Revision History        User Key Date/Time User Provider Type Action    > JV1.3 4/8/2017  3:31 PM Risa Thompson BSW  Addend     JV1.2 4/8/2017  3:06 PM Risa Thompson BSW  Sign     JV1.1 4/8/2017  2:57 PM Risa Thompson BSW              Progress Notes by Jennifer Walters OT at 4/8/2017  1:51 PM     Author:  Jennifer Walters OT Service:  (none) Author Type:  Occupational Therapist    Filed:  4/8/2017  1:51 PM Date of Service:  4/8/2017  1:51 PM Note Created:  4/8/2017  1:51 PM    Status:  Signed :  Jennifer Walters OT (Occupational Therapist)          04/08/17 1340   Quick Adds   Type of Visit Initial Occupational Therapy Evaluation   Living Environment    Lives With sibling(s)   Living Arrangements house   Home Accessibility stairs to enter home;stairs within home;tub/shower is not walk in   Number of Stairs to Enter Home 4   Number of Stairs Within Home 16   Living Environment Comment Bathroom upstairs 18 steps, tub/shower - no grab bars, has shower chair and higher toilet   Self-Care   Usual Activity Tolerance good   Current Activity Tolerance fair   Equipment Currently Used at Home cane, straight   Activity/Exercise/Self-Care Comment Patient independent in self-cares/mobility with SEC, reports increased difficulty with going to [employer]/community.   Functional Level Prior   Ambulation 1-->assistive equipment   Transferring 1-->assistive equipment   Toileting 0-->independent   Bathing 0-->independent   Dressing 0-->independent   Eating 0-->independent   Communication 0-->understands/communicates without difficulty   Swallowing 0-->swallows foods/liquids without difficulty   Cognition 0 - no cognition issues reported   Fall history within last six months no   Prior Functional Level Comment Patient independent in self-cares/mobility with SEC, reports increased difficulty with going to [employer]/community.   General Information   Onset of Illness/Injury or Date of Surgery - Date 04/07/17   Referring Physician Dr Sutton   Patient/Family Goals Statement return home to baseline   Additional Occupational Profile Info/Pertinent History of Current Problem POD #1 s/p R TKA   Weight-Bearing Status - RLE weight-bearing as tolerated   Cognitive Status Examination   Cognitive Comment No cognitive concerns   Sensory Examination   Sensory Comments No N/T noted   Pain Assessment   Patient Currently in Pain Yes, see Vital Sign flowsheet   Range of Motion (ROM)   ROM Comment B UE AROM WFL   Strength   Strength Comments B UE MMT WFL   Mobility   Bed Mobility Bed mobility skill: Sit to supine;Bed mobility skill: Supine to sit   Bed Mobility Skill: Supine to Sit   Level of [place]:  "Supine/Sit stand-by assist   Transfer Skill: Bed to Chair/Chair to Bed   Level of [place]: Bed to Chair contact guard   Weight-Bearing Restrictions weight-bearing as tolerated   Assistive Device - Transfer Skill Bed to Chair Chair to Bed Rehab Eval standard walker   Transfer Skill: Sit to Stand   Level of [place]: Sit/Stand contact guard   Transfer Skill: Sit to Stand weight-bearing as tolerated   Assistive Device for Transfer: Sit/Stand standard walker   Transfer Skill: Toilet Transfer   Level of [place]: Toilet contact guard   Assistive Device standard walker;seat riser;grab bars   Bathing   Level of [place] unable to perform   Upper Body Dressing   Level of [place]: Dress Upper Body independent   Lower Body Dressing   Level of [place]: Dress Lower Body unable to perform   Toileting   Level of [place]: Toilet stand-by assist   Grooming   Level of [place]: Grooming independent   Eating/Self Feeding   Level of [place]: Eating independent   Activities of Daily Living Analysis   Impairments Contributing to Impaired Activities of Daily Living pain;post surgical precautions;ROM decreased   General Therapy Interventions   Planned Therapy Interventions ADL retraining   Clinical Impression   Criteria for Skilled Therapeutic Interventions Met yes, treatment indicated   OT Diagnosis impaired self-cares/mobility   Influenced by the following impairments pain; decreased ROM   Assessment of Occupational Performance 1-3 Performance Deficits   Identified Performance Deficits dressing, bathing, toileting   Clinical Decision Making (Complexity) Low complexity   Therapy Frequency daily   Predicted Duration of Therapy Intervention (days/wks) 2-3 days   Anticipated Discharge Disposition Transitional Care Facility   Risks and Benefits of Treatment have been explained. Yes   Patient, Family & other staff in agreement with plan of care Yes   [facility] AM-PAC TM \"6 Clicks\"   2016, " "Trustees of [facility], under license to [employer].  All rights reserved.   6 Clicks Short Forms Daily Activity Inpatient Short Form   [facility] AM-PAC  \"6 Clicks\" Daily Activity Inpatient Short Form   1. Putting on and taking off regular lower body clothing? 2 - A Lot   2. Bathing (including washing, rinsing, drying)? 2 - A Lot   3. Toileting, which includes using toilet, bedpan or urinal? 3 - A Little   4. Putting on and taking off regular upper body clothing? 4 - None   5. Taking care of personal grooming such as brushing teeth? 4 - None   6. Eating meals? 4 - None   Daily Activity Raw Score (Score out of 24.Lower scores equate to lower levels of function) 19   Total Evaluation Time   Total Evaluation Time (Minutes) 9[MW1.1]        Revision History        User Key Date/Time User Provider Type Action    > MW1.1 4/8/2017  1:51 PM Jennifer Walters OT Occupational Therapist Sign            Progress Notes by Maday Montejo PT at 4/8/2017 10:48 AM     Author:  Maday Montejo PT Service:  (none) Author Type:  Physical Therapist    Filed:  4/8/2017 10:48 AM Date of Service:  4/8/2017 10:48 AM Note Created:  4/8/2017 10:48 AM    Status:  Signed :  Maday Montejo PT (Physical Therapist)          04/08/17 0900   Quick Adds   Type of Visit Initial PT Evaluation       Present no   Living Environment   Lives With sibling(s)  (sister)   Living Arrangements house   Home Accessibility bed and bath are not on the first floor;stairs within home;stairs to enter home;stairs (1 railing present)   Number of Stairs to Enter Home 8   Number of Stairs Within Home 16   Stair Railings at Home outside, present on right side;inside, present on right side   Transportation Available car;family or friend will provide   Living Environment Comment Pt lives in big house. One bathroom on second level   Self-Care   Dominant Hand right   Usual Activity Tolerance moderate   Current Activity Tolerance " fair   Regular Exercise no   Equipment Currently Used at Home cane, straight   Activity/Exercise/Self-Care Comment Pt reports not walking a lot d/t pain. Pt reports using cane when walking and only walking short distances.   Functional Level Prior   Ambulation 1-->assistive equipment   Transferring 0-->independent   Toileting 0-->independent   Bathing 0-->independent   Dressing 0-->independent   Eating 0-->independent   Communication 0-->understands/communicates without difficulty   Swallowing 0-->swallows foods/liquids without difficulty   Cognition 0 - no cognition issues reported   Fall history within last six months no   Which of the above functional risks had a recent onset or change? none   General Information   Onset of Illness/Injury or Date of Surgery - Date 04/07/17   Referring Physician Aamir Sutton MD   Patient/Family Goals Statement To be able to go on long walks again   Pertinent History of Current Problem (include personal factors and/or comorbidities that impact the POC) S/p RTKA on 4/7/17   Precautions/Limitations fall precautions   Weight-Bearing Status - LUE full weight-bearing   Weight-Bearing Status - RUE full weight-bearing   Weight-Bearing Status - LLE full weight-bearing   Weight-Bearing Status - RLE weight-bearing as tolerated   General Observations CPM in place, O2, TRINIDAD drain   Cognitive Status Examination   Orientation orientation to person, place and time   Level of Consciousness alert   Follows Commands and Answers Questions 100% of the time   Personal Safety and Judgment intact   Memory intact   Cognitive Comment Pt slightly confused with mobilizing using walker and needs step by step direction   Pain Assessment   Patient Currently in Pain Yes, see Vital Sign flowsheet   Integumentary/Edema   Integumentary/Edema Comments Normal post op swelling   Posture    Posture Not impaired   Range of Motion (ROM)   ROM Comment R knee AROM: 0-16-78   Strength   Strength Comments Not formall  "tested. Pts RLE demonstarted 2/5 strength in quad and 3/5 in hip   Bed Mobility   Bed Mobility Comments Pt is SBA for bed mobility with VC for body mechanics   Transfer Skills   Transfer Comments Pt is CGA for sit<>stand transfer with FWW and VC   Gait   Gait Comments Pt amb with CGA and FWW 10 ft adn 30 ft   Balance   Balance Comments Impaired standing balance d/t decreased strength and ROM, high pain levels   Sensory Examination   Sensory Perception no deficits were identified   General Therapy Interventions   Planned Therapy Interventions bed mobility training;balance training;gait training;ROM;strengthening;transfer training;progressive activity/exercise;home program guidelines;risk factor education   Clinical Impression   Criteria for Skilled Therapeutic Intervention yes, treatment indicated   PT Diagnosis Impaired functional mobility   Influenced by the following impairments pain, decreased strength, decreased ROM   Functional limitations due to impairments Bed mobility, transfer training, gait, stairs   Clinical Presentation Stable/Uncomplicated   Clinical Presentation Rationale Per pts PMHx and current medical and functional status   Clinical Decision Making (Complexity) Low complexity   Therapy Frequency` 2 times/day   Predicted Duration of Therapy Intervention (days/wks) 4 days   Anticipated Discharge Disposition Home with Outpatient Therapy;Home with Assist;Transitional Care Facility   Risk & Benefits of therapy have been explained Yes   Patient, Family & other staff in agreement with plan of care Yes   [facility] Cloudability TM \"6 Clicks\"   2016, Trustees of [facility], under license to [employer].  All rights reserved.   6 Clicks Short Forms Basic Mobility Inpatient Short Form   [facility] EventRegist-PAC  \"6 Clicks\" V.2 Basic Mobility Inpatient Short Form   1. Turning from your back to your side while in a flat bed without using bedrails? 4 - None   2. Moving from lying on your back to sitting " on the side of a flat bed without using bedrails? 3 - A Little   3. Moving to and from a bed to a chair (including a wheelchair)? 3 - A Little   4. Standing up from a chair using your arms (e.g., wheelchair, or bedside chair)? 3 - A Little   5. To walk in hospital room? 3 - A Little   6. Climbing 3-5 steps with a railing? 2 - A Lot   Basic Mobility Raw Score (Score out of 24.Lower scores equate to lower levels of function) 18   Total Evaluation Time   Total Evaluation Time (Minutes) 10[SD1.1]        Revision History        User Key Date/Time User Provider Type Action    > SD1.1 4/8/2017 10:48 AM Maday Montejo PT Physical Therapist Sign            Progress Notes by Aniket Carmichael MD at 4/8/2017  7:23 AM     Author:  Aniket Carmichael MD Service:  Orthopedics Author Type:  Resident    Filed:  4/8/2017  9:13 AM Date of Service:  4/8/2017  7:23 AM Note Created:  4/8/2017  7:23 AM    Status:  Signed :  Aniket Carmichael MD (Resident)         Orthopaedic Surgery Progress Note    E:  No acute issues overnight.      S: comfortable this AM.  Denies any new n/t/weakness.  Denies any sob/cp.  No n/v.  Tolerating CPM.  Discussed POC.     O:      Vitals:    04/07/17 2135 04/07/17 2150 04/07/17 2252 04/08/17 0449   BP: 152/69 149/61 119/45 142/66   BP Location:   Left arm Left arm   Resp:   14 16   Temp:   98.1  F (36.7  C) 98.7  F (37.1  C)   TempSrc:   Oral Oral   SpO2:   96% 95%   Weight:       Height:               Exam:  Gen: No acute distress, resting comfortably in bed.  Resp: Non-labored breathing  RLE:    -dressing is c/d/i   -thigh/calf is soft/compressible   -drain site c/d/i   --CMS intact    -motor intact to ehl/fhl/ta/gsc    -SILT to sp/dp/sural/saph/tibial    -foot wwp, cap refill <2 sec, DP 2+ palpable  Drain output: 100/75    Post op imaging: well aligned TKA implants.  No acute fractures    Post-Op Plan:  Zaira ABRAHAM Fierro is a 81F s/p right TKA on 4/7/2017 with Dr. Sutton.    Activity:  Up with assist.  Weight bearing status: WBAT RLE   Antibiotics: Ancef x 24 hours.  Diet: Begin with clear fluids and progress diet as tolerated.   DVT prophylaxis: Coumadin x 4 weeks, INR goal 1.5-2.5 and mechanical while in the hospital  Bracing/Splinting: none   Elevation: bridge knee with pillows under ankle  CPM: at least 8 hours per day, advance 5 degrees every hour  Wound Care: keep c/d/i.   Drains: Document output per shift, ortho to remove POD #1  Pain management: transition from IV to orals as tolerated.   X-rays: XR AP/lat right knee PACU  Physical Therapy: eval and treat, ROM, ADL's.   Occupational Therapy: eval and treat  Labs: Trend Hgb on POD #1, 2, 3   Cultures: none   Consults: PT, OT. medicine, appreciate assistance in caring for this patient.   Follow-up: Clinic with Dr. Sutton in 4 weeks with XR     Disposition: Pending progress with therapies, pain control on orals, and medical stability, anticipate discharge to home vs TCU on POD #2-4.    Future Appointments  Date Time Provider Department [place]   4/8/2017 8:30 AM Apoorva Palmer, PT URPT [place]   4/8/2017 11:00 AM Jennifer Walters, TER[facility] [place]       Aniket Carmichael MD  Orthopaedic Surgery PGY-4  [phone]      For questions about this patient, please attempt to contact me at my pager prior to contacting the orthopaedics resident on call. Thank you![BT1.1]         Revision History        User Key Date/Time User Provider Type Action    > BT1.1 4/8/2017  9:13 AM Aniket Carmichael MD Resident Sign            Progress Notes by Aniket Carmichael MD at 4/7/2017  6:19 PM     Author:  Aniket Carmichael MD Service:  Orthopedics Author Type:  Resident    Filed:  4/7/2017  6:21 PM Date of Service:  4/7/2017  6:19 PM Note Created:  4/7/2017  6:19 PM    Status:  Signed :  Aniket Carmichael MD (Resident)         Orthopaedic Surgery Post op check    S: comfortable resting in PACU.  No acute issues.  Denies any new  "n/t/weakness.     O:    Intake/Output Summary (Last 24 hours) at 04/07/17 1819  Last data filed at 04/07/17 1715   Gross per 24 hour   Intake             1200 ml   Output               70 ml   Net             1130 ml     Vitals:    04/07/17 1048 04/07/17 1747   BP: 157/80 156/84   Resp: 16 20   Temp: 98.2  F (36.8  C) 97.5  F (36.4  C)   TempSrc: Oral Oral   SpO2: 98%    Weight: 76.6 kg (168 lb 14 oz)    Height: 1.575 m (5' 2\")            Exam:  Gen: No acute distress, resting comfortably in bed.  Resp: Non-labored breathing  RLE:    -dressing is c/d/i   -thigh/calf is soft/compressible   -drain site c/d/i   --CMS intact    -motor intact to ehl/fhl/ta/gsc    -SILT to sp/dp/sural/saph/tibial    -foot wwp, cap refill <2 sec, DP 2+ palpable  Drain output: NR    Post-Op Plan:  Zaira Fierro is a 81F s/p right TKA on 4/7/2017 with Dr. Sutton.    Activity: Up with assist.  Weight bearing status: WBAT RLE   Antibiotics: Ancef x 24 hours.  Diet: Begin with clear fluids and progress diet as tolerated.   DVT prophylaxis: Coumadin x 4 weeks, INR goal 1.5-2.5 and mechanical while in the hospital  Bracing/Splinting: none   Elevation: bridge knee with pillows under ankle  CPM: at least 8 hours per day, advance 5 degrees every hour  Wound Care: keep c/d/i.   Drains: Document output per shift, ortho to remove POD #1  Pain management: transition from IV to orals as tolerated.   X-rays: XR AP/lat right knee PACU  Physical Therapy: eval and treat, ROM, ADL's.   Occupational Therapy: eval and treat  Labs: Trend Hgb on POD #1, 2, 3   Cultures: none   Consults: PT, OT. medicine, appreciate assistance in caring for this patient.   Follow-up: Clinic with Dr. Sutton in 4 weeks with XR     Disposition: Pending progress with therapies, pain control on orals, and medical stability, anticipate discharge to home vs TCU on POD #2-4.    Future Appointments  Date Time Provider Department Center   4/8/2017 8:30 AM Apoorva Palmer, PT [facility] [place] "   2017 11:00 AM Jennifer Walters, [facility] [place]       Aniket Carmichael MD  Orthopaedic Surgery PGY-4  [phone]      For questions about this patient, please attempt to contact me at my pager prior to contacting the orthopaedics resident on call. Thank you![BT1.1]         Revision History        User Key Date/Time User Provider Type Action    > BT1.1 2017  6:21 PM Aniket Carmichael MD Resident Sign            Progress Notes by Aniket Carmichael MD at 2017  6:12 PM     Author:  Aniket Carmichael MD Service:  Orthopedics Author Type:  Resident    Filed:  2017  6:19 PM Date of Service:  2017  6:12 PM Note Created:  2017  6:12 PM    Status:  Signed :  Aniket Carmichael MD (Resident)         Orthopaedic Surgery Brief Op-Note     Patient: Zaira Fierro  : 1935  Date of Service: 2017 6:12 PM    Pre-operative Diagnosis: right knee end stage osteoarthritis  Post-operative Diagnosis: same    Procedure(s) Performed: right total knee arthroplasty    Staff: Dr. Sutton  Resident: Amol    Anesthesia: General  EBL: 20 cc  Tourniquet Time: 125 minutes at 250 mmHg    Implants:   1.  [employer]   -Tibial component 71 mm   -CR femoral Right 65 mm   -Patella 34 x 9 mm   -CR tibial bearing PE 10 mm  Drains: TRINIDAD drain  Intra-op Labs/Cxs/Specimens: none  Complications: none apparent  Findings: diffuse tricompartment osteoarthritis    Dispo: Stable to PACU, then to floor.    Post-Op Plan:  Assessment/Plan: Zaira Fierro is a 81F s/p right TKA on[BT1.1] 2017[BT1.2] with Dr. Sutton.    Activity: Up with assist.  Weight bearing status: WBAT RLE   Antibiotics: Ancef x 24 hours.  Diet: Begin with clear fluids and progress diet as tolerated.   DVT prophylaxis: Coumadin x 4 weeks, INR goal 1.5-2.5 and mechanical while in the hospital  Bracing/Splinting: none   Elevation: bridge knee with pillows under ankle  CPM: at least 8 hours per day, advance 5 degrees every  hour  Wound Care: keep c/d/i.   Drains: Document output per shift, ortho to remove POD #1  Pain management: transition from IV to orals as tolerated.   X-rays: XR AP/lat right knee PACU  Physical Therapy: eval and treat, ROM, ADL's.   Occupational Therapy: eval and treat  Labs: Trend Hgb on POD #1, 2, 3   Cultures: none   Consults: PT, OT. medicine, appreciate assistance in caring for this patient.   Follow-up: Clinic with Dr. Sutton in 4 weeks with XR     Disposition: Pending progress with therapies, pain control on orals, and medical stability, anticipate discharge to home vs TCU on POD #2-4.    Future Appointments  Date Time Provider Department [place]   4/8[facility]2017 8:30 AM [facility]   4/8/2017 11:00 AM Jennifer Walters OT UROT [place]       Aniket Carmichael MD  Orthopaedic Surgery PGY-4  [phone]      For questions about this patient, please attempt to contact me at my pager prior to contacting the orthopaedics resident on call. Thank you![BT1.1]         Revision History        User Key Date/Time User Provider Type Action    > BT1.2 4/7/2017  6:19 PM Aniket Carmichael MD Resident Sign     BT1.1 4/7/2017  6:12 PM Aniket Carmichael MD Resident                   Procedure Notes     No notes of this type exist for this encounter.         Progress Notes - Therapies (Notes from 04/07/17 through 04/10/17)      Progress Notes by Jennifer Walters OT at 4/8/2017  1:51 PM     Author:  Jennifer Walters OT Service:  (none) Author Type:  Occupational Therapist    Filed:  4/8/2017  1:51 PM Date of Service:  4/8/2017  1:51 PM Note Created:  4/8/2017  1:51 PM    Status:  Signed :  Jennifer Walters OT (Occupational Therapist)          04/08/17 1340   Quick Adds   Type of Visit Initial Occupational Therapy Evaluation   Living Environment   Lives With sibling(s)   Living Arrangements house   Home Accessibility stairs to enter home;stairs within home;tub/shower is not walk in   Number of Stairs  to Enter Home 4   Number of Stairs Within Home 16   Living Environment Comment Bathroom upstairs 18 steps, tub/shower - no grab bars, has shower chair and higher toilet   Self-Care   Usual Activity Tolerance good   Current Activity Tolerance fair   Equipment Currently Used at Home cane, straight   Activity/Exercise/Self-Care Comment Patient independent in self-cares/mobility with SEC, reports increased difficulty with going to [facility]/community.   Functional Level Prior   Ambulation 1-->assistive equipment   Transferring 1-->assistive equipment   Toileting 0-->independent   Bathing 0-->independent   Dressing 0-->independent   Eating 0-->independent   Communication 0-->understands/communicates without difficulty   Swallowing 0-->swallows foods/liquids without difficulty   Cognition 0 - no cognition issues reported   Fall history within last six months no   Prior Functional Level Comment Patient independent in self-cares/mobility with SEC, reports increased difficulty with going to [facility]ism/community.   General Information   Onset of Illness/Injury or Date of Surgery - Date 04/07/17   Referring Physician Dr Sutton   Patient/Family Goals Statement return home to baseline   Additional Occupational Profile Info/Pertinent History of Current Problem POD #1 s/p R TKA   Weight-Bearing Status - RLE weight-bearing as tolerated   Cognitive Status Examination   Cognitive Comment No cognitive concerns   Sensory Examination   Sensory Comments No N/T noted   Pain Assessment   Patient Currently in Pain Yes, see Vital Sign flowsheet   Range of Motion (ROM)   ROM Comment B UE AROM WFL   Strength   Strength Comments B UE MMT WFL   Mobility   Bed Mobility Bed mobility skill: Sit to supine;Bed mobility skill: Supine to sit   Bed Mobility Skill: Supine to Sit   Level of [place]: Supine/Sit stand-by assist   Transfer Skill: Bed to Chair/Chair to Bed   Level of [place]: Bed to Chair contact guard   Weight-Bearing Restrictions  "weight-bearing as tolerated   Assistive Device - Transfer Skill Bed to Chair Chair to Bed Rehab Eval standard walker   Transfer Skill: Sit to Stand   Level of [place]: Sit/Stand contact guard   Transfer Skill: Sit to Stand weight-bearing as tolerated   Assistive Device for Transfer: Sit/Stand standard walker   Transfer Skill: Toilet Transfer   Level of [place]: Toilet contact guard   Assistive Device standard walker;seat riser;grab bars   Bathing   Level of [place] unable to perform   Upper Body Dressing   Level of [place]: Dress Upper Body independent   Lower Body Dressing   Level of [place]: Dress Lower Body unable to perform   Toileting   Level of [place]: Toilet stand-by assist   Grooming   Level of [place]: Grooming independent   Eating/Self Feeding   Level of [place]: Eating independent   Activities of Daily Living Analysis   Impairments Contributing to Impaired Activities of Daily Living pain;post surgical precautions;ROM decreased   General Therapy Interventions   Planned Therapy Interventions ADL retraining   Clinical Impression   Criteria for Skilled Therapeutic Interventions Met yes, treatment indicated   OT Diagnosis impaired self-cares/mobility   Influenced by the following impairments pain; decreased ROM   Assessment of Occupational Performance 1-3 Performance Deficits   Identified Performance Deficits dressing, bathing, toileting   Clinical Decision Making (Complexity) Low complexity   Therapy Frequency daily   Predicted Duration of Therapy Intervention (days/wks) 2-3 days   Anticipated Discharge Disposition Transitional Care Facility   Risks and Benefits of Treatment have been explained. Yes   Patient, Family & other staff in agreement with plan of care Yes   [facility] AM-PAC TM \"6 Clicks\"   2016, Trustees of [facility], under license to [employer].  All rights reserved.   6 Clicks Short Forms Daily Activity Inpatient Short [facility] " "[place] AM-PAC  \"6 Clicks\" Daily Activity Inpatient Short Form   1. Putting on and taking off regular lower body clothing? 2 - A Lot   2. Bathing (including washing, rinsing, drying)? 2 - A Lot   3. Toileting, which includes using toilet, bedpan or urinal? 3 - A Little   4. Putting on and taking off regular upper body clothing? 4 - None   5. Taking care of personal grooming such as brushing teeth? 4 - None   6. Eating meals? 4 - None   Daily Activity Raw Score (Score out of 24.Lower scores equate to lower levels of function) 19   Total Evaluation Time   Total Evaluation Time (Minutes) 9[MW1.1]        Revision History        User Key Date/Time User Provider Type Action    > MW1.1 4/8/2017  1:51 PM Jennifer Walters OT Occupational Therapist Sign            Progress Notes by Maday Montejo PT at 4/8/2017 10:48 AM     Author:  Maday Montejo PT Service:  (none) Author Type:  Physical Therapist    Filed:  4/8/2017 10:48 AM Date of Service:  4/8/2017 10:48 AM Note Created:  4/8/2017 10:48 AM    Status:  Signed :  Maday Montejo PT (Physical Therapist)          04/08/17 0900   Quick Adds   Type of Visit Initial PT Evaluation       Present no   Living Environment   Lives With sibling(s)  (sister)   Living Arrangements house   Home Accessibility bed and bath are not on the first floor;stairs within home;stairs to enter home;stairs (1 railing present)   Number of Stairs to Enter Home 8   Number of Stairs Within Home 16   Stair Railings at Home outside, present on right side;inside, present on right side   Transportation Available car;family or friend will provide   Living Environment Comment Pt lives in big house. One bathroom on second level   Self-Care   Dominant Hand right   Usual Activity Tolerance moderate   Current Activity Tolerance fair   Regular Exercise no   Equipment Currently Used at Home cane, straight   Activity/Exercise/Self-Care Comment Pt reports not walking a lot d/t " pain. Pt reports using cane when walking and only walking short distances.   Functional Level Prior   Ambulation 1-->assistive equipment   Transferring 0-->independent   Toileting 0-->independent   Bathing 0-->independent   Dressing 0-->independent   Eating 0-->independent   Communication 0-->understands/communicates without difficulty   Swallowing 0-->swallows foods/liquids without difficulty   Cognition 0 - no cognition issues reported   Fall history within last six months no   Which of the above functional risks had a recent onset or change? none   General Information   Onset of Illness/Injury or Date of Surgery - Date 04/07/17   Referring Physician Aamir Sutton MD   Patient/Family Goals Statement To be able to go on long walks again   Pertinent History of Current Problem (include personal factors and/or comorbidities that impact the POC) S/p RTKA on 4/7/17   Precautions/Limitations fall precautions   Weight-Bearing Status - LUE full weight-bearing   Weight-Bearing Status - RUE full weight-bearing   Weight-Bearing Status - LLE full weight-bearing   Weight-Bearing Status - RLE weight-bearing as tolerated   General Observations CPM in place, O2, TRINIDAD drain   Cognitive Status Examination   Orientation orientation to person, place and time   Level of Consciousness alert   Follows Commands and Answers Questions 100% of the time   Personal Safety and Judgment intact   Memory intact   Cognitive Comment Pt slightly confused with mobilizing using walker and needs step by step direction   Pain Assessment   Patient Currently in Pain Yes, see Vital Sign flowsheet   Integumentary/Edema   Integumentary/Edema Comments Normal post op swelling   Posture    Posture Not impaired   Range of Motion (ROM)   ROM Comment R knee AROM: [id number]   Strength   Strength Comments Not formall tested. Pts RLE demonstarted 2/5 strength in quad and 3/5 in hip   Bed Mobility   Bed Mobility Comments Pt is SBA for bed mobility with VC for body  "mechanics   Transfer Skills   Transfer Comments Pt is CGA for sit<>stand transfer with FWW and VC   Gait   Gait Comments Pt amb with CGA and FWW 10 ft adn 30 ft   Balance   Balance Comments Impaired standing balance d/t decreased strength and ROM, high pain levels   Sensory Examination   Sensory Perception no deficits were identified   General Therapy Interventions   Planned Therapy Interventions bed mobility training;balance training;gait training;ROM;strengthening;transfer training;progressive activity/exercise;home program guidelines;risk factor education   Clinical Impression   Criteria for Skilled Therapeutic Intervention yes, treatment indicated   PT Diagnosis Impaired functional mobility   Influenced by the following impairments pain, decreased strength, decreased ROM   Functional limitations due to impairments Bed mobility, transfer training, gait, stairs   Clinical Presentation Stable/Uncomplicated   Clinical Presentation Rationale Per pts PMHx and current medical and functional status   Clinical Decision Making (Complexity) Low complexity   Therapy Frequency` 2 times/day   Predicted Duration of Therapy Intervention (days/wks) 4 days   Anticipated Discharge Disposition Home with Outpatient Therapy;Home with Assist;Transitional Care Facility   Risk & Benefits of therapy have been explained Yes   Patient, Family & other staff in agreement with plan of care Yes   [facility] TM \"6 Clicks\"   2016, Trustees of [facility], under license to [employer].  All rights reserved.   6 Clicks Short Forms Basic Mobility Inpatient Short Form   [facility]  \"6 Clicks\" V.2 Basic Mobility Inpatient Short Form   1. Turning from your back to your side while in a flat bed without using bedrails? 4 - None   2. Moving from lying on your back to sitting on the side of a flat bed without using bedrails? 3 - A Little   3. Moving to and from a bed to a chair (including a wheelchair)? 3 - A Little   4. " Standing up from a chair using your arms (e.g., wheelchair, or bedside chair)? 3 - A Little   5. To walk in hospital room? 3 - A Little   6. Climbing 3-5 steps with a railing? 2 - A Lot   Basic Mobility Raw Score (Score out of 24.Lower scores equate to lower levels of function) 18   Total Evaluation Time   Total Evaluation Time (Minutes) 10[SD1.1]        Revision History        User Key Date/Time User Provider Type Action    > SD1.1 4/8/2017 10:48 AM Maday Montejo, PT Physical Therapist Sign

## 2017-04-07 NOTE — IP AVS SNAPSHOT
MRN:[id number]                      After Visit Summary   4/7/2017    Zaira Fierro    MRN: [id number]           Thank you!     Thank you for choosing [facility] for your care. Our goal is always to provide you with excellent care. Hearing back from our patients is one way we can continue to improve our services. Please take a few minutes to complete the written survey that you may receive in the mail after you visit with us. Thank you!        Patient Information     Date Of Birth          1935        Designated Caregiver       Most Recent Value    Caregiver    Will someone help with your care after discharge? no      About your hospital stay     You were admitted on:  April 7, 2017 You last received care in the:  [facility]    You were discharged on:  April 10, 2017        Reason for your hospital stay       81F s/p right TKA                  Who to Call     For medical emergencies, please call [phone].  For non-urgent questions about your medical care, please call your primary care provider or clinic, [phone]  For questions related to your surgery, please call your surgery clinic        Attending Provider     Provider Specialty    Aamir Sutton MD Orthopedics       Primary Care Provider Office Phone # Fax #    Ange Crespo [phone]       [facility] [address] [address]        After Care Instructions     Additional Discharge Instructions       Post Operative Instructions for Zaira Fierro is a 81 year old female    Surgery: Right Total Knee Replacement on 4/7/2017.    If you have any questions contact Dr. Sutton at the following numbers: [facility] call [phone].      Follow up appointment:   You are scheduled to follow up with Dr. Sutton approximately 4 weeks after your surgery.  Contact clinic if you have any questions about the date or time.    Anticoagulation:   Take coumadin x 4 weeks with goal INR of 2.0. This is given to  "help minimize your risk of blood clot.    Activity:   -Continue to weight bear on your operative leg as tolerated by pain.  As you are more comfortable, you can discontinue use of the walker and transition to a cane.  Once you are comfortable with the cane, you can also wean from the cane and progress to using no assistive devices.  Do not transition until you are comfortable and have good balance.      -Continue to work on knee range of motion to prevent stiffness.      -When you are sitting, \"bridge the leg\" and keep the leg fully straight, with a bump under the heel to prevent a flexion contracture and ensure that you can fully straighten the knee.    -Work on getting your leg fully straight while walking - landing on your heel and progressing over the knee normally to prevent a flexion contracture.        Pain Medications:   -Take pain medications as prescribed.  Wean off the the narcotic pain medications (Oxycodone, Dilaudid, etc) as tolerated by pain.  Only take the narcotic pain medication if not relieved by the other medications.  To help with this, take the Tylenol and Celebrex (if prescribed) to minimize the amount of narcotic pain medication you need to take.      -Do not drive while on pain medications or until your leg feels well enough to do so.      Bandage Care and Showering:   -Please keep the dressings clean/dry/intact until follow up.  Ok to change as needed after POD #5.  No soaking/scrubbing/submerging until follow up.    --Contact Dr. Sutton or his staff if there is any drainage from the incision or redness.    Leg Swelling and Icing  -Continue icing the knee 5-6 times per day for approximately 20 minutes each time.  This will help with swelling.    -Some swelling in the leg is normal after surgery.  This type of swelling is usually gravity dependent and is improved in the morning after sleeping.  If the swelling is painful in the calf or the swelling is getting worse, contact Dr. Sutton's " "office.  We may recommend presenting to the Emergency Department to obtain an ultrasound of the leg if there is concern for a DVT.      -Elevate the leg if you are sitting as this will help reduce swelling.    Contact clinic if you note any of the following:  -Fevers greater than 101.5 chills  -Increased pain, redness, swelling or discharge at the surgical site.   -During regular business hours call Dr Sutton's office and request to speak with his nurse or the triage nurses.   -After hours or on weekends call the hospital  at [phone] and ask to speak with the Orthopaedic resident on call.            General info for SNF       Length of Stay Estimate: Short Term Care: Estimated # of Days <30  Condition at Discharge: Improving  Level of care:skilled   Rehabilitation Potential: Excellent  Admission H&P remains valid and up-to-date: Yes  Recent Chemotherapy: N/A  Use Nursing Home Standing Orders: Yes            Mantoux instructions       Give two-step Mantoux (PPD) Per Facility Policy Yes            Weight bearing status                 Follow-up Appointments     Follow Up and recommended labs and tests       Follow up with Dr Sutton in 4 weeks with repeat XR.                  Additional Services     Occupational Therapy Adult Consult       Evaluate and treat as clinically indicated.    Reason:  eval and treat            Physical Therapy Adult Consult       Evaluate and treat as clinically indicated.    Reason:  eval and treat                  Pending Results     No orders found from 4/5/2017 to 4/8/2017.            Admission Information     Date & Time Provider Department Dept. Phone    4/7/2017 Aamir Sutton MD [facility] 8A [phone]      Your Vitals Were     Blood Pressure Pulse Temperature Respirations Height Weight    142/59 (BP Location: Right arm) 103 99.2  F (37.3  C) (Oral) 16 1.575 m (5' 2\") 76.6 kg (168 lb 14 oz)    Pulse Oximetry BMI (Body Mass Index)                92% 30.89 kg/m2          MyChart " Information     [employer] gives you secure access to your electronic health record. If you see a primary care provider, you can also send messages to your care team and make appointments. If you have questions, please call your primary care clinic.  If you do not have a primary care provider, please call [phone] and they will assist you.        Care EveryWhere ID     This is your Care EveryWhere ID. This could be used by other organizations to access your [facility] medical records  [id number]           Review of your medicines      START taking        Dose / Directions    HYDROmorphone 2 MG tablet   Commonly known as:  DILAUDID        For pain complaints of 1-5 take 1  tablets ( 1 mg), for pain complaints of 6-10 take 2 tablets ( 4 mg)  Every 4 hours as needed for pain.   Quantity:  80 tablet   Refills:  0       senna-docusate 8.6-50 MG per tablet   Commonly known as:  SENOKOT-S;PERICOLACE        Dose:  1-2 tablet   Take 1-2 tablets by mouth 2 times daily   Quantity:  100 tablet   Refills:  1         CONTINUE these medicines which may have CHANGED, or have new prescriptions. If we are uncertain of the size of tablets/capsules you have at home, strength may be listed as something that might have changed.        Dose / Directions    acetaminophen 325 MG tablet   Commonly known as:  TYLENOL   This may have changed:  how much to take   Used for:  Pain        Dose:  650 mg   Take 2 tablets (650 mg) by mouth every 6 hours as needed for pain   Quantity:  250 tablet   Refills:  11       calcium carb 1250 mg (500 mg [place])/vitamin D 200 units 500-200 MG-UNIT per tablet   Commonly known as:  OSCAL with D   This may have changed:  when to take this   Used for:  Nutrition disorder, Arthritis of knee        Dose:  1 tablet   Take 1 tablet by mouth 2 times daily (with meals)   Quantity:  100 tablet   Refills:  3       polyethylene glycol powder   Commonly known as:  MIRALAX   This may have changed:    - when to take this  -  reasons to take this   Used for:  Constipation        Dose:  1 capful   Take 17 g by mouth daily   Quantity:  510 g   Refills:  11       pravastatin 20 MG tablet   Commonly known as:  PRAVACHOL   This may have changed:  when to take this   Used for:  Hyperlipidemia LDL goal <160        Dose:  20 mg   Take 1 tablet (20 mg) by mouth daily   Quantity:  90 tablet   Refills:  2         CONTINUE these medicines which have NOT CHANGED        Dose / Directions    ASPIRIN PO        Dose:  81 mg   Take 81 mg by mouth every morning   Refills:  0       * CENTRUM SILVER per tablet        Dose:  1 tablet   Take 1 tablet by mouth daily.   Refills:  0       * multivitamin Tabs tablet   Used for:  Cataract        Dose:  1 tablet   Take 1 tablet by mouth daily   Quantity:  30 each   Refills:  6       hydrochlorothiazide 25 MG tablet   Commonly known as:  HYDRODIURIL   Used for:  Essential hypertension with goal blood pressure less than 140/90        Dose:  25 mg   Take 1 tablet (25 mg) by mouth daily   Quantity:  90 tablet   Refills:  1       latanoprost 0.005 % ophthalmic solution   Commonly known as:  XALATAN        Dose:  1 drop   Place 1 drop into both eyes At Bedtime   Refills:  0       losartan 100 MG tablet   Commonly known as:  COZAAR   Used for:  Benign essential hypertension        Dose:  100 mg   Take 1 tablet (100 mg) by mouth daily   Quantity:  90 tablet   Refills:  1       nortriptyline 25 MG capsule   Commonly known as:  PAMELOR   Used for:  Nutrition disorder, Arthritis of knee        Dose:  25 mg   Take 1 capsule (25 mg) by mouth At Bedtime   Quantity:  90 capsule   Refills:  1       pramipexole 0.125 MG tablet   Commonly known as:  MIRAPEX   Used for:  Restless leg syndrome        Dose:  0.125 mg   Take 1 tablet (0.125 mg) by mouth At Bedtime   Quantity:  30 tablet   Refills:  1       * Notice:  This list has 2 medication(s) that are the same as other medications prescribed for you. Read the directions carefully,  and ask your doctor or other care provider to review them with you.      STOP taking     HYDROcodone-acetaminophen 5-325 MG per tablet   Commonly known as:  NORCO                Where to get your medicines      These medications were sent to [facility] [place], [place] - [address]  [address], [facility] [address]     Phone:  [phone]     acetaminophen 325 MG tablet    senna-docusate 8.6-50 MG per tablet         Some of these will need a paper prescription and others can be bought over the counter. Ask your nurse if you have questions.     Bring a paper prescription for each of these medications     HYDROmorphone 2 MG tablet                Protect others around you: Learn how to safely use, store and throw away your medicines at www.disposemymeds.org.             Medication List: This is a list of all your medications and when to take them. Check marks below indicate your daily home schedule. Keep this list as a reference.      Medications           Morning Afternoon Evening Bedtime As Needed    acetaminophen 325 MG tablet   Commonly known as:  TYLENOL   Take 2 tablets (650 mg) by mouth every 6 hours as needed for pain   Last time this was given:  975 mg on 4/10/2017 11:01 AM                                ASPIRIN PO   Take 81 mg by mouth every morning                                calcium carb 1250 mg (500 mg [place])/vitamin D 200 units 500-200 MG-UNIT per tablet   Commonly known as:  OSCAL with D   Take 1 tablet by mouth 2 times daily (with meals)                                * CENTRUM SILVER per tablet   Take 1 tablet by mouth daily.                                * multivitamin Tabs tablet   Take 1 tablet by mouth daily                                hydrochlorothiazide 25 MG tablet   Commonly known as:  HYDRODIURIL   Take 1 tablet (25 mg) by mouth daily                                HYDROmorphone 2 MG tablet   Commonly known as:  DILAUDID   For pain complaints of 1-5  take 1  tablets ( 1 mg), for pain complaints of 6-10 take 2 tablets ( 4 mg)  Every 4 hours as needed for pain.   Last time this was given:  4 mg on 4/10/2017  2:05 PM                                latanoprost 0.005 % ophthalmic solution   Commonly known as:  XALATAN   Place 1 drop into both eyes At Bedtime   Last time this was given:  1 drop on 4/9/2017  9:43 PM                                losartan 100 MG tablet   Commonly known as:  COZAAR   Take 1 tablet (100 mg) by mouth daily   Last time this was given:  100 mg on 4/10/2017  7:55 AM                                nortriptyline 25 MG capsule   Commonly known as:  PAMELOR   Take 1 capsule (25 mg) by mouth At Bedtime   Last time this was given:  25 mg on 4/9/2017  8:01 PM                                polyethylene glycol powder   Commonly known as:  MIRALAX   Take 17 g by mouth daily                                pramipexole 0.125 MG tablet   Commonly known as:  MIRAPEX   Take 1 tablet (0.125 mg) by mouth At Bedtime                                pravastatin 20 MG tablet   Commonly known as:  PRAVACHOL   Take 1 tablet (20 mg) by mouth daily                                senna-docusate 8.6-50 MG per tablet   Commonly known as:  SENOKOT-S;PERICOLACE   Take 1-2 tablets by mouth 2 times daily   Last time this was given:  2 tablets on 4/10/2017  7:55 AM                                * Notice:  This list has 2 medication(s) that are the same as other medications prescribed for you. Read the directions carefully, and ask your doctor or other care provider to review them with you.

## 2017-04-07 NOTE — IP AVS SNAPSHOT
UR 8A    [address]    [place] [address]    Phone:  [phone]                                       After Visit Summary   4/7/2017    Zaira Fierro    MRN: [id number]           After Visit Summary Signature Page     I have received my discharge instructions, and my questions have been answered. I have discussed any challenges I see with this plan with the nurse or doctor.    ..........................................................................................................................................  Patient/Patient Representative Signature      ..........................................................................................................................................  Patient Representative Print Name and Relationship to Patient    ..................................................               ................................................  Date                                            Time    ..........................................................................................................................................  Reviewed by Signature/Title    ...................................................              ..............................................  Date                                                            Time

## 2017-04-07 NOTE — PROGRESS NOTES
"Orthopaedic Surgery Post op check    S: comfortable resting in PACU.  No acute issues.  Denies any new n/t/weakness.     O:    Intake/Output Summary (Last 24 hours) at 04/07/17 1819  Last data filed at 04/07/17 1715   Gross per 24 hour   Intake             1200 ml   Output               70 ml   Net             1130 ml     Vitals:    04/07/17 1048 04/07/17 1747   BP: 157/80 156/84   Resp: 16 20   Temp: 98.2  F (36.8  C) 97.5  F (36.4  C)   TempSrc: Oral Oral   SpO2: 98%    Weight: 76.6 kg (168 lb 14 oz)    Height: 1.575 m (5' 2\")            Exam:  Gen: No acute distress, resting comfortably in bed.  Resp: Non-labored breathing  RLE:    -dressing is c/d/i   -thigh/calf is soft/compressible   -drain site c/d/i   --CMS intact    -motor intact to ehl/fhl/ta/gsc    -SILT to sp/dp/sural/saph/tibial    -foot wwp, cap refill <2 sec, DP 2+ palpable  Drain output: NR    Post-Op Plan:  Zaira Fierro is a 81F s/p right TKA on 4/7/2017 with Dr. Sutton.    Activity: Up with assist.  Weight bearing status: WBAT RLE   Antibiotics: Ancef x 24 hours.  Diet: Begin with clear fluids and progress diet as tolerated.   DVT prophylaxis: Coumadin x 4 weeks, INR goal 1.5-2.5 and mechanical while in the hospital  Bracing/Splinting: none   Elevation: bridge knee with pillows under ankle  CPM: at least 8 hours per day, advance 5 degrees every hour  Wound Care: keep c/d/i.   Drains: Document output per shift, ortho to remove POD #1  Pain management: transition from IV to orals as tolerated.   X-rays: XR AP/lat right knee PACU  Physical Therapy: eval and treat, ROM, ADL's.   Occupational Therapy: eval and treat  Labs: Trend Hgb on POD #1, 2, 3   Cultures: none   Consults: PT, OT. medicine, appreciate assistance in caring for this patient.   Follow-up: Clinic with Dr. Sutton in 4 weeks with XR     Disposition: Pending progress with therapies, pain control on orals, and medical stability, anticipate discharge to home vs TCU on POD #2-4.    Future " Appointments  Date Time Provider Department [place]   4/8/2017 8:30 AM Apoorva Palmer, PT [facility] [place]   4/8/2017 11:00 AM Jennifer Walters, OT [facility] [place]       Aniket Carmichael MD  Orthopaedic Surgery PGY-4  [phone]      For questions about this patient, please attempt to contact me at my pager prior to contacting the orthopaedics resident on call. Thank you!

## 2017-04-07 NOTE — IP AVS SNAPSHOT
` `     [facility] 8A: [phone]            Medication Administration Report for Zaira Fierro as of 04/10/17 1437   Legend:    Given Hold Not Given Due Canceled Entry Other Actions    Time Time (Time) Time  Time-Action       Inactive    Active    Linked        Medications 04/04/17 04/05/17 04/06/17 04/07/17 04/08/17 04/09/17 04/10/17    acetaminophen (TYLENOL) tablet 325-650 mg  Dose: 325-650 mg Freq: EVERY 6 HOURS PRN Route: PO  PRN Reason: mild pain  Start: 04/07/17 2021   Admin Instructions: Maximum acetaminophen dose from all sources = 75 mg/kg/day not to exceed 4 grams/day.               acetaminophen (TYLENOL) tablet 650 mg  Dose: 650 mg Freq: EVERY 4 HOURS PRN Route: PO  PRN Reason: other  PRN Comment: surgical pain  Start: 04/10/17 0000   Admin Instructions: May give first dose 4 hours after last scheduled dose of acetaminophen.  Maximum acetaminophen dose from all sources = 75 mg/kg/day not to exceed 4 grams/day.               acetaminophen (TYLENOL) tablet 975 mg  Dose: 975 mg Freq: EVERY 8 HOURS Route: PO  Start: 04/07/17 1830   End: 04/10/17 1829   Admin Instructions: Do not use if patient has an active opioid/acetaminophen analgesic order for pain  Maximum acetaminophen dose from all sources = 75 mg/kg/day not to exceed 4 grams/day.        (2039)-Not Given [C]        0229 (975 mg)-Given       1040 (975 mg)-Given       1751 (975 mg)-Given        0245 (975 mg)-Given       1138 (975 mg)-Given       1820 (975 mg)-Given        0220 (975 mg)-Given       1101 (975 mg)-Given       1829-Med Discontinued       benzocaine-menthol (CHLORASEPTIC) 6-10 MG lozenge 1-2 lozenge  Dose: 1-2 lozenge Freq: EVERY 1 HOUR PRN Route: BU  PRN Reason: sore throat  PRN Comment: sore throat without fever  Start: 04/07/17 1957              cyclobenzaprine (FLEXERIL) tablet 10 mg  Dose: 10 mg Freq: 3 TIMES DAILY PRN Route: PO  PRN Reason: muscle spasms  Start: 04/08/17 1830        1903 (10 mg)-Given       2301 (10 mg)-Given         "1454 (10 mg)-Given            hydrALAZINE (APRESOLINE) half-tab 12.5 mg  Dose: 12.5 mg Freq: 4 TIMES DAILY PRN Route: PO  PRN Comment: SBP>175 or DBP>110  Start: 04/08/17 0904        1903 (12.5 mg)-Given             hydrochlorothiazide (HYDRODIURIL) tablet 25 mg  Dose: 25 mg Freq: HOLD Route: PO  PRN Comment: Post op  Start: 04/08/17 0757        (0817)-Not Given             HYDROmorphone (DILAUDID) tablet 2-4 mg  Dose: 2-4 mg Freq: EVERY 3 HOURS PRN Route: PO  PRN Reason: moderate to severe pain  Start: 04/09/17 0807         0829 (2 mg)-Given       1245 (2 mg)-Given       1552 (4 mg)-Given       1857 (4 mg)-Given       2241 (4 mg)-Given        0219 (4 mg)-Given       0755 (4 mg)-Given       1101 (4 mg)-Given       1405 (4 mg)-Given           HYDROmorphone (PF) (DILAUDID) injection 0.3-0.5 mg  Dose: 0.3-0.5 mg Freq: EVERY 3 HOURS PRN Route: IV  PRN Reason: moderate to severe pain  Start: 04/09/17 1215              hydrOXYzine (ATARAX) tablet 10 mg  Dose: 10 mg Freq: EVERY 6 HOURS PRN Route: PO  PRN Reason: itching  Start: 04/07/17 1815   Admin Instructions: Caution to be used when administering multiple CNS depressing meds within a short time frame.         1708 (10 mg)-Given        1207 (10 mg)-Given            latanoprost (XALATAN) 0.005 % ophthalmic solution 1 drop  Dose: 1 drop Freq: AT BEDTIME Route: Both Eyes  Start: 04/07/17 2200   Admin Instructions: Refrigerated product.        2210 (1 drop)-Given        2038 (1 drop)-Given        2143 (1 drop)-Given        [ ] 2100           lidocaine (LMX4) kit  Freq: EVERY 1 HOUR PRN Route: Top  PRN Reason: pain  PRN Comment: with VAD insertion or accessing implanted port.  Start: 04/07/17 1957   Admin Instructions: Do NOT give if patient has a history of allergy to any local anesthetic or any \"erika\" product.   Apply 30 minutes prior to VAD insertion or port access.  MAX Dose:  2.5 g (  of 5 g tube)               lidocaine 1 % 1 mL  Dose: 1 mL Freq: EVERY 1 HOUR PRN " "Route: OTHER  PRN Comment: mild pain with VAD insertion or accessing implanted port  Start: 04/07/17 1957   Admin Instructions: Do NOT give if patient has a history of allergy to any local anesthetic or any \"erika\" product. MAX dose 1 mL subcutaneous OR intradermal in divided doses.               LORazepam (ATIVAN) injection 0.5 mg  Dose: 0.5 mg Freq: ONCE PRN Route: IV  PRN Reason: anxiety  Start: 04/08/17 2251   Admin Instructions: If PO ineffective at 30 mins  For IV PUSH: Dilute with equal volume of NS.               losartan (COZAAR) tablet 100 mg  Dose: 100 mg Freq: DAILY Route: PO  Start: 04/09/17 0815   Admin Instructions: Hold for SBP<105          0811 (100 mg)-Given        0755 (100 mg)-Given           naloxone (NARCAN) injection 0.1-0.4 mg  Dose: 0.1-0.4 mg Freq: EVERY 2 MIN PRN Route: IV  PRN Reason: opioid reversal  Start: 04/07/17 1815   Admin Instructions: For respiratory rate LESS than or EQUAL to 8.  Partial reversal dose:  0.1 mg titrated q 2 minutes for Analgesia Side Effects Monitoring Sedation Level of 3 (frequently drowsy, arousable, drifts to sleep during conversation).Full reversal dose:  0.4 mg bolus for Analgesia Side Effects Monitoring Sedation Level of 4 (somnolent, minimal or no response to stimulation).               nortriptyline (PAMELOR) capsule 25 mg  Dose: 25 mg Freq: AT BEDTIME Route: PO  Start: 04/07/17 2200 2205 (25 mg)-Given        2038 (25 mg)-Given        2001 (25 mg)-Given        [ ] 2100           ondansetron (ZOFRAN-ODT) ODT tab 4 mg  Dose: 4 mg Freq: EVERY 6 HOURS PRN Route: PO  PRN Reason: nausea  Start: 04/07/17 1815   Admin Instructions: This is Step 1 of nausea and vomiting management.  If nausea not resolved in 15 minutes, go to Step 2 prochlorperazine (COMPAZINE). Do not push through foil backing. Peel back foil and gently remove. Place on tongue immediately. Administration with liquid unnecessary              Or  ondansetron (ZOFRAN) injection 4 mg  Dose: 4 " mg Freq: EVERY 6 HOURS PRN Route: IV  PRN Reasons: nausea,vomiting  Start: 04/07/17 1815   Admin Instructions: This is Step 1 of nausea and vomiting management.  If nausea not resolved in 15 minutes, go to Step 2 prochlorperazine (COMPAZINE).  Irritant.               polyethylene glycol (MIRALAX/GLYCOLAX) Packet 17 g  Dose: 17 g Freq: DAILY PRN Route: PO  PRN Reason: constipation  Start: 04/07/17 2021   Admin Instructions: 1 Packet = 17 grams. Mixed prescribed dose in 8 ounces of water.  1 Packet = 17 grams. Mixed prescribed dose in 8 ounces of water. Follow with 8 oz. of water.               pramipexole (MIRAPEX) tablet 0.125 mg  Dose: 0.125 mg Freq: AT BEDTIME PRN Route: PO  PRN Comment: restless legs  Start: 04/07/17 2132              senna-docusate (SENOKOT-S;PERICOLACE) 8.6-50 MG per tablet 1-2 tablet  Dose: 1-2 tablet Freq: 2 TIMES DAILY Route: PO  Start: 04/07/17 2000   Admin Instructions: Start with 1 tablet PO BID, If no bowel movement in 24 hours, increase to 2 tablets PO BID.  Hold for loose stools.        2047 (1 tablet)-Given        0811 (2 tablet)-Given       1903 (2 tablet)-Given        0811 (2 tablet)-Given       2001 (1 tablet)-Given        0755 (2 tablet)-Given       [ ] 2000           sodium chloride (PF) 0.9% PF flush 3 mL  Dose: 3 mL Freq: EVERY 8 HOURS Route: IK  Start: 04/07/17 2000   Admin Instructions: And Q1H PRN, to lock peripheral IV dormant line.        (2039)-Not Given        (0523)-Not Given       (1352)-Not Given       1904 (3 mL)-Given        0252 (3 mL)-Given              1141 (3 mL)-Given       2144 (3 mL)-Given        (0458)-Not Given       1102 (3 mL)-Given       [ ] 2000           sodium chloride (PF) 0.9% PF flush 3 mL  Dose: 3 mL Freq: EVERY 1 HOUR PRN Route: IK  PRN Reason: line flush  PRN Comment: for peripheral IV flush post IV meds  Start: 04/07/17 1957                     Warfarin Therapy Reminder (Check START DATE - warfarin may be starting in the FUTURE)  Freq:  CONTINUOUS PRN Route: XX  Start: 04/07/17 1957   Admin Instructions: Reorder warfarin (COUMADIN) daily  Patient is on Warfarin Therapy - check for daily order              Future Medications  Medications 04/04/17 04/05/17 04/06/17 04/07/17 04/08/17 04/09/17 04/10/17       warfarin (COUMADIN) tablet 5 mg  Dose: 5 mg Freq: ONCE AT 6PM Route: PO  Start: 04/10/17 1800          [ ] 1800          Completed Medications  Medications 04/04/17 04/05/17 04/06/17 04/07/17 04/08/17 04/09/17 04/10/17         Dose: 1 g Freq: EVERY 8 HOURS Route: IV  Indications of Use: SURGICAL PROPHYLAXIS  Start: 04/08/17 0000   End: 04/08/17 0829   Admin Instructions: First post-op dose due 8 hours after intra-op dose, see eMAR.         0014 (1 g)-New Bag       0759 (1 g)-New Bag               Dose: 0.2 mg Freq: ONCE Route: IV  Start: 04/08/17 1200   End: 04/08/17 1210        1210 (0.2 mg)-Given               Dose: 15 mg Freq: ONCE Route: IV  Start: 04/08/17 2345   End: 04/09/17 0007         0007 (15 mg)-Given              Dose: 0.5 mg Freq: ONCE Route: PO  Start: 04/08/17 2230   End: 04/08/17 2228        2228 (0.5 mg)-Given               Dose: 1 g Freq: ONCE Route: IV  Start: 04/07/17 1300   End: 04/07/17 1452   Admin Instructions: Administer after anesthesia induction  Each 1 gram to be infused over 10 minutes.        1452 (1 g)-New Bag                Dose: 2.5 mg Freq: ONCE AT 6PM Route: PO  Start: 04/09/17 1800   End: 04/09/17 1821         1821 (2.5 mg)-Given              Dose: 2.5 mg Freq: ONCE AT 6PM Route: PO  Start: 04/08/17 1800   End: 04/08/17 1757        1757 (2.5 mg)-Given               Dose: 2.5 mg Freq: ONCE AT 6PM Route: PO  Start: 04/07/17 2145   End: 04/07/17 2205       2205 (2.5 mg)-Given             Discontinued Medications  Medications 04/04/17 04/05/17 04/06/17 04/07/17 04/08/17 04/09/17 04/10/17         Rate: 100 mL/hr Freq: CONTINUOUS Route: IV  Start: 04/08/17 1045   End: 04/08/17 1152        1048 ( )-Rate/Dose Verify        1152-Med Discontinued           Rate: 75 mL/hr Freq: CONTINUOUS Route: IV  Last Dose: Stopped (04/08/17 1011)  Start: 04/07/17 1830   End: 04/08/17 0838   Admin Instructions: Change to saline lock when PO well tolerated.        2047 ( )-New Bag        0838-Med Discontinued  1011-Stopped               Dose: 2.5 mg Freq: EVERY 4 HOURS PRN Route: NEBULIZATION  PRN Reason: shortness of breath / dyspnea  Start: 04/07/17 1650   End: 04/07/17 1956 1956-Med Discontinued            Dose: 2 g Freq: PRE-OP/PRE-PROCEDURE Route: IV  Indications of Use: SURGICAL PROPHYLAXIS  Start: 04/07/17 1038   End: 04/07/17 1750   Admin Instructions: Give first dose within 1 hour PRIOR to incision. If patient weight is greater than or equal to 120 kg increase dose to 3 g.        1750-Med Discontinued            Dose: 25-50 mcg Freq: EVERY 2 MIN PRN Route: IV  PRN Reason: other  PRN Comment: acute pain  Start: 04/07/17 1650   End: 04/07/17 1956   Admin Instructions: MAX cumulative dose = 250 mcg.    Use Fentanyl initially, as a short acting agent for acute pain control.  If insufficient, or a longer acting agent is needed, begin Morphine or Hydromorphone if ordered.        1956-Med Discontinued            Dose: 25-50 mcg Freq: EVERY 2 MIN PRN Route: IV  PRN Reason: other  PRN Comment: acute pain  Start: 04/07/17 1650   End: 04/07/17 1956   Admin Instructions: MAX cumulative dose = 250 mcg.    Use Fentanyl initially, as a short acting agent for acute pain control.  If insufficient, or a longer acting agent is needed, begin Morphine or Hydromorphone if ordered.        1956-Med Discontinued            Dose: 25 mg Freq: DAILY Route: PO  Start: 04/08/17 0800   End: 04/08/17 0757        0757-Med Discontinued           Dose: 0.3-0.5 mg Freq: EVERY 2 HOURS PRN Route: IV  PRN Reason: moderate to severe pain  Start: 04/08/17 2339   End: 04/09/17 1206         0026 (0.5 mg)-Given       0247 (0.3 mg)-Given       0622 (0.3 mg)-Given       1206-Med  Discontinued          Dose: 0.3-0.5 mg Freq: EVERY 5 MIN PRN Route: IV  PRN Reason: other  PRN Comment: acute pain.  May administer if Respiratory Rate is greater than 10  Start: 04/07/17 1650   End: 04/07/17 1956   Admin Instructions: If fentanyl is also ordered, use HYDROmorphone if pain control insufficient with fentanyl or a longer acting agent is needed.   Max cumulative dose = 2 mg        1801 (0.3 mg)-Given       1820 (0.3 mg)-Given       1846 (0.4 mg)-Given       1956-Med Discontinued            Dose: 0.3-0.5 mg Freq: EVERY 5 MIN PRN Route: IV  PRN Reason: other  PRN Comment: acute pain.  May administer if Respiratory Rate is greater than 10  Start: 04/07/17 1650   End: 04/07/17 1956   Admin Instructions: If fentanyl is also ordered, use HYDROmorphone if pain control insufficient with fentanyl or a longer acting agent is needed.   Max cumulative dose = 2 mg        1956-Med Discontinued            Dose: 10 mg Freq: ONCE PRN Route: IV  PRN Reason: high blood pressure  PRN Comment: for Systemic Blood Pressure greater than 160 and Heart Rate greater than 60 bpm.    Start: 04/07/17 1650   End: 04/07/17 1956   Admin Instructions: For PACU USE ONLY.  DC WHEN TRANSFERRED TO FLOOR.        1956-Med Discontinued            Rate: 100 mL/hr Freq: CONTINUOUS Route: IV  Start: 04/07/17 1700   End: 04/07/17 1708   Admin Instructions: Continue until IV catheter is weaned               1708-Med Discontinued            Rate: 100 mL/hr Freq: CONTINUOUS Route: IV  Start: 04/07/17 1700   End: 04/07/17 1956   Admin Instructions: Continue until IV catheter is weaned               1956-Med Discontinued            Rate: 25 mL/hr Freq: CONTINUOUS Route: IV  Start: 04/07/17 1100   End: 04/07/17 1750   Admin Instructions: IF patient NOT on dialysis.        1428 ( )-New Bag       1430 ( )-Anesthesia Volume Adjustment       1630 ( )-New Bag       1645 ( )-Anesthesia Volume Adjustment       1750-Med Discontinued            Freq: EVERY 1  "HOUR PRN Route: Top  PRN Reason: pain  PRN Comment: with VAD insertion or accessing implanted port.  Start: 04/07/17 1048   End: 04/07/17 1750   Admin Instructions: Do NOT give if patient has a history of allergy to any local anesthetic or any \"erika\" product.   Apply 30 minutes prior to VAD insertion or port access.  MAX Dose:  2.5 g (  of 5 g tube)        1750-Med Discontinued            Dose: 1 mL Freq: EVERY 1 HOUR PRN Route: OTHER  PRN Comment: mild pain with VAD insertion or accessing implanted port  Start: 04/07/17 1048   End: 04/07/17 1750   Admin Instructions: Do NOT give if patient has a history of allergy to any local anesthetic or any \"erika\" product. MAX dose 1 mL subcutaneous OR intradermal in divided doses.        1750-Med Discontinued            Dose: 100 mg Freq: HOLD Route: PO  PRN Comment: Post op  Start: 04/08/17 0756   End: 04/09/17 0959        (0817)-Not Given        0959-Med Discontinued          Dose: 100 mg Freq: DAILY Route: PO  Start: 04/08/17 0800   End: 04/08/17 0757        0757-Med Discontinued           Freq: ONCE Route: IX  Start: 04/07/17 1500   End: 04/07/17 1956                     1956-Med Discontinued            Dose: 4 mg Freq: EVERY 30 MIN PRN Route: PO  PRN Reason: nausea  Start: 04/07/17 1651   End: 04/07/17 1709   Admin Instructions: MAX total dose = 8 mg, including OR dosing. If not resolved in 15 minutes, then go to step 2 (Prochlorperazine if ordered).        1709-Med Discontinued         Or    Dose: 4 mg Freq: EVERY 30 MIN PRN Route: IV  PRN Reason: nausea  Start: 04/07/17 1651   End: 04/07/17 1709   Admin Instructions: MAX total dose = 8 mg, including OR dosing. If not resolved in 15 minutes, then go to step 2 (Prochlorperazine if ordered).  Irritant.        1709-Med Discontinued            Dose: 4 mg Freq: EVERY 30 MIN PRN Route: PO  PRN Reason: nausea  Start: 04/07/17 1651   End: 04/07/17 1956   Admin Instructions: MAX total dose = 8 mg, including OR dosing. If not " resolved in 15 minutes, then go to step 2 (Prochlorperazine if ordered).        1956-Med Discontinued         Or    Dose: 4 mg Freq: EVERY 30 MIN PRN Route: IV  PRN Reason: nausea  Start: 04/07/17 1651   End: 04/07/17 1956   Admin Instructions: MAX total dose = 8 mg, including OR dosing. If not resolved in 15 minutes, then go to step 2 (Prochlorperazine if ordered).  Irritant.        1956-Med Discontinued            Dose: 5-10 mg Freq: EVERY 3 HOURS PRN Route: PO  PRN Reason: moderate to severe pain  Start: 04/08/17 1045   End: 04/09/17 0809   Admin Instructions: Hold while on PCA or with regular IV opioid dosing.  IF CrCl UNKNOWN start at lowest end of dosing range.         1047 (10 mg)-Given       1451 (5 mg)-Given       1603 (5 mg)-Given       1750 (5 mg)-Given       1825 (5 mg)-Given       2038 (10 mg)-Given        0809-Med Discontinued          Dose: 5-10 mg Freq: EVERY 4 HOURS PRN Route: PO  PRN Reason: moderate to severe pain  Start: 04/07/17 1815   End: 04/08/17 1043   Admin Instructions: Hold while on PCA or with regular IV opioid dosing.  IF CrCl UNKNOWN start at lowest end of dosing range.        1839 (5 mg)-Given        0229 (5 mg)-Given       0728 (10 mg)-Given       1043-Med Discontinued           Dose: 0.125 mg Freq: AT BEDTIME Route: PO  Start: 04/07/17 2200   End: 04/07/17 2132       2132-Med Discontinued            Dose: 5 mg Freq: EVERY 6 HOURS PRN Route: IV  PRN Reasons: nausea,vomiting  Start: 04/07/17 1650   End: 04/07/17 1709   Admin Instructions: This is Step 2 of the nausea and vomiting protocol.   If nausea not resolved in 15 minutes, give Metoclopramide if ordered (step 3 of nausea and vomiting protocol)          1709-Med Discontinued            Dose: 5 mg Freq: EVERY 6 HOURS PRN Route: IV  PRN Reasons: nausea,vomiting  Start: 04/07/17 1650   End: 04/07/17 1956   Admin Instructions: This is Step 2 of the nausea and vomiting protocol.   If nausea not resolved in 15 minutes, give  Metoclopramide if ordered (step 3 of nausea and vomiting protocol)          1956-Med Discontinued            Freq: PRN  Start: 04/07/17 1627   End: 04/07/17 1956       1627 (50 mL)-Given       1628 (50 mL)-Given       1956-Med Discontinued            Dose: 3 mL Freq: EVERY 8 HOURS Route: IK  Start: 04/07/17 1100   End: 04/07/17 1750   Admin Instructions: And Q1H PRN, to lock peripheral IV dormant line.               1750-Med Discontinued            Dose: 3 mL Freq: EVERY 1 HOUR PRN Route: IK  PRN Reason: line flush  PRN Comment: for peripheral IV flush post IV meds  Start: 04/07/17 1048   End: 04/07/17 1750       1750-Med Discontinued            Freq: PRN  Start: 04/07/17 1644   End: 04/07/17 1956       1644 (1,800 mL)-Given       1956-Med Discontinued       Medications 04/04/17 04/05/17 04/06/17 04/07/17 04/08/17 04/09/17 04/10/17

## 2017-04-07 NOTE — ANESTHESIA CARE TRANSFER NOTE
Patient: Zaira Fierro    Procedure(s):  Right Total Knee Replacement - Wound Class: I-Clean    Diagnosis: Osteoarthritis Right Knee  Diagnosis Additional Information: No value filed.    Anesthesia Type:   General, ETT     Note:  Airway :Face Mask  Patient transferred to:PACU  Comments: Spontaneous respirations, no s/s resp distress. Extubated to  4L, VSS. Transported to PACU, report given to RN.       Vitals: (Last set prior to Anesthesia Care Transfer)    CRNA VITALS  4/7/2017 1714 - 4/7/2017 1757      4/7/2017             Resp Rate (set): 10                Electronically Signed By: LINDEN Amaya CRNA  April 7, 2017  5:57 PM

## 2017-04-08 ENCOUNTER — APPOINTMENT (OUTPATIENT)
Dept: PHYSICAL THERAPY | Facility: CLINIC | Age: 82
DRG: 470 | End: 2017-04-08
Attending: ORTHOPAEDIC SURGERY
Payer: COMMERCIAL

## 2017-04-08 ENCOUNTER — APPOINTMENT (OUTPATIENT)
Dept: OCCUPATIONAL THERAPY | Facility: CLINIC | Age: 82
DRG: 470 | End: 2017-04-08
Attending: ORTHOPAEDIC SURGERY
Payer: COMMERCIAL

## 2017-04-08 LAB
GLUCOSE BLDC GLUCOMTR-MCNC: 106 MG/DL (ref 70–99)
GLUCOSE BLDC GLUCOMTR-MCNC: 145 MG/DL (ref 70–99)
HGB BLD-MCNC: 11 G/DL (ref 11.7–15.7)
INR PPP: 1.09 (ref 0.86–1.14)

## 2017-04-08 PROCEDURE — 25000128 H RX IP 250 OP 636: Performed by: INTERNAL MEDICINE

## 2017-04-08 PROCEDURE — 97116 GAIT TRAINING THERAPY: CPT | Mod: GP

## 2017-04-08 PROCEDURE — 40000133 ZZH STATISTIC OT WARD VISIT: Performed by: OCCUPATIONAL THERAPIST

## 2017-04-08 PROCEDURE — 25000132 ZZH RX MED GY IP 250 OP 250 PS 637: Performed by: PHYSICIAN ASSISTANT

## 2017-04-08 PROCEDURE — 25000128 H RX IP 250 OP 636: Performed by: ORTHOPAEDIC SURGERY

## 2017-04-08 PROCEDURE — 40000193 ZZH STATISTIC PT WARD VISIT

## 2017-04-08 PROCEDURE — 25000132 ZZH RX MED GY IP 250 OP 250 PS 637: Performed by: INTERNAL MEDICINE

## 2017-04-08 PROCEDURE — 36415 COLL VENOUS BLD VENIPUNCTURE: CPT | Performed by: ORTHOPAEDIC SURGERY

## 2017-04-08 PROCEDURE — 99207 ZZC APP CREDIT; MD BILLING SHARED VISIT: CPT | Performed by: PHYSICIAN ASSISTANT

## 2017-04-08 PROCEDURE — 97530 THERAPEUTIC ACTIVITIES: CPT | Mod: GO | Performed by: OCCUPATIONAL THERAPIST

## 2017-04-08 PROCEDURE — 00000146 ZZHCL STATISTIC GLUCOSE BY METER IP

## 2017-04-08 PROCEDURE — 97165 OT EVAL LOW COMPLEX 30 MIN: CPT | Mod: GO | Performed by: OCCUPATIONAL THERAPIST

## 2017-04-08 PROCEDURE — 85018 HEMOGLOBIN: CPT | Performed by: ORTHOPAEDIC SURGERY

## 2017-04-08 PROCEDURE — 25000132 ZZH RX MED GY IP 250 OP 250 PS 637: Performed by: ORTHOPAEDIC SURGERY

## 2017-04-08 PROCEDURE — 97535 SELF CARE MNGMENT TRAINING: CPT | Mod: GO | Performed by: OCCUPATIONAL THERAPIST

## 2017-04-08 PROCEDURE — 99221 1ST HOSP IP/OBS SF/LOW 40: CPT | Performed by: INTERNAL MEDICINE

## 2017-04-08 PROCEDURE — 85610 PROTHROMBIN TIME: CPT | Performed by: ORTHOPAEDIC SURGERY

## 2017-04-08 PROCEDURE — 97161 PT EVAL LOW COMPLEX 20 MIN: CPT | Mod: GP

## 2017-04-08 PROCEDURE — 12000001 ZZH R&B MED SURG/OB UMMC

## 2017-04-08 PROCEDURE — 97530 THERAPEUTIC ACTIVITIES: CPT | Mod: GP

## 2017-04-08 RX ORDER — LORAZEPAM 2 MG/ML
0.5 INJECTION INTRAMUSCULAR
Status: DISCONTINUED | OUTPATIENT
Start: 2017-04-08 | End: 2017-04-10 | Stop reason: HOSPADM

## 2017-04-08 RX ORDER — WARFARIN SODIUM 2.5 MG/1
2.5 TABLET ORAL
Status: COMPLETED | OUTPATIENT
Start: 2017-04-08 | End: 2017-04-08

## 2017-04-08 RX ORDER — HYDROMORPHONE HCL/0.9% NACL/PF 0.2MG/0.2
0.2 SYRINGE (ML) INTRAVENOUS ONCE
Status: COMPLETED | OUTPATIENT
Start: 2017-04-08 | End: 2017-04-08

## 2017-04-08 RX ORDER — CYCLOBENZAPRINE HCL 10 MG
10 TABLET ORAL 3 TIMES DAILY PRN
Status: DISCONTINUED | OUTPATIENT
Start: 2017-04-08 | End: 2017-04-10 | Stop reason: HOSPADM

## 2017-04-08 RX ORDER — SODIUM CHLORIDE 9 MG/ML
INJECTION, SOLUTION INTRAVENOUS CONTINUOUS
Status: DISCONTINUED | OUTPATIENT
Start: 2017-04-08 | End: 2017-04-08

## 2017-04-08 RX ORDER — OXYCODONE HYDROCHLORIDE 5 MG/1
5-10 TABLET ORAL
Status: DISCONTINUED | OUTPATIENT
Start: 2017-04-08 | End: 2017-04-09

## 2017-04-08 RX ORDER — HYDROCHLOROTHIAZIDE 25 MG/1
25 TABLET ORAL
Status: DISCONTINUED | OUTPATIENT
Start: 2017-04-08 | End: 2017-04-10 | Stop reason: HOSPADM

## 2017-04-08 RX ORDER — LORAZEPAM 0.5 MG/1
0.5 TABLET ORAL ONCE
Status: COMPLETED | OUTPATIENT
Start: 2017-04-08 | End: 2017-04-08

## 2017-04-08 RX ORDER — KETOROLAC TROMETHAMINE 15 MG/ML
15 INJECTION, SOLUTION INTRAMUSCULAR; INTRAVENOUS ONCE
Status: COMPLETED | OUTPATIENT
Start: 2017-04-08 | End: 2017-04-09

## 2017-04-08 RX ORDER — HYDROMORPHONE HYDROCHLORIDE 1 MG/ML
.3-.5 INJECTION, SOLUTION INTRAMUSCULAR; INTRAVENOUS; SUBCUTANEOUS
Status: DISCONTINUED | OUTPATIENT
Start: 2017-04-08 | End: 2017-04-09

## 2017-04-08 RX ORDER — LOSARTAN POTASSIUM 100 MG/1
100 TABLET ORAL
Status: DISCONTINUED | OUTPATIENT
Start: 2017-04-08 | End: 2017-04-09

## 2017-04-08 RX ORDER — SODIUM CHLORIDE 9 MG/ML
INJECTION, SOLUTION INTRAVENOUS
Status: DISPENSED
Start: 2017-04-08 | End: 2017-04-09

## 2017-04-08 RX ADMIN — CYCLOBENZAPRINE HYDROCHLORIDE 10 MG: 10 TABLET, FILM COATED ORAL at 19:03

## 2017-04-08 RX ADMIN — OXYCODONE HYDROCHLORIDE 5 MG: 5 TABLET ORAL at 18:25

## 2017-04-08 RX ADMIN — ACETAMINOPHEN 975 MG: 325 TABLET, FILM COATED ORAL at 10:40

## 2017-04-08 RX ADMIN — LATANOPROST 1 DROP: 50 SOLUTION/ DROPS OPHTHALMIC at 20:38

## 2017-04-08 RX ADMIN — WARFARIN SODIUM 2.5 MG: 2.5 TABLET ORAL at 17:57

## 2017-04-08 RX ADMIN — OXYCODONE HYDROCHLORIDE 10 MG: 5 TABLET ORAL at 07:28

## 2017-04-08 RX ADMIN — HYDROMORPHONE HYDROCHLORIDE 0.2 MG: 10 INJECTION, SOLUTION INTRAMUSCULAR; INTRAVENOUS; SUBCUTANEOUS at 12:10

## 2017-04-08 RX ADMIN — CEFAZOLIN 1 G: 1 INJECTION, POWDER, FOR SOLUTION INTRAMUSCULAR; INTRAVENOUS at 00:14

## 2017-04-08 RX ADMIN — OXYCODONE HYDROCHLORIDE 5 MG: 5 TABLET ORAL at 17:50

## 2017-04-08 RX ADMIN — NORTRIPTYLINE HYDROCHLORIDE 25 MG: 25 CAPSULE ORAL at 20:38

## 2017-04-08 RX ADMIN — OXYCODONE HYDROCHLORIDE 10 MG: 5 TABLET ORAL at 20:38

## 2017-04-08 RX ADMIN — OXYCODONE HYDROCHLORIDE 5 MG: 5 TABLET ORAL at 14:51

## 2017-04-08 RX ADMIN — SENNOSIDES AND DOCUSATE SODIUM 2 TABLET: 8.6; 5 TABLET ORAL at 19:03

## 2017-04-08 RX ADMIN — OXYCODONE HYDROCHLORIDE 10 MG: 5 TABLET ORAL at 10:47

## 2017-04-08 RX ADMIN — CEFAZOLIN 1 G: 1 INJECTION, POWDER, FOR SOLUTION INTRAMUSCULAR; INTRAVENOUS at 07:59

## 2017-04-08 RX ADMIN — ACETAMINOPHEN 975 MG: 325 TABLET, FILM COATED ORAL at 17:51

## 2017-04-08 RX ADMIN — ACETAMINOPHEN 975 MG: 325 TABLET, FILM COATED ORAL at 02:29

## 2017-04-08 RX ADMIN — OXYCODONE HYDROCHLORIDE 5 MG: 5 TABLET ORAL at 02:29

## 2017-04-08 RX ADMIN — SENNOSIDES AND DOCUSATE SODIUM 2 TABLET: 8.6; 5 TABLET ORAL at 08:11

## 2017-04-08 RX ADMIN — CYCLOBENZAPRINE HYDROCHLORIDE 10 MG: 10 TABLET, FILM COATED ORAL at 23:01

## 2017-04-08 RX ADMIN — HYDROXYZINE HYDROCHLORIDE 10 MG: 10 TABLET ORAL at 17:08

## 2017-04-08 RX ADMIN — Medication 12.5 MG: at 19:03

## 2017-04-08 RX ADMIN — LORAZEPAM 0.5 MG: 0.5 TABLET ORAL at 22:28

## 2017-04-08 RX ADMIN — OXYCODONE HYDROCHLORIDE 5 MG: 5 TABLET ORAL at 16:03

## 2017-04-08 NOTE — PLAN OF CARE
Problem: Goal Outcome Summary  Goal: Goal Outcome Summary  Patient admitted to the [facility] at 1945. Pt A&O x's 4. BP. 149/61 rest of vital signs are stable. Afebrile. O2 Sats  98% on  2LPM. Capnography on. Lungs bilateral clear. Denies SOB, chest pain and nausea. Tolerating regular diet. Bowel sounds active in all quadrants. No BM but passing gas. Renae catheter intact and patent. Dressing on right knee CDI. CPM machine on at 45 degrees flexion and 0 degrees extension. Currently denies having pain or discomfort. Ice pack utilized. CMS intact, denies numbness or tingling. PIV intact infusing NS at 75ml/hr. Able to make needs known, call light is in reach. Will continue to monitor.   0530: Patient slept well. Renae catheter intact and patent output clear yellow odorless 2300ml. States that pain was well controlled. Was calm and cooperative with cares.

## 2017-04-08 NOTE — PLAN OF CARE
Problem: Goal Outcome Summary  Goal: Goal Outcome Summary  OT:  Recommend rehab.  Patient feeling better this afternoon.  Supine to sit with CGA, ambulated to bathroom with walker, toilet transfer and g/h at sink.  No c/o of dizziness.

## 2017-04-08 NOTE — ANESTHESIA POSTPROCEDURE EVALUATION
Patient: Zaira Fierro    Procedure(s):  Right Total Knee Replacement - Wound Class: I-Clean    Diagnosis:Osteoarthritis Right Knee  Diagnosis Additional Information: No value filed.    Anesthesia Type:  General, ETT    Note:  Anesthesia Post Evaluation    Patient location during evaluation: PACU  Patient participation: Able to fully participate in evaluation  Level of consciousness: awake and alert  Pain management: adequate  Airway patency: patent  Cardiovascular status: acceptable  Respiratory status: acceptable  Hydration status: acceptable  PONV: none     Anesthetic complications: None          Last vitals:  Vitals:    04/07/17 1845 04/07/17 1900 04/07/17 1915   BP: (!) 154/93 (!) 141/100 137/78   Resp: 10 10 10   Temp:      SpO2: 97% 93% 99%         Electronically Signed By: Lennie Stewart MD  April 7, 2017  7:33 PM

## 2017-04-08 NOTE — PLAN OF CARE
"Problem: Goal Outcome Summary  Goal: Goal Outcome Summary  PT: Evaluation complete and treatment initiated. Pt performs all bed mobility with SBA and verbal cuing for body mechanics. Pt transfers sit<>stand with CGA and verbal cuing. Pt amb 10 ft to bathroom and 30 ft in mohan. Pt reported to feel \"tired\" and \"dizzy\" and slightly nauseous while amb and was immediately seated/wheeled back to room. Pts BP sitting was 139/62 and stranding 114/55 after ambulation. Pt denied feeling dizzy and tired after sitting and back in bed at end of session. PT recommends DC to TCU d/t pt has many stairs at home, requires assist with all OOB activity and high pain levels.      "

## 2017-04-08 NOTE — CONSULTS
Hospitalist Consult      Patient Name: Zaira Fierro MRN# [id number]   Age: 81 year old YOB: 1935     Date of Admission:4/7/2017  Primary care provider: Ange Crespo  Date of Service: 4/7/2017  Admitting Team: Orthopedics, Hospitalist [facility]         Assessment and Plan:   Zaira Fierro is a 81yM w/ PMHx significant for osteoarthritis and HTN who is admitted following R TKA w/ Dr. Sutton on 4/7/17.     ## R knee OA, s/p R TKA 4/7/17 - pt underwent R TKA w/ Dr. Sutton which was well tolerated.    -- Post operative cares per ortho (ancef x24hrs, WBAT, PT/OT, TRINIDAD drain in place, f/u w/ Dr. Sutton in 4 weeks)  -- Warfarin for 4 weeks, goal INR 1.5-2.5, mechanical prophylaxis while inpatient  -- Pain control w/ tylenol, oxycodone, IV dilaudid for breakthrough.     Chronic Medical Issues  # HTN - will resume home HCTZ/losartan given her being mildy hypertensive  # Mental health - cont nortryptiline  # RLS- cont pramipexole, takes PRN per her report      CODE: full code  Diet/IVF: ADAT, mIVFs until taking PO  DVT ppx: mechanical, initiate warfarin  Disposition/Admission Status: inpatient admit, dispo pending PT/OT    Patient was admitted overnight, to be staffed in AM.    Manuel Leslie MD  [facility]  [phone]           Chief Complaint:   R knee pain, s/p R TKA         HPI:   Zaira Fierro is a 81yM w/ PMHx significant for osteoarthritis and HTN who is admitted following R TKA w/ Dr. Sutton on 4/7/17.     Patient reports long standing hx of R knee pain, s/p multiple injections and attempts at conservative management which were no longer effective. She reports tolerating surgery well, no complaints post-operatively. Denies pain, no nausea or vomiting, reporting she is already tolerating PO intake. Denies any CP, palpitations, no SOB or cough, no abdominal pain.            Past Medical History:     Past Medical History:   Diagnosis Date     Glaucoma      Hearing loss     doesn't wear  hearing aids     Hyperlipidaemia      Hypertension      Macular degeneration      Multiple joint pain     secondary to arthritis     Obesity      Osteoarthritis involving multiple joints on both sides of body      Restless leg syndrome           Past Surgical History:     Past Surgical History:   Procedure Laterality Date     TONSILLECTOMY            Social History:     Social History     Social History     Marital status: Single     Spouse name: N/A     Number of children: N/A     Years of education: N/A     Occupational History     retired teacher      Social History Main Topics     Smoking status: Never Smoker     Smokeless tobacco: Never Used     Alcohol use No     Drug use: No     Sexual activity: No     Other Topics Concern     Not on file     Social History Narrative    Belongs to [place]. Lives with her sister Rosi.           Family History:     Family History   Problem Relation Age of Onset     Coronary Artery Disease Father      Age 62 when      Cardiovascular Father      Myocardial Infarction Father      DIABETES Mother      Occurred when older     Hypertension Mother      Arthritis Mother      Eye Disorder Mother      Arthritis Sister      Asthma Sister      Hypertension Sister      OSTEOPOROSIS Sister      Hypertension Sister      Hyperlipidemia Sister      Obesity Sister      Spine Problems Sister      Scoliosis     KIDNEY DISEASE Brother             Immunizations:     Immunization History   Administered Date(s) Administered     Influenza (High Dose) 3 valent vaccine 10/16/2015, 2016     Influenza (IIV3) 2004, 2006, 2007, 2008, 12/15/2010, 2011, 10/16/2012, 11/15/2013     Influenza Vaccine, 3 YRS +, IM (QUADRIVALENT W/PRESERVATIVES) 2014     Pneumococcal (PCV 13) 2016     Pneumococcal 23 valent 2003     TD (ADULT, 7+) 08/15/1996, 2006     TDAP Vaccine (Boostrix) 2016              Allergies:    No Known  Allergies         Medications:     Prior to Admission Medications   Prescriptions Last Dose Informant Patient Reported? Taking?   ASPIRIN PO Past Month at Unknown time Self Yes Yes   Sig: Take 81 mg by mouth every morning   HYDROcodone-acetaminophen (NORCO) 5-325 MG per tablet Past Month at Unknown time Self No Yes   Sig: Take 2 tablets by mouth every 6 hours as needed for pain maximum 4 tablet(s) per day   Patient taking differently: Take 0.5-1 tablets by mouth every 6 hours as needed for pain maximum 4 tablet(s) per day   Multiple Vitamins-Minerals (CENTRUM SILVER) per tablet 4/6/2017 at 0800 Self Yes Yes   Sig: Take 1 tablet by mouth daily.   acetaminophen (TYLENOL) 325 MG tablet 4/7/2017 at 0700 Self No Yes   Sig: Take 1-2 tablets (325-650 mg) by mouth every 6 hours as needed for pain   calcium carb 1250 mg, 500 mg [place],/vitamin D 200 units (OSCAL WITH D) 500-200 MG-UNIT per tablet 4/6/2017 at 0800 Self No Yes   Sig: Take 1 tablet by mouth 2 times daily (with meals)   Patient taking differently: Take 1 tablet by mouth every morning    hydrochlorothiazide (HYDRODIURIL) 25 MG tablet 4/6/2017 at 0800 Self No Yes   Sig: Take 1 tablet (25 mg) by mouth daily   latanoprost (XALATAN) 0.005 % ophthalmic solution 4/6/2017 at 2200 Self Yes Yes   Sig: Place 1 drop into both eyes At Bedtime    losartan (COZAAR) 100 MG tablet 4/6/2017 at 0800 Self No Yes   Sig: Take 1 tablet (100 mg) by mouth daily   multivitamin (OCUVITE) TABS 4/6/2017 at 0800 Self No Yes   Sig: Take 1 tablet by mouth daily   nortriptyline (PAMELOR) 25 MG capsule 4/6/2017 at 2100 Self No Yes   Sig: Take 1 capsule (25 mg) by mouth At Bedtime   polyethylene glycol (MIRALAX) powder Past Month at Unknown time Self No Yes   Sig: Take 17 g by mouth daily   Patient taking differently: Take 1 capful by mouth daily as needed    pramipexole (MIRAPEX) 0.125 MG tablet 4/6/2017 at 2100 Self No Yes   Sig: Take 1 tablet (0.125 mg) by mouth At Bedtime   pravastatin  "(PRAVACHOL) 20 MG tablet 4/6/2017 at 2100 Self No Yes   Sig: Take 1 tablet (20 mg) by mouth daily   Patient taking differently: Take 20 mg by mouth every evening       Facility-Administered Medications: None             Review of Systems:   A complete ROS was performed and is negative other than what is stated in the HPI.         Physical Exam:   Blood pressure (!) 141/100, temperature 97.9  F (36.6  C), resp. rate 10, height 1.575 m (5' 2\"), weight 76.6 kg (168 lb 14 oz), SpO2 93 %, not currently breastfeeding.  General: sitting up in bed, comfortable and conversant, NAD  HEENT: nc, at, EOMI, oral mucosa moist  Neck: supple  Chest/Resp: breathing comfortable on RA, clear in anterior fields  Heart/CV: RRR  Abdomen/GI: soft, nt, nd  : deferred  Extremities/MSK: WWP, RLE in cast w/ TRINIDAD drain in place, LLE w/o edema  Skin: no rashes on gross exam  Neuro: CNs II-XII grossly intact  Psych: affect appropriate           Data:   All labs and imaging reviewed by me in [facility].    Labs 3/31  CBC wnl  CMP wnl  UA wnl    R knee XR on 3/6/17  Impression:  1. Severe medial compartment osteoarthritis of bilateral knees.  2. Moderate right knee joint effusion.    Manuel Leslie MD  [facility]  [phone]    "

## 2017-04-08 NOTE — PROGRESS NOTES
04/08/17 0900   Quick Adds   Type of Visit Initial PT Evaluation       Present no   Living Environment   Lives With sibling(s)  (sister)   Living Arrangements house   Home Accessibility bed and bath are not on the first floor;stairs within home;stairs to enter home;stairs (1 railing present)   Number of Stairs to Enter Home 8   Number of Stairs Within Home 16   Stair Railings at Home outside, present on right side;inside, present on right side   Transportation Available car;family or friend will provide   Living Environment Comment Pt lives in big house. One bathroom on second level   Self-Care   Dominant Hand right   Usual Activity Tolerance moderate   Current Activity Tolerance fair   Regular Exercise no   Equipment Currently Used at Home cane, straight   Activity/Exercise/Self-Care Comment Pt reports not walking a lot d/t pain. Pt reports using cane when walking and only walking short distances.   Functional Level Prior   Ambulation 1-->assistive equipment   Transferring 0-->independent   Toileting 0-->independent   Bathing 0-->independent   Dressing 0-->independent   Eating 0-->independent   Communication 0-->understands/communicates without difficulty   Swallowing 0-->swallows foods/liquids without difficulty   Cognition 0 - no cognition issues reported   Fall history within last six months no   Which of the above functional risks had a recent onset or change? none   General Information   Onset of Illness/Injury or Date of Surgery - Date 04/07/17   Referring Physician Aamir Sutton MD   Patient/Family Goals Statement To be able to go on long walks again   Pertinent History of Current Problem (include personal factors and/or comorbidities that impact the POC) S/p RTKA on 4/7/17   Precautions/Limitations fall precautions   Weight-Bearing Status - LUE full weight-bearing   Weight-Bearing Status - RUE full weight-bearing   Weight-Bearing Status - LLE full weight-bearing   Weight-Bearing  Status - RLE weight-bearing as tolerated   General Observations CPM in place, O2, TRINIDAD drain   Cognitive Status Examination   Orientation orientation to person, place and time   Level of Consciousness alert   Follows Commands and Answers Questions 100% of the time   Personal Safety and Judgment intact   Memory intact   Cognitive Comment Pt slightly confused with mobilizing using walker and needs step by step direction   Pain Assessment   Patient Currently in Pain Yes, see Vital Sign flowsheet   Integumentary/Edema   Integumentary/Edema Comments Normal post op swelling   Posture    Posture Not impaired   Range of Motion (ROM)   ROM Comment R knee AROM: 0-16-78   Strength   Strength Comments Not formall tested. Pts RLE demonstarted 2/5 strength in quad and 3/5 in hip   Bed Mobility   Bed Mobility Comments Pt is SBA for bed mobility with VC for body mechanics   Transfer Skills   Transfer Comments Pt is CGA for sit<>stand transfer with FWW and VC   Gait   Gait Comments Pt amb with CGA and FWW 10 ft adn 30 ft   Balance   Balance Comments Impaired standing balance d/t decreased strength and ROM, high pain levels   Sensory Examination   Sensory Perception no deficits were identified   General Therapy Interventions   Planned Therapy Interventions bed mobility training;balance training;gait training;ROM;strengthening;transfer training;progressive activity/exercise;home program guidelines;risk factor education   Clinical Impression   Criteria for Skilled Therapeutic Intervention yes, treatment indicated   PT Diagnosis Impaired functional mobility   Influenced by the following impairments pain, decreased strength, decreased ROM   Functional limitations due to impairments Bed mobility, transfer training, gait, stairs   Clinical Presentation Stable/Uncomplicated   Clinical Presentation Rationale Per pts PMHx and current medical and functional status   Clinical Decision Making (Complexity) Low complexity   Therapy Frequency` 2  "times/day   Predicted Duration of Therapy Intervention (days/wks) 4 days   Anticipated Discharge Disposition Home with Outpatient Therapy;Home with Assist;Transitional Care Facility   Risk & Benefits of therapy have been explained Yes   Patient, Family & other staff in agreement with plan of care Yes   [facility] TM \"6 Clicks\"   2016, Trustees of [facility], under license to [employer].  All rights reserved.   6 Clicks Short Forms Basic Mobility Inpatient Short Form   [facility]  \"6 Clicks\" V.2 Basic Mobility Inpatient Short Form   1. Turning from your back to your side while in a flat bed without using bedrails? 4 - None   2. Moving from lying on your back to sitting on the side of a flat bed without using bedrails? 3 - A Little   3. Moving to and from a bed to a chair (including a wheelchair)? 3 - A Little   4. Standing up from a chair using your arms (e.g., wheelchair, or bedside chair)? 3 - A Little   5. To walk in hospital room? 3 - A Little   6. Climbing 3-5 steps with a railing? 2 - A Lot   Basic Mobility Raw Score (Score out of 24.Lower scores equate to lower levels of function) 18   Total Evaluation Time   Total Evaluation Time (Minutes) 10     "

## 2017-04-08 NOTE — PROGRESS NOTES
04/08/17 1340   Quick Adds   Type of Visit Initial Occupational Therapy Evaluation   Living Environment   Lives With sibling(s)   Living Arrangements house   Home Accessibility stairs to enter home;stairs within home;tub/shower is not walk in   Number of Stairs to Enter Home 4   Number of Stairs Within Home 16   Living Environment Comment Bathroom upstairs 18 steps, tub/shower - no grab bars, has shower chair and higher toilet   Self-Care   Usual Activity Tolerance good   Current Activity Tolerance fair   Equipment Currently Used at Home cane, straight   Activity/Exercise/Self-Care Comment Patient independent in self-cares/mobility with SEC, reports increased difficulty with going to Sikhism/community.   Functional Level Prior   Ambulation 1-->assistive equipment   Transferring 1-->assistive equipment   Toileting 0-->independent   Bathing 0-->independent   Dressing 0-->independent   Eating 0-->independent   Communication 0-->understands/communicates without difficulty   Swallowing 0-->swallows foods/liquids without difficulty   Cognition 0 - no cognition issues reported   Fall history within last six months no   Prior Functional Level Comment Patient independent in self-cares/mobility with SEC, reports increased difficulty with going to Sikhism/community.   General Information   Onset of Illness/Injury or Date of Surgery - Date 04/07/17   Referring Physician Dr Sutton   Patient/Family Goals Statement return home to baseline   Additional Occupational Profile Info/Pertinent History of Current Problem POD #1 s/p R TKA   Weight-Bearing Status - RLE weight-bearing as tolerated   Cognitive Status Examination   Cognitive Comment No cognitive concerns   Sensory Examination   Sensory Comments No N/T noted   Pain Assessment   Patient Currently in Pain Yes, see Vital Sign flowsheet   Range of Motion (ROM)   ROM Comment B UE AROM WFL   Strength   Strength Comments B UE MMT WFL   Mobility   Bed Mobility Bed mobility skill: Sit to  supine;Bed mobility skill: Supine to sit   Bed Mobility Skill: Supine to Sit   Level of [place]: Supine/Sit stand-by assist   Transfer Skill: Bed to Chair/Chair to Bed   Level of [place]: Bed to Chair contact guard   Weight-Bearing Restrictions weight-bearing as tolerated   Assistive Device - Transfer Skill Bed to Chair Chair to Bed Rehab Eval standard walker   Transfer Skill: Sit to Stand   Level of [place]: Sit/Stand contact guard   Transfer Skill: Sit to Stand weight-bearing as tolerated   Assistive Device for Transfer: Sit/Stand standard walker   Transfer Skill: Toilet Transfer   Level of [place]: Toilet contact guard   Assistive Device standard walker;seat riser;grab bars   Bathing   Level of [place] unable to perform   Upper Body Dressing   Level of [place]: Dress Upper Body independent   Lower Body Dressing   Level of [place]: Dress Lower Body unable to perform   Toileting   Level of [place]: Toilet stand-by assist   Grooming   Level of [place]: Grooming independent   Eating/Self Feeding   Level of [place]: Eating independent   Activities of Daily Living Analysis   Impairments Contributing to Impaired Activities of Daily Living pain;post surgical precautions;ROM decreased   General Therapy Interventions   Planned Therapy Interventions ADL retraining   Clinical Impression   Criteria for Skilled Therapeutic Interventions Met yes, treatment indicated   OT Diagnosis impaired self-cares/mobility   Influenced by the following impairments pain; decreased ROM   Assessment of Occupational Performance 1-3 Performance Deficits   Identified Performance Deficits dressing, bathing, toileting   Clinical Decision Making (Complexity) Low complexity   Therapy Frequency daily   Predicted Duration of Therapy Intervention (days/wks) 2-3 days   Anticipated Discharge Disposition Transitional Care Facility   Risks and Benefits of Treatment have been explained. Yes   Patient, Family &  "other staff in agreement with plan of care Yes   [facility] TM \"6 Clicks\"   2016, Trustees of [facility], under license to [employer].  All rights reserved.   6 Clicks Short Forms Daily Activity Inpatient Short Form   [facility]  \"6 Clicks\" Daily Activity Inpatient Short Form   1. Putting on and taking off regular lower body clothing? 2 - A Lot   2. Bathing (including washing, rinsing, drying)? 2 - A Lot   3. Toileting, which includes using toilet, bedpan or urinal? 3 - A Little   4. Putting on and taking off regular upper body clothing? 4 - None   5. Taking care of personal grooming such as brushing teeth? 4 - None   6. Eating meals? 4 - None   Daily Activity Raw Score (Score out of 24.Lower scores equate to lower levels of function) 19   Total Evaluation Time   Total Evaluation Time (Minutes) 9     "

## 2017-04-08 NOTE — PROGRESS NOTES
Social Work: Assessment with Discharge Plan    Patient Name:  Zaira Fierro  :  1935  Age:  81 year old  MRN:  [id number]  Risk/Complexity Score:     Completed assessment with:      Presenting Information   Reason for Referral:  Potential need for community services upon discharge  Date of Intake:  2017  Referral Source:  Physician  Decision Maker:  Pt  Alternate Decision Maker:  SisterNandini  Health Care Directive:  POSLT  Living Situation:  House  Previous Functional Status:  Independent  Patient and family understanding of hospitalization:  Yes  Cultural/Language/Spiritual Considerations:  None expressed  Adjustment to Illness:  Yes    Physical Health  Reason for Admission:  Osteoarthritis Right Knee  Status post knee surgery    Services Needed/Recommended:  TCU    Mental Health/Chemical Dependency  Diagnosis:  None  Support/Services in Place:  NA  Services Needed/Recommended:  NA    Support System  Significant relationship at present time:  No  Family of origin is available for support:  Yes, lives with sister  Other support available:  None  Gaps in support system:  None  Patient is caregiver to:  None     Provider Information   Primary Care Physician:  Ange Crespo   [phone]   Clinic:  [place] [address] [place] [address]*      :  None    Financial   Income Source:  Retired  Financial Concerns:  None  Insurance:    Payor/Plan Subscriber Name Rel Member # Group #   [employer] - [employer]* ZAIRA FIERRO  [id number] [facility]      [address]       Discharge Plan   Patient and family discharge goal:  TCU.  Pt would like [facility] or [facility]  Provided education on discharge plan:  YES  Patient agreeable to discharge plan:  YES  A list of Medicare Certified Facilities was provided to the patient and/or family to encourage patient choice. Patient's choices for facility are:  [facility] as first choice and [facility] as second option.    Will NH provide Skilled rehabilitation or complex medical:  YES  General information regarding anticipated insurance coverage and possible out of pocket cost was discussed. Patient and patient's family are aware patient may incur the cost of transportation to the facility, pending insurance payment: YES  Barriers to discharge:  Likely d/c Monday    Discharge Recommendations   Anticipated Disposition:  Facility:  First choice [facility] and second choice [facility]  Transportation Needs:  Medical:  Wheelchair  Name of Transportation Company and Phone:  No preference    Additional comments   SANDRA met with pt and her friends.  She states she pre-registered at [facility] and spoke to someone in intake last week.  She prefers [facility]. She also spoke to a rep at [facility].  She is open to having referrals sent to both place.  SANDRA called referral over to [facility] and also faxed info to [employer] at this time.    PAS completed but must call senior linkage line on Monday.  This is the message received: There was a problem submitting your referral. Please contact the Senior LinkAge Line  at [phone] with your confirmation number [id number] and they will help you figure it out.

## 2017-04-08 NOTE — PLAN OF CARE
Problem: Perioperative Period (Adult)  Goal: Signs and Symptoms of Listed Potential Problems Will be Absent or Manageable (Perioperative Period)  Signs and symptoms of listed potential problems will be absent or manageable by discharge/transition of care (reference Perioperative Period (Adult) CPG).  Outcome: No Change  Pt A&OX4, denies SOB or difficulty breathing, Adequate pain control on 10mg oxycodone q 3 hrs and IV dilaudid IV x1  CMS intact, Incisional dressing cdi and ice applied, TRINIDAD draining adequate amts and leakage around insertion site and reinforcement added, CPM on at 0-70 and pt ambulated with A1 and fww, Voiding adequate amounts on commode, BSAx4, + fl, denies feeling nauseated at this time, Pt. Tolerating a reg diet. Orthostatic hypotension after getting out of bed for the first time and fluids were started at 100ml/hr. Educated patient on IS use. Patient resting comfortably between cares. Will continue to monitor and offer pain medication as needed.

## 2017-04-08 NOTE — PLAN OF CARE
Problem: Goal Outcome Summary  Goal: Goal Outcome Summary  Outcome: Improving  VS'S, pain fairly managed,ice pack in place, CMP on at 64 degrees. TRINIDAD patent dressing and ace wrap shadowed with serosanguinous. Abd's reiforced Oxycodone 5 mg given at 1451, offered again at 1603 same dose, pain seemed bearable but after use of BR, c/o pain offered Atarax 10 mg with no effect.  1745: CPM stopped and removed, assisted to BR, was able to void. Assisted back to bed but continued to c/o pain. Oxycodone 5 mg plus Tylenol 975 mg  Given at 1750. Moonlighter notified, will order flexeril.  Meanwhile at 1825 writer gave another Oxycodone 5 mg to make it two with the previous one as pt was in tears.

## 2017-04-08 NOTE — PROGRESS NOTES
Orthopaedic Surgery Progress Note    E:  No acute issues overnight.      S: comfortable this AM.  Denies any new n/t/weakness.  Denies any sob/cp.  No n/v.  Tolerating CPM.  Discussed POC.     O:      Vitals:    04/07/17 2135 04/07/17 2150 04/07/17 2252 04/08/17 0449   BP: 152/69 149/61 119/45 142/66   BP Location:   Left arm Left arm   Resp:   14 16   Temp:   98.1  F (36.7  C) 98.7  F (37.1  C)   TempSrc:   Oral Oral   SpO2:   96% 95%   Weight:       Height:               Exam:  Gen: No acute distress, resting comfortably in bed.  Resp: Non-labored breathing  RLE:    -dressing is c/d/i   -thigh/calf is soft/compressible   -drain site c/d/i   --CMS intact    -motor intact to ehl/fhl/ta/gsc    -SILT to sp/dp/sural/saph/tibial    -foot wwp, cap refill <2 sec, DP 2+ palpable  Drain output: 100/75    Post op imaging: well aligned TKA implants.  No acute fractures    Post-Op Plan:  Zaira Fierro is a 81F s/p right TKA on 4/7/2017 with Dr. Sutton.    Activity: Up with assist.  Weight bearing status: WBAT RLE   Antibiotics: Ancef x 24 hours.  Diet: Begin with clear fluids and progress diet as tolerated.   DVT prophylaxis: Coumadin x 4 weeks, INR goal 1.5-2.5 and mechanical while in the hospital  Bracing/Splinting: none   Elevation: bridge knee with pillows under ankle  CPM: at least 8 hours per day, advance 5 degrees every hour  Wound Care: keep c/d/i.   Drains: Document output per shift, ortho to remove POD #1  Pain management: transition from IV to orals as tolerated.   X-rays: XR AP/lat right knee PACU  Physical Therapy: eval and treat, ROM, ADL's.   Occupational Therapy: eval and treat  Labs: Trend Hgb on POD #1, 2, 3   Cultures: none   Consults: PT, OT. medicine, appreciate assistance in caring for this patient.   Follow-up: Clinic with Dr. Sutton in 4 weeks with XR     Disposition: Pending progress with therapies, pain control on orals, and medical stability, anticipate discharge to home vs TCU on POD  #2-4.    Future Appointments  Date Time Provider Department [place]   4/8/2017 8:30 AM Apoorva Palmer, PT [facility] [place]   4/8/2017 11:00 AM Jennifer Walters, OT [facility] [place]       Aniket Carmichael MD  Orthopaedic Surgery PGY-4  [phone]      For questions about this patient, please attempt to contact me at my pager prior to contacting the orthopaedics resident on call. Thank you!

## 2017-04-08 NOTE — PROGRESS NOTES
"         Internal Medicine Daily Note   Patient: Zaira Fierro  MRN: [id number]  Admission Date: 4/7/2017  Hospital Day # 1    Assessment & Plan: Zaira Fierro is a 81y female with history of osteoarthritis, RLS, insomnia, and HTN who was admitted following R TKA w/ Dr. Sutton on 4/7/17.      R knee OA, s/p R TKA 4/7/17: Underwent R TKA w/ Dr. Sutton, tolerated well. Post operative cares per ortho- ancef x24hrs, WBAT, PT/OT, TRINIDAD drain in place. IV dilaudid stopped 4/7, pain adequately controlled with below regimen   - Follow up with Dr. Sutton in 4 weeks  - Warfarin for 4 weeks, goal INR 1.5-2.5, mechanical prophylaxis   - Pain control w/ tylenol, oxycodone     HTN - PTA on HCTZ and losartan, mildly hypertensive following surgery, BPs have since stabilized. - Holding HCTZ and losartan for now  - Hydralazine prn for SBP>175 or DBP>110    Insomnia: Stable, PTA on nortriptyline, continue    RLS: PTA on prn pramipexole, continue       CODE: Full   DVT: Coumadin, mechanical   Diet/fluids: ADAT, d/c IVF  Disposition: Per primary     Plan discussed with attending physician, Dr. Mary Shepherd  Internal Medicine MEL Hospitalist   [facility]   Pager: [phone]    ___________________________________________________________________    Subjective & Interval Hx:  Feeling well this morning. Pain increased early this am, but took pain meds and is now feeling better. Sleep intermittent throughout the night. Denies dyspnea, chest pain, or abdominal pain. No fever or chills. Denies confusion. Tolerated apple sauce last night. No N/V.    Last 24 hr care team notes reviewed.   ROS:  4 point ROS including Respiratory, CV, GI and , other than that noted in the HPI, is negative.    Medications: Reviewed in EPIC. List below for reference    Physical Exam:    /82 mmHg  Pulse 86  Temp(Src) 98.8  F (37.1  C) (Oral)  Resp 16  Ht 1.778 m (5' 10\")  Wt 87.4 kg (192 lb 10.9 oz)  BMI 27.65 " kg/m2  SpO2 99%     GENERAL: Alert and oriented x 3. NAD. Pleasant  HEENT: Anicteric sclera. Mucous membranes moist.   CV: RRR. S1, S2. No murmurs appreciated.   RESPIRATORY: Effort normal on room air. Lungs CTAB with no wheezing, rales, rhonchi.   GI: Abdomen soft and non distended with normoactive bowel sounds present in all quadrants. No tenderness, rebound, guarding.   NEUROLOGICAL: No focal deficits. Moves all extremities.    EXTREMITIES: No peripheral edema. Intact bilateral pedal pulses. Right knee immobilized.   SKIN: No jaundice. No rashes.     Labs & Studies of Note: I personally pertinent labs and procedures.

## 2017-04-09 ENCOUNTER — APPOINTMENT (OUTPATIENT)
Dept: OCCUPATIONAL THERAPY | Facility: CLINIC | Age: 82
DRG: 470 | End: 2017-04-09
Attending: ORTHOPAEDIC SURGERY
Payer: COMMERCIAL

## 2017-04-09 ENCOUNTER — APPOINTMENT (OUTPATIENT)
Dept: PHYSICAL THERAPY | Facility: CLINIC | Age: 82
DRG: 470 | End: 2017-04-09
Attending: ORTHOPAEDIC SURGERY
Payer: COMMERCIAL

## 2017-04-09 LAB
HGB BLD-MCNC: 10.5 G/DL (ref 11.7–15.7)
INR PPP: 1.28 (ref 0.86–1.14)

## 2017-04-09 PROCEDURE — 97110 THERAPEUTIC EXERCISES: CPT | Mod: GP

## 2017-04-09 PROCEDURE — 85610 PROTHROMBIN TIME: CPT | Performed by: ORTHOPAEDIC SURGERY

## 2017-04-09 PROCEDURE — 12000001 ZZH R&B MED SURG/OB UMMC

## 2017-04-09 PROCEDURE — 25000132 ZZH RX MED GY IP 250 OP 250 PS 637: Performed by: INTERNAL MEDICINE

## 2017-04-09 PROCEDURE — 40000133 ZZH STATISTIC OT WARD VISIT: Performed by: OCCUPATIONAL THERAPIST

## 2017-04-09 PROCEDURE — 99233 SBSQ HOSP IP/OBS HIGH 50: CPT | Performed by: INTERNAL MEDICINE

## 2017-04-09 PROCEDURE — 85018 HEMOGLOBIN: CPT | Performed by: ORTHOPAEDIC SURGERY

## 2017-04-09 PROCEDURE — 36415 COLL VENOUS BLD VENIPUNCTURE: CPT | Performed by: ORTHOPAEDIC SURGERY

## 2017-04-09 PROCEDURE — 99207 ZZC APP CREDIT; MD BILLING SHARED VISIT: CPT | Performed by: PHYSICIAN ASSISTANT

## 2017-04-09 PROCEDURE — 97535 SELF CARE MNGMENT TRAINING: CPT | Mod: GO | Performed by: OCCUPATIONAL THERAPIST

## 2017-04-09 PROCEDURE — 25000132 ZZH RX MED GY IP 250 OP 250 PS 637: Performed by: PHYSICIAN ASSISTANT

## 2017-04-09 PROCEDURE — 25000128 H RX IP 250 OP 636: Performed by: INTERNAL MEDICINE

## 2017-04-09 PROCEDURE — 97530 THERAPEUTIC ACTIVITIES: CPT | Mod: GP

## 2017-04-09 PROCEDURE — 97530 THERAPEUTIC ACTIVITIES: CPT | Mod: GO | Performed by: OCCUPATIONAL THERAPIST

## 2017-04-09 PROCEDURE — 97116 GAIT TRAINING THERAPY: CPT | Mod: GP

## 2017-04-09 PROCEDURE — 40000193 ZZH STATISTIC PT WARD VISIT

## 2017-04-09 PROCEDURE — 25000132 ZZH RX MED GY IP 250 OP 250 PS 637: Performed by: ORTHOPAEDIC SURGERY

## 2017-04-09 RX ORDER — WARFARIN SODIUM 2.5 MG/1
2.5 TABLET ORAL
Status: COMPLETED | OUTPATIENT
Start: 2017-04-09 | End: 2017-04-09

## 2017-04-09 RX ORDER — HYDROMORPHONE HYDROCHLORIDE 2 MG/1
2-4 TABLET ORAL
Status: DISCONTINUED | OUTPATIENT
Start: 2017-04-09 | End: 2017-04-10 | Stop reason: HOSPADM

## 2017-04-09 RX ORDER — LOSARTAN POTASSIUM 100 MG/1
100 TABLET ORAL DAILY
Status: DISCONTINUED | OUTPATIENT
Start: 2017-04-09 | End: 2017-04-10 | Stop reason: HOSPADM

## 2017-04-09 RX ORDER — HYDROMORPHONE HYDROCHLORIDE 1 MG/ML
.3-.5 INJECTION, SOLUTION INTRAMUSCULAR; INTRAVENOUS; SUBCUTANEOUS
Status: DISCONTINUED | OUTPATIENT
Start: 2017-04-09 | End: 2017-04-10 | Stop reason: HOSPADM

## 2017-04-09 RX ADMIN — HYDROXYZINE HYDROCHLORIDE 10 MG: 10 TABLET ORAL at 12:07

## 2017-04-09 RX ADMIN — ACETAMINOPHEN 975 MG: 325 TABLET, FILM COATED ORAL at 18:20

## 2017-04-09 RX ADMIN — SENNOSIDES AND DOCUSATE SODIUM 1 TABLET: 8.6; 5 TABLET ORAL at 20:01

## 2017-04-09 RX ADMIN — ACETAMINOPHEN 975 MG: 325 TABLET, FILM COATED ORAL at 11:38

## 2017-04-09 RX ADMIN — KETOROLAC TROMETHAMINE 15 MG: 15 INJECTION, SOLUTION INTRAMUSCULAR; INTRAVENOUS at 00:07

## 2017-04-09 RX ADMIN — HYDROMORPHONE HYDROCHLORIDE 2 MG: 2 TABLET ORAL at 08:29

## 2017-04-09 RX ADMIN — HYDROMORPHONE HYDROCHLORIDE 0.3 MG: 10 INJECTION, SOLUTION INTRAMUSCULAR; INTRAVENOUS; SUBCUTANEOUS at 02:47

## 2017-04-09 RX ADMIN — HYDROMORPHONE HYDROCHLORIDE 0.3 MG: 10 INJECTION, SOLUTION INTRAMUSCULAR; INTRAVENOUS; SUBCUTANEOUS at 06:22

## 2017-04-09 RX ADMIN — WARFARIN SODIUM 2.5 MG: 2.5 TABLET ORAL at 18:21

## 2017-04-09 RX ADMIN — HYDROMORPHONE HYDROCHLORIDE 4 MG: 2 TABLET ORAL at 18:57

## 2017-04-09 RX ADMIN — HYDROMORPHONE HYDROCHLORIDE 4 MG: 2 TABLET ORAL at 15:52

## 2017-04-09 RX ADMIN — HYDROMORPHONE HYDROCHLORIDE 4 MG: 2 TABLET ORAL at 22:41

## 2017-04-09 RX ADMIN — CYCLOBENZAPRINE HYDROCHLORIDE 10 MG: 10 TABLET, FILM COATED ORAL at 14:54

## 2017-04-09 RX ADMIN — ACETAMINOPHEN 975 MG: 325 TABLET, FILM COATED ORAL at 02:45

## 2017-04-09 RX ADMIN — LATANOPROST 1 DROP: 50 SOLUTION/ DROPS OPHTHALMIC at 21:43

## 2017-04-09 RX ADMIN — SENNOSIDES AND DOCUSATE SODIUM 2 TABLET: 8.6; 5 TABLET ORAL at 08:11

## 2017-04-09 RX ADMIN — HYDROMORPHONE HYDROCHLORIDE 2 MG: 2 TABLET ORAL at 12:45

## 2017-04-09 RX ADMIN — LOSARTAN POTASSIUM 100 MG: 100 TABLET, FILM COATED ORAL at 08:11

## 2017-04-09 RX ADMIN — NORTRIPTYLINE HYDROCHLORIDE 25 MG: 25 CAPSULE ORAL at 20:01

## 2017-04-09 RX ADMIN — HYDROMORPHONE HYDROCHLORIDE 0.5 MG: 10 INJECTION, SOLUTION INTRAMUSCULAR; INTRAVENOUS; SUBCUTANEOUS at 00:26

## 2017-04-09 NOTE — PROGRESS NOTES
Internal Medicine Daily Note   Patient: Zaira Fierro  MRN: [id number]  Admission Date: 4/7/2017  Hospital Day # 2    Assessment & Plan: Zaira Fierro is a 81y female with history of osteoarthritis, RLS, insomnia, and HTN who was admitted following R TKA w/ Dr. Sutton on 4/7/17.      R knee OA, s/p R TKA 4/7/17: Underwent R TKA w/ Dr. Sutton, tolerated well. Post operative cares per ortho- ancef x24hrs, WBAT, PT/OT, TRINIDAD drain removed per ortho 4/9. IV dilaudid stopped 4/7, pain increased 4/8, transitioned to oral and IV dilaudid per ortho.   - Follow up with Dr. Sutton in 4 weeks  - Warfarin for 4 weeks, goal INR 1.5-2.5, mechanical prophylaxis   - Pain control w/ tylenol, oral dilaudid  - Decrease IV dilaudid to q3h for 4 more doses, then stop as patient tolerating orals     Vasovagal event: Occurred 4/8 following PT. Patient reports she was feeling tired, then got weak. Did not pass out. Was seated in a wheelchair and symptoms resolved. No further events. Describes intermittent vasovagal events PTA  - Monitor     HTN - PTA on HCTZ and losartan, mildly hypertensive following surgery, BPs trending up to 150s-160s/50s-70s.  - Resume PTA losartan  - Continue to hold HCTZ, will resume pending pressures  - Hydralazine prn for SBP>175 or DBP>110    Acute anemia: BL hgb normal, 10.5 4/9 in the setting of BL 2/2 surgery and dilution with IVF  - Trend in am     Insomnia: Stable, PTA on nortriptyline, continue    RLS: PTA on prn pramipexole, continue     HLD: PTA on statin. Hold, may resume on d/c      CODE: Full   DVT: Coumadin, mechanical   Diet/fluids: Regular diet   Disposition: Per primary, likely to TCU    Plan discussed with attending physician, Dr. Mary Shepherd  Internal Medicine MEL Hospitalist   [facility]   Pager: [phone]    ___________________________________________________________________    Subjective & Interval Hx:  Feels better this morning. Reports  "negative effects to oral oxycodone including restlessness, agitation. Was transitioned to oral and IV dilaudid per primary. Pain adequately controlled at this time. Vasovagal event x1 yesterday as above. No further events. Tolerating regular diet. No dyspnea, chest pain.    Last 24 hr care team notes reviewed.   ROS:  4 point ROS including Respiratory, CV, GI and , other than that noted in the HPI, is negative.    Medications: Reviewed in EPIC. List below for reference    Physical Exam:    /82 mmHg  Pulse 86  Temp(Src) 98.8  F (37.1  C) (Oral)  Resp 16  Ht 1.778 m (5' 10\")  Wt 87.4 kg (192 lb 10.9 oz)  BMI 27.65 kg/m2  SpO2 99%     GENERAL: Alert and oriented x 3. NAD. Pleasant  HEENT: Anicteric sclera. Mucous membranes moist.   CV: RRR. S1, S2. No murmurs appreciated.   RESPIRATORY: Effort normal on room air. Lungs CTAB with no wheezing, rales, rhonchi.   GI: Abdomen soft and non distended with normoactive bowel sounds present in all quadrants. No tenderness, rebound, guarding.   NEUROLOGICAL: No focal deficits. Moves all extremities.    EXTREMITIES: No peripheral edema. Intact bilateral pedal pulses. Right leg wrapped per ortho.   SKIN: No jaundice. No rashes.     Labs & Studies of Note: I personally pertinent labs and procedures.      "

## 2017-04-09 NOTE — PLAN OF CARE
"Problem: Goal Outcome Summary  Goal: Goal Outcome Summary  Outcome: No Change  [id number]   A/O. VSS except for elevated BP - 170s/80s - prn hydrazaline given once. Pt is restless, anxious. Writer has been in contact with moonlighter throughout shift regarding anxiety and right knee pain. Oxycodone had not been effective and medication seems to make her feel 'anxious and shaky\" according to pt. Appears to be having right leg spasms as every few seconds pt lurches forward and grabs right knee. Updated Moonlighter regarding this. MD earlier ordered PRN flexeril TID. Flexeril was given at 1900 and worked very well according to pt. Pt slept for about an hour and half. At 2030 oxycodone 10 mg was administered and by 2130 pt's pain seemed to increase as well as her anxiety and complaints of feeling shaky. Pt could not describe how exactly she felt but stated that she \"felt strange\" and described it like her restless leg syndrome but different. MD ordered PRN ativan 0.5 mg PO once, dose given but did not seem to help. Updated oncoming nurse who is currently in contact with MD and discussing different pain medication options other than oxycodone that will be effective for pt.   "

## 2017-04-09 NOTE — PROGRESS NOTES
SPIRITUAL HEALTH SERVICES   [facility] ([facility] 8A   ON-CALL VISIT   REFERRAL SOURCE: request on admission   Zaira was joined by two sisters, one of whom is a [employer] nun.  They spoke of their history with this hospital and chaplains and priests whom they know in the area.  In response to Zaira's request for a blessing, offered prayer, closing with an Our Father.   After reviewing the schedule of eucharistic ministers with her, she expressed no further needs at this time.   PLAN: Will notify the unit  of our visit.     AMINATA Jacques.  Staff   Pager [phone]

## 2017-04-09 NOTE — PLAN OF CARE
Problem: Goal Outcome Summary  Goal: Goal Outcome Summary  OT:  Recommend rehab, patient limited with endurance and overall strength.  Alert, oriented, motivated and reports pain is improved today.  Patient improving with ADLs.

## 2017-04-09 NOTE — PLAN OF CARE
Problem: Goal Outcome Summary  Goal: Goal Outcome Summary  Outcome: Improving  Patient A/Ox4. VSS. Denies CP, SOB, dizziness/LH. LSCTA. +fl/BS. Voiding up to the bathroom with the assist of one person and the use of her walker.  CMS intact. Dressing to her right knee has some old bloody drainage from the TRINIDAD drain. CDI, ice applied. Tolerating a regular  diet without NV. IS encouraged. Activity she is up with the assist of one person.        . IV is saline locked. Pain has been managed with dilaudid every 3 hours.she has been having episodes of what she calls restlessness, during these times that last about 15 minutes she appears to be squirming in her bed, the first time she thought it was the tylenol, then she thought it might be the CPM, the third time she was not sure what it was from. She was given vistaril the first time, flexeril the second time, the third time it passed. Patient has demonstrated ability to call appropriately. Patient is resting with call light within reach. Will continue to monitor.

## 2017-04-09 NOTE — PLAN OF CARE
Problem: Goal Outcome Summary  Goal: Goal Outcome Summary  Outcome: No Change  Pt A/O X 4. VSS with elevated BP. PRN Hydralazine given per report, BP down to 156/59.  Lungs- clear, equal bilaterally. IS encouraged.  PP2+ DP2+. CMS and Neuro's are intact. Denies numbness and tingling in all extremities. Denies nausea, shortness of breath, and chest pain. Has pain in the right knee and given a one time dose of IV Toradol and PRN IV dilaudid 0.3-0.5mg, ICE applied, and is tolerating well. Patient would like an alternate to oxycodone for PO pain medication for during the day. Right knee ACE wrap in place with some dried drainage from TRINIDAD drain, no change overnight. TRINIDAD drain patent and draining red/bloody fluid, reinforced dressing is CDI. Voids spontaneously without difficulty in the bedside commode. Is on a regular diet, no meal this shift. Bowels- audible and active in all four quadrants, is passing flatus, last BM 4/6. CPM is OFF per patient refusal, previously set to 64 degrees flexion and 0 degrees extension. Pt up assist of 1 with walker and gaitbelt. PIV is patent in the left hand and saline locked. Bilateral heels are elevated off the bed. Pt is able to make needs known and the call light is within the pt's reach. Continue to monitor.

## 2017-04-09 NOTE — PLAN OF CARE
Problem: Goal Outcome Summary  Goal: Goal Outcome Summary  PT: patient required minimal assist with bed mobility and CGA with transfers this morning. She ambulated 110 feet using rolling walker with CGA and participated with AAROM of right knee to 0-12-85 degrees. Recommend d/c to TCU when medically stable per plan of care established by physical therapist.     Pt ambulated additional 175 feet using rolling walker with SBA this afternoon and tolerated supine exercises well. Continue to recommend d/c to TCU when medically stable.

## 2017-04-09 NOTE — PROGRESS NOTES
Orthopaedic Surgery Progress Note    E:  Vasovagal episode with PT yesterday.  CPM 0-70.  Increased anxiety/nausea with oxycodone.     S: no acute issues this AM.  Denies any new n/t/weakness.  Discussed switching to dilaudid PO, patient agreeable.  Plan for TCU, discussed with patient.   O:      Vitals:    04/09/17 0200 04/09/17 0245 04/09/17 0302 04/09/17 0630   BP:       BP Location:       Pulse:       Resp: 16  18    Temp:       TempSrc:       SpO2: 98% 99% 98% 98%   Weight:       Height:               Exam:  Gen: No acute distress, resting comfortably in bed.  Resp: Non-labored breathing  RLE:    -dressing is c/d/i   -thigh/calf is soft/compressible   -drain site c/d/i   --CMS intact    -motor intact to ehl/fhl/ta/gsc    -SILT to sp/dp/sural/saph/tibial    -foot wwp, cap refill <2 sec, DP 2+ palpable  Drain output: 45/100    Post-Op Plan:  Zaira Fierro is a 81F s/p right TKA on 4/7/2017 with Dr. Sutton.    Activity: Up with assist.  Weight bearing status: WBAT RLE   Antibiotics: Ancef x 24 hours.  Diet: Begin with clear fluids and progress diet as tolerated.   DVT prophylaxis: Coumadin x 4 weeks, INR goal 1.5-2.5 and mechanical while in the hospital  Bracing/Splinting: none   Elevation: bridge knee with pillows under ankle  CPM: at least 8 hours per day, advance 5 degrees every hour  Wound Care: keep c/d/i.   Drains: Document output per shift, ortho to remove POD #1  Pain management: transition from IV to orals as tolerated.   X-rays: XR AP/lat right knee PACU  Physical Therapy: eval and treat, ROM, ADL's.   Occupational Therapy: eval and treat  Labs: Trend Hgb on POD #1, 2, 3   Cultures: none   Consults: PT, OT. medicine, appreciate assistance in caring for this patient.   Follow-up: Clinic with Dr. Sutton in 4 weeks with XR     Disposition: Pending progress with therapies, pain control on orals, and medical stability, anticipate discharge to home vs TCU on POD #2-4.    Future Appointments  Date Time  Provider Department [place]   4/8/2017 8:30 AM Apoorva Palmer, PT [facility] [place]   4/8/2017 11:00 AM Jennifer Walters, OT [facility] [place]       Aniket Carmichael MD  Orthopaedic Surgery PGY-4  [phone]      For questions about this patient, please attempt to contact me at my pager prior to contacting the orthopaedics resident on call. Thank you!

## 2017-04-10 ENCOUNTER — APPOINTMENT (OUTPATIENT)
Dept: OCCUPATIONAL THERAPY | Facility: CLINIC | Age: 82
DRG: 470 | End: 2017-04-10
Attending: ORTHOPAEDIC SURGERY
Payer: COMMERCIAL

## 2017-04-10 ENCOUNTER — APPOINTMENT (OUTPATIENT)
Dept: PHYSICAL THERAPY | Facility: CLINIC | Age: 82
DRG: 470 | End: 2017-04-10
Attending: ORTHOPAEDIC SURGERY
Payer: COMMERCIAL

## 2017-04-10 VITALS
DIASTOLIC BLOOD PRESSURE: 59 MMHG | RESPIRATION RATE: 16 BRPM | HEART RATE: 103 BPM | WEIGHT: 168.87 LBS | TEMPERATURE: 99.2 F | SYSTOLIC BLOOD PRESSURE: 142 MMHG | HEIGHT: 62 IN | BODY MASS INDEX: 31.08 KG/M2 | OXYGEN SATURATION: 92 %

## 2017-04-10 LAB — INR PPP: 1.31 (ref 0.86–1.14)

## 2017-04-10 PROCEDURE — 99207 ZZC APP CREDIT; MD BILLING SHARED VISIT: CPT | Performed by: PHYSICIAN ASSISTANT

## 2017-04-10 PROCEDURE — 97116 GAIT TRAINING THERAPY: CPT | Mod: GP | Performed by: PHYSICAL THERAPIST

## 2017-04-10 PROCEDURE — 36415 COLL VENOUS BLD VENIPUNCTURE: CPT | Performed by: ORTHOPAEDIC SURGERY

## 2017-04-10 PROCEDURE — 99232 SBSQ HOSP IP/OBS MODERATE 35: CPT | Performed by: INTERNAL MEDICINE

## 2017-04-10 PROCEDURE — 97530 THERAPEUTIC ACTIVITIES: CPT | Mod: GP | Performed by: PHYSICAL THERAPIST

## 2017-04-10 PROCEDURE — 97110 THERAPEUTIC EXERCISES: CPT | Mod: GP | Performed by: PHYSICAL THERAPIST

## 2017-04-10 PROCEDURE — 99207 ZZC CDG-CODE CATEGORY CHANGED: CPT | Performed by: INTERNAL MEDICINE

## 2017-04-10 PROCEDURE — 25000132 ZZH RX MED GY IP 250 OP 250 PS 637: Performed by: PHYSICIAN ASSISTANT

## 2017-04-10 PROCEDURE — 40000133 ZZH STATISTIC OT WARD VISIT: Performed by: OCCUPATIONAL THERAPIST

## 2017-04-10 PROCEDURE — 97535 SELF CARE MNGMENT TRAINING: CPT | Mod: GO | Performed by: OCCUPATIONAL THERAPIST

## 2017-04-10 PROCEDURE — 40000193 ZZH STATISTIC PT WARD VISIT: Performed by: PHYSICAL THERAPIST

## 2017-04-10 PROCEDURE — 25000132 ZZH RX MED GY IP 250 OP 250 PS 637: Performed by: ORTHOPAEDIC SURGERY

## 2017-04-10 PROCEDURE — 85610 PROTHROMBIN TIME: CPT | Performed by: ORTHOPAEDIC SURGERY

## 2017-04-10 RX ORDER — HYDROMORPHONE HYDROCHLORIDE 2 MG/1
TABLET ORAL
Qty: 80 TABLET | Refills: 0 | Status: SHIPPED | OUTPATIENT
Start: 2017-04-10 | End: 2017-04-12

## 2017-04-10 RX ORDER — HYDROXYZINE HYDROCHLORIDE 10 MG/1
10 TABLET, FILM COATED ORAL EVERY 6 HOURS PRN
Qty: 120 TABLET | Refills: 0 | Status: SHIPPED | OUTPATIENT
Start: 2017-04-10 | End: 2017-04-10

## 2017-04-10 RX ORDER — ACETAMINOPHEN 325 MG/1
650 TABLET ORAL EVERY 6 HOURS PRN
Qty: 250 TABLET | Refills: 11 | Status: SHIPPED | OUTPATIENT
Start: 2017-04-10 | End: 2017-04-12

## 2017-04-10 RX ORDER — HYDROMORPHONE HYDROCHLORIDE 2 MG/1
2-4 TABLET ORAL
Qty: 90 TABLET | Refills: 0 | Status: SHIPPED | OUTPATIENT
Start: 2017-04-10 | End: 2017-04-10

## 2017-04-10 RX ORDER — WARFARIN SODIUM 5 MG/1
5 TABLET ORAL
Status: DISCONTINUED | OUTPATIENT
Start: 2017-04-10 | End: 2017-04-10 | Stop reason: HOSPADM

## 2017-04-10 RX ORDER — AMOXICILLIN 250 MG
1-2 CAPSULE ORAL 2 TIMES DAILY
Qty: 100 TABLET | Refills: 1 | Status: SHIPPED | OUTPATIENT
Start: 2017-04-10 | End: 2017-04-12

## 2017-04-10 RX ADMIN — ACETAMINOPHEN 975 MG: 325 TABLET, FILM COATED ORAL at 02:20

## 2017-04-10 RX ADMIN — SENNOSIDES AND DOCUSATE SODIUM 2 TABLET: 8.6; 5 TABLET ORAL at 07:55

## 2017-04-10 RX ADMIN — ACETAMINOPHEN 975 MG: 325 TABLET, FILM COATED ORAL at 11:01

## 2017-04-10 RX ADMIN — LOSARTAN POTASSIUM 100 MG: 100 TABLET, FILM COATED ORAL at 07:55

## 2017-04-10 RX ADMIN — HYDROMORPHONE HYDROCHLORIDE 4 MG: 2 TABLET ORAL at 02:19

## 2017-04-10 RX ADMIN — HYDROMORPHONE HYDROCHLORIDE 4 MG: 2 TABLET ORAL at 11:01

## 2017-04-10 RX ADMIN — HYDROMORPHONE HYDROCHLORIDE 4 MG: 2 TABLET ORAL at 07:55

## 2017-04-10 RX ADMIN — HYDROMORPHONE HYDROCHLORIDE 4 MG: 2 TABLET ORAL at 14:05

## 2017-04-10 NOTE — PLAN OF CARE
Problem: Goal Outcome Summary  Goal: Goal Outcome Summary  Patient discharging to rehab at 2:45PM.      Physical Therapy Discharge Summary     Reason for therapy discharge:    Discharged to transitional care facility.     Progress towards therapy goal(s). See goals on Care Plan in [facility] electronic health record for goal details.  Goals partially met.  Barriers to achieving goals:   limited tolerance for therapy.     Therapy recommendation(s):    Continued therapy is recommended.  Rationale/Recommendations:  to progress functional independence and safety with mobility..

## 2017-04-10 NOTE — PROGRESS NOTES
Social Work Services Discharge Note      Patient Name:  Zaira Fierro     Anticipated Discharge Date:  4/10/17    Discharge Disposition:   TCU:  [facility] [phone] F: [phone]    Following MD:  Dr. Montano & Suzan Hung CNP  Geriatric Services [phone]     Pre-Admission Screening (PAS) online form has been completed.  The Level of Care (LOC) is:  Determined  Confirmation Code is:  [id number]  Patient/caregiver informed of referral to [facility] Line for Pre-Admission Screening for skilled nursing facility (SNF) placement and to expect a phone call post discharge from SNF.     Additional Services/Equipment Arranged:   [facility] [phone]     Patient / Family response to discharge plan:  agreeable     Persons notified of above discharge plan:  Pt, bedside RN Maria C Sainz 8A Charge RN, Jeimy Lacy, [place]    Staff Discharge Instructions:  Please fax discharge orders and signed hard scripts for any controlled substances.  Please print a packet and send with patient.     CTS Handoff completed:  YES    Medicare Notice of Rights provided to the patient/family:  YES

## 2017-04-10 NOTE — OP NOTE
SURGERY DATE: 4/9/2017  PREOPERATIVE DIAGNOSIS: end stage Osteoarthritis of Right knee.   POSTOPERATIVE DIAGNOSIS: same  PROCEDURE PERFORMED: Right total knee arthroplasty.  STAFF: MD Herman   ASSISTANT: MD Amol.   COMPONENTS USED: Biomet Vanguard femoral size 65, tibial tray size 71, polyethylene insert size 10, all-poly button size 34x9mm.     DRAINS: TRINIDAD drain    TOURNIQUET TIME: 125 min at 250 mmHg    COMPLICATIONS: none apparent    FINDINGS: Diffuse tricompartmental end stage osteoarthritis    DESCRIPTION OF PROCEDURE: Patient was placed on the operating table in the supine position under general anesthesia. The operated knee was prepped and draped in standard manner. The tourniquet was inflated after ensanguination. The knee was exposed via a standard anterior incision with medial arthrotomy through the quadriceps tendon.  The proximal tibial cut was performed perpendicular to the long axis of the tibia using an external guide.  The distal femoral cut was performed in 5 degrees of valgus using an intramedullary guide removing 10 mm of bone.  Osteophytes were removed and the collateral ligaments were balanced and released as necessary given any existing deformity.  The tensioning device was used to measure the extension gap at 40 lbs of tension.  Flexion gap was measured at the same tension force and the size of the femoral component was determined while maintaining the same flexion gap for isometricity of the collateral ligaments.  The 4-in-1 cutting block for the selected femoral size was impacted onto the femur. The anterior, posterior and chamfer cuts were now made and the femoral component was placed in trial position. A trial reduction was performed.  The knee was found to be stable with balanced collateral ligaments throughout the entire range of motion.  The tibial component rotation was confirmed and marked.  Femoral component was removed and the tibial tray was used to prepare the tibial surface by  using the punch for the tibial keel and winged flange.  I now proceeded with preparation of the patella by performing a cut of the articular surface after measuring the patellar thickness in order to restore the anatomic thickness. Trial components were again placed to ensure proper patellofemoral kinematics.  A lateral release was was not necessary.  The bony surface of the patella and tibia were drilled with an 1/8 inch drill bit to enhance cement interdigitation with the bone.  Each of the real components were now cemented in place after pulsatile lavage and cleansing of the bony surfaces. Excess cement was removed.  A 50 cc dose of the anesthetic cocktail using bupicaine, ketorolac, and morphine was injected into the posterior capsule, medial and lateral gutters with care taken to avoid intravascular injection.  The tibial polyethelene insert was locked in place.  An additional 50 cc dose of the anesthetic cocktail was injected into the quadriceps tendon and muscle tissue.    Wound closure was performed after placing a drain in the knee.  Standard technique using 2-0 vicryl running suture for the fat pad and synovium, interrupted figure-of-8 0-vicryl sutures for arthrotomy and quadriceps, 2-0 vicryl for subcutaneous tissue, and 3-0 PDS running subcuticular skin closure.  Steristrips, adaptic, gauze and ace wrap dressing applied.   Dr Sutton was present for all key portions of the procedure    Post-operative plan:    Postoperative plan will be weightbearing as tolerated with continuous passive range of motion device and Coumadin anticoagulation DVT prophylaxis x4 weeks, target INR 2.0.     nAiket Carmichael MD  Orthopaedic Surgery PGY-4  [phone]

## 2017-04-10 NOTE — PLAN OF CARE
Problem: Goal Outcome Summary  Goal: Goal Outcome Summary  Outcome: Therapy, progress toward functional goals as expected  OT: Patient hoping to go to TCU later today if bed available. Completed bed mobility with Min A; LE dressing with cues, SBA-CGA; Transfers with CGA and cues. Will benefit from TCU stay to increase (I), safety, and activity tolerance prior to DC home.

## 2017-04-10 NOTE — PLAN OF CARE
Problem: Goal Outcome Summary  Goal: Goal Outcome Summary  Pt seen by PT. She reports pain at the knee cap and in thigh limiting gait distance compared to yesterday afternoon.  Pt ACE bandage seems tight and bunching under her pants but PT unable to access. Pt ambulates 75'x2, not ready for stairs due to pain with R weight bearing. She completed exercises and doing well with transfers.  Pt pleasant and motivated as she wants to fully recover and be able to help her sister.  Recommend rehab for greater independence with bed mobility, gait tolerance and stairs.

## 2017-04-10 NOTE — PLAN OF CARE
Problem: Individualization  Goal: Patient Preferences  Pt A/O X 4. Afebrile. VSS. Lungs- Clear bilaterally with both anterior and posterior. IS encouraged. Bowels- Hyperactive in all four quadrants. CMS and Neuro's are intact. Denies numbness and tingling in all extremities. Denies nausea, shortness of breath, and chest pain. Has pain in the right knee and given scheduled Tylenol, 4 mg PO Dilaudid, and is tolerating well. Voids spontaneously without difficulty in the bathroom. Is on a Regular diet and appetite was Good this shift. Right knee incisional dressing has a scant amount of dried serosanguinous drainage. CPM is ON and set to 70 degrees flexion and 0 degrees extension. Pt up to the bathroom with SBA and a FWW. PIV was removed from the left hand for discharge today to [facility] @ 1445PM. Bilateral heels are elevated off the bed. Pt is able to make needs known and the call light is within the pt's reach. Pt. Discharged/transferred at 1445PM to [facility] via [facility] [employer] [facility]. Pt. was accompanied by her sister, and left with personal belongings. Pt. received complete hardcopy discharge paperwork for [facility]. Pt. had no further questions at the time of discharge/transfer and no unmet needs were identified.

## 2017-04-10 NOTE — PROGRESS NOTES
"          [facility] - Internal Medicine Daily Note   Date of Service: 4/10/2017  Patient: Zaira Fierro  MRN: [id number]  Admission Date: 4/7/2017  Hospital Day # 3    Assessment & Plan    Zaira \"Deborah\" Sri is an 81 year old female with a history of PA, RLS, insomnia, and HTN who is s/p R TKA w/ Dr. Sutton on 4/7/17.     R Knee OA, s/p R TKA 4/7/1/7.   -F/u with Dr. Sutton in 4 weeks  -Warfarin x 4 weeks, goal INR 1.5-2.5  - Pain control with Tylenol, PO Dilaudid    HTN. Managed PTA on HCTZ and Losartan, both held post-op. Restarted Losartan 4/9. /66. If BP continues to climb, would recommend resuming PTA HCTZ.     Acute Anemia. BL hgb normal, 10.5 4/9 in the setting of blood loss 2/2 to surgery (above) and dilution with IVF. No s/s of active bleeding.     Insomnia. Continue PTA nortriptyline     RLS. Continue PTA pramipexole    HLD. Statin held post op, may resume at discharge.      CODE: FULL   DVT: Coumadin  Diet/fluids: Regular diet  Disposition: Transfer to TCU, likely today      Nicole Llanos PA-C  Internal Medicine [facility] Hospitalist   [facility]   Pager: [phone]  ___________________________________________________________________    Subjective & Interval Hx:  Doing well today. Good appetite, bowels moving. Able to participate in PT with \"some pain, but I expect that\". Feels comfortable to transfer to TCU today. Denies chest pain, sob or difficulty breathing. No abdominal pain, nausea, vomiting or diarrhea. Denies fevers or chills.     Last 24 hr care team notes reviewed.   ROS:  4 point ROS including Respiratory, CV, GI and , other than that noted in the HPI, is negative.    Medications: Reviewed in EPIC. List below for reference    Physical Exam:    /66 (BP Location: Right arm)  Pulse 103  Temp 98.3  F (36.8  C) (Oral)  Resp 15  Ht 1.575 m (5' 2\")  Wt 76.6 kg (168 lb 14 oz)  SpO2 96%  BMI 30.89 kg/m2     GENERAL: Alert and oriented x 3. NAD. "   HEENT: Anicteric sclera. Mucous membranes moist.   CV: RRR. S1, S2. No murmurs appreciated.   RESPIRATORY: Effort normal on room air. Lungs CTAB with no wheezing, rales, rhonchi.   GI: Abdomen soft and non distended with normoactive bowel sounds present in all quadrants. No tenderness, rebound, guarding.   NEUROLOGICAL: No focal deficits. Moves all extremities.    EXTREMITIES: No peripheral edema. Intact bilateral pedal pulses. Right leg wrapped.   SKIN: No jaundice. No rashes.

## 2017-04-10 NOTE — DISCHARGE SUMMARY
Orthopaedic Surgery  Discharge Summary    Zaira Fierro  : 1935    Date of Service: 4/10/2017 9:16 AM      Date of Admission:  2017  Date of Discharge::  4/10/2017  Attending Physician:  Herman Vazquez Diagnosis:  S/p TKA    Procedures Performed:  Right total knee arthroplasty on 2017 with Dr Sutton    Future Appointments:  Date Time Provider Department Center   4/10/2017 9:30 AM Jovita Bullard, PT [facility] [place]   4/10/2017 2:15 PM Apoorva Palmer, PT [facility] [place]       Medications Prior to Admission:  Prescriptions Prior to Admission   Medication Sig Dispense Refill Last Dose     ASPIRIN PO Take 81 mg by mouth every morning   Past Month at Unknown time     pramipexole (MIRAPEX) 0.125 MG tablet Take 1 tablet (0.125 mg) by mouth At Bedtime 30 tablet 1 2017 at 2100     pravastatin (PRAVACHOL) 20 MG tablet Take 1 tablet (20 mg) by mouth daily (Patient taking differently: Take 20 mg by mouth every evening ) 90 tablet 2 2017 at 2100     nortriptyline (PAMELOR) 25 MG capsule Take 1 capsule (25 mg) by mouth At Bedtime 90 capsule 1 2017 at 2100     losartan (COZAAR) 100 MG tablet Take 1 tablet (100 mg) by mouth daily 90 tablet 1 2017 at 0800     hydrochlorothiazide (HYDRODIURIL) 25 MG tablet Take 1 tablet (25 mg) by mouth daily 90 tablet 1 2017 at 0800     calcium carb 1250 mg, 500 mg [place],/vitamin D 200 units (OSCAL WITH D) 500-200 MG-UNIT per tablet Take 1 tablet by mouth 2 times daily (with meals) (Patient taking differently: Take 1 tablet by mouth every morning ) 100 tablet 3 2017 at 0800     latanoprost (XALATAN) 0.005 % ophthalmic solution Place 1 drop into both eyes At Bedtime    2017 at 2200     polyethylene glycol (MIRALAX) powder Take 17 g by mouth daily (Patient taking differently: Take 1 capful by mouth daily as needed ) 510 g 11 Past Month at Unknown time     multivitamin (OCUVITE) TABS Take 1 tablet by mouth daily 30 each 6 2017 at 0800      Multiple Vitamins-Minerals (CENTRUM SILVER) per tablet Take 1 tablet by mouth daily.   4/6/2017 at 0800     [DISCONTINUED] HYDROcodone-acetaminophen (NORCO) 5-325 MG per tablet Take 2 tablets by mouth every 6 hours as needed for pain maximum 4 tablet(s) per day (Patient taking differently: Take 0.5-1 tablets by mouth every 6 hours as needed for pain maximum 4 tablet(s) per day) 45 tablet 0 Past Month at Unknown time       Discharge Medications:  Current Discharge Medication List      START taking these medications    Details   senna-docusate (SENOKOT-S;PERICOLACE) 8.6-50 MG per tablet Take 1-2 tablets by mouth 2 times daily  Qty: 100 tablet, Refills: 1    Associated Diagnoses: Status post knee surgery      HYDROmorphone (DILAUDID) 2 MG tablet For pain complaints of 1-5 take 1  tablets ( 1 mg), for pain complaints of 6-10 take 2 tablets ( 4 mg)  Every 4 hours as needed for pain.  Qty: 80 tablet, Refills: 0    Associated Diagnoses: Status post knee surgery         CONTINUE these medications which have CHANGED    Details   acetaminophen (TYLENOL) 325 MG tablet Take 2 tablets (650 mg) by mouth every 6 hours as needed for pain  Qty: 250 tablet, Refills: 11    Associated Diagnoses: Pain         CONTINUE these medications which have NOT CHANGED    Details   ASPIRIN PO Take 81 mg by mouth every morning      pramipexole (MIRAPEX) 0.125 MG tablet Take 1 tablet (0.125 mg) by mouth At Bedtime  Qty: 30 tablet, Refills: 1    Associated Diagnoses: Restless leg syndrome      pravastatin (PRAVACHOL) 20 MG tablet Take 1 tablet (20 mg) by mouth daily  Qty: 90 tablet, Refills: 2    Associated Diagnoses: Hyperlipidemia LDL goal <160      nortriptyline (PAMELOR) 25 MG capsule Take 1 capsule (25 mg) by mouth At Bedtime  Qty: 90 capsule, Refills: 1    Associated Diagnoses: Nutrition disorder; Arthritis of knee      losartan (COZAAR) 100 MG tablet Take 1 tablet (100 mg) by mouth daily  Qty: 90 tablet, Refills: 1    Associated Diagnoses:  Benign essential hypertension      hydrochlorothiazide (HYDRODIURIL) 25 MG tablet Take 1 tablet (25 mg) by mouth daily  Qty: 90 tablet, Refills: 1    Associated Diagnoses: Essential hypertension with goal blood pressure less than 140/90      calcium carb 1250 mg, 500 mg [place],/vitamin D 200 units (OSCAL WITH D) 500-200 MG-UNIT per tablet Take 1 tablet by mouth 2 times daily (with meals)  Qty: 100 tablet, Refills: 3    Associated Diagnoses: Nutrition disorder; Arthritis of knee      latanoprost (XALATAN) 0.005 % ophthalmic solution Place 1 drop into both eyes At Bedtime       polyethylene glycol (MIRALAX) powder Take 17 g by mouth daily  Qty: 510 g, Refills: 11    Associated Diagnoses: Constipation      !! multivitamin (OCUVITE) TABS Take 1 tablet by mouth daily  Qty: 30 each, Refills: 6    Associated Diagnoses: Cataract      !! Multiple Vitamins-Minerals (CENTRUM SILVER) per tablet Take 1 tablet by mouth daily.       !! - Potential duplicate medications found. Please discuss with provider.      STOP taking these medications       HYDROcodone-acetaminophen (NORCO) 5-325 MG per tablet Comments:   Reason for Stopping:               Brief History of Presenting Illness:  81F who failed non op management for end stage knee OA, elected to proceed with TKA.     Hospital Course:  Uncomplicated.  Admitted to hospital for routine post-op cares. Drain was removed POD # 2.  Physical and occupational therapy both worked with the patient.  On the day of discharge she had adequate pain control on oral meds, was tolerating a diet, voiding independently, and was ambulating safely.  The patient was determined to be safe to d/c TCU      Discharge Instructions    Discharge Procedure Orders  General info for SNF   Order Comments: Length of Stay Estimate: Short Term Care: Estimated # of Days <30  Condition at Discharge: Improving  Level of care:skilled   Rehabilitation Potential: Excellent  Admission H&P remains valid and up-to-date:  "Yes  Recent Chemotherapy: N/A  Use Nursing Home Standing Orders: Yes     Mantoux instructions   Order Comments: Give two-step Mantoux (PPD) Per Facility Policy Yes     Reason for your hospital stay   Order Comments: 81F s/p right TKA     Follow Up and recommended labs and tests   Order Comments: Follow up with Dr Sutton in 4 weeks with repeat XR.     Additional Discharge Instructions   Order Comments: Post Operative Instructions for Zaira Fierro is a 81 year old female    Surgery: Right Total Knee Replacement on 4/7/2017.    If you have any questions contact Dr. Sutton at the following numbers: [facility] call [phone].      Follow up appointment:   You are scheduled to follow up with Dr. Sutton approximately 4 weeks after your surgery.  Contact clinic if you have any questions about the date or time.    Anticoagulation:   Take coumadin x 4 weeks with goal INR of 2.0. This is given to help minimize your risk of blood clot.    Activity:   -Continue to weight bear on your operative leg as tolerated by pain.  As you are more comfortable, you can discontinue use of the walker and transition to a cane.  Once you are comfortable with the cane, you can also wean from the cane and progress to using no assistive devices.  Do not transition until you are comfortable and have good balance.      -Continue to work on knee range of motion to prevent stiffness.      -When you are sitting, \"bridge the leg\" and keep the leg fully straight, with a bump under the heel to prevent a flexion contracture and ensure that you can fully straighten the knee.    -Work on getting your leg fully straight while walking - landing on your heel and progressing over the knee normally to prevent a flexion contracture.        Pain Medications:   -Take pain medications as prescribed.  Wean off the the narcotic pain medications (Oxycodone, Dilaudid, etc) as tolerated by pain.  Only take the narcotic pain medication if not relieved by the other " medications.  To help with this, take the Tylenol and Celebrex (if prescribed) to minimize the amount of narcotic pain medication you need to take.      -Do not drive while on pain medications or until your leg feels well enough to do so.      Bandage Care and Showering:   -Please keep the dressings clean/dry/intact until follow up.  Ok to change as needed after POD #5.  No soaking/scrubbing/submerging until follow up.    --Contact Dr. Sutton or his staff if there is any drainage from the incision or redness.    Leg Swelling and Icing  -Continue icing the knee 5-6 times per day for approximately 20 minutes each time.  This will help with swelling.    -Some swelling in the leg is normal after surgery.  This type of swelling is usually gravity dependent and is improved in the morning after sleeping.  If the swelling is painful in the calf or the swelling is getting worse, contact Dr. Sutton's office.  We may recommend presenting to the Emergency Department to obtain an ultrasound of the leg if there is concern for a DVT.      -Elevate the leg if you are sitting as this will help reduce swelling.    Contact clinic if you note any of the following:  -Fevers greater than 101.5 chills  -Increased pain, redness, swelling or discharge at the surgical site.   -During regular business hours call Dr Sutton's office and request to speak with his nurse or the triage nurses.   -After hours or on weekends call the hospital  at [phone] and ask to speak with the Orthopaedic resident on call.     Weight bearing status     Full Code     Physical Therapy Adult Consult   Order Comments: Evaluate and treat as clinically indicated.    Reason:  eval and treat     Occupational Therapy Adult Consult   Order Comments: Evaluate and treat as clinically indicated.    Reason:  eval and treat         Discharge Disposition:  [facility]    Aniket Carmichael MD  Orthopaedic Surgery PGY-4  [phone]    Discharge summary updated by me to reflect  changes in medications/plan of care.

## 2017-04-10 NOTE — PROGRESS NOTES
Orthopaedic Surgery Progress Note    E:  No acute issues overnight.      S: comfortable this morning, did much better with dilaudid PO vs oxycodone.  Denies any new n/t/weakness.  Plan for TCU today.      O:      Vitals:    04/09/17 0700 04/09/17 1300 04/09/17 2241 04/10/17 0750   BP: 131/58 132/49 143/60 139/66   BP Location: Right arm  Right arm Right arm   Pulse: 93 107 103    Resp: 18 18 16 15   Temp: 96.7  F (35.9  C) 98.8  F (37.1  C) 97.1  F (36.2  C) 98.3  F (36.8  C)   TempSrc: Oral Oral Oral Oral   SpO2:    96%   Weight:       Height:               Exam:  Gen: No acute distress, resting comfortably in bed.  Resp: Non-labored breathing  RLE:    -dressing is c/d/i   -thigh/calf is soft/compressible   -drain site c/d/i   --CMS intact    -motor intact to ehl/fhl/ta/gsc    -SILT to sp/dp/sural/saph/tibial    -foot wwp, cap refill <2 sec, DP 2+ palpable  Drain output: removed    Post-Op Plan:  Zaira Fierro is a 81F s/p right TKA on 4/7/2017 with Dr. Sutton.    Activity: Up with assist.  Weight bearing status: WBAT RLE   Antibiotics: Ancef x 24 hours.  Diet: Begin with clear fluids and progress diet as tolerated.   DVT prophylaxis: Coumadin x 4 weeks, INR goal 1.5-2.5 and mechanical while in the hospital  Bracing/Splinting: none   Elevation: bridge knee with pillows under ankle  CPM: at least 8 hours per day, advance 5 degrees every hour  Wound Care: keep c/d/i.   Drains: Document output per shift, ortho to remove POD #1  Pain management: transition from IV to orals as tolerated.   X-rays: XR AP/lat right knee PACU  Physical Therapy: eval and treat, ROM, ADL's.   Occupational Therapy: eval and treat  Labs: Trend Hgb on POD #1, 2, 3   Cultures: none   Consults: PT, OT. medicine, appreciate assistance in caring for this patient.   Follow-up: Clinic with Dr. Sutton in 4 weeks with XR     Disposition: Pending progress with therapies, pain control on orals, and medical stability, anticipate discharge to home vs TCU  on POD #2-4.    Future Appointments  Date Time Provider Department Center   4/8/2017 8:30 AM Apoorva Palmer, PT [facility] [place]   4/8/2017 11:00 AM Jennifer Walters, OT [facility] [place]       Aniket Carmichael MD  Orthopaedic Surgery PGY-4  [phone]      For questions about this patient, please attempt to contact me at my pager prior to contacting the orthopaedics resident on call. Thank you!

## 2017-04-11 ENCOUNTER — TRANSFERRED RECORDS (OUTPATIENT)
Dept: HEALTH INFORMATION MANAGEMENT | Facility: CLINIC | Age: 82
End: 2017-04-11

## 2017-04-11 LAB — INR PPP: 1.24 (ref 0.9–1.1)

## 2017-04-11 NOTE — PLAN OF CARE
Problem: Goal Outcome Summary  Goal: Goal Outcome Summary  Occupational Therapy Discharge Summary     Reason for therapy discharge:    Discharged to transitional care facility.     Progress towards therapy goal(s). See goals on Care Plan in [facility] electronic health record for goal details.  Goals partially met.  Barriers to achieving goals:   discharge from facility.     Therapy recommendation(s):    Continued therapy is recommended.  Rationale/Recommendations:  limited self-cares/mobility.

## 2017-04-12 RX ORDER — ACETAMINOPHEN 325 MG/1
650 TABLET ORAL 4 TIMES DAILY
COMMUNITY
End: 2017-12-12

## 2017-04-12 RX ORDER — HYDROCHLOROTHIAZIDE 25 MG/1
25 TABLET ORAL EVERY MORNING
COMMUNITY
End: 2017-06-29

## 2017-04-12 RX ORDER — PRAVASTATIN SODIUM 20 MG
20 TABLET ORAL EVERY EVENING
COMMUNITY
End: 2018-02-09

## 2017-04-12 RX ORDER — WARFARIN SODIUM 5 MG/1
5 TABLET ORAL AT BEDTIME
COMMUNITY
End: 2017-05-08 | Stop reason: DRUGHIGH

## 2017-04-12 RX ORDER — SENNA AND DOCUSATE SODIUM 50; 8.6 MG/1; MG/1
2 TABLET, FILM COATED ORAL 2 TIMES DAILY
COMMUNITY
End: 2017-06-19

## 2017-04-12 RX ORDER — POLYETHYLENE GLYCOL 1450
17 POWDER (GRAM) MISCELLANEOUS DAILY
COMMUNITY
End: 2017-06-19

## 2017-04-12 RX ORDER — MULTIVITAMIN WITH FOLIC ACID 400 MCG
1 TABLET ORAL DAILY
COMMUNITY
End: 2017-04-20

## 2017-04-12 RX ORDER — LOSARTAN POTASSIUM 100 MG/1
100 TABLET ORAL EVERY MORNING
COMMUNITY
End: 2017-08-01

## 2017-04-12 RX ORDER — HYDROMORPHONE HYDROCHLORIDE 2 MG/1
2-4 TABLET ORAL EVERY 4 HOURS PRN
COMMUNITY
End: 2017-06-19

## 2017-04-13 ENCOUNTER — NURSING HOME VISIT (OUTPATIENT)
Dept: GERIATRICS | Facility: CLINIC | Age: 82
End: 2017-04-13
Payer: COMMERCIAL

## 2017-04-13 VITALS
DIASTOLIC BLOOD PRESSURE: 58 MMHG | RESPIRATION RATE: 20 BRPM | SYSTOLIC BLOOD PRESSURE: 105 MMHG | BODY MASS INDEX: 33.58 KG/M2 | TEMPERATURE: 98.6 F | HEART RATE: 108 BPM | WEIGHT: 183.6 LBS | OXYGEN SATURATION: 94 %

## 2017-04-13 DIAGNOSIS — Z96.651 AFTERCARE FOLLOWING RIGHT KNEE JOINT REPLACEMENT SURGERY: Primary | ICD-10-CM

## 2017-04-13 DIAGNOSIS — I10 ESSENTIAL HYPERTENSION WITH GOAL BLOOD PRESSURE LESS THAN 140/90: ICD-10-CM

## 2017-04-13 DIAGNOSIS — Z47.1 AFTERCARE FOLLOWING RIGHT KNEE JOINT REPLACEMENT SURGERY: Primary | ICD-10-CM

## 2017-04-13 DIAGNOSIS — K59.01 SLOW TRANSIT CONSTIPATION: ICD-10-CM

## 2017-04-13 DIAGNOSIS — D62 ANEMIA DUE TO BLOOD LOSS, ACUTE: ICD-10-CM

## 2017-04-13 PROCEDURE — 99309 SBSQ NF CARE MODERATE MDM 30: CPT | Performed by: NURSE PRACTITIONER

## 2017-04-13 PROCEDURE — 99207 ZZC CDG-DOWN CODE MED NECESSITY: CPT | Performed by: NURSE PRACTITIONER

## 2017-04-13 NOTE — PROGRESS NOTES
[facility]      PRIMARY CARE PROVIDER AND CLINIC:  [facility] [address] [address] / [facility]     Chief Complaint   Patient presents with     Hospital F/U        HPI:    Zaira Fierro is a 81 year old  (1935),admitted to the [facility] from [facility].  Hospital stay 2017 through 4/10/2017 for elective R TKA. Hospital course was uncomplicated. Admitted to this facility for  rehab, medical management and nursing care.     Current issues are:      Aftercare following right knee joint replacement surgery  Patient and family have been upset about her pain control on the overnight shift. They were upset to the point that they were requesting transfer to another TCU. After discussion with patient, she is very satisfied with her care otherwise. If her pain medication could be scheduled temporarily, she would be happy to stay. She is aware that she can decline doses and that it can be adjusted in the future as needed. She is on warfarin for DVT prophylaxis, INR pending    Slow transit constipation  Taking senna and miralax, still having some constipation. No nausea, abdominal pain.    Essential hypertension with goal blood pressure less than 140/90  On HCTZ and losartan. BP Readin/75, 136/73, 162/87     Anemia due to blood loss, acute  Hemoglobin   Date Value Ref Range Status   2017 10.5 (L) 11.7 - 15.7 g/dL Final   2017 11.0 (L) 11.7 - 15.7 g/dL Final         CODE STATUS/ADVANCE DIRECTIVES DISCUSSION:   CPR/Full code   Patient's living condition: lives with family, Sister     ALLERGIES:No known allergies  PAST MEDICAL HISTORY:  has a past medical history of Glaucoma; Hearing loss; Hyperlipidaemia; Hypertension; Macular degeneration; Multiple joint pain; Obesity; Osteoarthritis involving multiple joints on both sides of body; and Restless leg syndrome.  PAST SURGICAL HISTORY:  has a past surgical history  that includes tonsillectomy (1941) and Arthroplasty knee (Right, 4/7/2017).  FAMILY HISTORY: family history includes Arthritis in her mother and sister; Asthma in her sister; Cardiovascular in her father; Coronary Artery Disease in her father; DIABETES in her mother; Eye Disorder in her mother; Hyperlipidemia in her sister; Hypertension in her mother, sister, and sister; KIDNEY DISEASE in her brother; Myocardial Infarction in her father; OSTEOPOROSIS in her sister; Obesity in her sister; Spine Problems in her sister.  SOCIAL HISTORY:  reports that she has never smoked. She has never used smokeless tobacco. She reports that she does not drink alcohol or use illicit drugs.    Post Discharge Medication Reconciliation Status: discharge medications reconciled and changed, per note/orders (see AVS).  Current Outpatient Prescriptions   Medication Sig Dispense Refill     warfarin (COUMADIN) 5 MG tablet Take 5 mg by mouth daily for DVT prophylaxis until 4/13/2017.       hydrochlorothiazide (HYDRODIURIL) 25 MG tablet Take 25 mg by mouth every morning       HYDROmorphone (DILAUDID) 2 MG tablet Take 2-4 mg by mouth every 4 hours as needed for pain of (1-5) take 1 tablet (2mg), for pain (6-10) take 2 tablets (4mg).       losartan (COZAAR) 100 MG tablet Take 100 mg by mouth every morning       calcium carb 1250 mg, 500 mg [place],/vitamin D 200 unit (OSCAL 500/200 D-3) 500-200 MG-UNIT per tablet Take 1 tablet by mouth 2 times daily       Polyethylene Glycol 1450 POWD Take 17 g by mouth daily       pravastatin (PRAVACHOL) 20 MG tablet Take 20 mg by mouth every evening       Sennosides-Docusate Sodium (SENNA-DOCUSATE SODIUM) 8.6-50 MG TABS Take 1 tablet by mouth 2 times daily       Multiple Vitamin (TAB-A-ESPERANZA) TABS Take 1 tablet by mouth daily       acetaminophen (TYLENOL) 325 MG tablet Take 650 mg by mouth 4 times daily       pramipexole (MIRAPEX) 0.125 MG tablet Take 1 tablet (0.125 mg) by mouth At Bedtime 30 tablet 1      nortriptyline (PAMELOR) 25 MG capsule Take 1 capsule (25 mg) by mouth At Bedtime 90 capsule 1     latanoprost (XALATAN) 0.005 % ophthalmic solution Place 1 drop into both eyes At Bedtime        Multiple Vitamins-Minerals (CENTRUM SILVER) per tablet Take 1 tablet by mouth daily.           ROS:  10 point ROS of systems including Constitutional, Eyes, Respiratory, Cardiovascular, Gastroenterology, Genitourinary, Integumentary, Muscularskeletal, Psychiatric were all negative except for pertinent positives noted in my HPI.    Exam:  /58  Pulse 108  Temp 98.6  F (37  C)  Resp 20  Wt 183 lb 9.6 oz (83.3 kg)  SpO2 94%  BMI 33.58 kg/m2  GENERAL APPEARANCE:  Alert, in no distress, oriented  ENT:  Mouth and posterior oropharynx normal, moist mucous membranes, normal hearing acuity  EYES:  EOM, conjunctivae, lids, pupils and irises normal  NECK:  No adenopathy,masses or thyromegaly  RESP:  respiratory effort and palpation of chest normal, lungs clear to auscultation , no respiratory distress  CV:  Palpation and auscultation of heart done , regular rate and rhythm, no murmur, rub, or gallop, no edema  ABDOMEN:  normal bowel sounds, soft, nontender, no hepatosplenomegaly or other masses  M/S:   R knee with mild swelling, calf soft and non-tender  SKIN:  wound healing well, no signs of infection R knee  PSYCH:  oriented X 3, normal insight, judgement and memory, affect and mood normal    Lab/Diagnostic data:  CBC RESULTS:   Recent Labs   Lab Test  04/09/17   0601  04/08/17   0546  03/31/17   1120  05/18/12   1630  04/14/12   0226   WBC   --    --   9.5   --   13.7*   RBC   --    --   4.34   --   4.10   HGB  10.5*  11.0*  14.4  14.2  13.4   HCT   --    --   42.3  41.2  37.5   MCV   --    --   98  93.1  92   MCH   --    --   33.2*  32.1  32.7   MCHC   --    --   34.0  34.5  35.7   RDW   --    --   12.2   --   12.1   PLT   --    --   275   --   215       Last Basic Metabolic Panel:  Recent Labs   Lab Test  03/31/17   1120   06/09/16   0926   NA  142  137.4   POTASSIUM  4.2  3.8   CHLORIDE  104  101.1   ANDRE  9.3  9.3   CO2  30  30.7   BUN  21  14.8   CR  0.63  0.7   GLC  122*  92.7       Lab Results   Component Value Date    INR 1.31 04/10/2017    INR 1.28 04/09/2017    INR 1.09 04/08/2017    INR 1.00 04/07/2017         ASSESSMENT/PLAN:  (Z47.1,  Z96.651) Aftercare following right knee joint replacement surgery  (primary encounter diagnosis)  Comment: Pain controlled as long as she gets the medication. She is at risk for delirium, but shows no signs currently and has been getting 2 tabs of hydromorphone regularly. On warfarin for DVT prophylaxis, recommend INR goal 1.8-2.2. No s/sx DVT. No s/sx poor wound healing. Goal is to discharge home when PT/OT goals met.  Plan: Schedule dilaudid 4mg every 4 hours. Will reassess 4/17/17 and plan to adjust as needed. Monitor for side effects such as delirium, fatigue, dizziness, hypotension. PT/OT eval and treat, discharge planning per their recommendation. Continue warfarin, monitor s/sx DVT, monitor INR. Monitor incision. F/u ortho as scheduled.    (K59.01) Slow transit constipation  Comment: Due to medications, decreased activity. No evidence of bowel obstruction. Not currently controlled.  Plan: Increase Senna-S to 2 tabs BID. Cont Miralax. Monitor bowel function. Adjust medication as clinically indicated.    (I10) Essential hypertension with goal blood pressure less than 140/90  Comment: BP variable at this time, suspect higher reading due to pain or anxiety. Will not make changes at this time.  Plan: Continue current POC with no changes at this time. Monitor BP, BMP. Adjust medication as clinically indicated.    (D62) Anemia due to blood loss, acute  Comment: Mild, asymptomatic  Plan: Recheck Hgb      Orders:  Schedule dilaudid 4mg every 4 hours  Increase Senna-S to 2 tabs BID  CBC, BMP with next INR      Information reviewed:  Medications, vital signs, orders, nursing notes, problem list,  hospital information.     Electronically signed by:  LINDEN Cardenas [facility]  Pager: [phone]

## 2017-04-14 ENCOUNTER — TRANSFERRED RECORDS (OUTPATIENT)
Dept: HEALTH INFORMATION MANAGEMENT | Facility: CLINIC | Age: 82
End: 2017-04-14

## 2017-04-14 LAB
ANION GAP SERPL CALCULATED.3IONS-SCNC: 7 MMOL/L (ref 5–18)
BUN SERPL-MCNC: 11 MG/DL (ref 8–28)
CALCIUM SERPL-MCNC: 9 MG/DL (ref 8.5–10.5)
CHLORIDE SERPLBLD-SCNC: 102 MMOL/L (ref 98–107)
CO2 SERPL-SCNC: 30 MMOL/L (ref 22–31)
CREAT SERPL-MCNC: 0.66 MG/DL (ref 0.6–1.1)
ERYTHROCYTE [DISTWIDTH] IN BLOOD BY AUTOMATED COUNT: 12.7 % (ref 11–14.5)
GFR SERPL CREATININE-BSD FRML MDRD: >60 ML/MIN/1.73M2
GLUCOSE SERPL-MCNC: 122 MG/DL (ref 70–125)
HCT VFR BLD AUTO: 30.1 % (ref 35–47)
HEMOGLOBIN: 10.1 G/DL (ref 12–16)
INR PPP: 1.27 (ref 0.9–1.1)
MCH RBC QN AUTO: 33.2 PG (ref 27–34)
MCHC RBC AUTO-ENTMCNC: 33.6 G/DL (ref 32–36)
MCV RBC AUTO: 99 FL (ref 80–100)
PLATELET # BLD AUTO: 275 THOU/UL (ref 140–440)
POTASSIUM SERPL-SCNC: 4.2 MMOL/L (ref 3.5–5)
RBC # BLD AUTO: 3.04 MILL/UL (ref 3.8–5.4)
SODIUM SERPL-SCNC: 139 MMOL/L (ref 136–145)
WBC # BLD AUTO: 8.4 THOU/UL (ref 4–11)

## 2017-04-17 ENCOUNTER — NURSING HOME VISIT (OUTPATIENT)
Dept: GERIATRICS | Facility: CLINIC | Age: 82
End: 2017-04-17
Payer: COMMERCIAL

## 2017-04-17 ENCOUNTER — TRANSFERRED RECORDS (OUTPATIENT)
Dept: HEALTH INFORMATION MANAGEMENT | Facility: CLINIC | Age: 82
End: 2017-04-17

## 2017-04-17 VITALS
TEMPERATURE: 98.8 F | BODY MASS INDEX: 33.58 KG/M2 | SYSTOLIC BLOOD PRESSURE: 142 MMHG | HEART RATE: 92 BPM | OXYGEN SATURATION: 96 % | WEIGHT: 183.6 LBS | RESPIRATION RATE: 16 BRPM | DIASTOLIC BLOOD PRESSURE: 78 MMHG

## 2017-04-17 DIAGNOSIS — I10 BENIGN ESSENTIAL HYPERTENSION: ICD-10-CM

## 2017-04-17 DIAGNOSIS — Z96.651 AFTERCARE FOLLOWING RIGHT KNEE JOINT REPLACEMENT SURGERY: Primary | ICD-10-CM

## 2017-04-17 DIAGNOSIS — D62 ANEMIA DUE TO BLOOD LOSS, ACUTE: ICD-10-CM

## 2017-04-17 DIAGNOSIS — K59.01 SLOW TRANSIT CONSTIPATION: ICD-10-CM

## 2017-04-17 DIAGNOSIS — G25.81 RESTLESS LEGS SYNDROME (RLS): ICD-10-CM

## 2017-04-17 DIAGNOSIS — Z47.1 AFTERCARE FOLLOWING RIGHT KNEE JOINT REPLACEMENT SURGERY: Primary | ICD-10-CM

## 2017-04-17 DIAGNOSIS — G47.01 INSOMNIA DUE TO MEDICAL CONDITION: ICD-10-CM

## 2017-04-17 DIAGNOSIS — Z96.651 STATUS POST TOTAL RIGHT KNEE REPLACEMENT: ICD-10-CM

## 2017-04-17 DIAGNOSIS — E78.2 MIXED HYPERLIPIDEMIA: ICD-10-CM

## 2017-04-17 LAB — INR PPP: 1.75 (ref 0.9–1.1)

## 2017-04-17 PROCEDURE — 99306 1ST NF CARE HIGH MDM 50: CPT | Performed by: INTERNAL MEDICINE

## 2017-04-17 PROCEDURE — 99207 ZZC CDG-CORRECTLY CODED, REVIEWED AND AGREE: CPT | Performed by: INTERNAL MEDICINE

## 2017-04-17 NOTE — PROGRESS NOTES
[place] [facility]  INITIAL VISIT NOTE  April 17, 2017    PRIMARY CARE PROVIDER AND CLINIC:  [facility] [address] [place] [address] / [facility]*    Chief Complaint   Patient presents with     Hospital F/U       HPI:    Zaira Fierro is a 81 year old  (1935) female who was seen at [facility] on April 17, 2017 for an initial visit. Medical history is notable for HTN, OA, RLS and insomnia. She was hospitalized at [facility] from 4/7/17 to 4/10/17 where she is s/p elective right total knee arthroplasty for DJD. Surgery without complication. Post-op with acute blood loss anemia (Hgb 14.4 --> 10.5). She was admitted to this facility for medical management and rehab.     Today, Ms. Fierro is seen in her room. Main issue since arriving at TCU is pain control. Hydromorphone was scheduled late last week and today discussed changing to mostly PRN dosing. She is OK with this. Did ask repeatedly for me to re-state times of scheduled vs PRN. Pain overall is controlled. They are having to wake her up at night for the scheduled dose. No chest pain or dyspnea. No abdominal pain. Having BMs. Working with therapies.     CODE STATUS:   CPR/Full code     ALLERGIES:     Allergies   Allergen Reactions     No Known Allergies        PAST MEDICAL HISTORY:   Past Medical History:   Diagnosis Date     Glaucoma      Hearing loss     doesn't wear hearing aids     Hyperlipidaemia      Hypertension      Macular degeneration      Multiple joint pain     secondary to arthritis     Obesity      Osteoarthritis involving multiple joints on both sides of body      Restless leg syndrome        PAST SURGICAL HISTORY:   Past Surgical History:   Procedure Laterality Date     ARTHROPLASTY KNEE Right 4/7/2017    Procedure: ARTHROPLASTY KNEE;  Surgeon: Aamir Sutton MD;  Location: [facility] OR     TONSILLECTOMY  1940       FAMILY HISTORY:   Family History   Problem Relation Age of Onset     Coronary Artery Disease  Father      Age 62 when      Cardiovascular Father      Myocardial Infarction Father      DIABETES Mother      Occurred when older     Hypertension Mother      Arthritis Mother      Eye Disorder Mother      Arthritis Sister      Asthma Sister      Hypertension Sister      OSTEOPOROSIS Sister      Hypertension Sister      Hyperlipidemia Sister      Obesity Sister      Spine Problems Sister      Scoliosis     KIDNEY DISEASE Brother        SOCIAL HISTORY:   Lives with her sister     MEDICATIONS:  Current Outpatient Prescriptions   Medication Sig Dispense Refill     warfarin (COUMADIN) 5 MG tablet Take 7.5 mg by mouth daily for DVT prophylaxis until 2017.        hydrochlorothiazide (HYDRODIURIL) 25 MG tablet Take 25 mg by mouth every morning       HYDROmorphone (DILAUDID) 2 MG tablet Take 4 mg by mouth every 4 hours        losartan (COZAAR) 100 MG tablet Take 100 mg by mouth every morning       calcium carb 1250 mg, 500 mg [place],/vitamin D 200 unit (OSCAL 500/200 D-3) 500-200 MG-UNIT per tablet Take 1 tablet by mouth 2 times daily       Polyethylene Glycol 1450 POWD Take 17 g by mouth daily       pravastatin (PRAVACHOL) 20 MG tablet Take 20 mg by mouth every evening       Sennosides-Docusate Sodium (SENNA-DOCUSATE SODIUM) 8.6-50 MG TABS Take 2 tablets by mouth 2 times daily       Multiple Vitamin (TAB-A-ESPERANZA) TABS Take 1 tablet by mouth daily       acetaminophen (TYLENOL) 325 MG tablet Take 650 mg by mouth 4 times daily       pramipexole (MIRAPEX) 0.125 MG tablet Take 1 tablet (0.125 mg) by mouth At Bedtime 30 tablet 1     nortriptyline (PAMELOR) 25 MG capsule Take 1 capsule (25 mg) by mouth At Bedtime 90 capsule 1     latanoprost (XALATAN) 0.005 % ophthalmic solution Place 1 drop into both eyes At Bedtime        Multiple Vitamins-Minerals (CENTRUM SILVER) per tablet Take 1 tablet by mouth daily.         Post Discharge Medication Reconciliation Status: medication reconcilation previously completed during another  office visit.    ROS:  10 point ROS neg other than the symptoms noted above in the HPI.    PHYSICAL EXAM:  /78  Pulse 92  Temp 98.8  F (37.1  C)  Resp 16  Wt 183 lb 9.6 oz (83.3 kg)  SpO2 96%  BMI 33.58 kg/m2  Gen: sitting up in bed, alert, cooperative and in no acute distress  HEENT: normocephalic; oropharynx clear  Card: RRR, S1, S2, no murmurs  Resp: lungs clear to auscultation bilaterally  GI: abdomen soft, not-tender  MSK: normal muscle tone, no LE edema  Neuro: CX II-XII grossly in tact; ROM in all four extremities grossly in tact  Psych: alert and oriented to self and general situation; normal affect  Skin: dressing c/d/i over     LABORATORY/IMAGING DATA:  Reviewed as per Epic    ASSESSMENT/PLAN:    s/p Right Total Knee Arthroplasty for DJD (4/7/17)  Surgery without complication.   -- analgesia with acetaminophen 650 mg QID  -- change to hydromorphone 4 mg scheduled at 0800, noon and hs and 4 mg q4h PRN  -- DVT prophylaxis with warfarin per ortho  -- ongoing PT/OT  -- follow up with ortho as scheduled    Acute Blood Loss Anemia  Secondary to above. No evidence of ongoing bleeding. Hgb 14.4 --> 10.5.   -- repeat Hgb to ensure stability    HTN  SBPs labile in 120s-150s  -- continues on HCTZ 25 mg daily and losartan 100 mg daily  -- follow BPs and adjust medications as needed    HLD  -- continues on pravastatin 20 mg daily    RLS  -- continues on pramipexole 0.125 mg qhs    Insomnia  -- continues on nortriptyline 25 mg qhs    Slow Transit Constipation  -- continues on Miralax 17g daily and Senna-S 2 tabs BID   -- adjust bowel regimen as needed    Electronically signed by:  Sigrid Montano MD

## 2017-04-20 ENCOUNTER — TRANSFERRED RECORDS (OUTPATIENT)
Dept: HEALTH INFORMATION MANAGEMENT | Facility: CLINIC | Age: 82
End: 2017-04-20

## 2017-04-20 ENCOUNTER — NURSING HOME VISIT (OUTPATIENT)
Dept: GERIATRICS | Facility: CLINIC | Age: 82
End: 2017-04-20
Payer: COMMERCIAL

## 2017-04-20 VITALS
RESPIRATION RATE: 18 BRPM | WEIGHT: 183.6 LBS | OXYGEN SATURATION: 97 % | DIASTOLIC BLOOD PRESSURE: 80 MMHG | HEART RATE: 92 BPM | BODY MASS INDEX: 33.58 KG/M2 | TEMPERATURE: 97.6 F | SYSTOLIC BLOOD PRESSURE: 144 MMHG

## 2017-04-20 DIAGNOSIS — D62 ANEMIA DUE TO BLOOD LOSS, ACUTE: ICD-10-CM

## 2017-04-20 DIAGNOSIS — Z47.1 AFTERCARE FOLLOWING RIGHT KNEE JOINT REPLACEMENT SURGERY: Primary | ICD-10-CM

## 2017-04-20 DIAGNOSIS — I10 ESSENTIAL HYPERTENSION WITH GOAL BLOOD PRESSURE LESS THAN 140/90: ICD-10-CM

## 2017-04-20 DIAGNOSIS — Z96.651 AFTERCARE FOLLOWING RIGHT KNEE JOINT REPLACEMENT SURGERY: Primary | ICD-10-CM

## 2017-04-20 DIAGNOSIS — K59.01 SLOW TRANSIT CONSTIPATION: ICD-10-CM

## 2017-04-20 LAB — INR PPP: 2.61 (ref 0.9–1.1)

## 2017-04-20 PROCEDURE — 99309 SBSQ NF CARE MODERATE MDM 30: CPT | Performed by: NURSE PRACTITIONER

## 2017-04-20 NOTE — PROGRESS NOTES
[place] [facility]    Chief Complaint   Patient presents with     RECHECK       HPI:    Zaira Fierro is a 81 year old  (1935), who is being seen today for an episodic care visit at [facility]. Medical history is notable for HTN, OA, RLS and insomnia. She was hospitalized at [facility] from 17 to 4/10/17 where she is s/p elective right total knee arthroplasty for DJD. Surgery without complication. Post-op with acute blood loss anemia (Hgb 14.4 --> 10.5). She was admitted to this facility for medical management and rehab.     Today's concern is:    Aftercare following right knee joint replacement surgery  Patient initially had difficulty with pain control, so hydromorphone was scheduled. On 17 the frequency was reduced. She says her pain has been well controlled now. She has only use the prn dilaudid once. She is progressing in therapy. She is ambulating 250 feet, supervision with transfers, and independent to min assist with ADLs. On warfarin for DVT prophylaxis. INR was low on 5mg daily, so she received 7.5 mg daily for several days.   Lab Results   Component Value Date    INR 2.61 2017    INR 1.27 2017    INR 1.24 2017    INR 1.31 04/10/2017    INR 1.28 2017    INR 1.09 2017    INR 1.00 2017       Slow transit constipation  Taking senna and miralax, bowels moving well. No nausea, abdominal pain.    Essential hypertension with goal blood pressure less than 140/90  On HCTZ and losartan. BP Readin/80, 157/63, 130/72. She does note some intermittent anxiety    Anemia due to blood loss, acute  Hemoglobin   Date Value Ref Range Status   2017 10.1 (L)  12.0 - 16.0 g/dL Final     2017 10.5 (L) 11.7 - 15.7 g/dL Final       ALLERGIES: No known allergies  Past Medical, Surgical, Family and Social History reviewed and updated in [facility]..    Current Outpatient Prescriptions   Medication Sig Dispense Refill     warfarin (COUMADIN) 5 MG tablet Take 5  mg by mouth At Bedtime for DVT prophylaxis until 4/23/2017.       hydrochlorothiazide (HYDRODIURIL) 25 MG tablet Take 25 mg by mouth every morning       HYDROmorphone (DILAUDID) 2 MG tablet Take 4 mg by mouth 3 times daily and (2mg) orally every 4 hours as needed.       losartan (COZAAR) 100 MG tablet Take 100 mg by mouth every morning       calcium carb 1250 mg, 500 mg [place],/vitamin D 200 unit (OSCAL 500/200 D-3) 500-200 MG-UNIT per tablet Take 1 tablet by mouth 2 times daily       Polyethylene Glycol 1450 POWD Take 17 g by mouth daily       pravastatin (PRAVACHOL) 20 MG tablet Take 20 mg by mouth every evening       Sennosides-Docusate Sodium (SENNA-DOCUSATE SODIUM) 8.6-50 MG TABS Take 2 tablets by mouth 2 times daily       pramipexole (MIRAPEX) 0.125 MG tablet Take 1 tablet (0.125 mg) by mouth At Bedtime 30 tablet 1     nortriptyline (PAMELOR) 25 MG capsule Take 1 capsule (25 mg) by mouth At Bedtime 90 capsule 1     latanoprost (XALATAN) 0.005 % ophthalmic solution Place 1 drop into both eyes At Bedtime        Multiple Vitamins-Minerals (CENTRUM SILVER) per tablet Take 1 tablet by mouth daily.       acetaminophen (TYLENOL) 325 MG tablet Take 650 mg by mouth 4 times daily           REVIEW OF SYSTEMS:  10 point ROS of systems including Constitutional, Eyes, Respiratory, Cardiovascular, Gastroenterology, Genitourinary, Integumentary, Muscularskeletal, Psychiatric were all negative except for pertinent positives noted in my HPI.    Physical Exam:  /80  Pulse 92  Temp 97.6  F (36.4  C)  Resp 18  Wt 183 lb 9.6 oz (83.3 kg)  SpO2 97%  BMI 33.58 kg/m2   GENERAL APPEARANCE:  Alert, in no distress, oriented  ENT:  Mouth and posterior oropharynx normal, moist mucous membranes, normal hearing acuity  EYES:  EOM, conjunctivae, lids, pupils and irises normal  NECK:  No adenopathy,masses or thyromegaly  RESP:  respiratory effort and palpation of chest normal, lungs clear to auscultation , no respiratory  distress  CV:  Palpation and auscultation of heart done , regular rate and rhythm, no murmur, rub, or gallop, no edema  ABDOMEN:  normal bowel sounds, soft, nontender, no hepatosplenomegaly or other masses  M/S:   R knee with mild swelling, calf soft and non-tender  SKIN:  wound healing well, no signs of infection R knee  PSYCH:  oriented X 3, normal insight, judgement and memory, affect and mood normal      Recent Labs:    CBC RESULTS:   Recent Labs   Lab Test 04/14/17 04/09/17   0601   03/31/17   1120   WBC  8.4   --    --   9.5   RBC  3.04*   --    --   4.34   HGB  10.1*  10.5*   < >  14.4   HCT  30.1*   --    --   42.3   MCV  99   --    --   98   MCH  33.2   --    --   33.2*   MCHC  33.6   --    --   34.0   RDW  12.7   --    --   12.2   PLT  275   --    --   275    < > = values in this interval not displayed.       Last Basic Metabolic Panel:  Recent Labs   Lab Test 04/14/17 03/31/17   1120   NA  139  142   POTASSIUM  4.2  4.2   CHLORIDE  102  104   ANDRE  9.0  9.3   CO2  30  30   BUN  11  21   CR  0.66  0.63   GLC  122  122*       Lab Results   Component Value Date    INR 2.61 04/20/2017    INR 1.27 04/14/2017    INR 1.24 04/11/2017    INR 1.31 04/10/2017    INR 1.28 04/09/2017    INR 1.09 04/08/2017    INR 1.00 04/07/2017         Assessment/Plan:  (Z47.1,  Z96.651) Aftercare following right knee joint replacement surgery  (primary encounter diagnosis)  Comment: Pain controlled. No s/sx DVT. No s/sx poor wound healing. Goal is to discharge home when PT/OT goals met. INR now up significantly, goal is around 2, so will reduce dose.  Plan: PT/OT eval and treat, discharge planning per their recommendation. Warfarin 5mg daily, INR 4/24/17, monitor s/sx DVT. Continue scheduled dilaudid for pain, monitor pain severity. Monitor incision. F/u ortho as scheduled.    (K59.01) Slow transit constipation  Comment: Due to medications, decreased activity. No evidence of bowel obstruction.  Plan: Cont Miralax and Senna-S.  Monitor bowel function. Adjust medication as clinically indicated.    (I10) Essential hypertension with goal blood pressure less than 140/90  Comment: Fair control, likely elevated due to pain and anxiety. Would not adjust medication at this time  Plan: Continue to monitor BP. Continue current meds    (D62) Anemia due to blood loss, acute  Comment: Hgb down slightly, but no s/sx bleeding  Plan: Recheck Hgb with next INR    Orders:  Warfarin 5mg daily  INR, Hgb 4/24/17      Electronically signed by  LINDEN Cardenas [facility]  Pager: [phone]

## 2017-04-24 ENCOUNTER — TRANSFERRED RECORDS (OUTPATIENT)
Dept: HEALTH INFORMATION MANAGEMENT | Facility: CLINIC | Age: 82
End: 2017-04-24

## 2017-04-24 LAB — INR PPP: 2.36 (ref 0.9–1.1)

## 2017-04-27 ENCOUNTER — NURSING HOME VISIT (OUTPATIENT)
Dept: GERIATRICS | Facility: CLINIC | Age: 82
End: 2017-04-27
Payer: COMMERCIAL

## 2017-04-27 VITALS
BODY MASS INDEX: 33.64 KG/M2 | DIASTOLIC BLOOD PRESSURE: 66 MMHG | WEIGHT: 183.9 LBS | SYSTOLIC BLOOD PRESSURE: 121 MMHG | HEART RATE: 91 BPM | OXYGEN SATURATION: 93 % | TEMPERATURE: 98.7 F | RESPIRATION RATE: 18 BRPM

## 2017-04-27 DIAGNOSIS — Z47.1 AFTERCARE FOLLOWING RIGHT KNEE JOINT REPLACEMENT SURGERY: Primary | ICD-10-CM

## 2017-04-27 DIAGNOSIS — Z98.890 STATUS POST KNEE SURGERY: Primary | ICD-10-CM

## 2017-04-27 DIAGNOSIS — I10 ESSENTIAL HYPERTENSION WITH GOAL BLOOD PRESSURE LESS THAN 140/90: ICD-10-CM

## 2017-04-27 DIAGNOSIS — D62 ANEMIA DUE TO BLOOD LOSS, ACUTE: ICD-10-CM

## 2017-04-27 DIAGNOSIS — K59.01 SLOW TRANSIT CONSTIPATION: ICD-10-CM

## 2017-04-27 DIAGNOSIS — Z96.651 AFTERCARE FOLLOWING RIGHT KNEE JOINT REPLACEMENT SURGERY: Primary | ICD-10-CM

## 2017-04-27 PROCEDURE — 99309 SBSQ NF CARE MODERATE MDM 30: CPT | Performed by: NURSE PRACTITIONER

## 2017-04-27 NOTE — PROGRESS NOTES
[place] [facility]    Chief Complaint   Patient presents with     RECHECK       HPI:    Zaira Fierro is a 81 year old  (1935), who is being seen today for an episodic care visit at [facility]. Medical history is notable for HTN, OA, RLS and insomnia. She was hospitalized at [facility] from 4/7/17 to 4/10/17 where she is s/p elective right total knee arthroplasty for DJD. Surgery without complication. Post-op with acute blood loss anemia (Hgb 14.4 --> 10.5). She was admitted to this facility for medical management and rehab.     Today's concern is:    Aftercare following right knee joint replacement surgery  Patient initially had difficulty with pain control, so hydromorphone was scheduled. On 4/17/17 the frequency was reduced. She says her pain has been well controlled now. She uses the prn dilaudid only rarely. She is progressing in therapy. She is ambulating 250 feet, supervision with transfers, and independent to min assist with ADLs. Managing 18 stairs. On warfarin for DVT prophylaxis. See INRs    Slow transit constipation  Taking senna and miralax, bowels moving well. No nausea, abdominal pain.    Essential hypertension with goal blood pressure less than 140/90  On HCTZ and losartan  BP readings range:  121/66  109/65  122/61  128/75  121/74  128/72  140/83  156/80  143/56      Anemia due to blood loss, acute  Hemoglobin   Date Value Ref Range Status   04/14/2017 10.1 (L) 12.0 - 16.0 g/dL Final   04/09/2017 10.5 (L) 11.7 - 15.7 g/dL Final         ALLERGIES: No known allergies  Past Medical, Surgical, Family and Social History reviewed and updated in [email or web address].    Current Outpatient Prescriptions   Medication Sig Dispense Refill     warfarin (COUMADIN) 5 MG tablet Take 5 mg by mouth At Bedtime for DVT prophylaxis until 4/23/2017.       hydrochlorothiazide (HYDRODIURIL) 25 MG tablet Take 25 mg by mouth every morning       HYDROmorphone (DILAUDID) 2 MG tablet Take 4 mg by mouth 3 times daily and  (2mg) orally every 4 hours as needed.       losartan (COZAAR) 100 MG tablet Take 100 mg by mouth every morning       calcium carb 1250 mg, 500 mg [place],/vitamin D 200 unit (OSCAL 500/200 D-3) 500-200 MG-UNIT per tablet Take 1 tablet by mouth 2 times daily       Polyethylene Glycol 1450 POWD Take 17 g by mouth daily       pravastatin (PRAVACHOL) 20 MG tablet Take 20 mg by mouth every evening       Sennosides-Docusate Sodium (SENNA-DOCUSATE SODIUM) 8.6-50 MG TABS Take 2 tablets by mouth 2 times daily       acetaminophen (TYLENOL) 325 MG tablet Take 650 mg by mouth 4 times daily       pramipexole (MIRAPEX) 0.125 MG tablet Take 1 tablet (0.125 mg) by mouth At Bedtime 30 tablet 1     nortriptyline (PAMELOR) 25 MG capsule Take 1 capsule (25 mg) by mouth At Bedtime 90 capsule 1     latanoprost (XALATAN) 0.005 % ophthalmic solution Place 1 drop into both eyes At Bedtime        Multiple Vitamins-Minerals (CENTRUM SILVER) per tablet Take 1 tablet by mouth daily.           REVIEW OF SYSTEMS:  10 point ROS of systems including Constitutional, Eyes, Respiratory, Cardiovascular, Gastroenterology, Genitourinary, Integumentary, Muscularskeletal, Psychiatric were all negative except for pertinent positives noted in my HPI.    Physical Exam:  /66  Pulse 91  Temp 98.7  F (37.1  C)  Resp 18  Wt 183 lb 14.4 oz (83.4 kg)  SpO2 93%  BMI 33.64 kg/m2   GENERAL APPEARANCE:  Alert, in no distress, oriented  ENT:  Mouth and posterior oropharynx normal, moist mucous membranes, normal hearing acuity  EYES:  EOM, conjunctivae, lids, pupils and irises normal  NECK:  No adenopathy,masses or thyromegaly  RESP:  respiratory effort and palpation of chest normal, lungs clear to auscultation , no respiratory distress  CV:  Palpation and auscultation of heart done , regular rate and rhythm, no murmur, rub, or gallop, no edema  ABDOMEN:  normal bowel sounds, soft, nontender, no hepatosplenomegaly or other masses  M/S:   R knee with mild  swelling, calf soft and non-tender  SKIN:  wound healing well, no signs of infection R knee  PSYCH:  oriented X 3, normal insight, judgement and memory, affect and mood normal      Recent Labs:    CBC RESULTS:   Recent Labs   Lab Test 04/14/17 04/09/17   0601   03/31/17   1120   WBC  8.4   --    --   9.5   RBC  3.04*   --    --   4.34   HGB  10.1*  10.5*   < >  14.4   HCT  30.1*   --    --   42.3   MCV  99   --    --   98   MCH  33.2   --    --   33.2*   MCHC  33.6   --    --   34.0   RDW  12.7   --    --   12.2   PLT  275   --    --   275    < > = values in this interval not displayed.       Last Basic Metabolic Panel:  Recent Labs   Lab Test 04/14/17 03/31/17   1120   NA  139  142   POTASSIUM  4.2  4.2   CHLORIDE  102  104   ANDRE  9.0  9.3   CO2  30  30   BUN  11  21   CR  0.66  0.63   GLC  122  122*     Lab Results   Component Value Date    INR 2.36 04/24/2017    INR 2.61 04/20/2017    INR 1.27 04/14/2017    INR 1.24 04/11/2017    INR 1.31 04/10/2017    INR 1.28 04/09/2017    INR 1.09 04/08/2017    INR 1.00 04/07/2017         Assessment/Plan:  (Z47.1,  Z96.651) Aftercare following right knee joint replacement surgery  (primary encounter diagnosis)  Comment: Pain controlled. No s/sx DVT. No s/sx poor wound healing. Goal is to discharge home when PT/OT goals met. INR goal is around 2  Plan: PT/OT eval and treat, discharge planning per their recommendation. Warfarin 5mg daily, INR 5/1/17, monitor s/sx DVT. Continue scheduled dilaudid for pain, monitor pain severity. Monitor incision. F/u ortho as scheduled.    (K59.01) Slow transit constipation  Comment: Due to medications, decreased activity. No evidence of bowel obstruction.  Plan: Cont Miralax and Senna-S. Monitor bowel function. Adjust medication as clinically indicated.    (I10) Essential hypertension with goal blood pressure less than 140/90  Comment: Well controlled  Plan: Continue to monitor BP. Continue current meds    (D62) Anemia due to blood loss,  acute  Comment: Hgb down slightly, but no s/sx bleeding  Plan: Recheck Hgb prn      Electronically signed by  LINDEN Cardenas [facility]  Pager: [phone]

## 2017-05-01 ENCOUNTER — OFFICE VISIT (OUTPATIENT)
Dept: ORTHOPEDICS | Facility: CLINIC | Age: 82
End: 2017-05-01

## 2017-05-01 ENCOUNTER — TRANSFERRED RECORDS (OUTPATIENT)
Dept: HEALTH INFORMATION MANAGEMENT | Facility: CLINIC | Age: 82
End: 2017-05-01

## 2017-05-01 VITALS — WEIGHT: 174.7 LBS | BODY MASS INDEX: 32.15 KG/M2 | HEIGHT: 62 IN

## 2017-05-01 DIAGNOSIS — G25.81 RESTLESS LEG SYNDROME: ICD-10-CM

## 2017-05-01 DIAGNOSIS — Z96.651 STATUS POST TOTAL RIGHT KNEE REPLACEMENT: Primary | ICD-10-CM

## 2017-05-01 LAB
HEMOGLOBIN: 10.9 G/DL (ref 12–16)
INR PPP: 1.78 (ref 0.9–1.1)

## 2017-05-01 RX ORDER — PRAMIPEXOLE DIHYDROCHLORIDE 0.12 MG/1
0.12 TABLET ORAL AT BEDTIME
Qty: 30 TABLET | Refills: 1 | Status: SHIPPED | OUTPATIENT
Start: 2017-05-01 | End: 2017-05-18

## 2017-05-01 RX ORDER — MEDICAL SUPPLY, MISCELLANEOUS
2 EACH MISCELLANEOUS DAILY
Qty: 2 EACH | Refills: 0 | Status: SHIPPED | OUTPATIENT
Start: 2017-05-01 | End: 2017-06-19

## 2017-05-01 NOTE — NURSING NOTE
"Reason For Visit:   Chief Complaint   Patient presents with     Surgical Followup     4wk POP F/U Right Total Knee Replacement DOS: 4/7/17     RECHECK     Pt states that she has been experiencing swelling in her Left Leg and Left Foot since surgery.                Ht 1.562 m (5' 1.5\")  Wt 79.2 kg (174 lb 11.2 oz)  BMI 32.47 kg/m2                      Current Outpatient Prescriptions   Medication     pramipexole (MIRAPEX) 0.125 MG tablet     warfarin (COUMADIN) 5 MG tablet     hydrochlorothiazide (HYDRODIURIL) 25 MG tablet     HYDROmorphone (DILAUDID) 2 MG tablet     losartan (COZAAR) 100 MG tablet     calcium carb 1250 mg, 500 mg [place],/vitamin D 200 unit (OSCAL 500/200 D-3) 500-200 MG-UNIT per tablet     Polyethylene Glycol 1450 POWD     pravastatin (PRAVACHOL) 20 MG tablet     Sennosides-Docusate Sodium (SENNA-DOCUSATE SODIUM) 8.6-50 MG TABS     acetaminophen (TYLENOL) 325 MG tablet     nortriptyline (PAMELOR) 25 MG capsule     [DISCONTINUED] pramipexole (MIRAPEX) 0.125 MG tablet     latanoprost (XALATAN) 0.005 % ophthalmic solution     Multiple Vitamins-Minerals (CENTRUM SILVER) per tablet     No current facility-administered medications for this visit.        Allergies   Allergen Reactions     No Known Allergies        Lori Curtis C.M.A.             "

## 2017-05-01 NOTE — TELEPHONE ENCOUNTER
Request for medication refill:    Date of last visit at clinic: 12/14/16    Please complete refill if appropriate and CLOSE ENCOUNTER.    Closing the encounter signifies the refill is complete.    If refill has been denied, please complete the smart phrase .smirefuse and route it to the [facility] RN TRIAGE pool to inform the patient and the pharmacy.    Rosemary Hall

## 2017-05-01 NOTE — LETTER
5/1/2017       RE: Zaira Fierro  [address]  [place] [address]     Dear Colleague,    Thank you for referring your patient, Zaira Fierro, to the [facility] at [facility]. Please see a copy of my visit note below.    Postoperative knee replacement follow-up clinic visit Dr Hayes  DIAGNOSIS: end stage Osteoarthritis of Right knee.   PROCEDURE PERFORMED: 4/9/17, Right total knee arthroplasty. ([place])    Zaira Fierro is doing well after last surgery listed above.  She has several complaints about her pain medication service at the nursing home she is staying at, but has been satisfied with her physical therapy   Preoperative knee pain is  Partially Present  Analgesic medication: Dilaudid (Oral)    EXAM:  Incision healing, clean and dry.  Joint range of motion  With minimal pain. Extends to 170. Flexes to 90   Effusion: none  Periarticular swelling or leg edema:   3-4mm pitting edema L leg  2-3mm pitting edema R leg   Peroneal and tibial nerve function: intact     Intraoperative cultures:  none     IMAGING:  Radiographs demonstrate the knee implant in satisfactory position without evidence of any complication.    IMPRESSION:  Excellent outcome to date after knee replacement.     PLAN:    Continue range of motion exercises and stretching.    Quadriceps strengthening exercises.    May be weight bearing as tolerated.    Venous duplex today to assess for DVT    Continue warfarin for 4 weeks total     Refill of:  (meds listed above): not needed.    Patient given instructions re: prophylactic antibiotic recommendations during dental work.    Next follow-up visit at 1 year after surgery or sooner as needed.       Aamir Sutton M.D.  [employer]  Oncology and Adult Reconstructive Surgery  Dept Orthopaedic Surgery, [facility]  [phone] office  [email or web address]

## 2017-05-01 NOTE — MR AVS SNAPSHOT
"              After Visit Summary   5/1/2017    Zaira Fierro    MRN: [id number]           Patient Information     Date Of Birth          1935        Visit Information        Provider Department      5/1/2017 2:15 PM Aamir Sutton MD [facility]        Today's Diagnoses     Status post total right knee replacement    -  1       Follow-ups after your visit        Who to contact     Please call your clinic at [phone] to:    Ask questions about your health    Make or cancel appointments    Discuss your medicines    Learn about your test results    Speak to your doctor   If you have compliments or concerns about an experience at your clinic, or if you wish to file a complaint, please contact [facility] Patient Relations at [phone] or email us at [email or web address]         Additional Information About Your Visit        MyChart Information     Prote[employer] gives you secure access to your electronic health record. If you see a primary care provider, you can also send messages to your care team and make appointments. If you have questions, please call your primary care clinic.  If you do not have a primary care provider, please call [phone] and they will assist you.      SocialDial is an electronic gateway that provides easy, online access to your medical records. With SocialDial, you can request a clinic appointment, read your test results, renew a prescription or communicate with your care team.     To access your existing account, please contact your [facility] or call [phone] for assistance.        Care EveryWhere ID     This is your Care EveryWhere ID. This could be used by other organizations to access your [place] medical records  [id number]        Your Vitals Were     Height BMI (Body Mass Index)                1.562 m (5' 1.5\") 32.47 kg/m2           Blood Pressure from Last 3 Encounters:   05/08/17 151/81 "   04/27/17 121/66   04/20/17 144/80    Weight from Last 3 Encounters:   05/08/17 79.7 kg (175 lb 12.8 oz)   05/01/17 79.2 kg (174 lb 11.2 oz)   04/27/17 83.4 kg (183 lb 14.4 oz)              We Performed the Following     Compression stockings          Today's Medication Changes          These changes are accurate as of: 5/1/17 11:59 PM.  If you have any questions, ask your nurse or doctor.               Start taking these medicines.        Dose/Directions    T.E.D. BELOW KNEE/L-REGULAR Misc   Started by:  Aamir Stuton MD        Dose:  2 Units   2 Units daily   Quantity:  2 each   Refills:  0            Where to get your medicines      These medications were sent to [place] [address] [place] [address] [address]     Phone:  [phone]     pramipexole 0.125 MG tablet         Some of these will need a paper prescription and others can be bought over the counter.  Ask your nurse if you have questions.     Bring a paper prescription for each of these medications     T.E.D. BELOW KNEE/L-REGULAR Misc                Primary Care Provider Office Phone # Fax #    Ange Crespo [phone]       [facility] [address] [address]        Thank you!     Thank you for choosing [facility]  for your care. Our goal is always to provide you with excellent care. Hearing back from our patients is one way we can continue to improve our services. Please take a few minutes to complete the written survey that you may receive in the mail after your visit with us. Thank you!             Your Updated Medication List - Protect others around you: Learn how to safely use, store and throw away your medicines at www.disposemymeds.org.          This list is accurate as of: 5/1/17 11:59 PM.  Always use your most recent med list.                   Brand Name Dispense Instructions for use    acetaminophen 325 MG tablet     TYLENOL     Take 650 mg by mouth 4 times daily       DILAUDID 2 MG tablet   Generic drug:  HYDROmorphone      Take 2-4 mg by mouth every 4 hours as needed       hydrochlorothiazide 25 MG tablet    HYDRODIURIL     Take 25 mg by mouth every morning       latanoprost 0.005 % ophthalmic solution    XALATAN     Place 1 drop into both eyes At Bedtime Reported on 5/1/2017       losartan 100 MG tablet    COZAAR     Take 100 mg by mouth every morning       nortriptyline 25 MG capsule    PAMELOR    90 capsule    Take 1 capsule (25 mg) by mouth At Bedtime       OSCAL 500/200 D-3 500-200 MG-UNIT per tablet   Generic drug:  calcium carb 1250 mg (500 mg [place])/vitamin D 200 unit      Take 1 tablet by mouth 2 times daily       Polyethylene Glycol 1450 Powd      Take 17 g by mouth daily       pramipexole 0.125 MG tablet    MIRAPEX    30 tablet    Take 1 tablet (0.125 mg) by mouth At Bedtime       PRAVACHOL 20 MG tablet   Generic drug:  pravastatin      Take 20 mg by mouth every evening       SENNA-docusate sodium 8.6-50 MG Tabs      Take 2 tablets by mouth 2 times daily       T.E.D. BELOW KNEE/L-REGULAR Misc     2 each    2 Units daily       warfarin 5 MG tablet    COUMADIN     Take 5 mg by mouth At Bedtime for DVT prophylaxis until 4/23/2017.

## 2017-05-08 ENCOUNTER — TRANSFERRED RECORDS (OUTPATIENT)
Dept: HEALTH INFORMATION MANAGEMENT | Facility: CLINIC | Age: 82
End: 2017-05-08

## 2017-05-08 ENCOUNTER — DISCHARGE SUMMARY NURSING HOME (OUTPATIENT)
Dept: GERIATRICS | Facility: CLINIC | Age: 82
End: 2017-05-08
Payer: COMMERCIAL

## 2017-05-08 VITALS
TEMPERATURE: 97.8 F | WEIGHT: 175.8 LBS | HEART RATE: 88 BPM | DIASTOLIC BLOOD PRESSURE: 81 MMHG | BODY MASS INDEX: 32.68 KG/M2 | OXYGEN SATURATION: 100 % | RESPIRATION RATE: 16 BRPM | SYSTOLIC BLOOD PRESSURE: 151 MMHG

## 2017-05-08 DIAGNOSIS — I10 ESSENTIAL HYPERTENSION WITH GOAL BLOOD PRESSURE LESS THAN 140/90: ICD-10-CM

## 2017-05-08 DIAGNOSIS — Z47.1 AFTERCARE FOLLOWING RIGHT KNEE JOINT REPLACEMENT SURGERY: Primary | ICD-10-CM

## 2017-05-08 DIAGNOSIS — K59.01 SLOW TRANSIT CONSTIPATION: ICD-10-CM

## 2017-05-08 DIAGNOSIS — Z96.651 AFTERCARE FOLLOWING RIGHT KNEE JOINT REPLACEMENT SURGERY: Primary | ICD-10-CM

## 2017-05-08 DIAGNOSIS — D62 ANEMIA DUE TO BLOOD LOSS, ACUTE: ICD-10-CM

## 2017-05-08 LAB — INR PPP: 2.17 (ref 0.9–1.1)

## 2017-05-08 PROCEDURE — 99316 NF DSCHRG MGMT 30 MIN+: CPT | Performed by: NURSE PRACTITIONER

## 2017-05-08 PROCEDURE — 99207 ZZC CDG-CORRECTLY CODED, REVIEWED AND AGREE: CPT | Performed by: NURSE PRACTITIONER

## 2017-05-08 NOTE — PROGRESS NOTES
[place] [facility] DISCHARGE SUMMARY    PATIENT'S NAME: Zaira Fierro  YOB: 1935  MEDICAL RECORD NUMBER:  [id number]    PRIMARY CARE PROVIDER AND CLINIC RESPONSIBLE AFTER TRANSFER: [facility] [address]     CODE STATUS/ADVANCE DIRECTIVES DISCUSSION:   CPR/Full code        Allergies   Allergen Reactions     No Known Allergies        TRANSFERRING PROVIDERS: LINDEN Canseco CNP, Sigrid Montano MD  DATE OF SNF ADMISSION:  April / 10 / 2017  DATE OF SNF (anticipated) DISCHARGE: May / 10 / 2017  DISCHARGE DISPOSITION: Non-FMG Provider   Nursing Facility: [facility] stay 4/7/17 to 4/10/17     Condition on Discharge:  Improving.  Function:  Ambulating 250 feet with a walker. Independent with ADLs. Managing 18 stairs.  Cognitive Scores: BIMS 15/15 and PHQ9 00/27.    Equipment: walker    DISCHARGE DIAGNOSIS:   1. Aftercare following right knee joint replacement surgery    2. Slow transit constipation    3. Essential hypertension with goal blood pressure less than 140/90    4. Anemia due to blood loss, acute        [facility] Course:  Medical history is notable for HTN, OA, RLS and insomnia. She was hospitalized at [facility] from 4/7/17 to 4/10/17 where she is s/p elective right total knee arthroplasty for DJD. Surgery without complication. Post-op with acute blood loss anemia (Hgb 14.4 --> 10.5). She was admitted to this facility for medical management and rehab.     Aftercare following right knee joint replacement surgery  Patient initially had difficulty with pain control, so hydromorphone was scheduled. On 4/17/17 the frequency was reduced. She says her pain has been well controlled now. She no longer uses the prn and sometimes declines the scheduled doses. At discharge, it will be changed to prn only. She is progressing in therapy. She is ambulating 250 feet, supervision with  transfers, and independent to min assist with ADLs. Managing 18 stairs. She was on warfarin for DVT prophylaxis, this will be stopped at discharge.    Slow transit constipation  Taking senna and miralax, bowels moving well. No nausea, abdominal pain.    Essential hypertension with goal blood pressure less than 140/90  On HCTZ and losartan  BP readings range:  151/81  150/79  147/84  148/78  146/82  125/74  144/83  143/78  117/66  141/78  125/76      Anemia due to blood loss, acute  Hemoglobin   Date Value Ref Range Status   05/01/2017 10.9 (L) 12.0 - 16.0 g/dL Final   04/14/2017 10.1 (L) 12.0 - 16.0 g/dL Final       PAST MEDICAL HISTORY:  has a past medical history of Glaucoma; Hearing loss; Hyperlipidaemia; Hypertension; Macular degeneration; Multiple joint pain; Obesity; Osteoarthritis involving multiple joints on both sides of body; and Restless leg syndrome.    DISCHARGE MEDICATIONS:  Current Outpatient Prescriptions   Medication Sig Dispense Refill     pramipexole (MIRAPEX) 0.125 MG tablet Take 1 tablet (0.125 mg) by mouth At Bedtime 30 tablet 1     Elastic Bandages & Supports (T.E.D. BELOW KNEE/L-REGULAR) MISC 2 Units daily 2 each 0     hydrochlorothiazide (HYDRODIURIL) 25 MG tablet Take 25 mg by mouth every morning       HYDROmorphone (DILAUDID) 2 MG tablet Take 2-4 mg by mouth every 4 hours as needed       losartan (COZAAR) 100 MG tablet Take 100 mg by mouth every morning       calcium carb 1250 mg, 500 mg [place],/vitamin D 200 unit (OSCAL 500/200 D-3) 500-200 MG-UNIT per tablet Take 1 tablet by mouth 2 times daily       Polyethylene Glycol 1450 POWD Take 17 g by mouth daily       pravastatin (PRAVACHOL) 20 MG tablet Take 20 mg by mouth every evening       Sennosides-Docusate Sodium (SENNA-DOCUSATE SODIUM) 8.6-50 MG TABS Take 2 tablets by mouth 2 times daily       acetaminophen (TYLENOL) 325 MG tablet Take 650 mg by mouth 4 times daily       nortriptyline (PAMELOR) 25 MG capsule Take 1 capsule (25 mg) by mouth  At Bedtime 90 capsule 1     latanoprost (XALATAN) 0.005 % ophthalmic solution Place 1 drop into both eyes At Bedtime Reported on 5/1/2017         MEDICATION CHANGES/RATIONALE:   Warfarin stopped, no longer needed       ROS:    10 point ROS of systems including Constitutional, Eyes, Respiratory, Cardiovascular, Gastroenterology, Genitourinary, Integumentary, Muscularskeletal, Psychiatric were all negative except for pertinent positives noted in my HPI.    Physical Exam:   Vitals: /81  Pulse 88  Temp 97.8  F (36.6  C)  Resp 16  Wt 175 lb 12.8 oz (79.7 kg)  SpO2 100%  BMI 32.68 kg/m2  BMI= Body mass index is 32.68 kg/(m^2).  GENERAL APPEARANCE:  Alert, in no distress, oriented  ENT:  Mouth and posterior oropharynx normal, moist mucous membranes, normal hearing acuity  EYES:  EOM, conjunctivae, lids, pupils and irises normal  NECK:  No adenopathy,masses or thyromegaly  RESP:  respiratory effort and palpation of chest normal, lungs clear to auscultation , no respiratory distress  CV:  Palpation and auscultation of heart done , regular rate and rhythm, no murmur, rub, or gallop, no edema  ABDOMEN:  normal bowel sounds, soft, nontender, no hepatosplenomegaly or other masses  M/S:   R knee with mild swelling, calf soft and non-tender  SKIN:  wound healing well, no signs of infection R knee  PSYCH:  oriented X 3, normal insight, judgement and memory, affect and mood normal      DISCHARGE PLAN:  Occupational Therapy, Physical Therapy, Registered Nurse, Home Health Aide and From:  [facility]  Patient instructed to follow-up with:  PCP in 7 days      Current [place] scheduled appointments: No future [facility] appts.       MTM referral needed and placed by this provider: No    Pending labs: none    SNF labs     CBC RESULTS:   Recent Labs   Lab Test 05/01/17 04/14/17 03/31/17   1120   WBC   --   8.4   --   9.5   RBC   --   3.04*   --   4.34   HGB  10.9*  10.1*   < >  14.4   HCT   --   30.1*   --   42.3   MCV   --    99   --   98   MCH   --   33.2   --   33.2*   MCHC   --   33.6   --   34.0   RDW   --   12.7   --   12.2   PLT   --   275   --   275    < > = values in this interval not displayed.       Last Basic Metabolic Panel:  Recent Labs   Lab Test 04/14/17 03/31/17   1120   NA  139  142   POTASSIUM  4.2  4.2   CHLORIDE  102  104   ANDRE  9.0  9.3   CO2  30  30   BUN  11  21   CR  0.66  0.63   GLC  122  122*         Discharge Treatments: none    TOTAL DISCHARGE TIME:   Greater than 30 minutes  Electronically signed by:  LINDEN Canseco CNP   [facility]  Pager: [phone]

## 2017-05-12 ENCOUNTER — MEDICAL CORRESPONDENCE (OUTPATIENT)
Dept: HEALTH INFORMATION MANAGEMENT | Facility: CLINIC | Age: 82
End: 2017-05-12

## 2017-05-12 ENCOUNTER — TELEPHONE (OUTPATIENT)
Dept: FAMILY MEDICINE | Facility: CLINIC | Age: 82
End: 2017-05-12

## 2017-05-12 NOTE — TELEPHONE ENCOUNTER
[facility] phone call message - order or referral request for patient:     Order or referral being requested: Physical Therapy order       Additional Comments: Home care nurse called to request a PT order for pt 1 time for 1 week, two times a week for 3 weeks and 1 time a week for 1 week after knee replacement.     OK to leave a message on voice mail? Yes    Primary language: English      needed? No    Call taken on May 12, 2017 at 2:58 PM by Vickie Winn

## 2017-05-12 NOTE — TELEPHONE ENCOUNTER
Returned call to home care PT to give verbal orders per protocol as requested. PT verbalized understanding.    Danielle Mitchell RN

## 2017-05-16 ENCOUNTER — TELEPHONE (OUTPATIENT)
Dept: FAMILY MEDICINE | Facility: CLINIC | Age: 82
End: 2017-05-16

## 2017-05-16 NOTE — TELEPHONE ENCOUNTER
Called number provided, VM box unsecured, RN unable to leave message with patient information. Left callback number to relay orders, please transfer to RN.    Gloria Chaudhary RN

## 2017-05-16 NOTE — TELEPHONE ENCOUNTER
[facility] phone call message - order or referral request for patient:     Order or referral being requested: OT      Additional Comments: Dex is requesting verbal ok for Ot 2 times a week for 2 weeks for self care and home management training.    OK to leave a message on voice mail? Yes    Primary language: English      needed? No    Call taken on May 16, 2017 at 11:21 AM by Sandy Loving

## 2017-05-17 ENCOUNTER — TELEPHONE (OUTPATIENT)
Dept: FAMILY MEDICINE | Facility: CLINIC | Age: 82
End: 2017-05-17

## 2017-05-17 ENCOUNTER — DOCUMENTATION ONLY (OUTPATIENT)
Dept: FAMILY MEDICINE | Facility: CLINIC | Age: 82
End: 2017-05-17

## 2017-05-17 DIAGNOSIS — G25.81 RESTLESS LEG SYNDROME: Primary | ICD-10-CM

## 2017-05-17 NOTE — TELEPHONE ENCOUNTER
[facility] phone call message- patient requesting to speak with PCP or provider:    PCP: Ange Crespo    Additional Comments: Patient calling to speak with PCP or nurse. Patient advised that she had knee replacement surgery 1 week ago and is having problems sleeping, relaxing; possibly due to the meds that she is on. Patient would like to speak with PCP or nurse about options for something that could help her sleep/relax.    Is a call back needed? Yes    Patient informed that it may take up to 2 business days to hear back from PCP: No    OK to leave a message on voice mail? No    Primary language: English      needed? No    Call taken on May 17, 2017 at 4:40 PM by Kelly Davidson

## 2017-05-18 RX ORDER — GABAPENTIN 300 MG/1
300 CAPSULE ORAL AT BEDTIME
Qty: 30 CAPSULE | Refills: 1 | Status: SHIPPED | OUTPATIENT
Start: 2017-05-18 | End: 2017-05-26

## 2017-05-18 NOTE — TELEPHONE ENCOUNTER
Has tried the Trazadone and tried two of them and it didn't work - 100mg. Also finds that the pill for restless legs makes things worse. Feels like nothing is taking the gitters away.  When she lies down, it starts in the feet and goes all the way up her body and then she has to walk.  So gets up, then tries again.   Sleeps only 2 - 3 hours at the most and then    Is on dilaudid as well. Came home with 15 pills and was using 1 a day to cover some of the pain - does help her relax. Is doing therapy and taking one before she does therapy.  Takes only the one in the am    Plan:  She will stop the nortryptiline and the mirapex.  Will try 300 mg of Gabapentin at bedtime and can increase to 600 mg max at bedtime.

## 2017-05-19 NOTE — TELEPHONE ENCOUNTER
Patient calling to speak with PCP. Patient advised that she tried the Gabapentin last night, but it did not help. Patient inquiring if PCP has any additional advice regarding what she can do. Patient advised that there seems to be a lot of fluid in her legs and is curious if maybe that is causing the tremors. Please return patient's call to discuss.

## 2017-05-20 NOTE — TELEPHONE ENCOUNTER
Called her back - the gabapentin helps with the pain and getting her drowsy,   Last time she had dilaudid was at least 3 days ago and at that time was taking it three times a day when at [place]. Then down to 1 or 2 a day. Only at night, she starts shaking in her legs and it moves all up her body.  Starts around 4 - 5 pm.    With the gabapentin she took 1 pill and then woke up shaking and then after about an hour or 2 later, she is shaking again and she takes the other one. Then sleeps another 2 - 3 hours and then ends up having coffee and calms down and sleeps more.     Plan to have her take 2 gabapentin at bedtime and then if she wakes up, to take a third one.  She will try this for a few nights to see if she cannot get enough sleep to improve her restless legs. She did this same thing while withdrawing from tramadol and managed it by dropping at very slow increments.  Possibly triggered by her recent dilaudid. Both of us agree not to restart that.   Call on Monday to update me.

## 2017-05-21 ENCOUNTER — HOSPITAL ENCOUNTER (EMERGENCY)
Facility: CLINIC | Age: 82
Discharge: HOME | End: 2017-05-21
Attending: EMERGENCY MEDICINE | Admitting: EMERGENCY MEDICINE
Payer: COMMERCIAL

## 2017-05-21 VITALS
BODY MASS INDEX: 31.73 KG/M2 | HEIGHT: 62 IN | SYSTOLIC BLOOD PRESSURE: 147 MMHG | DIASTOLIC BLOOD PRESSURE: 78 MMHG | HEART RATE: 105 BPM | RESPIRATION RATE: 16 BRPM | TEMPERATURE: 97.9 F | OXYGEN SATURATION: 96 % | WEIGHT: 172.4 LBS

## 2017-05-21 DIAGNOSIS — F51.01 PRIMARY INSOMNIA: ICD-10-CM

## 2017-05-21 PROCEDURE — 99283 EMERGENCY DEPT VISIT LOW MDM: CPT | Performed by: EMERGENCY MEDICINE

## 2017-05-21 PROCEDURE — 99282 EMERGENCY DEPT VISIT SF MDM: CPT | Mod: Z6 | Performed by: EMERGENCY MEDICINE

## 2017-05-21 ASSESSMENT — ENCOUNTER SYMPTOMS
TREMORS: 1
FEVER: 0
SLEEP DISTURBANCE: 1
SHORTNESS OF BREATH: 0
ABDOMINAL PAIN: 0

## 2017-05-21 NOTE — ED AVS SNAPSHOT
[facility], Emergency Department    [address] [address]    Phone:  [phone]                                       Zaira Fierro   MRN: [id number]    Department:  [facility], [place], Emergency Department   Date of Visit:  5/21/2017           Patient Information     Date Of Birth          1935        Your diagnoses for this visit were:     Primary insomnia        You were seen by Krishna Long MD.      Follow-up Information     Follow up with Ange Crespo MD In 1 day.    Specialty:  Family Practice    Contact information:    [facility]  [address]   [facility] [address] [phone]          Follow up with [facility], Emergency Department.    Specialty:  EMERGENCY MEDICINE    Why:  As needed, If symptoms worsen    Contact information:    [address] [place] [address] [phone]    Additional information:    [facility] is located on the corner of [address] and [address] on the [facility]. It is easily accessible from virtually any point in the [place] area, via [email or web address] and [employer].        Discharge Instructions       Please follow up with your doctor in the morning for further management.    24 Hour Appointment Hotline       To make an appointment at any [facility], call [phone] ([phone]). If you don't have a family doctor or clinic, we will help you find one. [place] clinics are conveniently located to serve the needs of you and your family.             Review of your medicines      Our records show that you are taking the medicines listed below. If these are incorrect, please call your family doctor or clinic.        Dose / Directions Last dose taken    acetaminophen 325 MG tablet   Commonly known as:  TYLENOL   Dose:  650 mg        Take 650 mg by mouth 4 times daily   Refills:  0        DILAUDID 2 MG tablet   Dose:  2-4 mg   Generic drug:  HYDROmorphone        Take 2-4  mg by mouth every 4 hours as needed   Refills:  0        gabapentin 300 MG capsule   Commonly known as:  NEURONTIN   Dose:  300 mg   Quantity:  30 capsule        Take 1 capsule (300 mg) by mouth At Bedtime   Refills:  1        hydrochlorothiazide 25 MG tablet   Commonly known as:  HYDRODIURIL   Dose:  25 mg        Take 25 mg by mouth every morning   Refills:  0        latanoprost 0.005 % ophthalmic solution   Commonly known as:  XALATAN   Dose:  1 drop        Place 1 drop into both eyes At Bedtime Reported on 5/1/2017   Refills:  0        losartan 100 MG tablet   Commonly known as:  COZAAR   Dose:  100 mg        Take 100 mg by mouth every morning   Refills:  0        nortriptyline 25 MG capsule   Commonly known as:  PAMELOR   Dose:  25 mg   Quantity:  90 capsule        Take 1 capsule (25 mg) by mouth At Bedtime   Refills:  1        OSCAL 500/200 D-3 500-200 MG-UNIT per tablet   Dose:  1 tablet   Generic drug:  calcium carb 1250 mg (500 mg [place])/vitamin D 200 unit        Take 1 tablet by mouth 2 times daily   Refills:  0        Polyethylene Glycol 1450 Powd   Dose:  17 g        Take 17 g by mouth daily   Refills:  0        PRAVACHOL 20 MG tablet   Dose:  20 mg   Generic drug:  pravastatin        Take 20 mg by mouth every evening   Refills:  0        SENNA-docusate sodium 8.6-50 MG Tabs   Dose:  2 tablet        Take 2 tablets by mouth 2 times daily   Refills:  0        T.E.D. BELOW KNEE/L-REGULAR Misc   Dose:  2 Units   Quantity:  2 each        2 Units daily   Refills:  0        TRAZODONE HCL PO   Dose:  50 mg        Take 50 mg by mouth nightly as needed for sleep   Refills:  0                Orders Needing Specimen Collection     None      Pending Results     No orders found from 5/19/2017 to 5/22/2017.            Pending Culture Results     No orders found from 5/19/2017 to 5/22/2017.            Pending Results Instructions     If you had any lab results that were not finalized at the time of your Discharge, you can  call the ED Lab Result RN at [phone]. You will be contacted by this team for any positive Lab results or changes in treatment. The nurses are available 7 days a week from 10A to 6:30P.  You can leave a message 24 hours per day and they will return your call.        Thank you for choosing [facility]       Thank you for choosing [facility] for your care. Our goal is always to provide you with excellent care. Hearing back from our patients is one way we can continue to improve our services. Please take a few minutes to complete the written survey that you may receive in the mail after you visit with us. Thank you!        [employer] Information     [employer] gives you secure access to your electronic health record. If you see a primary care provider, you can also send messages to your care team and make appointments. If you have questions, please call your primary care clinic.  If you do not have a primary care provider, please call [phone] and they will assist you.        Care EveryWhere ID     This is your Care EveryWhere ID. This could be used by other organizations to access your [facility] medical records  [id number]        After Visit Summary       This is your record. Keep this with you and show to your community pharmacist(s) and doctor(s) at your next visit.

## 2017-05-21 NOTE — ED AVS SNAPSHOT
[facility], [place], Emergency Department    [address] [place] [address]    Phone:  [phone]                                       Zaira Fierro   MRN: [id number]    Department:  [facility], Emergency Department   Date of Visit:  5/21/2017           After Visit Summary Signature Page     I have received my discharge instructions, and my questions have been answered. I have discussed any challenges I see with this plan with the nurse or doctor.    ..........................................................................................................................................  Patient/Patient Representative Signature      ..........................................................................................................................................  Patient Representative Print Name and Relationship to Patient    ..................................................               ................................................  Date                                            Time    ..........................................................................................................................................  Reviewed by Signature/Title    ...................................................              ..............................................  Date                                                            Time

## 2017-05-22 ENCOUNTER — MYC MEDICAL ADVICE (OUTPATIENT)
Dept: FAMILY MEDICINE | Facility: CLINIC | Age: 82
End: 2017-05-22

## 2017-05-22 RX ORDER — LORAZEPAM 0.5 MG/1
TABLET ORAL
Qty: 4 TABLET | Refills: 1 | Status: SHIPPED | OUTPATIENT
Start: 2017-05-22 | End: 2017-05-24

## 2017-05-22 NOTE — TELEPHONE ENCOUNTER
"Patient is calling back to discuss the \"2 day trial\" of medication. She states that she was taking the gabapentin (NEURONTIN) 300 MG capsule and TRAZODONE HCL PO to see how it helped her. She would like to discuss this in more detail with Dr. Crespo and is requesting a call after 4 pm today (5/22/2017) if at all possible. Thanks!  "

## 2017-05-22 NOTE — TELEPHONE ENCOUNTER
i will try to call back but I believe she is not following through exactly how we discussed. The plan was not to mix the Trazadone but rather to increase the gabapentin to 600 mg before bed.  I have tried to avoid, but will now do a trial of Ativan,

## 2017-05-22 NOTE — ED PROVIDER NOTES
History     Chief Complaint   Patient presents with     Insomnia     Tremors     HPI  Zaira Fierro is a 81 year old female with a history of restless leg syndrome and osteoarthritis status post right knee arthroplasty 2017 (~1.5 months ago). The patient has had recent difficulty sleeping so she was prescribed gabapentin as it was thought to be complicated by her restless leg syndrome. The patient reports that the gabapentin has seemed to make it worse for the last two nights. She called her primary care provider who prescribed trazodone. The patient tried that tonight but again is feeling like her restless legs are preventing her from sleeping. She presents to the Emergency Department requesting additional assistance with sleep.     I have reviewed the Medications, Allergies, Past Medical and Surgical History, and Social History in the [employer] system.  Past Medical History:   Diagnosis Date     Glaucoma      Hearing loss     doesn't wear hearing aids     Hyperlipidaemia      Hypertension      Macular degeneration      Multiple joint pain     secondary to arthritis     Obesity      Osteoarthritis involving multiple joints on both sides of body      Restless leg syndrome        Past Surgical History:   Procedure Laterality Date     ARTHROPLASTY KNEE Right 2017    Procedure: ARTHROPLASTY KNEE;  Surgeon: Aamir Sutton MD;  Location: [facility]     TONSILLECTOMY         Family History   Problem Relation Age of Onset     Coronary Artery Disease Father      Age 62 when      Cardiovascular Father      Myocardial Infarction Father      DIABETES Mother      Occurred when older     Hypertension Mother      Arthritis Mother      Eye Disorder Mother      Arthritis Sister      Asthma Sister      Hypertension Sister      OSTEOPOROSIS Sister      Hypertension Sister      Hyperlipidemia Sister      Obesity Sister      Spine Problems Sister      Scoliosis     KIDNEY DISEASE Brother        Social History   Substance  "Use Topics     Smoking status: Never Smoker     Smokeless tobacco: Never Used     Alcohol use No       No current facility-administered medications for this encounter.      Current Outpatient Prescriptions   Medication     TRAZODONE HCL PO     gabapentin (NEURONTIN) 300 MG capsule     hydrochlorothiazide (HYDRODIURIL) 25 MG tablet     HYDROmorphone (DILAUDID) 2 MG tablet     losartan (COZAAR) 100 MG tablet     calcium carb 1250 mg, 500 mg [place],/vitamin D 200 unit (OSCAL 500/200 D-3) 500-200 MG-UNIT per tablet     Polyethylene Glycol 1450 POWD     pravastatin (PRAVACHOL) 20 MG tablet     acetaminophen (TYLENOL) 325 MG tablet     latanoprost (XALATAN) 0.005 % ophthalmic solution     Elastic Bandages & Supports (T.E.D. BELOW KNEE/L-REGULAR) MISC     Sennosides-Docusate Sodium (SENNA-DOCUSATE SODIUM) 8.6-50 MG TABS     nortriptyline (PAMELOR) 25 MG capsule          Allergies   Allergen Reactions     No Known Allergies       Review of Systems   Constitutional: Negative for fever.   Respiratory: Negative for shortness of breath.    Cardiovascular: Negative for chest pain.   Gastrointestinal: Negative for abdominal pain.   Neurological: Positive for tremors.   Psychiatric/Behavioral: Positive for sleep disturbance.   All other systems reviewed and are negative.      Physical Exam   BP: 151/81  Pulse: 111  Temp: 97.9  F (36.6  C)  Resp: 16  Height: 157.5 cm (5' 2\")  Weight: 78.2 kg (172 lb 6.4 oz)  SpO2: 96 %  Physical Exam   Constitutional: She appears well-developed and well-nourished. No distress.   Eyes: Pupils are equal, round, and reactive to light. Right eye exhibits no discharge. Left eye exhibits no discharge.   Pulmonary/Chest: Effort normal. No stridor. No respiratory distress. She has no wheezes.   Abdominal: She exhibits no distension. There is no tenderness.   Musculoskeletal: She exhibits no deformity.   Neurological: She is alert. No cranial nerve deficit.   Skin: Skin is warm. She is not diaphoretic. "       ED Course     Procedures    Labs Ordered and Resulted from Time of ED Arrival Up to the Time of Departure from the ED - No data to display      Assessments & Plan (with Medical Decision Making)   Zaira Fierro is a 81 year old female who is presenting to the ED with symptoms of difficulty with sleeping. Looking at the patient's chart, it looks like this is something that her PCP has been addressing. I don't feel comfortable making changes to her regimen given her age and the fact that she has a PCP who has already been managing her.      I have reviewed the nursing notes.    I have reviewed the findings, diagnosis, plan and need for follow up with the patient.    Discharge Medication List as of 5/21/2017  8:26 PM          Final diagnoses:   Primary insomnia     Estela FERNANDO, am serving as a trained medical scribe to document services personally performed by Krishna Long MD, based on the provider's statements to me. This document has been checked and approved by the attending provider.    Krishna FERNANDO MD was physically present and have reviewed and verified the accuracy of this note documented by Estlea Hernandez.      5/21/2017   [facility], [place], EMERGENCY DEPARTMENT     Krishna Long MD  05/21/17 4653

## 2017-05-22 NOTE — ED NOTES
Pt presents ambulatory to triage with c/o having a hard time sleeping for the past 34 hours. Hx of recent knee surgery. States her medications included adding Trazodone tonight, but still denies being able to sleep. States she is also concerned the Gabapentin is causing tremors.

## 2017-05-22 NOTE — TELEPHONE ENCOUNTER
Sat night - took the 600mg of Gabapentin - which helped her restlessness but could not fall asleep.  Then took a third gabapentin and the shakes started and unable to sleep at all. Finding it very helpful with the pain.     Sun - took the 2 gabepentin and the trazadone and ended up with more restlessness - and ended up going to the ED. Then fell asleep from MN through 7 am. Best sleep she has had.     Plan: try the 600 mg of Gabapentin - 1 hour before and then if your aren't sleepy can take the Trazadone.  Have sent ativan for her to use next day.

## 2017-05-24 ENCOUNTER — MYC MEDICAL ADVICE (OUTPATIENT)
Dept: FAMILY MEDICINE | Facility: CLINIC | Age: 82
End: 2017-05-24

## 2017-05-24 ENCOUNTER — OFFICE VISIT (OUTPATIENT)
Dept: FAMILY MEDICINE | Facility: CLINIC | Age: 82
End: 2017-05-24

## 2017-05-24 VITALS
HEART RATE: 103 BPM | OXYGEN SATURATION: 98 % | BODY MASS INDEX: 30.91 KG/M2 | TEMPERATURE: 98.2 F | SYSTOLIC BLOOD PRESSURE: 142 MMHG | WEIGHT: 169 LBS | DIASTOLIC BLOOD PRESSURE: 82 MMHG | RESPIRATION RATE: 18 BRPM

## 2017-05-24 DIAGNOSIS — G47.00 INSOMNIA, UNSPECIFIED TYPE: Primary | ICD-10-CM

## 2017-05-24 LAB
% GRANULOCYTES: 71.9 %G (ref 40–75)
ALBUMIN SERPL-MCNC: 4.4 MG/DL (ref 3.5–4.7)
ALP SERPL-CCNC: 117.9 U/L (ref 31.7–110.5)
ALT SERPL-CCNC: 13.8 U/L (ref 0–45)
AST SERPL-CCNC: 15.2 U/L (ref 0–45)
BILIRUB SERPL-MCNC: <0.4 MG/DL (ref 0.2–1.3)
BUN SERPL-MCNC: 16.2 MG/DL (ref 7–19)
CALCIUM SERPL-MCNC: 10.5 MG/DL (ref 8.5–10.1)
CHLORIDE SERPLBLD-SCNC: 101.5 MMOL/L (ref 98–110)
CO2 SERPL-SCNC: 26.5 MMOL/L (ref 20–32)
CREAT SERPL-MCNC: 0.5 MG/DL (ref 0.5–1)
FERRITIN SERPL-MCNC: 25 NG/ML (ref 8–252)
GFR SERPL CREATININE-BSD FRML MDRD: >90 ML/MIN/1.7 M2
GLUCOSE SERPL-MCNC: 122.2 MG'DL (ref 70–99)
GRANULOCYTES #: 6.5 K/UL (ref 1.6–8.3)
HCT VFR BLD AUTO: 41.5 % (ref 35–47)
HEMOGLOBIN: 13.1 G/DL (ref 11.7–15.7)
LYMPHOCYTES # BLD AUTO: 1.9 K/UL (ref 0.8–5.3)
LYMPHOCYTES NFR BLD AUTO: 21.1 %L (ref 20–48)
MCH RBC QN AUTO: 31.2 PG (ref 26.5–35)
MCHC RBC AUTO-ENTMCNC: 31.6 G/DL (ref 32–36)
MCV RBC AUTO: 98.7 FL (ref 78–100)
MID #: 0.6 K/UL (ref 0–2.2)
MID %: 7 %M (ref 0–20)
PLATELET # BLD AUTO: 346 K/UL (ref 150–450)
POTASSIUM SERPL-SCNC: 3.7 MMOL/DL (ref 3.3–4.5)
PROT SERPL-MCNC: 7.5 G/DL (ref 6.8–8.8)
RBC # BLD AUTO: 4.2 M/UL (ref 3.8–5.2)
SODIUM SERPL-SCNC: 138.5 MMOL/L (ref 132.6–141.4)
WBC # BLD AUTO: 9 K/UL (ref 4–11)

## 2017-05-24 RX ORDER — LORAZEPAM 1 MG/1
1 TABLET ORAL
Qty: 30 TABLET | Refills: 0 | Status: SHIPPED | OUTPATIENT
Start: 2017-05-24 | End: 2017-06-19

## 2017-05-24 NOTE — MR AVS SNAPSHOT
MRN:[id number]                      After Visit Summary   5/24/2017    Zaira Fierro    MRN: [id number]           Visit Information        Provider Department      5/24/2017 3:00 PM Donya Hirsch MD [facility]        [employer] Information     [employer] gives you secure access to your electronic health record. If you see a primary care provider, you can also send messages to your care team and make appointments. If you have questions, please call your primary care clinic.  If you do not have a primary care provider, please call [phone] and they will assist you.      [employer] is an electronic gateway that provides easy, online access to your medical records. With [employer], you can request a clinic appointment, read your test results, renew a prescription or communicate with your care team.     To access your existing account, please contact your [facility] or call [phone] for assistance.        Care EveryWhere ID     This is your Care EveryWhere ID. This could be used by other organizations to access your [place] medical records  [id number]

## 2017-05-24 NOTE — PROGRESS NOTES
SUBJECTIVE:  The patient is here with some confusing history.  Dr. Crespo filled me in on this before she came in.  She is accompanied by his sister and friend.  She has been having trouble since she had a right knee replacement back on 04/07 with trouble sleeping.  She feels the need to get up and move and the sensation is not just in her legs, but ends up being in her whole body.  She said she had a similar thing happen a while ago when she was on tramadol and tapering off of that.        The surgery seemed to go well.  She was in a transitional care unit for a while.  Various things were tried for this including gabapentin with doses up to 900 mg at nighttime, which sounded like just it just did not help.  Trazodone made her symptoms worse.  Mirapex made it worse.  She does sometimes get this in the daytime, starting like 4:30 in the evening.  She is wearing some compression stockings but had felt that she had some edema.  She is having physical therapy at home and that is going well.  When she saw an orthopedic doctor for followup, he apparently order an ultrasound to make sure that she did not have a DVT.  She is mainly concerned that she is not able to get any sleep and not had any for the last 30 hours.      The most recent thing that was tried as far as meds was Ativan 0.5 mg tablets.  She took 2 of those and it did not help at all.  Dr. Crespo had talked to our geriatrician, who had recommended that she try Sinemet.  That message was just conveyed to me, but not the patient.  Also Dr. Crespo was potentially going to talk with a sleep specialist about her situation.        OBJECTIVE:  On exam, the patient is in no acute distress.  Vital signs are stable.  Her surgical scar on her right knee looks fine.  The surgical scar looks like it is healing well.  There is some diffuse swelling of the right knee but no erythema or warmth.  There is some generalized swelling of the right lower extremity and some signs of where  indentations from where her socks were at her ankles on the left lower extremity.  She also has signs of where her socks had dented her ankles, but really just some trace edema.  In looking at her weight, she is actually quite a bit down on her weight, which she was surprised to hear, but happy to hear.      IMPRESSION:  Patient with insomnia related to what sounds somewhat like restless leg syndrome, but is covering a larger amount of her body and is occurring some in the daytime, so certainly is not typical.      I went ahead and ordered a CBC, ferritin and CMP.  Her CBC was actually normal, where 3 weeks ago, she was low on her hemoglobin.  Her CMP was basically normal also, with some minor abnormalities.  I do not feel like this this has anything to contribute to figuring out what the problem is.      PLAN:  The patient was really reluctant to try to Sinemet and so we ended up deciding to go ahead with a higher dose of Ativan for now.  I wrote a prescription for 1 mg tablets.  She will take 2 at night and then if that does not help her sleep, she can take a third tablet; at the most, 4 tablets in the evening.  I wrote for #30 tablets.  I will talk with Dr. Crespo without our plan and the patient will keep Dr. Crespo updated via [email or web address].      Visit length was 40 minutes, more than 50% spent in counseling about symptoms and plan.

## 2017-05-26 ENCOUNTER — MYC MEDICAL ADVICE (OUTPATIENT)
Dept: FAMILY MEDICINE | Facility: CLINIC | Age: 82
End: 2017-05-26

## 2017-05-26 DIAGNOSIS — G25.81 RESTLESS LEG SYNDROME: Primary | ICD-10-CM

## 2017-05-26 RX ORDER — GABAPENTIN 300 MG/1
900 CAPSULE ORAL AT BEDTIME
Qty: 120 CAPSULE | Refills: 1 | Status: SHIPPED | OUTPATIENT
Start: 2017-05-26 | End: 2017-07-05

## 2017-05-26 RX ORDER — ROPINIROLE 0.25 MG/1
0.25 TABLET, FILM COATED ORAL AT BEDTIME
Qty: 60 TABLET | Refills: 1 | Status: SHIPPED | OUTPATIENT
Start: 2017-05-26 | End: 2017-05-30

## 2017-05-26 NOTE — TELEPHONE ENCOUNTER
Called Dr. Grimaldo in sleep and he confirms that she has severe RLS.   He recommends Gabapentin and will go up to 1200 mg if necessary  Also recommends Klonopin over ativan since acts longer but she has been so looped the next day, we will continue Ativan for now  Also would like her to try Ropinirole and start at 0.25mg nightly and then after 3 nights, increase to 0.5  Narcotics can help - of note - she had severe symptoms when she tried to wean off the Tramadol so likely very narcotic responsive. Was on dilaudid which might have triggered this recently.     Will refer her to the [facility] ASAP - [phone] at [place].

## 2017-05-27 ENCOUNTER — HOSPITAL ENCOUNTER (EMERGENCY)
Facility: CLINIC | Age: 82
Discharge: HOME | End: 2017-05-28
Attending: EMERGENCY MEDICINE | Admitting: EMERGENCY MEDICINE
Payer: COMMERCIAL

## 2017-05-27 DIAGNOSIS — M62.81 MUSCLE WEAKNESS (GENERALIZED): ICD-10-CM

## 2017-05-27 LAB
ALBUMIN UR-MCNC: NEGATIVE MG/DL
ANION GAP SERPL CALCULATED.3IONS-SCNC: 10 MMOL/L (ref 3–14)
APPEARANCE UR: CLEAR
BASOPHILS # BLD AUTO: 0 10E9/L (ref 0–0.2)
BASOPHILS NFR BLD AUTO: 0.1 %
BILIRUB UR QL STRIP: NEGATIVE
BUN SERPL-MCNC: 14 MG/DL (ref 7–30)
CALCIUM SERPL-MCNC: 9.3 MG/DL (ref 8.5–10.1)
CHLORIDE SERPL-SCNC: 103 MMOL/L (ref 94–109)
CO2 SERPL-SCNC: 25 MMOL/L (ref 20–32)
COLOR UR AUTO: ABNORMAL
CREAT SERPL-MCNC: 0.59 MG/DL (ref 0.52–1.04)
DIFFERENTIAL METHOD BLD: NORMAL
EOSINOPHIL # BLD AUTO: 0.1 10E9/L (ref 0–0.7)
EOSINOPHIL NFR BLD AUTO: 1.7 %
ERYTHROCYTE [DISTWIDTH] IN BLOOD BY AUTOMATED COUNT: 13.3 % (ref 10–15)
GFR SERPL CREATININE-BSD FRML MDRD: ABNORMAL ML/MIN/1.7M2
GLUCOSE SERPL-MCNC: 118 MG/DL (ref 70–99)
GLUCOSE UR STRIP-MCNC: NEGATIVE MG/DL
HCT VFR BLD AUTO: 38.5 % (ref 35–47)
HGB BLD-MCNC: 12.6 G/DL (ref 11.7–15.7)
HGB UR QL STRIP: NEGATIVE
HYALINE CASTS #/AREA URNS LPF: 1 /LPF (ref 0–2)
IMM GRANULOCYTES # BLD: 0 10E9/L (ref 0–0.4)
IMM GRANULOCYTES NFR BLD: 0.2 %
KETONES UR STRIP-MCNC: NEGATIVE MG/DL
LEUKOCYTE ESTERASE UR QL STRIP: NEGATIVE
LYMPHOCYTES # BLD AUTO: 2.2 10E9/L (ref 0.8–5.3)
LYMPHOCYTES NFR BLD AUTO: 25.6 %
MCH RBC QN AUTO: 31 PG (ref 26.5–33)
MCHC RBC AUTO-ENTMCNC: 32.7 G/DL (ref 31.5–36.5)
MCV RBC AUTO: 95 FL (ref 78–100)
MONOCYTES # BLD AUTO: 0.8 10E9/L (ref 0–1.3)
MONOCYTES NFR BLD AUTO: 9.4 %
MUCOUS THREADS #/AREA URNS LPF: PRESENT /LPF
NEUTROPHILS # BLD AUTO: 5.3 10E9/L (ref 1.6–8.3)
NEUTROPHILS NFR BLD AUTO: 63 %
NITRATE UR QL: NEGATIVE
NRBC # BLD AUTO: 0 10*3/UL
NRBC BLD AUTO-RTO: 0 /100
PH UR STRIP: 5.5 PH (ref 5–7)
PLATELET # BLD AUTO: 294 10E9/L (ref 150–450)
POTASSIUM SERPL-SCNC: 3.3 MMOL/L (ref 3.4–5.3)
RBC # BLD AUTO: 4.07 10E12/L (ref 3.8–5.2)
RBC #/AREA URNS AUTO: <1 /HPF (ref 0–2)
SODIUM SERPL-SCNC: 137 MMOL/L (ref 133–144)
SP GR UR STRIP: 1.01 (ref 1–1.03)
SQUAMOUS #/AREA URNS AUTO: <1 /HPF (ref 0–1)
URN SPEC COLLECT METH UR: ABNORMAL
UROBILINOGEN UR STRIP-MCNC: NORMAL MG/DL (ref 0–2)
WBC # BLD AUTO: 8.4 10E9/L (ref 4–11)
WBC #/AREA URNS AUTO: 1 /HPF (ref 0–2)

## 2017-05-27 PROCEDURE — 80048 BASIC METABOLIC PNL TOTAL CA: CPT | Performed by: EMERGENCY MEDICINE

## 2017-05-27 PROCEDURE — 99284 EMERGENCY DEPT VISIT MOD MDM: CPT | Mod: 25 | Performed by: EMERGENCY MEDICINE

## 2017-05-27 PROCEDURE — 93005 ELECTROCARDIOGRAM TRACING: CPT | Performed by: EMERGENCY MEDICINE

## 2017-05-27 PROCEDURE — 85025 COMPLETE CBC W/AUTO DIFF WBC: CPT | Performed by: EMERGENCY MEDICINE

## 2017-05-27 PROCEDURE — 36415 COLL VENOUS BLD VENIPUNCTURE: CPT | Performed by: EMERGENCY MEDICINE

## 2017-05-27 PROCEDURE — 99284 EMERGENCY DEPT VISIT MOD MDM: CPT | Performed by: EMERGENCY MEDICINE

## 2017-05-27 PROCEDURE — 93010 ELECTROCARDIOGRAM REPORT: CPT | Mod: Z6 | Performed by: EMERGENCY MEDICINE

## 2017-05-27 PROCEDURE — 81001 URINALYSIS AUTO W/SCOPE: CPT | Performed by: EMERGENCY MEDICINE

## 2017-05-27 NOTE — ED AVS SNAPSHOT
[facility], [place], Emergency Department    [address] [place] [address]    Phone:  [phone]                                       Zaira Fierro   MRN: [id number]    Department:  [facility], Emergency Department   Date of Visit:  5/27/2017           After Visit Summary Signature Page     I have received my discharge instructions, and my questions have been answered. I have discussed any challenges I see with this plan with the nurse or doctor.    ..........................................................................................................................................  Patient/Patient Representative Signature      ..........................................................................................................................................  Patient Representative Print Name and Relationship to Patient    ..................................................               ................................................  Date                                            Time    ..........................................................................................................................................  Reviewed by Signature/Title    ...................................................              ..............................................  Date                                                            Time

## 2017-05-27 NOTE — ED AVS SNAPSHOT
[facility], Emergency Department    [address] [address]    Phone:  [phone]                                       Zaira Fierro   MRN: [id number]    Department:  [facility], Emergency Department   Date of Visit:  5/27/2017           Patient Information     Date Of Birth          1935        Your diagnoses for this visit were:     Muscle weakness (generalized)        You were seen by Drew Melendrez MD.        Discharge Instructions       Please make an appointment to follow up with Your Primary Care Provider as soon as possible as needed.  All of your labs are normal. Please stop taking your ativan until you speak with your doctor.        24 Hour Appointment Hotline       To make an appointment at any [place] clinic, call [phone] ([phone]). If you don't have a family doctor or clinic, we will help you find one. [place] clinics are conveniently located to serve the needs of you and your family.             Review of your medicines      Our records show that you are taking the medicines listed below. If these are incorrect, please call your family doctor or clinic.        Dose / Directions Last dose taken    acetaminophen 325 MG tablet   Commonly known as:  TYLENOL   Dose:  650 mg        Take 650 mg by mouth 4 times daily   Refills:  0        DILAUDID 2 MG tablet   Dose:  2-4 mg   Generic drug:  HYDROmorphone        Take 2-4 mg by mouth every 4 hours as needed   Refills:  0        gabapentin 300 MG capsule   Commonly known as:  NEURONTIN   Dose:  900 mg   Quantity:  120 capsule        Take 3 capsules (900 mg) by mouth At Bedtime   Refills:  1        hydrochlorothiazide 25 MG tablet   Commonly known as:  HYDRODIURIL   Dose:  25 mg        Take 25 mg by mouth every morning   Refills:  0        latanoprost 0.005 % ophthalmic solution   Commonly known as:  XALATAN   Dose:  1 drop        Place 1 drop into both eyes At Bedtime Reported on 5/1/2017   Refills:  0         LORazepam 1 MG tablet   Commonly known as:  ATIVAN   Dose:  1 mg   Quantity:  30 tablet        Take 1 tablet (1 mg) by mouth nightly as needed for other (insomnia.Take 2 tabs at bedtime. May take a 3rd or 4th tablet if needed. No more than 4 tablets at night.)   Refills:  0        losartan 100 MG tablet   Commonly known as:  COZAAR   Dose:  100 mg        Take 100 mg by mouth every morning   Refills:  0        nortriptyline 25 MG capsule   Commonly known as:  PAMELOR   Dose:  25 mg   Quantity:  90 capsule        Take 1 capsule (25 mg) by mouth At Bedtime   Refills:  1        OSCAL 500/200 D-3 500-200 MG-UNIT per tablet   Dose:  1 tablet   Generic drug:  calcium carb 1250 mg (500 mg [place])/vitamin D 200 unit        Take 1 tablet by mouth 2 times daily   Refills:  0        Polyethylene Glycol 1450 Powd   Dose:  17 g        Take 17 g by mouth daily   Refills:  0        PRAVACHOL 20 MG tablet   Dose:  20 mg   Generic drug:  pravastatin        Take 20 mg by mouth every evening   Refills:  0        rOPINIRole 0.25 MG tablet   Commonly known as:  REQUIP   Dose:  0.25 mg   Quantity:  60 tablet        Take 1 tablet (0.25 mg) by mouth At Bedtime   Refills:  1        SENNA-docusate sodium 8.6-50 MG Tabs   Dose:  2 tablet        Take 2 tablets by mouth 2 times daily   Refills:  0        T.E.D. BELOW KNEE/L-REGULAR Misc   Dose:  2 Units   Quantity:  2 each        2 Units daily   Refills:  0        TRAZODONE HCL PO   Dose:  50 mg        Take 50 mg by mouth nightly as needed for sleep   Refills:  0                Procedures and tests performed during your visit     Basic metabolic panel    CBC with platelets differential    EKG 12 lead    UA with Microscopic reflex to Culture      Orders Needing Specimen Collection     None      Pending Results     Date and Time Order Name Status Description    5/27/2017 2201 EKG 12 lead Preliminary             Pending Culture Results     No orders found for last 3 day(s).            Pending  Results Instructions     If you had any lab results that were not finalized at the time of your Discharge, you can call the ED Lab Result RN at [phone]. You will be contacted by this team for any positive Lab results or changes in treatment. The nurses are available 7 days a week from 10A to 6:30P.  You can leave a message 24 hours per day and they will return your call.        Thank you for choosing [facility]       Thank you for choosing [facility] for your care. Our goal is always to provide you with excellent care. Hearing back from our patients is one way we can continue to improve our services. Please take a few minutes to complete the written survey that you may receive in the mail after you visit with us. Thank you!        [employer] Information     [employer] gives you secure access to your electronic health record. If you see a primary care provider, you can also send messages to your care team and make appointments. If you have questions, please call your primary care clinic.  If you do not have a primary care provider, please call [phone] and they will assist you.        Care EveryWhere ID     This is your Care EveryWhere ID. This could be used by other organizations to access your [facility] medical records  [id number]        After Visit Summary       This is your record. Keep this with you and show to your community pharmacist(s) and doctor(s) at your next visit.

## 2017-05-28 VITALS
TEMPERATURE: 97.8 F | BODY MASS INDEX: 29.26 KG/M2 | RESPIRATION RATE: 16 BRPM | WEIGHT: 159 LBS | HEART RATE: 99 BPM | DIASTOLIC BLOOD PRESSURE: 88 MMHG | HEIGHT: 62 IN | SYSTOLIC BLOOD PRESSURE: 140 MMHG | OXYGEN SATURATION: 97 %

## 2017-05-28 PROCEDURE — 25000132 ZZH RX MED GY IP 250 OP 250 PS 637: Performed by: EMERGENCY MEDICINE

## 2017-05-28 RX ORDER — HYDROXYZINE HYDROCHLORIDE 25 MG/1
50 TABLET, FILM COATED ORAL ONCE
Status: COMPLETED | OUTPATIENT
Start: 2017-05-28 | End: 2017-05-28

## 2017-05-28 RX ADMIN — HYDROXYZINE HYDROCHLORIDE 50 MG: 25 TABLET ORAL at 01:17

## 2017-05-28 NOTE — ED NOTES
Pt was prescribed Ativan for her tremors in her legs recently. She has been taking 3 pills and not being able to sleep the tremors have not stopped, and she is very lethargic and somnolent all the time. She called the ambulance for lethargy. She is lethargic but oriented.

## 2017-05-28 NOTE — ED PROVIDER NOTES
"  History     Chief Complaint   Patient presents with     Fatigue     HPI  Zaira Fierro is a 81 year old female who presents to the emergency room with fatigue.  Patient states that she's been seeing her primary care provider for restless leg syndrome and has been on multiple medications including Requip.  She was recently started on Ativan and while she's been taking medication to approximate 3-4 mg a night she complains of increasing weakness and tiredness.  Her symptoms of restless leg have not improved with this medication.  Her chief complaint is that she is unable to sleep and feels like she is very lethargic due to this medication.  She denies all other complaints at this time.    I have reviewed the Medications, Allergies, Past Medical and Surgical History, and Social History in the Epic system.    Review of Systems   All other systems reviewed and are negative.      Physical Exam   BP: 148/78  Pulse: 95  Temp: 97.8  F (36.6  C)  Resp: 16  Height: 157.5 cm (5' 2\")  Weight: 72.1 kg (159 lb)  SpO2: 96 %  Physical Exam   Constitutional: She is oriented to person, place, and time. She appears well-developed and well-nourished. No distress.   HENT:   Head: Normocephalic and atraumatic.   Eyes: No scleral icterus.   Neck: Normal range of motion. Neck supple.   Cardiovascular: Normal rate.    Pulmonary/Chest: Effort normal.   Neurological: She is alert and oriented to person, place, and time.   Walks with a slightly unsteady gait   Skin: Skin is warm and dry. No rash noted. She is not diaphoretic. No erythema. No pallor.       ED Course     ED Course     Procedures             EKG Interpretation:      Interpreted by Drew Melendrez  Time reviewed:   Symptoms at time of EKG: Fatigue   Rhythm: normal sinus   Rate: normal  Axis: normal  Ectopy: none  Conduction: normal  ST Segments/ T Waves: No ST-T wave changes  Q Waves: none  Comparison to prior: Unchanged    Clinical Impression: normal " EKG          Critical Care time:  none         Results for orders placed or performed during the hospital encounter of 05/27/17   Basic metabolic panel   Result Value Ref Range    Sodium 137 133 - 144 mmol/L    Potassium 3.3 (L) 3.4 - 5.3 mmol/L    Chloride 103 94 - 109 mmol/L    Carbon Dioxide 25 20 - 32 mmol/L    Anion Gap 10 3 - 14 mmol/L    Glucose 118 (H) 70 - 99 mg/dL    Urea Nitrogen 14 7 - 30 mg/dL    Creatinine 0.59 0.52 - 1.04 mg/dL    GFR Estimate >90  Non  GFR Calc   >60 mL/min/1.7m2    GFR Estimate If Black >90   GFR Calc   >60 mL/min/1.7m2    Calcium 9.3 8.5 - 10.1 mg/dL   CBC with platelets differential   Result Value Ref Range    WBC 8.4 4.0 - 11.0 10e9/L    RBC Count 4.07 3.8 - 5.2 10e12/L    Hemoglobin 12.6 11.7 - 15.7 g/dL    Hematocrit 38.5 35.0 - 47.0 %    MCV 95 78 - 100 fl    MCH 31.0 26.5 - 33.0 pg    MCHC 32.7 31.5 - 36.5 g/dL    RDW 13.3 10.0 - 15.0 %    Platelet Count 294 150 - 450 10e9/L    Diff Method Automated Method     % Neutrophils 63.0 %    % Lymphocytes 25.6 %    % Monocytes 9.4 %    % Eosinophils 1.7 %    % Basophils 0.1 %    % Immature Granulocytes 0.2 %    Nucleated RBCs 0 0 /100    Absolute Neutrophil 5.3 1.6 - 8.3 10e9/L    Absolute Lymphocytes 2.2 0.8 - 5.3 10e9/L    Absolute Monocytes 0.8 0.0 - 1.3 10e9/L    Absolute Eosinophils 0.1 0.0 - 0.7 10e9/L    Absolute Basophils 0.0 0.0 - 0.2 10e9/L    Abs Immature Granulocytes 0.0 0 - 0.4 10e9/L    Absolute Nucleated RBC 0.0    UA with Microscopic reflex to Culture   Result Value Ref Range    Color Urine Light Yellow     Appearance Urine Clear     Glucose Urine Negative NEG mg/dL    Bilirubin Urine Negative NEG    Ketones Urine Negative NEG mg/dL    Specific Gravity Urine 1.011 1.003 - 1.035    Blood Urine Negative NEG    pH Urine 5.5 5.0 - 7.0 pH    Protein Albumin Urine Negative NEG mg/dL    Urobilinogen mg/dL Normal 0.0 - 2.0 mg/dL    Nitrite Urine Negative NEG    Leukocyte Esterase Urine Negative  NEG    Source Midstream Urine     WBC Urine 1 0 - 2 /HPF    RBC Urine <1 0 - 2 /HPF    Squamous Epithelial /HPF Urine <1 0 - 1 /HPF    Mucous Urine Present (A) NEG /LPF    Hyaline Casts 1 0 - 2 /LPF   EKG 12 lead   Result Value Ref Range    Interpretation ECG Click View Image link to view waveform and result      Medications   hydrOXYzine (ATARAX) tablet 50 mg (50 mg Oral Given 5/28/17 0117)              Labs Ordered and Resulted from Time of ED Arrival Up to the Time of Departure from the ED   BASIC METABOLIC PANEL - Abnormal; Notable for the following:        Result Value    Potassium 3.3 (*)     Glucose 118 (*)     All other components within normal limits   ROUTINE UA WITH MICROSCOPIC REFLEX TO CULTURE - Abnormal; Notable for the following:     Mucous Urine Present (*)     All other components within normal limits   CBC WITH PLATELETS DIFFERENTIAL            Assessments & Plan (with Medical Decision Making)   This is a 81-year-old female patient coming into hemorrhage with complaints of fatigue.  Evidently she was recently started on Ativan at a very high dose for restless leg syndrome.  I did review her primary care providers note which discusses her pertinent history.  I do not believe that we'll be able to solve her issues here in the emergency room but it is recommended she refrain from taking Ativan at this time as it does not seem to be helping her and the way that she would like it to.  It seems it made her more sedate rather than improving her restless leg.  I believe she should all up with her primary care provider.  She was maintained here in the emergency room for a long period of time to assess her gait and improvement of her symptoms.  She is otherwise vitally stable.  All of her labs are within normal limits.  She'll be taken home by ambulance and is recommended that she follow up with her primary care doctor.    I have reviewed the nursing notes.    I have reviewed the findings, diagnosis, plan  and need for follow up with the patient.    Discharge Medication List as of 5/28/2017 12:24 AM          Final diagnoses:   Muscle weakness (generalized)       5/27/2017   [facility], [place], EMERGENCY DEPARTMENT     Drew Melendrez MD  05/28/17 5029

## 2017-05-28 NOTE — DISCHARGE INSTRUCTIONS
Please make an appointment to follow up with Your Primary Care Provider as soon as possible as needed.  All of your labs are normal. Please stop taking your ativan until you speak with your doctor.

## 2017-05-29 LAB — INTERPRETATION ECG - MUSE: NORMAL

## 2017-05-30 ENCOUNTER — MYC MEDICAL ADVICE (OUTPATIENT)
Dept: FAMILY MEDICINE | Facility: CLINIC | Age: 82
End: 2017-05-30

## 2017-05-30 NOTE — TELEPHONE ENCOUNTER
"Has been in ED twice and has started Ativan for as she cannot sleep at night due to tremors. Tremors are thru out her body and she cannot lay down.   Started after knee surgery which was 5/10/17. Seen on 5/24 and started on Ativan. Did not start one of the medications, Requip that was prescribed. She is concerned with how many pills she is taking.  States she gets scared at night with the \"waves of tremors\". Last night took some Tylenol and had the waves of tremors.. States she is afraid to take the medications, she is afraid they may be causing the tremors.   Reassured patient and offered an appointment to be seen. She states she can't come today, will try taking the prescription ordered by Dr Crespo and call in the morning if the tremors have continued.   Patient will call in the morning with an update.   Gege Torres RN     "

## 2017-06-01 NOTE — PROGRESS NOTES
Yearly Health Risk Assessment home visit completed.  LTCC, POC and Obra updated.  Current Situation/Living Environment/Hospitalizations: Member lives with her sister in a single family home in [place]. Member just returned home after a month in a Rehab facility after having Knee replacement surgery. Her sister while she was away was found to have a brain tumor and is currently at [facility] recovering.   Activities of Daily Living (ADL)/Instrumental Activities of Daily Living (IADL): Member is independent in her personal cares. Since she is still recovering from Knee replacement surgery she is asking for homemaking assistance. Member is unable to bend to clean or walk down the stairs to the basement to do laundry. This will only be short term until her knee is better.  Health Concerns/updates: Is having issues sleeping since coming home but is managing  Cognition/Mental Health: no issues  Plan of Care: Will open member up to the wavier and put in place 4hr.wk of homemaking to assist with cleaning, laundry. Member will let CM know if any additional services are needed. Will continue to monitor and revisit in 6 months sooner if needed  Nurys Smyth,[facility]  [employer] [facility] Care Coordinator   [phone]

## 2017-06-05 ENCOUNTER — TELEPHONE (OUTPATIENT)
Dept: FAMILY MEDICINE | Facility: CLINIC | Age: 82
End: 2017-06-05

## 2017-06-05 NOTE — TELEPHONE ENCOUNTER
"[facility] Family Medicine phone call message- general phone call:    Reason for call: contacted patient with Sleep Clinic appointment information. [address] [place] [address], [address] [facility] [address]. [phone]. 6/19/17 @ 8:00a.m with  (). Patient states that \"she will not be able to go to this appointment, because she has not been cleared to drive and her brother in law is having his own medical issues\". I explained to the patient that \" would like her to been seen by the Sleep Clinic\". I informed patient that \"I am going to leave the appointment as is and contact her [facility] , Nurys Smyth, [phone] and determine if patient has any transportation benefits\". Patient agreed, I have left a detailed message for Gela Smyth. Forwarded message to  as ACE.     Return call needed: Yes    OK to leave a message on voice mail? Yes    Primary language: English      needed? No    Call taken on June 5, 2017 at 1:44 PM by Katarina Nicholas    "

## 2017-06-05 NOTE — TELEPHONE ENCOUNTER
Sleep Clinic referral  [address] [facility]  [address]  [email or web address] [address]  [phone]  6/19/17 @ 8:00a.m with  ()  Please arrive 15 minutes early   Patient will be receiving questionnaire in the mail, please complete and bring to appointment

## 2017-06-07 ENCOUNTER — TELEPHONE (OUTPATIENT)
Dept: FAMILY MEDICINE | Facility: CLINIC | Age: 82
End: 2017-06-07

## 2017-06-07 NOTE — TELEPHONE ENCOUNTER
Returned call to home care PT, unable to reach. Left VM with verbal orders per protocol as requested. Left callback number for questions.    Arnol Mcmahon RN

## 2017-06-07 NOTE — TELEPHONE ENCOUNTER
[facility] Family Medicine phone call message - order or referral request for patient:     Order or referral being requested: Orders for PT for 1x a week for 4 weeks       Additional Comments: Verbal order is ok    OK to leave a message on voice mail? Yes    Primary language: English      needed? No    Call taken on June 7, 2017 at 12:09 PM by Cara Ramey

## 2017-06-09 NOTE — TELEPHONE ENCOUNTER
6/8/17 Nurys Canchola ([phone]) returned call and stated that patient does have transportation benefits thru [employer]. I will contact [facility] [phone] to set up transport for patient 6/19/17 @ 8:00a.m sleep appt. Patient informed.

## 2017-06-19 ENCOUNTER — OFFICE VISIT (OUTPATIENT)
Dept: SLEEP MEDICINE | Facility: CLINIC | Age: 82
End: 2017-06-19
Attending: FAMILY MEDICINE
Payer: COMMERCIAL

## 2017-06-19 VITALS
HEIGHT: 62 IN | WEIGHT: 168 LBS | SYSTOLIC BLOOD PRESSURE: 143 MMHG | RESPIRATION RATE: 18 BRPM | DIASTOLIC BLOOD PRESSURE: 71 MMHG | OXYGEN SATURATION: 96 % | HEART RATE: 86 BPM | BODY MASS INDEX: 30.91 KG/M2

## 2017-06-19 DIAGNOSIS — G25.81 RESTLESS LEG SYNDROME: ICD-10-CM

## 2017-06-19 PROCEDURE — 99211 OFF/OP EST MAY X REQ PHY/QHP: CPT | Mod: ZF

## 2017-06-19 RX ORDER — TRAMADOL HYDROCHLORIDE 50 MG/1
TABLET ORAL
Qty: 30 TABLET | Refills: 1 | Status: SHIPPED | OUTPATIENT
Start: 2017-06-19 | End: 2017-07-10

## 2017-06-19 NOTE — MR AVS SNAPSHOT
After Visit Summary   6/19/2017    Zaira Fierro    MRN: [id number]           Patient Information     Date Of Birth          1935        Visit Information        Provider Department      6/19/2017 8:00 AM Mitchell Singleton MD [facility], [place], Sleep Study        Today's Diagnoses     Restless leg syndrome          Care Instructions      Take 1/2 tablet of Tramadol at 4pm. If not effective, take 1 full tablet.    Take 1 tablet of Tramadol at 10pm, before bed.    Stop taking Gabapentin, Requip, and Melatonin.    Take VitronC (iron) two times per day, in between meals.      Your BMI is Body mass index is 30.73 kg/(m^2).  Weight management is a personal decision.  If you are interested in exploring weight loss strategies, the following discussion covers the approaches that may be successful. Body mass index (BMI) is one way to tell whether you are at a healthy weight, overweight, or obese. It measures your weight in relation to your height.  A BMI of 18.5 to 24.9 is in the healthy range. A person with a BMI of 25 to 29.9 is considered overweight, and someone with a BMI of 30 or greater is considered obese. More than two-thirds of American adults are considered overweight or obese.  Being overweight or obese increases the risk for further weight gain. Excess weight may lead to heart disease and diabetes.  Creating and following plans for healthy eating and physical activity may help you improve your health.  Weight control is part of healthy lifestyle and includes exercise, emotional health, and healthy eating habits. Careful eating habits lifelong are the mainstay of weight control. Though there are significant health benefits from weight loss, long-term weight loss with diet alone may be very difficult to achieve- studies show long-term success with dietary management in less than 10% of people. Attaining a healthy weight may be especially difficult to achieve in those with  severe obesity. In some cases, medications, devices and surgical management might be considered.  What can you do?  If you are overweight or obese and are interested in methods for weight loss, you should discuss this with your provider.     Consider reducing daily calorie intake by 500 calories.     Keep a food journal.     Avoiding skipping meals, consider cutting portions instead.    Diet combined with exercise helps maintain muscle while optimizing fat loss. Strength training is particularly important for building and maintaining muscle mass. Exercise helps reduce stress, increase energy, and improves fitness. Increasing exercise without diet control, however, may not burn enough calories to loose weight.       Start walking three days a week 10-20 minutes at a time    Work towards walking thirty minutes five days a week     Eventually, increase the speed of your walking for 1-2 minutes at time    In addition, we recommend that you review healthy lifestyles and methods for weight loss available through the National Institutes of Health patient information sites:  http://win.niddk.nih.gov/publications/index.htm    And look into health and wellness programs that may be available through your health insurance provider, employer, local community center, or kathleen club.    Weight management plan: Patient was referred to their PCP to discuss a diet and exercise plan.     Your blood pressure was checked while you were in clinic today.  Please read the guidelines below about what these numbers mean and what you should do about them.  Your systolic blood pressure is the top number.  This is the pressure when the heart is pumping.  Your diastolic blood pressure is the bottom number.  This is the pressure in between beats.  If your systolic blood pressure is less than 120 and your diastolic blood pressure is less than 80, then your blood pressure is normal. There is nothing more that you need to do about it  If your  systolic blood pressure is 120-139 or your diastolic blood pressure is 80-89, your blood pressure may be higher than it should be.  You should have your blood pressure re-checked within a year by a primary care provider.  If your systolic blood pressure is 140 or greater or your diastolic blood pressure is 90 or greater, you may have high blood pressure.  High blood pressure is treatable, but if left untreated over time it can put you at risk for heart attack, stroke, or kidney failure.  You should have your blood pressure re-checked by a primary care provider within the next four weeks.                Follow-ups after your visit        Your next 10 appointments already scheduled     Jul 03, 2017  2:20 PM CDT   Return Sleep Patient with Javed Rangel MD   [facility] ([facility])    [address] [place] [id number] [phone]              Who to contact     If you have questions or need follow up information about today's clinic visit or your schedule please contact [facility] directly at [phone].  Normal or non-critical lab and imaging results will be communicated to you by Yobblehart, letter or phone within 4 business days after the clinic has received the results. If you do not hear from us within 7 days, please contact the clinic through Zeebot or phone. If you have a critical or abnormal lab result, we will notify you by phone as soon as possible.  Submit refill requests through Priceza or call your pharmacy and they will forward the refill request to us. Please allow 3 business days for your refill to be completed.          Additional Information About Your Visit        Priceza Information     Priceza gives you secure access to your electronic health record. If you see a primary care provider, you can also send messages to your care team and make appointments. If you have questions, please call your primary care  "clinic.  If you do not have a primary care provider, please call [phone] and they will assist you.        Care EveryWhere ID     This is your Care EveryWhere ID. This could be used by other organizations to access your [facility] medical records  [id number]        Your Vitals Were     Pulse Respirations Height Pulse Oximetry BMI (Body Mass Index)       86 18 1.575 m (5' 2\") 96% 30.73 kg/m2        Blood Pressure from Last 3 Encounters:   06/19/17 143/71   05/28/17 140/88   05/24/17 142/82    Weight from Last 3 Encounters:   06/19/17 76.2 kg (168 lb)   05/27/17 72.1 kg (159 lb)   05/24/17 76.7 kg (169 lb)              We Performed the Following     Sleep Study Referral - INTERNAL          Today's Medication Changes          These changes are accurate as of: 6/19/17  9:23 AM.  If you have any questions, ask your nurse or doctor.               Start taking these medicines.        Dose/Directions    traMADol 50 MG tablet   Commonly known as:  ULTRAM        Take one- half to one tab by mouth at 4 PM and take one tab by mouth at 10PM   Quantity:  30 tablet   Refills:  1            Where to get your medicines      Some of these will need a paper prescription and others can be bought over the counter.  Ask your nurse if you have questions.     Bring a paper prescription for each of these medications     traMADol 50 MG tablet                Primary Care Provider Office Phone # Fax #    Ange Crespo [phone]       [facility] [address] [address]        Thank you!     Thank you for choosing [facility], SLEEP STUDY  for your care. Our goal is always to provide you with excellent care. Hearing back from our patients is one way we can continue to improve our services. Please take a few minutes to complete the written survey that you may receive in the mail after your visit with us. Thank you!             Your Updated Medication List - Protect others around you: Learn " how to safely use, store and throw away your medicines at www.disposemymeds.org.          This list is accurate as of: 6/19/17  9:23 AM.  Always use your most recent med list.                   Brand Name Dispense Instructions for use    acetaminophen 325 MG tablet    TYLENOL     Take 650 mg by mouth 4 times daily       ASPIRIN PO      Take 81 mg by mouth       gabapentin 300 MG capsule    NEURONTIN    120 capsule    Take 3 capsules (900 mg) by mouth At Bedtime       hydrochlorothiazide 25 MG tablet    HYDRODIURIL     Take 25 mg by mouth every morning       latanoprost 0.005 % ophthalmic solution    XALATAN     Place 1 drop into both eyes At Bedtime Reported on 5/1/2017       losartan 100 MG tablet    COZAAR     Take 100 mg by mouth every morning       MELATONIN PO      Take 5 mg by mouth       nortriptyline 25 MG capsule    PAMELOR    90 capsule    Take 1 capsule (25 mg) by mouth At Bedtime       OSCAL 500/200 D-3 500-200 MG-UNIT per tablet   Generic drug:  calcium carb 1250 mg (500 mg [place])/vitamin D 200 unit      Take 1 tablet by mouth 2 times daily       PRAVACHOL 20 MG tablet   Generic drug:  pravastatin      Take 20 mg by mouth every evening       rOPINIRole 0.25 MG tablet    REQUIP    60 tablet    Take 1 tablet (0.25 mg) by mouth At Bedtime       traMADol 50 MG tablet    ULTRAM    30 tablet    Take one- half to one tab by mouth at 4 PM and take one tab by mouth at 10PM       UNABLE TO FIND      MEDICATION NAME: Occuvite

## 2017-06-19 NOTE — PROGRESS NOTES
"Sleep Consultation Note:    Date on this visit: 6/19/2017    Zaira Fierro is sent by Dr. Ange Crespo for a sleep consultation regarding restless legs syndrome.    Primary Physician: Ange Crespo     CC:  \"Restless legs syndrome\"    Zaira Fierro 82 year old with PMH RLS, OA, macular degeneration who complains uncontrolled RLS. This has been going on for many years, worsened May 2017 after her knee replacement. She had been on chronic Tramadol to control joint pain for her arthritis for the last 3 years at least. She had to be weaned off Tramadol by her PCP. She was successfully weaned off Tramadol December 2016. At that time, she developed RLS symptoms. They did not bother her too much or cause any sleep disruption until after her knee surgery and rehab April 2017. She has not had a previous sleep study.    Symptoms start at 4:30pm. Symptoms can be described as a urge to move her legs. She has to keep shaking her legs. Symptoms are relieved by rocking on her glider. Eating will also help, possibly due to distraction. She has also tried tonic water. She does complain of spontaneous leg movements/jerks in the middle of the night. She will take the 1200mg Gabapentin and 0.5mg Requip 5pm. Around 6pm, she will take 5mg of melatonin to help her sleep. Bed time is 7:30pm to 8pm and she tires to sleep in bed. When she wakes up due to her leg symptoms, which is around 11pm, she will have to get up and either go to her rocking chair or couch or walking around. She will usually be awake around 4am. She will get up and have coffee with cream. She will then fall back asleep until 6am-7am.    Sleep Disordered Breathing  Zaira does complain of snoring and dry mouth. Denies snort arousals, choking/gasping for air, witnessed apneas, morning headaches. She does have non-refreshing sleep and daytime sleepiness/fatigue.    Sleep Behaviors  She denies any cataplexy, sleep paralysis, sleep hallucinations. She denies any " night time behaviors - sleep walking, sleep talking, sleep eating. She does not complain acting out dreams. Patient denies any injury to oneself or others while sleeping.    Daytime Functioning  No napping due to leg symptoms. Occasionally, she will doze off. She denies closing eyes, dozing and falling asleep while driving. Patient's [place] Sleepiness score 5/24 inconsistent with mild daytime sleepiness.      Social History  Zaira a retired . She does drink caffeine, about 1 drinks/day. Last caffeine intake is not within 6 hours of bed time. No alcohol use. Patient is a never smoker. Denies any secondhand exposure. Patient does not use drugs.  No future surgeries planned.    Allergies:    Allergies   Allergen Reactions     No Known Allergies        Medications:    Current Outpatient Prescriptions   Medication Sig Dispense Refill     MELATONIN PO Take 5 mg by mouth       ASPIRIN PO Take 81 mg by mouth       UNABLE TO FIND MEDICATION NAME: Occuvite       rOPINIRole (REQUIP) 0.25 MG tablet Take 1 tablet (0.25 mg) by mouth At Bedtime 60 tablet 1     gabapentin (NEURONTIN) 300 MG capsule Take 3 capsules (900 mg) by mouth At Bedtime 120 capsule 1     hydrochlorothiazide (HYDRODIURIL) 25 MG tablet Take 25 mg by mouth every morning       losartan (COZAAR) 100 MG tablet Take 100 mg by mouth every morning       calcium carb 1250 mg, 500 mg [place],/vitamin D 200 unit (OSCAL 500/200 D-3) 500-200 MG-UNIT per tablet Take 1 tablet by mouth 2 times daily       pravastatin (PRAVACHOL) 20 MG tablet Take 20 mg by mouth every evening       acetaminophen (TYLENOL) 325 MG tablet Take 650 mg by mouth 4 times daily       nortriptyline (PAMELOR) 25 MG capsule Take 1 capsule (25 mg) by mouth At Bedtime 90 capsule 1     latanoprost (XALATAN) 0.005 % ophthalmic solution Place 1 drop into both eyes At Bedtime Reported on 5/1/2017         Problem List:  Patient Active Problem List    Diagnosis Date Noted     Status post  knee surgery 04/07/2017     Priority: Medium     Essential hypertension with goal blood pressure less than 140/90 07/01/2016     Priority: Medium     Arthritis of knee 12/11/2015     Priority: Medium     Primary osteoarthritis of both knees 10/17/2015     Priority: Medium     Cataract 04/07/2015     Priority: Medium     Macular degeneration (senile) of retina 04/07/2015     Non -exudative. Seen at [facility]. On latanoprost and Ocuvite       Advanced directives, counseling/discussion 07/30/2013     Completed POLST 7/30/2013 - see note for details.        Health Care Home 10/29/2012     Tier 1   DX V65.8 REPLACED WITH [facility] (04/08/2013)       Diverticulosis of large intestine 10/29/2012     Problem list name updated by automated process. Provider to review       Hyperlipidemia 10/29/2012     Problem list name updated by automated process. Provider to review       Obesity 10/29/2012     Problem list name updated by automated process. Provider to review       Disorder of bone and cartilage 10/29/2012     Problem list name updated by automated process. Provider to review       Prediabetes 10/29/2012        Past Medical/Surgical History:  Past Medical History:   Diagnosis Date     Glaucoma      Hearing loss     doesn't wear hearing aids     Hyperlipidaemia      Hypertension      Macular degeneration      Multiple joint pain     secondary to arthritis     Obesity      Osteoarthritis involving multiple joints on both sides of body      Restless leg syndrome      Past Surgical History:   Procedure Laterality Date     ARTHROPLASTY KNEE Right 4/7/2017    Procedure: ARTHROPLASTY KNEE;  Surgeon: Aamir Sutton MD;  Location: [facility] OR     TONSILLECTOMY  1940       Social History:  Social History     Social History     Marital status: Single     Spouse name: N/A     Number of children: N/A     Years of education: N/A     Occupational History     retired teacher      Social History Main Topics     Smoking  status: Never Smoker     Smokeless tobacco: Never Used     Alcohol use No     Drug use: No     Sexual activity: No     Other Topics Concern     Not on file     Social History Narrative    Belongs to Noe Won Sisters. Lives with her sister Rosi.        Family History:  Family History   Problem Relation Age of Onset     Coronary Artery Disease Father      Age 62 when      Cardiovascular Father      Myocardial Infarction Father      DIABETES Mother      Occurred when older     Hypertension Mother      Arthritis Mother      Eye Disorder Mother      Arthritis Sister      Asthma Sister      Hypertension Sister      OSTEOPOROSIS Sister      Hypertension Sister      Hyperlipidemia Sister      Obesity Sister      Spine Problems Sister      Scoliosis     KIDNEY DISEASE Brother    RLS in her sister    Review of Systems:  A complete review of systems reviewed by me is negative with the exeption of what has been mentioned in the history of present illness.  CONSTITUTIONAL: NEGATIVE for weight gain/loss, fever, chills, sweats or night sweats, drug allergies.  EYES: POS for changes in vision, NEG blind spots, double vision.  ENT: NEGATIVE for ear pain, sore throat, sinus pain, post-nasal drip, runny nose, bloody nose  CARDIAC: NEGATIVE for fast heartbeats or fluttering in chest, chest pain or pressure, breathlessness when lying flat, POS swollen legs or swollen feet.  NEUROLOGIC: NEGATIVE headaches, POS weakness or numbness in the legs.  DERMATOLOGIC: NEGATIVE for rashes, new moles or change in mole(s)  PULMONARY: NEGATIVE SOB at rest, SOB with activity, dry cough, productive cough, coughing up blood, wheezing or whistling when breathing.    GASTROINTESTINAL: NEGATIVE for nausea or vomitting, loose or watery stools, fat or grease in stools, constipation, abdominal pain, bowel movements black in color or blood noted.  GENITOURINARY: NEGATIVE for pain during urination, blood in urine, urinating more frequently than  "usual  MUSCULOSKELETAL: NEGATIVE for muscle pain, swollen joints POS bone or joint pain  ENDOCRINE: NEGATIVE for increased thirst or urination, diabetes.  LYMPHATIC: NEGATIVE for swollen lymph nodes, lumps or bumps in the breasts or nipple discharge.  PSYCHE: NEGATIVE for depression, anxiety    Physical Examination:  Vitals: /71  Pulse 86  Resp 18  Ht 1.575 m (5' 2\")  Wt 76.2 kg (168 lb)  SpO2 96%  BMI 30.73 kg/m2  BMI= Body mass index is 30.73 kg/(m^2).    Neck Cir (cm): 36 cm    [place] Total Score 6/19/2017   Total score - [place] 5       General: No apparent distress, appropriately groomed  Head: Normocephalic, atraumatic  Eyes: no icterus, PERRL  Nose: Nares patent. No exudate/erythema. No septal deviation noted.  Mouth: op pink and moist, teeth: overbite  Orophraynx: Opening is narrowed   Mallampati Class: III.   Tonsillar Stage: 1  hidden by pillars.  Neck: Supple, Circumference: 14 inches  Cardiac: Regular rate and rhythm  Chest: Symmetric air movement, lungs clear to auscultation bilaterally  GI: Abdomen soft, non-tender, non-distended  Musculoskeletal: trace pitting edema noted  Skin: Warm, dry, intact  Psych: Mood pleasant, affect congruent  Neuro:  Mental status: Awake, alert, attentive, oriented.      Impression/Plan:    Restless legs syndrome  Her symptoms were previously controlled while taking Tramadol for her chronic joint pain. Her ferritin level was checked 5/24/17 and it was 25ng/mL. Recommend iron supplementation since the ferritin level is less than 75ng/mL, using  VitronC (iron) two times per day, in between meals. She was instructed to avoid combing the medication with dairy products which can impair the absorption. Will re-check the serum ferritin levels in fasting state in 2-3 months after restating the iron supplement.  Recommended starting  her back on Tramadol, 25mg at 4pm. If not effective, she can take 50mg at 4pm. She should take another tablet (50mg) at 10pm. She was " "instructed to  stop taking Gabapentin, Requip, and Melatonin. She was encouraged to try non-pharmacological treatments as well - hot bath/shower, stretching, heating pads, avoiding caffeine/alcohol/chocolate prior to bed.    Patient is a life long non-smoker and has been encouraged to continue to not smoke. Encourage weight loss, healthy diet, and exercise.    Patient was strongly advised to avoid driving, operating any heavy machinery or other hazardous situations while drowsy or sleepy.  Patient was counseled on the importance of driving while alert, to pull over if drowsy, or nap before getting into the vehicle if sleepy.      Follow up via Virtual Visit 7/3/17      Seen and examined with Dr. Areli Rangel, DO  Clinical Sleep Medicine Fellow  Pager #[phone]        Attending: As the attending physician I was present with  Dr. Javed Rangel,  during this clinic visit. I personally reviewed the key aspects of the history and physical exam and I agree with the above documentation.    \"I spent a total of 25 minutes  minutes face to face with Zaira Fierro during today's office visit. Over 50% of this time was spent counseling the patient and/or coordinating care regarding restless legs syndrome and management.     Mitchell Singleton MD   of Medicine,  Division of Pulmonary/Sleep Medicine  [facility].          "

## 2017-06-19 NOTE — PATIENT INSTRUCTIONS
Take 1/2 tablet of Tramadol at 4pm. If not effective, take 1 full tablet.    Take 1 tablet of Tramadol at 10pm, before bed.    Stop taking Gabapentin, Requip, and Melatonin.    Take VitronC (iron) two times per day, in between meals.      Your BMI is Body mass index is 30.73 kg/(m^2).  Weight management is a personal decision.  If you are interested in exploring weight loss strategies, the following discussion covers the approaches that may be successful. Body mass index (BMI) is one way to tell whether you are at a healthy weight, overweight, or obese. It measures your weight in relation to your height.  A BMI of 18.5 to 24.9 is in the healthy range. A person with a BMI of 25 to 29.9 is considered overweight, and someone with a BMI of 30 or greater is considered obese. More than two-thirds of American adults are considered overweight or obese.  Being overweight or obese increases the risk for further weight gain. Excess weight may lead to heart disease and diabetes.  Creating and following plans for healthy eating and physical activity may help you improve your health.  Weight control is part of healthy lifestyle and includes exercise, emotional health, and healthy eating habits. Careful eating habits lifelong are the mainstay of weight control. Though there are significant health benefits from weight loss, long-term weight loss with diet alone may be very difficult to achieve- studies show long-term success with dietary management in less than 10% of people. Attaining a healthy weight may be especially difficult to achieve in those with severe obesity. In some cases, medications, devices and surgical management might be considered.  What can you do?  If you are overweight or obese and are interested in methods for weight loss, you should discuss this with your provider.     Consider reducing daily calorie intake by 500 calories.     Keep a food journal.     Avoiding skipping meals, consider cutting portions  instead.    Diet combined with exercise helps maintain muscle while optimizing fat loss. Strength training is particularly important for building and maintaining muscle mass. Exercise helps reduce stress, increase energy, and improves fitness. Increasing exercise without diet control, however, may not burn enough calories to loose weight.       Start walking three days a week 10-20 minutes at a time    Work towards walking thirty minutes five days a week     Eventually, increase the speed of your walking for 1-2 minutes at time    In addition, we recommend that you review healthy lifestyles and methods for weight loss available through the National Institutes of Health patient information sites:  http://win.niddk.nih.gov/publications/index.htm    And look into health and wellness programs that may be available through your health insurance provider, employer, local community center, or [employer].    Weight management plan: Patient was referred to their PCP to discuss a diet and exercise plan.     Your blood pressure was checked while you were in clinic today.  Please read the guidelines below about what these numbers mean and what you should do about them.  Your systolic blood pressure is the top number.  This is the pressure when the heart is pumping.  Your diastolic blood pressure is the bottom number.  This is the pressure in between beats.  If your systolic blood pressure is less than 120 and your diastolic blood pressure is less than 80, then your blood pressure is normal. There is nothing more that you need to do about it  If your systolic blood pressure is 120-139 or your diastolic blood pressure is 80-89, your blood pressure may be higher than it should be.  You should have your blood pressure re-checked within a year by a primary care provider.  If your systolic blood pressure is 140 or greater or your diastolic blood pressure is 90 or greater, you may have high blood pressure.  High blood pressure is  treatable, but if left untreated over time it can put you at risk for heart attack, stroke, or kidney failure.  You should have your blood pressure re-checked by a primary care provider within the next four weeks.

## 2017-06-29 DIAGNOSIS — I10 BENIGN ESSENTIAL HYPERTENSION: Primary | ICD-10-CM

## 2017-06-29 RX ORDER — HYDROCHLOROTHIAZIDE 25 MG/1
25 TABLET ORAL EVERY MORNING
Qty: 90 TABLET | Refills: 1 | Status: SHIPPED | OUTPATIENT
Start: 2017-06-29 | End: 2017-12-15

## 2017-06-29 NOTE — TELEPHONE ENCOUNTER
Date of last visit at clinic: 5/24/17    Please complete refill and CLOSE ENCOUNTER.  Closing the encounter signifies the refill is complete.

## 2017-07-03 ENCOUNTER — OFFICE VISIT (OUTPATIENT)
Dept: SLEEP MEDICINE | Facility: CLINIC | Age: 82
End: 2017-07-03
Attending: INTERNAL MEDICINE
Payer: COMMERCIAL

## 2017-07-03 VITALS
OXYGEN SATURATION: 95 % | HEART RATE: 13 BPM | DIASTOLIC BLOOD PRESSURE: 86 MMHG | HEIGHT: 60 IN | BODY MASS INDEX: 32.59 KG/M2 | WEIGHT: 166 LBS | SYSTOLIC BLOOD PRESSURE: 148 MMHG | RESPIRATION RATE: 16 BRPM

## 2017-07-03 DIAGNOSIS — G25.81 RESTLESS LEGS SYNDROME (RLS): Primary | ICD-10-CM

## 2017-07-03 PROCEDURE — 99211 OFF/OP EST MAY X REQ PHY/QHP: CPT | Mod: ZF

## 2017-07-03 NOTE — MR AVS SNAPSHOT
After Visit Summary   7/3/2017    Zaira Fierro    MRN: [id number]           Patient Information     Date Of Birth          1935        Visit Information        Provider Department      7/3/2017 2:20 PM Javed Rangel MD [facility], [place], Sleep Study        Today's Diagnoses     Restless legs syndrome (RLS)    -  1      Care Instructions    - We will fax the Tramadol prescription to you pharmacy ([facility] on [address])   - Please get a ferritin level drawn some time in September 2017 at [place]  - We will get the results and will contact you through phone or MyChart to discuss the results  - At that time, we will discuss when you should follow up next        Your BMI is Body mass index is 32.42 kg/(m^2).  Weight management is a personal decision.  If you are interested in exploring weight loss strategies, the following discussion covers the approaches that may be successful. Body mass index (BMI) is one way to tell whether you are at a healthy weight, overweight, or obese. It measures your weight in relation to your height.  A BMI of 18.5 to 24.9 is in the healthy range. A person with a BMI of 25 to 29.9 is considered overweight, and someone with a BMI of 30 or greater is considered obese. More than two-thirds of American adults are considered overweight or obese.  Being overweight or obese increases the risk for further weight gain. Excess weight may lead to heart disease and diabetes.  Creating and following plans for healthy eating and physical activity may help you improve your health.  Weight control is part of healthy lifestyle and includes exercise, emotional health, and healthy eating habits. Careful eating habits lifelong are the mainstay of weight control. Though there are significant health benefits from weight loss, long-term weight loss with diet alone may be very difficult to achieve- studies show long-term success with dietary management in less than 10%  of people. Attaining a healthy weight may be especially difficult to achieve in those with severe obesity. In some cases, medications, devices and surgical management might be considered.  What can you do?  If you are overweight or obese and are interested in methods for weight loss, you should discuss this with your provider.     Consider reducing daily calorie intake by 500 calories.     Keep a food journal.     Avoiding skipping meals, consider cutting portions instead.    Diet combined with exercise helps maintain muscle while optimizing fat loss. Strength training is particularly important for building and maintaining muscle mass. Exercise helps reduce stress, increase energy, and improves fitness. Increasing exercise without diet control, however, may not burn enough calories to loose weight.       Start walking three days a week 10-20 minutes at a time    Work towards walking thirty minutes five days a week     Eventually, increase the speed of your walking for 1-2 minutes at time    In addition, we recommend that you review healthy lifestyles and methods for weight loss available through the National Institutes of Health patient information sites:  http://win.niddk.nih.gov/publications/index.htm    And look into health and wellness programs that may be available through your health insurance provider, employer, local community center, or kathleen club.    Weight management plan: Patient was referred to their PCP to discuss a diet and exercise plan.              Follow-ups after your visit        Follow-up notes from your care team     Return in about 1 year (around 7/3/2018).      Who to contact     If you have questions or need follow up information about today's clinic visit or your schedule please contact [facility] directly at [phone].  Normal or non-critical lab and imaging results will be communicated to you by MyChart, letter or phone within 4 business days after the clinic has  received the results. If you do not hear from us within 7 days, please contact the clinic through [employer] or phone. If you have a critical or abnormal lab result, we will notify you by phone as soon as possible.  Submit refill requests through [employer] or call your pharmacy and they will forward the refill request to us. Please allow 3 business days for your refill to be completed.          Additional Information About Your Visit        [facility] Information     [employer] gives you secure access to your electronic health record. If you see a primary care provider, you can also send messages to your care team and make appointments. If you have questions, please call your primary care clinic.  If you do not have a primary care provider, please call [phone] and they will assist you.        Care EveryWhere ID     This is your Care EveryWhere ID. This could be used by other organizations to access your [place] medical records  [id number]        Your Vitals Were     Pulse Respirations Height Pulse Oximetry BMI (Body Mass Index)       13 16 1.524 m (5') 95% 32.42 kg/m2        Blood Pressure from Last 3 Encounters:   07/03/17 148/86   06/19/17 143/71   05/28/17 140/88    Weight from Last 3 Encounters:   07/03/17 75.3 kg (166 lb)   06/19/17 76.2 kg (168 lb)   05/27/17 72.1 kg (159 lb)                 Today's Medication Changes          These changes are accurate as of: 7/3/17 11:59 PM.  If you have any questions, ask your nurse or doctor.               These medicines have changed or have updated prescriptions.        Dose/Directions    traMADol 50 MG tablet   Commonly known as:  ULTRAM   This may have changed:  additional instructions        Take one  tab by mouth at 4 PM and take one tab by mouth at 10PM   Quantity:  60 tablet   Refills:  5            Where to get your medicines      Some of these will need a paper prescription and others can be bought over the counter.  Ask your nurse if you have questions.     Bring a  paper prescription for each of these medications     traMADol 50 MG tablet                Primary Care Provider Office Phone # Fax #    Ange Crespo [phone]       [facility] [address] [address]        Equal Access to Services     PAOLA SKELTON : Hadii aad ku hadanandcalderon Sojj, waaxda luqadaha, qaybta kaalmada adeegyada, vaughn joaquín estuardomargarette delatorremaryjacob rm. So [facility] [phone].    ATENCIÓN: Si habla español, tiene a gibbons disposición servicios gratuitos de asistencia lingüística. [facility] [phone].    We comply with applicable federal civil rights laws and [place] laws. We do not discriminate on the basis of race, color, national origin, age, disability sex, sexual orientation or gender identity.            Thank you!     Thank you for choosing [facility]  for your care. Our goal is always to provide you with excellent care. Hearing back from our patients is one way we can continue to improve our services. Please take a few minutes to complete the written survey that you may receive in the mail after your visit with us. Thank you!             Your Updated Medication List - Protect others around you: Learn how to safely use, store and throw away your medicines at www.disposemymeds.org.          This list is accurate as of: 7/3/17 11:59 PM.  Always use your most recent med list.                   Brand Name Dispense Instructions for use Diagnosis    acetaminophen 325 MG tablet    TYLENOL     Take 650 mg by mouth 4 times daily        ASPIRIN PO      Take 81 mg by mouth        gabapentin 300 MG capsule    NEURONTIN    120 capsule    Take 3 capsules (900 mg) by mouth At Bedtime    Restless leg syndrome       hydrochlorothiazide 25 MG tablet    HYDRODIURIL    90 tablet    Take 1 tablet (25 mg) by mouth every morning    Benign essential hypertension       IRON SUPPLEMENT PO           latanoprost 0.005 % ophthalmic solution    XALATAN     Place  1 drop into both eyes At Bedtime Reported on 5/1/2017        losartan 100 MG tablet    COZAAR     Take 100 mg by mouth every morning        MELATONIN PO      Take 5 mg by mouth        OSCAL 500/200 D-3 500-200 MG-UNIT per tablet   Generic drug:  calcium carb 1250 mg (500 mg [place])/vitamin D 200 unit      Take 1 tablet by mouth 2 times daily        PRAVACHOL 20 MG tablet   Generic drug:  pravastatin      Take 20 mg by mouth every evening        rOPINIRole 0.25 MG tablet    REQUIP    60 tablet    Take 1 tablet (0.25 mg) by mouth At Bedtime    Restless leg syndrome       traMADol 50 MG tablet    ULTRAM    60 tablet    Take one  tab by mouth at 4 PM and take one tab by mouth at 10PM        UNABLE TO FIND      MEDICATION NAME: Occuvite

## 2017-07-03 NOTE — PROGRESS NOTES
"Sleep Follow-Up Visit:    Date on this visit: 7/3/2017    Zaira Fierro comes in today for follow-up for RLS. After the last visit, she stopped taking gabapentin and melatonin. She started taking 50mg of Tramadol at 4pm and 10pm. States that the medication was not scored so she had a very difficult time cutting it in half. Since starting the medication, her motor restlessness has resolved. She is back to \"normal.\" She is back to her old sleep habits and is able to sleep well throughout the night. She is very happy with the results.    She is started the iron supplementation as well for the low ferritin level. She was unable to tolerate taking the medication twice per day initially due to indigestion. However, she is now able to tolerate taking it twice a day: 11am and 7pm.    Past medical/surgical history, family history, social history, medications and allergies were reviewed.      Problem List:  Patient Active Problem List    Diagnosis Date Noted     Status post knee surgery 04/07/2017     Priority: Medium     Essential hypertension with goal blood pressure less than 140/90 07/01/2016     Priority: Medium     Arthritis of knee 12/11/2015     Priority: Medium     Primary osteoarthritis of both knees 10/17/2015     Priority: Medium     Cataract 04/07/2015     Priority: Medium     Macular degeneration (senile) of retina 04/07/2015     Non -exudative. Seen at [facility]. On latanoprost and Ocuvite       Advanced directives, counseling/discussion 07/30/2013     Completed POLST 7/30/2013 - see note for details.        Health Care Home 10/29/2012     Tier 1   DX V65.8 REPLACED WITH [facility] (04/08/2013)       Diverticulosis of large intestine 10/29/2012     Problem list name updated by automated process. Provider to review       Hyperlipidemia 10/29/2012     Problem list name updated by automated process. Provider to review       Obesity 10/29/2012     Problem list name updated by automated " "process. Provider to review       Disorder of bone and cartilage 10/29/2012     Problem list name updated by automated process. Provider to review       Prediabetes 10/29/2012        Physical Exam:  /86  Pulse (!) 13  Resp 16  Ht 1.524 m (5')  Wt 75.3 kg (166 lb)  SpO2 95%  BMI 32.42 kg/m2    General: No apparent distress, appropriately groomed  Head: Normocephalic, atraumatic  Eyes: no icterus      Impression/Plan:    Restless legs syndrome  Symptoms of motor restlessness are well controlled on her current dose of Tramadol: 50mg BID (4pm and 10pm). Will continue this current dose. Prescription refill will be sent to her pharmacy: [facility] [address]. Additionally, recommend for her to continue taking iron supplementation twice per day. Repeat ferritin level was ordered for 9/2017. She will be contacted through phone or MyChart with the results and further follow up. She was also encouraged to try non-pharmacological treatments as well - hot bath/shower, stretching, heating pads, avoiding caffeine/alcohol/chocolate prior to bed.      Patient is a life long non-smoker and has been encouraged to continue to not smoke. Encourage weight loss, healthy diet, and exercise.     Patient was strongly advised to avoid driving, operating any heavy machinery or other hazardous situations while drowsy or sleepy.  She was also instructed to avoid driving after taking tramadol.       Will contact patient in 9/2017 regarding ferritin level. F/u at sleep clinic in 1 year or sooner if any concerns.    ?  Seen and examined with Dr. Singleton  ?    Javed Rangel, DO  Clinical Sleep Medicine Fellow  Pager #[phone]    Attending: As the attending physician I was present with  Dr. Javed Rangel,  during this clinic visit. I personally reviewed the key aspects of the history and physical exam and I agree with the above documentation.    \"I spent a total of 15 minutes face to face with Zaira Fierro during " "today's office visit. Over 50% of this time was spent counseling the patient and/or coordinating care regarding RLS management.\"       Mitchell Singleton MD   of Medicine,  Division of Pulmonary/Sleep Medicine  [facility].    "

## 2017-07-03 NOTE — NURSING NOTE
Chief Complaint   Patient presents with     RECHECK     Follow up medication       Initial /86  Pulse (!) 13  Resp 16  Ht 1.524 m (5')  Wt 75.3 kg (166 lb)  SpO2 95%  BMI 32.42 kg/m2 Estimated body mass index is 32.42 kg/(m^2) as calculated from the following:    Height as of this encounter: 1.524 m (5').    Weight as of this encounter: 75.3 kg (166 lb).  Medication Reconciliation: complete     ALISA Mrarero

## 2017-07-03 NOTE — Clinical Note
Abhilash Wood, I've generated Rx for Tramadol  for pt. It can't be escribed. Please have  sign off the prescription on my behalf and send it to the pharmacy.  Thanks, Aniyah

## 2017-07-05 ENCOUNTER — TELEPHONE (OUTPATIENT)
Dept: FAMILY MEDICINE | Facility: CLINIC | Age: 82
End: 2017-07-05

## 2017-07-05 DIAGNOSIS — G25.81 RESTLESS LEG SYNDROME: Primary | ICD-10-CM

## 2017-07-05 DIAGNOSIS — G25.81 RESTLESS LEG SYNDROME: ICD-10-CM

## 2017-07-05 RX ORDER — PRAMIPEXOLE DIHYDROCHLORIDE 0.12 MG/1
0.12 TABLET ORAL AT BEDTIME
Qty: 30 TABLET | Refills: 1 | OUTPATIENT
Start: 2017-07-05

## 2017-07-05 NOTE — TELEPHONE ENCOUNTER
Date of last visit at clinic: 5-24-17    Please complete refill and CLOSE ENCOUNTER.  Closing the encounter signifies the refill is complete.

## 2017-07-05 NOTE — TELEPHONE ENCOUNTER
Request for medication refill:    Date of last visit at clinic: 5/24/17    Please complete refill if appropriate and CLOSE ENCOUNTER.    Closing the encounter signifies the refill is complete.    If refill has been denied, please complete the smart phrase .smirefuse and route it to the [facility] RN TRIAGE pool to inform the patient and the pharmacy.    ALISA Kramer

## 2017-07-06 DIAGNOSIS — M17.10 ARTHRITIS OF KNEE: ICD-10-CM

## 2017-07-06 DIAGNOSIS — E63.9 NUTRITION DISORDER: ICD-10-CM

## 2017-07-06 DIAGNOSIS — G25.81 RESTLESS LEG SYNDROME: ICD-10-CM

## 2017-07-07 RX ORDER — NORTRIPTYLINE HCL 25 MG
25 CAPSULE ORAL AT BEDTIME
Qty: 90 CAPSULE | Refills: 1 | Status: SHIPPED | OUTPATIENT
Start: 2017-07-07 | End: 2018-01-17

## 2017-07-07 RX ORDER — PRAMIPEXOLE DIHYDROCHLORIDE 0.12 MG/1
0.12 TABLET ORAL AT BEDTIME
Qty: 30 TABLET | Refills: 1 | OUTPATIENT
Start: 2017-07-07

## 2017-07-10 RX ORDER — TRAMADOL HYDROCHLORIDE 50 MG/1
TABLET ORAL
Qty: 60 TABLET | Refills: 5 | Status: SHIPPED | OUTPATIENT
Start: 2017-07-10 | End: 2018-01-16

## 2017-07-17 ENCOUNTER — MEDICAL CORRESPONDENCE (OUTPATIENT)
Dept: HEALTH INFORMATION MANAGEMENT | Facility: CLINIC | Age: 82
End: 2017-07-17

## 2017-08-01 DIAGNOSIS — I10 BENIGN ESSENTIAL HYPERTENSION: Primary | ICD-10-CM

## 2017-08-01 RX ORDER — LOSARTAN POTASSIUM 100 MG/1
100 TABLET ORAL EVERY MORNING
Qty: 90 TABLET | Refills: 3 | Status: SHIPPED | OUTPATIENT
Start: 2017-08-01 | End: 2018-08-06

## 2017-08-01 NOTE — TELEPHONE ENCOUNTER
Request for medication refill: Losartan potassium 100mg     Date of last visit at clinic: 07/03/2017    Please complete refill if appropriate and CLOSE ENCOUNTER.    Closing the encounter signifies the refill is complete.    If refill has been denied, please complete the smart phrase .smirefuse and route it to the [facility] RN TRIAGE pool to inform the patient and the pharmacy.    Suzan Chaudhary, CMA

## 2017-11-03 ENCOUNTER — OFFICE VISIT (OUTPATIENT)
Dept: FAMILY MEDICINE | Facility: CLINIC | Age: 82
End: 2017-11-03

## 2017-11-03 VITALS
OXYGEN SATURATION: 98 % | WEIGHT: 165 LBS | SYSTOLIC BLOOD PRESSURE: 122 MMHG | HEART RATE: 86 BPM | TEMPERATURE: 97.9 F | DIASTOLIC BLOOD PRESSURE: 83 MMHG | RESPIRATION RATE: 16 BRPM | HEIGHT: 60 IN | BODY MASS INDEX: 32.39 KG/M2

## 2017-11-03 DIAGNOSIS — G25.81 RESTLESS LEGS SYNDROME (RLS): ICD-10-CM

## 2017-11-03 DIAGNOSIS — M17.0 PRIMARY OSTEOARTHRITIS OF BOTH KNEES: ICD-10-CM

## 2017-11-03 DIAGNOSIS — I10 ESSENTIAL HYPERTENSION WITH GOAL BLOOD PRESSURE LESS THAN 140/90: ICD-10-CM

## 2017-11-03 DIAGNOSIS — E16.8 OTHER SPECIFIED DISORDERS OF PANCREATIC INTERNAL SECRETION: ICD-10-CM

## 2017-11-03 DIAGNOSIS — Z00.00 ROUTINE HEALTH MAINTENANCE: Primary | ICD-10-CM

## 2017-11-03 LAB
BUN SERPL-MCNC: 13.7 MG/DL (ref 7–19)
CALCIUM SERPL-MCNC: 9.6 MG/DL (ref 8.5–10.1)
CHLORIDE SERPLBLD-SCNC: 101.3 MMOL/L (ref 98–110)
CO2 SERPL-SCNC: 30.9 MMOL/L (ref 20–32)
CREAT SERPL-MCNC: 0.6 MG/DL (ref 0.5–1)
FERRITIN SERPL-MCNC: 44 NG/ML (ref 8–252)
GFR SERPL CREATININE-BSD FRML MDRD: >90 ML/MIN/1.7 M2
GLUCOSE SERPL-MCNC: 111.3 MG'DL (ref 70–99)
HBA1C MFR BLD: 5.5 % (ref 4.1–5.7)
HEMOGLOBIN: 14.1 G/DL (ref 11.7–15.7)
POTASSIUM SERPL-SCNC: 4.3 MMOL/DL (ref 3.3–4.5)
SODIUM SERPL-SCNC: 138.2 MMOL/L (ref 132.6–141.4)

## 2017-11-03 ASSESSMENT — ENCOUNTER SYMPTOMS
WEAKNESS: 0
NUMBNESS: 0
FATIGUE: 0
SLEEP DISTURBANCE: 0

## 2017-11-03 NOTE — PATIENT INSTRUCTIONS
Here is the plan from today's visit    1. Restless legs syndrome (RLS)   I will get back to you on whether you need to restart the iron or if you can stop it for awhile.  Our goal is 50 or higher.   - Ferritin    2. Routine health maintenance  - ADMIN VACCINE, INITIAL  - FLU VACCINE, INCREASED ANTIGEN, PRESV FREE  - calcium carb 1250 mg, 500 mg [place],/vitamin D 200 unit (OSCAL 500/200 D-3) 500-200 MG-UNIT per tablet; Take 1 tablet by mouth 2 times daily  Dispense: 90 tablet; Refill: 3  - Hemoglobin (HGB) ([facility])  - Hemoglobin A1c ([facility])  - Basic Metabolic Panel ([facility])          Please call or return to clinic if your symptoms don't go away.    Follow up plan  In the next 6 months    Thank you for coming to [facility] today.  Lab Testing:  **If you had lab testing today and your results are reassuring or normal they will be mailed to you or sent through [employer] within 7 days.   **If the lab tests need quick action we will call you with the results.  The phone number we will call with results is # [phone] (home) . If this is not the best number please call our clinic and change the number.  Medication Refills:  If you need any refills please call your pharmacy and they will contact us.   If you need to  your refill at a new pharmacy, please contact the new pharmacy directly. The new pharmacy will help you get your medications transferred faster.   Scheduling:  If you have any concerns about today's visit or wish to schedule another appointment please call our office during normal business hours [phone] (8-5:00 M-F)  If a referral was made to a [facility] and you don't get a call from central scheduling please call [phone].  If a Mammogram was ordered for you at [facility] call [phone] to schedule or change your appointment.  If you had an XRay/CT/Ultrasound/MRI ordered the number is [phone] to schedule or change your radiology appointment.    Medical Concerns:  If you have urgent medical concerns please call [phone] at any time of the day.  If you have a medical emergency please call [phone].

## 2017-11-03 NOTE — MR AVS SNAPSHOT
After Visit Summary   11/3/2017    Zaira Fierro    MRN: [id number]           Patient Information     Date Of Birth          1935        Visit Information        Provider Department      11/3/2017 9:20 AM Ange Crespo MD [facility]        Today's Diagnoses     Routine health maintenance    -  1    Restless legs syndrome (RLS)          Care Instructions    Here is the plan from today's visit    1. Restless legs syndrome (RLS)   I will get back to you on whether you need to restart the iron or if you can stop it for awhile.  Our goal is 50 or higher.   - Ferritin    2. Routine health maintenance  - ADMIN VACCINE, INITIAL  - FLU VACCINE, INCREASED ANTIGEN, PRESV FREE  - calcium carb 1250 mg, 500 mg [place],/vitamin D 200 unit (OSCAL 500/200 D-3) 500-200 MG-UNIT per tablet; Take 1 tablet by mouth 2 times daily  Dispense: 90 tablet; Refill: 3  - Hemoglobin (HGB) ([facility])  - Hemoglobin A1c ([facility])  - Basic Metabolic Panel ([facility])          Please call or return to clinic if your symptoms don't go away.    Follow up plan  In the next 6 months    Thank you for coming to [facility] today.  Lab Testing:  **If you had lab testing today and your results are reassuring or normal they will be mailed to you or sent through [employer] within 7 days.   **If the lab tests need quick action we will call you with the results.  The phone number we will call with results is # [phone] (home) . If this is not the best number please call our clinic and change the number.  Medication Refills:  If you need any refills please call your pharmacy and they will contact us.   If you need to  your refill at a new pharmacy, please contact the new pharmacy directly. The new pharmacy will help you get your medications transferred faster.   Scheduling:  If you have any concerns about today's visit or wish to schedule another appointment please call our office during normal business hours  [phone] (8-5:00 M-F)  If a referral was made to a [facility] and you don't get a call from central scheduling please call [phone].  If a Mammogram was ordered for you at [facility] call [phone] to schedule or change your appointment.  If you had an XRay/CT/Ultrasound/MRI ordered the number is [phone] to schedule or change your radiology appointment.   Medical Concerns:  If you have urgent medical concerns please call [phone] at any time of the day.  If you have a medical emergency please call [phone].          Follow-ups after your visit        Who to contact     Please call your clinic at [phone] to:    Ask questions about your health    Make or cancel appointments    Discuss your medicines    Learn about your test results    Speak to your doctor   If you have compliments or concerns about an experience at your clinic, or if you wish to file a complaint, please contact [facility] Patient Relations at [phone] or email us at [email or web address]         Additional Information About Your Visit        [employer] Information     [employer] gives you secure access to your electronic health record. If you see a primary care provider, you can also send messages to your care team and make appointments. If you have questions, please call your primary care clinic.  If you do not have a primary care provider, please call [phone] and they will assist you.      [email or web address] is an electronic gateway that provides easy, online access to your medical records. With [email or web address], you can request a clinic appointment, read your test results, renew a prescription or communicate with your care team.     To access your existing account, please contact your [facility] or call [phone] for assistance.        Care EveryWhere ID     This is your Care EveryWhere ID. This could be used by other organizations to access  your [place] medical records  [id number]        Your Vitals Were     Pulse Temperature Respirations Height Pulse Oximetry Breastfeeding?    86 97.9  F (36.6  C) (Oral) 16 5' (152.4 cm) 98% No    BMI (Body Mass Index)                   32.22 kg/m2            Blood Pressure from Last 3 Encounters:   11/03/17 122/83   07/03/17 148/86   06/19/17 143/71    Weight from Last 3 Encounters:   11/03/17 165 lb (74.8 kg)   07/03/17 166 lb (75.3 kg)   06/19/17 168 lb (76.2 kg)              We Performed the Following     ADMIN VACCINE, INITIAL     Basic Metabolic Panel ([facility])     Ferritin     FLU VACCINE, INCREASED ANTIGEN, PRESV FREE     Hemoglobin (HGB) ([facility])     Hemoglobin A1c ([facility])          Where to get your medicines      Some of these will need a paper prescription and others can be bought over the counter.  Ask your nurse if you have questions.     Bring a paper prescription for each of these medications     calcium carb 1250 mg (500 mg [place])/vitamin D 200 unit 500-200 MG-UNIT per tablet          Primary Care Provider Office Phone # Fax #    Ange Crespo [phone]       [address] [address]        Equal Access to Services     PAOLA SKELTON : Federico mendozao Sojj, waaxda luqadaha, qaybta kaalmada adetasha, vaughn rm. So [facility] [phone].    ATENCIÓN: Si habla español, tiene a gibbons disposición servicios gratuitos de asistencia lingüística. Llame al [phone].    We comply with applicable federal civil rights laws and [place] laws. We do not discriminate on the basis of race, color, national origin, age, disability, sex, sexual orientation, or gender identity.            Thank you!     Thank you for choosing [facility]  for your care. Our goal is always to provide you with excellent care. Hearing back from our patients is one way we can continue to improve our services. Please take a few minutes  to complete the written survey that you may receive in the mail after your visit with us. Thank you!             Your Updated Medication List - Protect others around you: Learn how to safely use, store and throw away your medicines at www.disposemymeds.org.          This list is accurate as of: 11/3/17 10:33 AM.  Always use your most recent med list.                   Brand Name Dispense Instructions for use Diagnosis    acetaminophen 325 MG tablet    TYLENOL     Take 650 mg by mouth 4 times daily        ASPIRIN PO      Take 81 mg by mouth        calcium carb 1250 mg (500 mg [place])/vitamin D 200 unit 500-200 MG-UNIT per tablet    OSCAL 500/200 D-3    90 tablet    Take 1 tablet by mouth 2 times daily    Routine health maintenance       hydrochlorothiazide 25 MG tablet    HYDRODIURIL    90 tablet    Take 1 tablet (25 mg) by mouth every morning    Benign essential hypertension       IRON SUPPLEMENT PO           latanoprost 0.005 % ophthalmic solution    XALATAN     Place 1 drop into both eyes At Bedtime Reported on 5/1/2017        losartan 100 MG tablet    COZAAR    90 tablet    Take 1 tablet (100 mg) by mouth every morning    Benign essential hypertension       MELATONIN PO      Take 5 mg by mouth        nortriptyline 25 MG capsule    PAMELOR    90 capsule    Take 1 capsule (25 mg) by mouth At Bedtime    Nutrition disorder, Arthritis of knee       PRAVACHOL 20 MG tablet   Generic drug:  pravastatin      Take 20 mg by mouth every evening        traMADol 50 MG tablet    ULTRAM    60 tablet    Take one  tab by mouth at 4 PM and take one tab by mouth at 10PM        UNABLE TO FIND      MEDICATION NAME: Occuvite

## 2017-12-07 ENCOUNTER — TRANSFERRED RECORDS (OUTPATIENT)
Dept: HEALTH INFORMATION MANAGEMENT | Facility: CLINIC | Age: 82
End: 2017-12-07

## 2017-12-12 DIAGNOSIS — M25.562 CHRONIC PAIN OF LEFT KNEE: Primary | ICD-10-CM

## 2017-12-12 DIAGNOSIS — G89.29 CHRONIC PAIN OF LEFT KNEE: Primary | ICD-10-CM

## 2017-12-12 RX ORDER — ACETAMINOPHEN 325 MG/1
650 TABLET ORAL 4 TIMES DAILY
Qty: 100 TABLET | Refills: 11 | Status: SHIPPED | OUTPATIENT
Start: 2017-12-12 | End: 2019-01-09

## 2017-12-12 NOTE — TELEPHONE ENCOUNTER
Request for medication refill:    Date of last visit at clinic: 11/03/2017    Please complete refill if appropriate and CLOSE ENCOUNTER.    Closing the encounter signifies the refill is complete.    If refill has been denied, please complete the smart phrase .smirefuse and route it to the [facility] RN TRIAGE pool to inform the patient and the pharmacy.    Ranjit Suárez, CMA

## 2017-12-15 DIAGNOSIS — I10 BENIGN ESSENTIAL HYPERTENSION: ICD-10-CM

## 2017-12-15 RX ORDER — HYDROCHLOROTHIAZIDE 25 MG/1
25 TABLET ORAL EVERY MORNING
Qty: 90 TABLET | Refills: 1 | Status: SHIPPED | OUTPATIENT
Start: 2017-12-15 | End: 2018-06-14

## 2017-12-15 NOTE — TELEPHONE ENCOUNTER
Date of last visit at clinic: 11/3/17    Please complete refill and CLOSE ENCOUNTER.  Closing the encounter signifies the refill is complete.

## 2018-01-16 ENCOUNTER — MYC MEDICAL ADVICE (OUTPATIENT)
Dept: SLEEP MEDICINE | Facility: CLINIC | Age: 83
End: 2018-01-16

## 2018-01-16 RX ORDER — TRAMADOL HYDROCHLORIDE 50 MG/1
TABLET ORAL
Qty: 60 TABLET | Refills: 5 | Status: SHIPPED | OUTPATIENT
Start: 2018-01-16 | End: 2018-07-18

## 2018-01-16 NOTE — TELEPHONE ENCOUNTER
From: Zaira Fierro  To: Mitchell Singleton MD  Sent: 1/16/2018 2:25 PM CST  Subject: Question about medications    My second try at messaging: Dear Dr. Luciano: I am requesting refills on Tramadol. I contacted [address] Pharmacy [address] [facility] in [place] and they said they would contact you, but suggested I do likewise. In June or July you prescribed Tramadol for restless leg syndrome which was causing me to be sleepless for many weeks. The regimen of 50mg at 4:00pm and 10pm each day has worked very well. I am sleeping every night and I am feeling much better. On November 3 I had blood work done at [facility], as you requested. The iron improved some. I would like to continue on this routine if you continue to approve. Thank you for considering my request. Zaira Fierro

## 2018-01-16 NOTE — TELEPHONE ENCOUNTER
Pending Prescriptions:                       Disp   Refills    traMADol (ULTRAM) 50 MG tablet            60 tab*5            Sig: Take one  tab by mouth at 4 PM and take one tab           by mouth at 10PM    Last Written Prescription Date:  7/10/17  Last Fill Quantity: 60,   # refills: 5  Last Office Visit with [facility], [facility] or [facility] prescribing provider: 7/3/17  Future Office visit:   No follow up appointment scheduled at this time.    ALISA Marrero

## 2018-01-17 DIAGNOSIS — E63.9 NUTRITION DISORDER: ICD-10-CM

## 2018-01-17 DIAGNOSIS — M17.10 ARTHRITIS OF KNEE: ICD-10-CM

## 2018-01-17 RX ORDER — NORTRIPTYLINE HCL 25 MG
25 CAPSULE ORAL AT BEDTIME
Qty: 90 CAPSULE | Refills: 1 | Status: SHIPPED | OUTPATIENT
Start: 2018-01-17 | End: 2018-09-11

## 2018-01-17 NOTE — TELEPHONE ENCOUNTER
Request for medication refill:    Date of last visit at clinic: 11-3-17    Please complete refill if appropriate and CLOSE ENCOUNTER.    Closing the encounter signifies the refill is complete.    If refill has been denied, please complete the smart phrase .smirefuse and route it to the [facility] RN TRIAGE pool to inform the patient and the pharmacy.    DARRICK MAGALLANES, CMA

## 2018-02-09 DIAGNOSIS — I10 BENIGN ESSENTIAL HYPERTENSION: Primary | ICD-10-CM

## 2018-02-10 RX ORDER — PRAVASTATIN SODIUM 20 MG
20 TABLET ORAL EVERY EVENING
Qty: 90 TABLET | Refills: 3 | Status: SHIPPED | OUTPATIENT
Start: 2018-02-10 | End: 2019-02-08

## 2018-03-05 ENCOUNTER — TRANSFERRED RECORDS (OUTPATIENT)
Dept: HEALTH INFORMATION MANAGEMENT | Facility: CLINIC | Age: 83
End: 2018-03-05

## 2018-05-10 ENCOUNTER — DOCUMENTATION ONLY (OUTPATIENT)
Dept: FAMILY MEDICINE | Facility: CLINIC | Age: 83
End: 2018-05-10

## 2018-06-01 ENCOUNTER — OFFICE VISIT (OUTPATIENT)
Dept: FAMILY MEDICINE | Facility: CLINIC | Age: 83
End: 2018-06-01

## 2018-06-01 VITALS
TEMPERATURE: 97.7 F | HEART RATE: 97 BPM | WEIGHT: 168 LBS | RESPIRATION RATE: 16 BRPM | HEIGHT: 60 IN | BODY MASS INDEX: 32.98 KG/M2 | SYSTOLIC BLOOD PRESSURE: 112 MMHG | OXYGEN SATURATION: 94 % | DIASTOLIC BLOOD PRESSURE: 71 MMHG

## 2018-06-01 DIAGNOSIS — Z01.818 PREOP GENERAL PHYSICAL EXAM: Primary | ICD-10-CM

## 2018-06-01 DIAGNOSIS — E78.49 OTHER HYPERLIPIDEMIA: ICD-10-CM

## 2018-06-01 DIAGNOSIS — I10 ESSENTIAL HYPERTENSION WITH GOAL BLOOD PRESSURE LESS THAN 140/90: ICD-10-CM

## 2018-06-01 DIAGNOSIS — G25.81 RESTLESS LEG SYNDROME: ICD-10-CM

## 2018-06-01 LAB
BUN SERPL-MCNC: 16.4 MG/DL (ref 7–19)
CALCIUM SERPL-MCNC: 9.9 MG/DL (ref 8.5–10.1)
CHLORIDE SERPLBLD-SCNC: 98.6 MMOL/L (ref 98–110)
CO2 SERPL-SCNC: 27.5 MMOL/L (ref 20–32)
CREAT SERPL-MCNC: 0.6 MG/DL (ref 0.5–1)
FERRITIN SERPL-MCNC: 96 NG/ML (ref 8–252)
GFR SERPL CREATININE-BSD FRML MDRD: >90 ML/MIN/1.7 M2
GLUCOSE SERPL-MCNC: 142.9 MG'DL (ref 70–99)
POTASSIUM SERPL-SCNC: 3.6 MMOL/DL (ref 3.3–4.5)
SODIUM SERPL-SCNC: 139.4 MMOL/L (ref 132.6–141.4)

## 2018-06-01 NOTE — PROGRESS NOTES
[facility]  [address],  [address]  [facility] [address]  Phone: [phone]  Fax: [phone]    6/1/2018    Adult PRE-OP Evaluation:    Zaira Fierro, 1935 presents for pre-operative evaluation and assessment as requested by Dr. Patten, prior to undergoing surgery/procedure for treatment of  cataracts .    Proposed procedure: Removal    Date of Surgery/ Procedure: 6/5/2018  Hospital/Surgical Facility: [facility] #[phone] (Fax)     Primary Physician: Ange Crespo  Type of Anesthesia Anticipated: Local  History of anesthesia complications: NONE  History of  abnormal bleeding: NONE   History of blood transfusions: NO  Patient has a Health Care Directive or Living Will:  YES     Preoperative Questions   1. NO - Do you have a history of heart attack, stroke, stent, bypass or surgery on an artery in the head, neck, heart or legs?  2. NO - Do you ever have any pain or discomfort in your chest?  3. NO - Have you ever had a severe pain across the front of your chest lasting for half an hour or more?  4. NO - Do you have a history of Congestive Heart Failure?  5. NO - Are you troubled by shortness of breath when: walking on the level/ up a slight hill/ at night?  6. NO - Does your chest ever sound wheezy or whistling?  7. YES - Do you currently have a cold, bronchitis or other respiratory infection? But is getting better  8. NO - Have you had a cold (yes), bronchitis or other respiratory infection within the last 2 weeks?  9. NO - Do you usually have a cough? minimal  10. NO - Do you sometimes get pains in the calves of your legs when you walk?  11. NO - Do you or anyone in your family have previous history of blood clots?  12. NO - Do you or does anyone in your family have a serious bleeding problem such as prolonged bleeding following surgeries or cuts?  13. NO - Have you ever had problems with anemia or been told to take iron pills?  14. NO - Have  you had any abnormal blood loss such as black, tarry or bloody stools, or abnormal vaginal bleeding?  15. NO - Have you ever had a blood transfusion?  16. NO - Have you or any of your relatives ever had problems with anesthesia?  17. NO - Do you have sleep apnea, excessive snoring or daytime drowsiness?  18. NO - Do you have any prosthetic heart valves?  19. YES - Do you have prosthetic joints? RIGHT KNEE  20. NO - Is there any chance that you may be pregnant?    Patient Active Problem List   Diagnosis     Health Care Home     Diverticulosis of large intestine     Hyperlipidemia     Obesity     Disorder of bone and cartilage     Prediabetes     Advanced directives, counseling/discussion     Macular degeneration (senile) of retina     Cataract     Primary osteoarthritis of both knees     Arthritis of knee     Essential hypertension with goal blood pressure less than 140/90     Status post knee surgery         Current Outpatient Prescriptions on File Prior to Visit:  acetaminophen (TYLENOL) 325 MG tablet Take 2 tablets (650 mg) by mouth 4 times daily   ASPIRIN PO Take 81 mg by mouth   calcium carb 1250 mg, 500 mg [place],/vitamin D 200 unit (OSCAL 500/200 D-3) 500-200 MG-UNIT per tablet Take 1 tablet by mouth 2 times daily   Ferrous Sulfate (IRON SUPPLEMENT PO)    hydrochlorothiazide (HYDRODIURIL) 25 MG tablet Take 1 tablet (25 mg) by mouth every morning   latanoprost (XALATAN) 0.005 % ophthalmic solution Place 1 drop into both eyes At Bedtime Reported on 5/1/2017   losartan (COZAAR) 100 MG tablet Take 1 tablet (100 mg) by mouth every morning   MELATONIN PO Take 5 mg by mouth   nortriptyline (PAMELOR) 25 MG capsule Take 1 capsule (25 mg) by mouth At Bedtime   pravastatin (PRAVACHOL) 20 MG tablet Take 1 tablet (20 mg) by mouth every evening   traMADol (ULTRAM) 50 MG tablet Take one  tab by mouth at 4 PM and take one tab by mouth at 10PM   UNABLE TO FIND MEDICATION NAME: Occuvite     No current facility-administered  medications on file prior to visit.     OTC products:  81 mg aspirin    Allergies   Allergen Reactions     No Known Allergies      Latex Allergy: NO    Social History     Social History     Marital status: Single     Spouse name: N/A     Number of children: N/A     Years of education: N/A     Occupational History     retired teacher      Social History Main Topics     Smoking status: Never Smoker     Smokeless tobacco: Never Used     Alcohol use No     Drug use: No     Sexual activity: No     Other Topics Concern     None     Social History Narrative    Belongs to [employer]. Lives with her sister Rosi.        REVIEW OF SYSTEMS:   Constitutional, HEENT, cardiovascular, pulmonary, GI, , musculoskeletal, neuro, skin, endocrine and psych systems are negative, except as otherwise noted.    EXAM:     Patient Vitals for the past 24 hrs:   BP Temp Temp src Pulse Resp SpO2 Height Weight   06/01/18 1042 112/71 97.7  F (36.5  C) Oral 97 16 94 % 5' (152.4 cm) 168 lb (76.2 kg)     Body mass index is 32.81 kg/(m^2).  GENERAL: healthy, alert and no distress  EYES: Eyes grossly normal to inspection, extraocular movements - intact, and PERRL  HENT: ear canals- normal; TMs- normal; Nose- normal; Mouth- no ulcers, no lesions  NECK: no tenderness, no adenopathy, no asymmetry, no masses, no stiffness; thyroid- normal to palpation  RESP: lungs clear to auscultation - no rales, no rhonchi, no wheezes  CV: regular rates and rhythm, normal S1 S2, no S3 or S4 and no murmur, no click or rub -  ABDOMEN: soft, no tenderness, no  hepatosplenomegaly, no masses, normal bowel sounds  MS: extremities- no gross deformities noted, no edema  SKIN: no suspicious lesions, no rashes  LYMPHATICS: ant. cervical- normal, post. cervical- normal    DIAGNOSTICS:      EKG: Not indicated due to non-vascular surgery and low risk of event (age <65 and without cardiac risk factors)    RISK ASSESSMENT:     Cardiovascular Risk:  -Patient is able  to perform ADL's without assistance without chest pain.  -The patient does not have chest pain at rest.  -Patient does not have a history of congestive heart failure.    -The patient does not have a history of stroke and does not have a history of valvular disease.    Pulmonary Risk:  -In terms of risk factors for pulmonary complication, the patient has no risk factors    Perioperative Complications:  -The patient does not have a history of bleeding or clotting problems in the past.    -The patient has not had complications from surgeries.    -The patient does not have a family history of any anesthesia or surgical complications.      IMPRESSION:   Reason for surgery/procedure: Left eye cataract surgery    The proposed surgical procedure is considered LOW risk.    For above listed surgery and anesthesia:   Patient is at low risk for surgery/procedure and perioperative/procedure complications.    RECOMMENDATIONS:   Cleared for surgery. She has a cold that is improving - doubt this should be a problem.   Labs:  none    Fasting:  NPO for 12 hours prior to surgery    Preop Plan:  --Patient is to take all other scheduled medications on the day of surgery     Medications:  Patient should take their regular medications the morning of surgery unless otherwise instructed.          KINGSLEY Aguilar MD      Please contact our office if there are any further questions or information required about this patient.

## 2018-06-13 PROBLEM — G25.81 RESTLESS LEG SYNDROME: Status: ACTIVE | Noted: 2018-06-13

## 2018-06-14 DIAGNOSIS — I10 BENIGN ESSENTIAL HYPERTENSION: ICD-10-CM

## 2018-06-14 RX ORDER — HYDROCHLOROTHIAZIDE 25 MG/1
25 TABLET ORAL EVERY MORNING
Qty: 90 TABLET | Refills: 1 | Status: SHIPPED | OUTPATIENT
Start: 2018-06-14 | End: 2018-12-14

## 2018-06-14 NOTE — TELEPHONE ENCOUNTER

## 2018-07-16 ENCOUNTER — MYC MEDICAL ADVICE (OUTPATIENT)
Dept: FAMILY MEDICINE | Facility: CLINIC | Age: 83
End: 2018-07-16

## 2018-07-18 ENCOUNTER — MYC MEDICAL ADVICE (OUTPATIENT)
Dept: FAMILY MEDICINE | Facility: CLINIC | Age: 83
End: 2018-07-18

## 2018-07-18 DIAGNOSIS — G25.81 RESTLESS LEG SYNDROME: Primary | ICD-10-CM

## 2018-07-18 RX ORDER — TRAMADOL HYDROCHLORIDE 50 MG/1
TABLET ORAL
Qty: 60 TABLET | Refills: 5 | Status: SHIPPED | OUTPATIENT
Start: 2018-07-18 | End: 2019-01-09

## 2018-07-18 NOTE — TELEPHONE ENCOUNTER

## 2018-07-20 NOTE — TELEPHONE ENCOUNTER
Tramadol Rx printed 7/18. Pharmacy does not have. RN called rx in to [facility]. Pharmacist verbalized understanding    Asmita Hammer RN

## 2018-08-06 DIAGNOSIS — I10 BENIGN ESSENTIAL HYPERTENSION: ICD-10-CM

## 2018-08-06 RX ORDER — LOSARTAN POTASSIUM 100 MG/1
100 TABLET ORAL EVERY MORNING
Qty: 90 TABLET | Refills: 3 | Status: SHIPPED | OUTPATIENT
Start: 2018-08-06 | End: 2019-06-10

## 2018-08-06 NOTE — TELEPHONE ENCOUNTER

## 2018-08-08 ENCOUNTER — TRANSFERRED RECORDS (OUTPATIENT)
Dept: HEALTH INFORMATION MANAGEMENT | Facility: CLINIC | Age: 83
End: 2018-08-08

## 2018-09-11 ENCOUNTER — OFFICE VISIT (OUTPATIENT)
Dept: FAMILY MEDICINE | Facility: CLINIC | Age: 83
End: 2018-09-11

## 2018-09-11 VITALS
HEART RATE: 86 BPM | DIASTOLIC BLOOD PRESSURE: 83 MMHG | RESPIRATION RATE: 16 BRPM | OXYGEN SATURATION: 96 % | BODY MASS INDEX: 32.03 KG/M2 | WEIGHT: 164 LBS | TEMPERATURE: 98.5 F | SYSTOLIC BLOOD PRESSURE: 136 MMHG

## 2018-09-11 DIAGNOSIS — M17.10 ARTHRITIS OF KNEE: ICD-10-CM

## 2018-09-11 DIAGNOSIS — I10 ESSENTIAL HYPERTENSION WITH GOAL BLOOD PRESSURE LESS THAN 140/90: Primary | ICD-10-CM

## 2018-09-11 DIAGNOSIS — G25.81 RESTLESS LEG SYNDROME: ICD-10-CM

## 2018-09-11 RX ORDER — NORTRIPTYLINE HCL 25 MG
25 CAPSULE ORAL AT BEDTIME
Qty: 90 CAPSULE | Refills: 1 | Status: SHIPPED | OUTPATIENT
Start: 2018-09-11 | End: 2019-02-21

## 2018-09-11 NOTE — PROGRESS NOTES
HPI       Zaira Fierro is a 83 year old  who presents for   Chief Complaint   Patient presents with     Knee Pain     left knee pain        Joint/Muscle Pain      Onset: ongoing 6 months     Injury?  no    Description:   Location(s): left knee  Character: Dull ache, Stabbing and Burning    Intensity: severe    Progression of Symptoms: worse    Accompanying Signs & Symptoms:  Other symptoms: radiation of pain to hip and calf     History:   Previous similar pain: Yes Details: on right side before knee replacement       Worsened by    Overuse?: Yes Details: had right knee replaced 1.5 years ago and in recovery favored left knee   Morning Stiffness?:Yes Details: also when sitting for long periods     Alleviating factors:  Improved by: NSAID - tylenol helps tolerate pain       Had right knee injection in the past and never in the left. Moving and doing things.     Tylenol every 4 - 6 hours on how it hurts.   RLS - using Tramadol 50 mg at 4 pm and 50 at 10 pm and then sleeps.      BP - is taking her meds.  Took this am. Is taking the ASA 81 mg daily. Is not taking the calcium.     Dental - does not need antibiotics prophylaxis.     Problem, Medication and Allergy Lists were reviewed and updated if needed..    Patient is an established patient of this clinic..         Review of Systems:   Review of Systems         Physical Exam:     Vitals:    09/11/18 1038   BP: 136/83   Pulse: 86   Resp: 16   Temp: 98.5  F (36.9  C)   TempSrc: Oral   SpO2: 96%   Weight: 164 lb (74.4 kg)     Body mass index is 32.03 kg/(m^2).  Vitals were reviewed and were normal     Physical Exam    Left knee with mild effusion, some joint line tenderness and slight warmth.      [facility]   Injection Note    Zaira Fierro is a patient of Ange Alanis here for injection for OA of the left knee.    Consent: Affirmation of informed consent was signed and scanned into the medical record. Risks, benefits and alternatives  were discussed. Patient's questions were elicited and answered.   Procedure safety checklist was completed:  Yes  Time Out (Pause for the Cause) completed: Yes    Preoperative Diagnosis:OA  Postoperative Diagnosis: same       The left knee was prepped  in the usual sterile fashion INJECTION:  Using 4 mL of 1% lidocaine mixed with 40 mg of kenalog, the   was successfully injected without complication.  Patient did experience some pain relief following injection.    Technique:   Skin prep Betadine  Anesthesia 1% lidocaine  Complications:  No  Tolerance:  Pt tolerated procedure well and was in stable condition.     Follow up: Patient was instructed that there will be a return to pain in a couple hours followed by relief in the next several days to a week. Patient was advised to call if increasing redness and swelling.         Results:       Assessment and Plan        Zaira was seen today for knee pain.    Diagnoses and all orders for this visit:    Essential hypertension with goal blood pressure less than 140/90   - at goal    Arthritis of knee- refilled meds and injection today. Tolerated well and much better post.  Aware needs to rest in the next few days.   -     nortriptyline (PAMELOR) 25 MG capsule; Take 1 capsule (25 mg) by mouth At Bedtime  -     DRAIN/INJECT LARGE JOINT/BURSA    Restless leg syndrome - doing well with the Tramadol    Grieving - her sister just  of metastatic melanoma. She has moved out of their house of 70+ years and is living with the [place] sisters. She has her own room and it is working out for her. Is aware that Hospice has family grieving counseling and also that she can come see me if need be.        Medications Discontinued During This Encounter   Medication Reason     nortriptyline (PAMELOR) 25 MG capsule Reorder       Options for treatment and follow-up care were reviewed with the patient. Zaira Fierro  engaged in the decision making process and verbalized understanding  of the options discussed and agreed with the final plan.    Ange Crespo MD

## 2018-09-11 NOTE — LETTER
September 11, 2018      Zaira Fierro  [address]   [place] [address]        To whom it may concern:    Deborah does NOT require antibiotics for dental work prophylaxis.        Sincerely,    Ange Crespo MD

## 2018-09-11 NOTE — PATIENT INSTRUCTIONS
Here is the plan from today's visit    1. Arthritis of knee  I have refilled the Nortryptiline.   Take it easy with the knee for the next 2 days.  It might hurt more tomorrow.   - nortriptyline (PAMELOR) 25 MG capsule; Take 1 capsule (25 mg) by mouth At Bedtime  Dispense: 90 capsule; Refill: 1    2. Essential hypertension with goal blood pressure less than 140/90   - continue taking your meds    3. Restless leg syndrome      Please call or return to clinic if your symptoms don't go away.    Follow up plan  6 months    Thank you for coming to [facility] today.  Lab Testing:  **If you had lab testing today and your results are reassuring or normal they will be mailed to you or sent through [employer] within 7 days.   **If the lab tests need quick action we will call you with the results.  The phone number we will call with results is # [phone] (home) . If this is not the best number please call our clinic and change the number.  Medication Refills:  If you need any refills please call your pharmacy and they will contact us.   If you need to  your refill at a new pharmacy, please contact the new pharmacy directly. The new pharmacy will help you get your medications transferred faster.   Scheduling:  If you have any concerns about today's visit or wish to schedule another appointment please call our office during normal business hours [phone] (8-5:00 M-F)  If a referral was made to a [facility] and you don't get a call from central scheduling please call [phone].  If a Mammogram was ordered for you at [facility] call [phone] to schedule or change your appointment.  If you had an XRay/CT/Ultrasound/MRI ordered the number is [phone] to schedule or change your radiology appointment.   Medical Concerns:  If you have urgent medical concerns please call [phone] at any time of the day.  If you have a medical emergency please call [phone].

## 2018-09-11 NOTE — MR AVS SNAPSHOT
After Visit Summary   9/11/2018    Zaira Fierro    MRN: [id number]           Patient Information     Date Of Birth          1935        Visit Information        Provider Department      9/11/2018 10:40 AM Ange Crespo MD [facility]        Today's Diagnoses     Essential hypertension with goal blood pressure less than 140/90    -  1    Arthritis of knee        Restless leg syndrome          Care Instructions    Here is the plan from today's visit    1. Arthritis of knee  I have refilled the Nortryptiline.   Take it easy with the knee for the next 2 days.  It might hurt more tomorrow.   - nortriptyline (PAMELOR) 25 MG capsule; Take 1 capsule (25 mg) by mouth At Bedtime  Dispense: 90 capsule; Refill: 1    2. Essential hypertension with goal blood pressure less than 140/90   - continue taking your meds    3. Restless leg syndrome      Please call or return to clinic if your symptoms don't go away.    Follow up plan  6 months    Thank you for coming to [facility] today.  Lab Testing:  **If you had lab testing today and your results are reassuring or normal they will be mailed to you or sent through [employer] within 7 days.   **If the lab tests need quick action we will call you with the results.  The phone number we will call with results is # [phone] (home) . If this is not the best number please call our clinic and change the number.  Medication Refills:  If you need any refills please call your pharmacy and they will contact us.   If you need to  your refill at a new pharmacy, please contact the new pharmacy directly. The new pharmacy will help you get your medications transferred faster.   Scheduling:  If you have any concerns about today's visit or wish to schedule another appointment please call our office during normal business hours [phone] (8-5:00 M-F)  If a referral was made to a [facility] and you don't get a call from  central scheduling please call [phone].  If a Mammogram was ordered for you at [facility] call [phone] to schedule or change your appointment.  If you had an XRay/CT/Ultrasound/MRI ordered the number is [phone] to schedule or change your radiology appointment.   Medical Concerns:  If you have urgent medical concerns please call [phone] at any time of the day.  If you have a medical emergency please call 911.          Follow-ups after your visit        Who to contact     Please call your clinic at [phone] to:    Ask questions about your health    Make or cancel appointments    Discuss your medicines    Learn about your test results    Speak to your doctor            Additional Information About Your Visit        [employer] Information     [employer] gives you secure access to your electronic health record. If you see a primary care provider, you can also send messages to your care team and make appointments. If you have questions, please call your primary care clinic.  If you do not have a primary care provider, please call [phone] and they will assist you.      [employer] is an electronic gateway that provides easy, online access to your medical records. With [employer], you can request a clinic appointment, read your test results, renew a prescription or communicate with your care team.     To access your existing account, please contact your [facility] or call [phone] for assistance.        Care EveryWhere ID     This is your Care EveryWhere ID. This could be used by other organizations to access your [place] medical records  [id number]        Your Vitals Were     Pulse Temperature Respirations Pulse Oximetry Breastfeeding? BMI (Body Mass Index)    86 98.5  F (36.9  C) (Oral) 16 96% No 32.03 kg/m2       Blood Pressure from Last 3 Encounters:   09/11/18 136/83   06/01/18 112/71   11/03/17 122/83    Weight from Last 3 Encounters:   09/11/18 164 lb (74.4  kg)   06/01/18 168 lb (76.2 kg)   11/03/17 165 lb (74.8 kg)              Today, you had the following     No orders found for display         Where to get your medicines      These medications were sent to [facility] [id number] [facility] - [place], [place] - [address]  [address] [address]     Phone:  [phone]     nortriptyline 25 MG capsule          Primary Care Provider Office Phone # Fax #    Ange Crespo [phone]       [address] [address]        Equal Access to Services     [facility]: Hadii kori khan hadasho Soomaali, waaxda luqadaha, qaybta kaalmada deng, vaughn odell . So [facility] [phone].    ATENCIÓN: Si habla español, tiene a gibbons disposición servicios gratuitos de asistencia lingüística. [facility] [phone].    We comply with applicable federal civil rights laws and [place] laws. We do not discriminate on the basis of race, color, national origin, age, disability, sex, sexual orientation, or gender identity.            Thank you!     Thank you for choosing [facility]  for your care. Our goal is always to provide you with excellent care. Hearing back from our patients is one way we can continue to improve our services. Please take a few minutes to complete the written survey that you may receive in the mail after your visit with us. Thank you!             Your Updated Medication List - Protect others around you: Learn how to safely use, store and throw away your medicines at www.disposemymeds.org.          This list is accurate as of 9/11/18 11:11 AM.  Always use your most recent med list.                   Brand Name Dispense Instructions for use Diagnosis    acetaminophen 325 MG tablet    TYLENOL    100 tablet    Take 2 tablets (650 mg) by mouth 4 times daily    Chronic pain of left knee       ASPIRIN PO      Take 81 mg by mouth        calcium carb 1250 mg (500 mg [place])/vitamin  D 200 unit 500-200 MG-UNIT per tablet    OSCAL 500/200 D-3    90 tablet    Take 1 tablet by mouth 2 times daily    Routine health maintenance       hydrochlorothiazide 25 MG tablet    HYDRODIURIL    90 tablet    Take 1 tablet (25 mg) by mouth every morning    Benign essential hypertension       IRON SUPPLEMENT PO           latanoprost 0.005 % ophthalmic solution    XALATAN     Place 1 drop into both eyes At Bedtime Reported on 5/1/2017        losartan 100 MG tablet    COZAAR    90 tablet    Take 1 tablet (100 mg) by mouth every morning    Benign essential hypertension       MELATONIN PO      Take 5 mg by mouth        nortriptyline 25 MG capsule    PAMELOR    90 capsule    Take 1 capsule (25 mg) by mouth At Bedtime    Arthritis of knee       pravastatin 20 MG tablet    PRAVACHOL    90 tablet    Take 1 tablet (20 mg) by mouth every evening    Benign essential hypertension       traMADol 50 MG tablet    ULTRAM    60 tablet    Take one  tab by mouth at 4 PM and take one tab by mouth at 10PM    Restless leg syndrome       UNABLE TO FIND      MEDICATION NAME: Occuvite

## 2018-11-07 ENCOUNTER — ALLIED HEALTH/NURSE VISIT (OUTPATIENT)
Dept: FAMILY MEDICINE | Facility: CLINIC | Age: 83
End: 2018-11-07

## 2018-11-07 DIAGNOSIS — Z23 NEEDS FLU SHOT: Primary | ICD-10-CM

## 2018-11-07 NOTE — NURSING NOTE
Injectable influenza vaccine documentation    1. Has the patient received the information for the influenza vaccine? YES    2. Does the patient have a severe allergy to eggs (Patients with a severe egg allergy should be assessed by a medical provider, RN, or clinical pharmacist. If they receive the influenza vaccine, please have them observed for 15 minutes.)? No    3. Has the patient had an allergic reaction to previous influenza vaccines? No    4. Has the patient had any severe allergic reactions to past influenza vaccines ? No       5. Does patient have a history of Guillain-Sealy syndrome? No      Based on responses above, I administered the influenza vaccine.  ALISA Higuera 10:45 AM November 7, 2018

## 2018-11-07 NOTE — MR AVS SNAPSHOT
After Visit Summary   11/7/2018    Zaira Fierro    MRN: [id number]           Patient Information     Date Of Birth          1935        Visit Information        Provider Department      11/7/2018 11:00 AM SY [facility]        Today's Diagnoses     Needs flu shot    -  1       Follow-ups after your visit        Your next 10 appointments already scheduled     Nov 07, 2018 11:00 AM CST   Flu Shot with SY [facility] ([facility])    [address],  [address]  [place] [address]   [phone]              Who to contact     Please call your clinic at [phone] to:    Ask questions about your health    Make or cancel appointments    Discuss your medicines    Learn about your test results    Speak to your doctor            Additional Information About Your Visit        [employer] Information     [employer] gives you secure access to your electronic health record. If you see a primary care provider, you can also send messages to your care team and make appointments. If you have questions, please call your primary care clinic.  If you do not have a primary care provider, please call [phone] and they will assist you.      [employer] is an electronic gateway that provides easy, online access to your medical records. With [employer], you can request a clinic appointment, read your test results, renew a prescription or communicate with your care team.     To access your existing account, please contact your [facility] or call [phone] for assistance.        Care EveryWhere ID     This is your Care EveryWhere ID. This could be used by other organizations to access your [place] medical records  [id number]         Blood Pressure from Last 3 Encounters:   09/11/18 136/83   06/01/18 112/71   11/03/17 122/83    Weight from Last 3 Encounters:   09/11/18 164 lb (74.4 kg)   06/01/18 168 lb  (76.2 kg)   11/03/17 165 lb (74.8 kg)              We Performed the Following     ADMIN VACCINE, INITIAL     FLU VACCINE, INCREASED ANTIGEN, PRESV FREE        Primary Care Provider Office Phone # Fax #    Ange Crespo [phone]       [address] [address]        Equal Access to Services     [facility] : Hadii aad ku hadasho Soomaali, waaxda luqadaha, qaybta kaalmada adeegyada, waxay maryin hayaan ademarina delatorremaryjacob odell . So [facility] [phone].    ATENCIÓN: Si habla espming, tiene a gibbons disposición servicios gratuitos de asistencia lingüística. [facility] [phone].    We comply with applicable federal civil rights laws and [place] laws. We do not discriminate on the basis of race, color, national origin, age, disability, sex, sexual orientation, or gender identity.            Thank you!     Thank you for choosing [facility]  for your care. Our goal is always to provide you with excellent care. Hearing back from our patients is one way we can continue to improve our services. Please take a few minutes to complete the written survey that you may receive in the mail after your visit with us. Thank you!             Your Updated Medication List - Protect others around you: Learn how to safely use, store and throw away your medicines at www.disposemymeds.org.          This list is accurate as of 11/7/18 10:46 AM.  Always use your most recent med list.                   Brand Name Dispense Instructions for use Diagnosis    acetaminophen 325 MG tablet    TYLENOL    100 tablet    Take 2 tablets (650 mg) by mouth 4 times daily    Chronic pain of left knee       ASPIRIN PO      Take 81 mg by mouth        calcium carb 1250 mg (500 mg [place])/vitamin D 200 unit 500-200 MG-UNIT per tablet    OSCAL 500/200 D-3    90 tablet    Take 1 tablet by mouth 2 times daily    Routine health maintenance       hydrochlorothiazide 25 MG tablet    HYDRODIURIL    90 tablet     Take 1 tablet (25 mg) by mouth every morning    Benign essential hypertension       IRON SUPPLEMENT PO           latanoprost 0.005 % ophthalmic solution    XALATAN     Place 1 drop into both eyes At Bedtime Reported on 5/1/2017        losartan 100 MG tablet    COZAAR    90 tablet    Take 1 tablet (100 mg) by mouth every morning    Benign essential hypertension       MELATONIN PO      Take 5 mg by mouth        nortriptyline 25 MG capsule    PAMELOR    90 capsule    Take 1 capsule (25 mg) by mouth At Bedtime    Arthritis of knee       pravastatin 20 MG tablet    PRAVACHOL    90 tablet    Take 1 tablet (20 mg) by mouth every evening    Benign essential hypertension       traMADol 50 MG tablet    ULTRAM    60 tablet    Take one  tab by mouth at 4 PM and take one tab by mouth at 10PM    Restless leg syndrome       UNABLE TO FIND      MEDICATION NAME: Occuvite

## 2018-12-14 DIAGNOSIS — I10 BENIGN ESSENTIAL HYPERTENSION: ICD-10-CM

## 2018-12-14 RX ORDER — HYDROCHLOROTHIAZIDE 25 MG/1
25 TABLET ORAL EVERY MORNING
Qty: 90 TABLET | Refills: 1 | Status: SHIPPED | OUTPATIENT
Start: 2018-12-14 | End: 2019-03-19

## 2018-12-14 NOTE — TELEPHONE ENCOUNTER

## 2019-01-08 ENCOUNTER — MYC MEDICAL ADVICE (OUTPATIENT)
Dept: FAMILY MEDICINE | Facility: CLINIC | Age: 84
End: 2019-01-08

## 2019-01-09 DIAGNOSIS — M25.562 CHRONIC PAIN OF LEFT KNEE: ICD-10-CM

## 2019-01-09 DIAGNOSIS — G89.29 CHRONIC PAIN OF LEFT KNEE: ICD-10-CM

## 2019-01-09 DIAGNOSIS — G25.81 RESTLESS LEG SYNDROME: ICD-10-CM

## 2019-01-09 RX ORDER — ACETAMINOPHEN 325 MG/1
650 TABLET ORAL 4 TIMES DAILY
Qty: 100 TABLET | Refills: 11 | Status: SHIPPED | OUTPATIENT
Start: 2019-01-09 | End: 2020-01-13

## 2019-01-09 RX ORDER — TRAMADOL HYDROCHLORIDE 50 MG/1
TABLET ORAL
Qty: 60 TABLET | Refills: 5 | Status: SHIPPED | OUTPATIENT
Start: 2019-01-09 | End: 2019-06-10

## 2019-01-10 NOTE — TELEPHONE ENCOUNTER
RN verbally called in the order for the tramadol as pharmacy did not receive the fax (thought something may be wrong with their machine).   Cris Grewal RN

## 2019-02-08 DIAGNOSIS — I10 BENIGN ESSENTIAL HYPERTENSION: ICD-10-CM

## 2019-02-08 RX ORDER — PRAVASTATIN SODIUM 20 MG
20 TABLET ORAL EVERY EVENING
Qty: 90 TABLET | Refills: 3 | Status: SHIPPED | OUTPATIENT
Start: 2019-02-08 | End: 2019-06-10

## 2019-02-08 NOTE — TELEPHONE ENCOUNTER

## 2019-02-21 DIAGNOSIS — M17.10 ARTHRITIS OF KNEE: ICD-10-CM

## 2019-02-21 RX ORDER — NORTRIPTYLINE HCL 25 MG
25 CAPSULE ORAL AT BEDTIME
Qty: 90 CAPSULE | Refills: 1 | Status: SHIPPED | OUTPATIENT
Start: 2019-02-21 | End: 2019-06-10

## 2019-02-21 NOTE — TELEPHONE ENCOUNTER

## 2019-03-18 DIAGNOSIS — I10 BENIGN ESSENTIAL HYPERTENSION: ICD-10-CM

## 2019-03-18 NOTE — TELEPHONE ENCOUNTER

## 2019-03-19 RX ORDER — HYDROCHLOROTHIAZIDE 25 MG/1
25 TABLET ORAL EVERY MORNING
Qty: 90 TABLET | Refills: 1 | Status: SHIPPED | OUTPATIENT
Start: 2019-03-19 | End: 2019-06-10

## 2019-05-07 ENCOUNTER — DOCUMENTATION ONLY (OUTPATIENT)
Dept: FAMILY MEDICINE | Facility: CLINIC | Age: 84
End: 2019-05-07

## 2019-05-30 NOTE — PROGRESS NOTES
Visit to the client's home for annual health risk assessment.  An  was not needed.    Current situation/living environment  Member lives in a one bedroom apartment. Home was clean and well decorated.     Activities of daily living (ADL)/instrumental activities of daily living (IADL) and functional issues  Member is independent in all ADL and IADL task. Member states that when she lived in the house it was more difficult for her to keep up with the task but since she only has the apartment it is easier to manage.       Health concerns for today  Member has no concerns at this time. States that she is receiving injections in her knee that she isn't experiencing as much pain as she was before.  Has patient fallen 2 or more times in the last year? No  Has patient fallen with injury in the last year? No    Cognition/mental health  Member states that her memory is good and states that she misses her sisters but is doing okay. Member lost a sister in August and another sister in January.    STARS/Med Adherence  Member has aged out of STARS measures and takes all medications as prescribed    Client's Plan of Care consists of:  No formal services in place since member is independent in all cares. Member understands to call with any additional service needs, questions or concerns. Will revisit in 6 months sooner if needed.    Nurys Smyth,[facility]  [facility]ESSE [facility] Care Coordinator   [phone]

## 2019-06-10 ENCOUNTER — OFFICE VISIT (OUTPATIENT)
Dept: FAMILY MEDICINE | Facility: CLINIC | Age: 84
End: 2019-06-10
Payer: COMMERCIAL

## 2019-06-10 VITALS
DIASTOLIC BLOOD PRESSURE: 97 MMHG | HEIGHT: 62 IN | HEART RATE: 85 BPM | SYSTOLIC BLOOD PRESSURE: 176 MMHG | TEMPERATURE: 98 F | RESPIRATION RATE: 16 BRPM | WEIGHT: 167.2 LBS | OXYGEN SATURATION: 98 % | BODY MASS INDEX: 30.77 KG/M2

## 2019-06-10 DIAGNOSIS — I10 ESSENTIAL HYPERTENSION WITH GOAL BLOOD PRESSURE LESS THAN 140/90: ICD-10-CM

## 2019-06-10 DIAGNOSIS — I10 BENIGN ESSENTIAL HYPERTENSION: ICD-10-CM

## 2019-06-10 DIAGNOSIS — M17.10 ARTHRITIS OF KNEE: ICD-10-CM

## 2019-06-10 DIAGNOSIS — Z00.00 MEDICARE ANNUAL WELLNESS VISIT, SUBSEQUENT: Primary | ICD-10-CM

## 2019-06-10 DIAGNOSIS — G25.81 RESTLESS LEG SYNDROME: ICD-10-CM

## 2019-06-10 LAB
ALBUMIN SERPL-MCNC: 4.6 MG/DL (ref 3.5–4.7)
ALP SERPL-CCNC: 89.1 U/L (ref 31.7–110.5)
ALT SERPL-CCNC: 11.2 U/L (ref 0–45)
AST SERPL-CCNC: 18.8 U/L (ref 0–45)
BILIRUB SERPL-MCNC: 0.5 MG/DL (ref 0.2–1.3)
BILIRUBIN UR: NEGATIVE
BLOOD UR: NEGATIVE
BUN SERPL-MCNC: 13.3 MG/DL (ref 7–19)
CALCIUM SERPL-MCNC: 9.3 MG/DL (ref 8.5–10.1)
CHLORIDE SERPLBLD-SCNC: 100.6 MMOL/L (ref 98–110)
CHOLEST SERPL-MCNC: 180.4 MG/DL (ref 0–200)
CHOLEST/HDLC SERPL: 3.7 {RATIO} (ref 0–5)
CO2 SERPL-SCNC: 29.8 MMOL/L (ref 20–32)
CREAT SERPL-MCNC: 0.6 MG/DL (ref 0.5–1)
CREAT UR-MCNC: 53 MG/DL
GFR SERPL CREATININE-BSD FRML MDRD: >90 ML/MIN/1.7 M2
GLUCOSE SERPL-MCNC: 114.6 MG'DL (ref 70–99)
GLUCOSE URINE: NEGATIVE
HDLC SERPL-MCNC: 48.8 MG/DL
KETONES UR QL: NEGATIVE
LDLC SERPL CALC-MCNC: 97 MG/DL (ref 0–129)
LEUKOCYTE ESTERASE UR: NEGATIVE
MICROALBUMIN UR-MCNC: <5 MG/L
MICROALBUMIN/CREAT UR: NORMAL MG/G CR (ref 0–25)
NITRITE UR QL STRIP: NEGATIVE
PH UR STRIP: 7 [PH] (ref 4.5–8)
POTASSIUM SERPL-SCNC: 3.8 MMOL/DL (ref 3.3–4.5)
PROT SERPL-MCNC: 7.3 G/DL (ref 6.8–8.8)
PROTEIN UR: NEGATIVE
SODIUM SERPL-SCNC: 138.7 MMOL/L (ref 132.6–141.4)
SP GR UR STRIP: 1.01 (ref 1–1.03)
TRIGL SERPL-MCNC: 174.2 MG/DL (ref 0–150)
UROBILINOGEN UR STRIP-ACNC: NORMAL
VLDL CHOLESTEROL: 34.8 MG/DL (ref 7–32)

## 2019-06-10 RX ORDER — PRAVASTATIN SODIUM 20 MG
20 TABLET ORAL EVERY EVENING
Qty: 90 TABLET | Refills: 3 | Status: SHIPPED | OUTPATIENT
Start: 2019-06-10 | End: 2020-06-04

## 2019-06-10 RX ORDER — AMLODIPINE BESYLATE 2.5 MG/1
2.5 TABLET ORAL DAILY
Qty: 30 TABLET | Refills: 1 | Status: SHIPPED | OUTPATIENT
Start: 2019-06-10 | End: 2019-07-10

## 2019-06-10 RX ORDER — TRIAMCINOLONE ACETONIDE 40 MG/ML
40 INJECTION, SUSPENSION INTRA-ARTICULAR; INTRAMUSCULAR ONCE
Status: COMPLETED | OUTPATIENT
Start: 2019-06-10 | End: 2019-06-10

## 2019-06-10 RX ORDER — NORTRIPTYLINE HCL 25 MG
25 CAPSULE ORAL AT BEDTIME
Qty: 90 CAPSULE | Refills: 1 | Status: SHIPPED | OUTPATIENT
Start: 2019-06-10 | End: 2019-12-11

## 2019-06-10 RX ORDER — HYDROCHLOROTHIAZIDE 25 MG/1
25 TABLET ORAL EVERY MORNING
Qty: 90 TABLET | Refills: 1 | Status: SHIPPED | OUTPATIENT
Start: 2019-06-10 | End: 2019-12-10

## 2019-06-10 RX ORDER — TRAMADOL HYDROCHLORIDE 50 MG/1
TABLET ORAL
Qty: 60 TABLET | Refills: 5 | Status: SHIPPED | OUTPATIENT
Start: 2019-06-10 | End: 2019-12-05

## 2019-06-10 RX ORDER — LOSARTAN POTASSIUM 100 MG/1
100 TABLET ORAL EVERY MORNING
Qty: 90 TABLET | Refills: 3 | Status: SHIPPED | OUTPATIENT
Start: 2019-06-10 | End: 2020-06-04

## 2019-06-10 RX ADMIN — TRIAMCINOLONE ACETONIDE 40 MG: 40 INJECTION, SUSPENSION INTRA-ARTICULAR; INTRAMUSCULAR at 11:45

## 2019-06-10 ASSESSMENT — MIFFLIN-ST. JEOR: SCORE: 1158.72

## 2019-06-10 NOTE — LETTER
Daphnie 10, 2019      Zaira Fierro  [address]   [place] [address]        Dear Zaira,    Thank you for getting your care at [facility]. Please see below for your test results.  Your testing was all good. Your kidney testing was normal.  Your cholesterol is good!      Resulted Orders   Comprehensive Metabolic Panel ([facility])   Result Value Ref Range    Albumin 4.6 3.5 - 4.7 mg/dL    Alkaline Phosphatase 89.1 31.7 - 110.5 U/L    ALT 11.2 0.0 - 45.0 U/L    AST 18.8 0.0 - 45.0 U/L    Bilirubin Total 0.5 0.2 - 1.3 mg/dL    Urea Nitrogen 13.3 7.0 - 19.0 mg/dL    Calcium 9.3 8.5 - 10.1 mg/dL    Chloride 100.6 98.0 - 110.0 mmol/L    Carbon Dioxide 29.8 20.0 - 32.0 mmol/L    Creatinine 0.6 0.5 - 1.0 mg/dL    Glucose 114.6 (H) 70.0 - 99.0 mg'dL    Potassium 3.8 3.3 - 4.5 mmol/dL    Sodium 138.7 132.6 - 141.4 mmol/L    Protein Total 7.3 6.8 - 8.8 g/dL    GFR Estimate >90 >60.0 mL/min/1.7 m2    GFR Estimate If Black >90 >60.0 mL/min/1.7 m2   Lipid Panel ([facility])   Result Value Ref Range    Cholesterol 180.4 0.0 - 200.0 mg/dL    Cholesterol/HDL Ratio 3.7 0.0 - 5.0    HDL Cholesterol 48.8 >40.0 mg/dL    LDL Cholesterol Calculated 97 0 - 129 mg/dL    Triglycerides 174.2 (H) 0.0 - 150.0 mg/dL    VLDL Cholesterol 34.8 (H) 7.0 - 32.0 mg/dL   Urinalysis, Micro If (UA) ([facility])   Result Value Ref Range    Specific Gravity Urine 1.015 1.005 - 1.030    pH Urine 7.0 4.5 - 8.0    Leukocyte Esterase UR Negative -ATIVE    Nitrite Urine Negative -ATIVE    Protein UR Negative -ATIVE    Glucose Urine Negative -ATIVE    Ketones Urine Negative -ATIVE    Urobilinogen mg/dL 0.2 E.U./dL 0.2 E.U./dL    Bilirubin UR Negative -ATIVE    Blood UR Negative -ATIVE       If you have any concerns about these results please call and leave a message for me or send a [employer] message to the clinic.    Sincerely,    Ange Crespo MD

## 2019-06-10 NOTE — PROGRESS NOTES
06-Feb-2019 21:45 Medicare Annual Wellness Visit         HPI     This 84 year old female presents as an established patient  Ange Crespo who presents for an subsequent Medicare Wellness Exam.  Patient also reports left knee pain, would like cortisone injection    Continues to live in a small apartment near the [facility].     Question about Losartan.     She is done with her preventive testing.     Wt Readings from Last 4 Encounters:   06/10/19 75.8 kg (167 lb 3.2 oz)   18 74.4 kg (164 lb)   18 76.2 kg (168 lb)   17 74.8 kg (165 lb)         Patient Active Problem List   Diagnosis     Health Care Home     Diverticulosis of large intestine     Hyperlipidemia     Obesity     Disorder of bone and cartilage     Prediabetes     Advanced directives, counseling/discussion     Macular degeneration (senile) of retina     Cataract     Primary osteoarthritis of both knees     Arthritis of knee     Essential hypertension with goal blood pressure less than 140/90     Status post knee surgery     Restless leg syndrome       Past Medical History:   Diagnosis Date     Glaucoma      Hearing loss     doesn't wear hearing aids     Hyperlipidaemia      Hypertension      Macular degeneration      Multiple joint pain     secondary to arthritis     Obesity      Osteoarthritis involving multiple joints on both sides of body      Restless leg syndrome         Family History   Problem Relation Age of Onset     Coronary Artery Disease Father         Age 62 when      Cardiovascular Father      Myocardial Infarction Father      Diabetes Mother         Occurred when older     Hypertension Mother      Arthritis Mother      Eye Disorder Mother      Arthritis Sister      Asthma Sister      Hypertension Sister      Osteoporosis Sister      Hypertension Sister      Hyperlipidemia Sister      Obesity Sister      Spine Problems Sister         Scoliosis     Kidney Disease Brother          Past Surgical History:   Procedure Laterality Date      ARTHROPLASTY KNEE Right 4/7/2017    Procedure: ARTHROPLASTY KNEE;  Surgeon: Aamir Sutton MD;  Location: [facility] OR     TONSILLECTOMY  1940       Reviewed no other significant FH    Family History and past Medical History reviewed and it is unchanged/updated.       Review of Systems   Constitutional:   fevers, night sweats or unintentional weight change ?  NO      Eyes:   vision change, diplopia or red eyes?  Yes; macular degeneration      Ears, Nose, Mouth, Throat:   tinnitus or hearing change,  epistaxis or nasal discharge,  oral lesions, throat pain ?  Yes; moderate hearing loss      Neck:   stiffness?  NO           Cardiovascular:   chest pain, palpitations, or pain with walking, orthopnea or PND?  NO   Breasts:  Any bumps or unusual discharge?     NO         Respiratory:   dyspnea, cough, shortness of breath or wheezing?  NO     GI:   nausea, vomiting, diarrhea or constipation,  abdominal pain ?  NO     :   change in urine,  dysuria or hematuria,  sexual dysfunction ?  NO     Musculoskeletal:   joint or muscle pain or swelling?  Yes; arthritis           Skin:   concerning lesions or moles?  NO           Nervous System:   loss of strength or sensation,  numbness or tingling,  tremor,  dizziness,  headache?  NO   Endocrine/Homone:   polyuria or polydipsia,  temperature intolerance?  NO            Blood and Lymphnodes:   concerning bumps,  bleeding problems?  NO            Allergy:   environmental allergies?  NO            Mental Health:   depression or anxiety,  sleep problems?  NO               Medical Care     Have you been to an ER or a hospital in the last year? No  What other specialists or organizations are involved in your medical care?  [facility], also seeing dentist every 4 months  Current providers sharing in care for this patient include:  Patient Care Team:  Ange Crespo MD as PCP - General (Family Practice)  Nurys Smyth LSW as [facility] Coordinator  Leyda Garcia  MD Lore as MD (Family Practice)  Sigrid Montano MD as Assigned PCP  Sil Hung APRN CNP as Assigned PCP         Social History     Social History     Tobacco Use     Smoking status: Never Smoker     Smokeless tobacco: Never Used   Substance Use Topics     Alcohol use: No     Marital Status:Single  Who lives in your household? self  Does your home have any of the following safety concerns? Loose rugs in the hallway, no grab bars in the bathroom, no handrails on the stairs or have poorly lit areas? No  Do you feel threatened or controlled by a partner, ex-partner or anyone in your life? No  Has anyone hurt you physically, for example by pushing, hitting, slapping or kicking you or forcing you to have sex? No  Do you need help with the phone, transportation, shopping, preparing meals, housework, laundry, medications or managing money? No   Have you noticed any hearing difficulties? Yes       Risk Behaviors and Healthy Habits     How many servings of fruits and vegetables do you eat a day? 1  How often do you exercise and what do you do? N/A  Do you frequently ride without a seatbelt? No  Do you use tobacco?  No  Do you use any other drugs? No         Do you use alcohol?No    Today's PHQ-2 Score: 0    Sexual Health     Are you sexually active?  No   If yes, with men, women, or both? N/A  If yes, do you more than one current partner?N/A  If yes, are you using condoms?  N/A  Have you had any sexually transmitted infections? N/A   Any sexual concerns? No     FOR WOMEN  What year did you stop having periods? unsure  Any vaginal bleeding in the last year? No  Have you ever had an abnormal Pap smear? No    FUNCTIONAL ABILITY/SAFETY SCREENING     Fall Risk Assessment Today: Fallen 2 or more times in the past year?: No  Any fall with injury in the past year?: No    Hearing evaluation if done: Yes once in the past, pt was advised she had some hearing loss but did not need to get hearing aids     EVALUATION  "OF COGNITIVE FUNCTION     Mood/affect:Normal  Appearance:Normal  Family member/caregiver input: Normal  Mini Cog Scoring   3 points   Clock Draw Test result:  Normal    SCREENING FOR PREVENTION and EARLY DETECTION     ECG (if done)not performed    Corrected Visual acuity: Per pt she sees eye doctor every 4 months  Screening Lipid Level (covered every 5 years ): Recommended and patient accepted testing.  ASA use (>3% risk in 5 y):  Recommended and patient accepted testing.  none    CV Risk based on Pooled Cohort Risk:  The ASCVD Risk score (Carrolltonursula WALDRON Jr., et al., 2013) failed to calculate for the following reasons:    The 2013 ASCVD risk score is only valid for ages 40 to 79      Advanced Directives: Discussed and patient desires to be full code.      Immunization History   Administered Date(s) Administered     Influenza (High Dose) 3 valent vaccine 10/16/2015, 12/01/2016, 11/03/2017, 11/07/2018     Influenza (IIV3) PF 12/09/2004, 11/06/2006, 11/01/2007, 09/09/2008, 12/15/2010, 12/06/2011, 10/16/2012, 11/15/2013     Influenza Vaccine, 3 YRS +, IM (QUADRIVALENT W/PRESERVATIVES) 11/05/2014     Pneumo Conj 13-V (2010&after) 07/22/2016     Pneumococcal 23 valent 11/25/2003     TD (ADULT, 7+) 08/15/1996, 09/14/2006     TDAP Vaccine (Boostrix) 12/14/2016       Reviewed Immunization Record Today  Pneumoccocal Vaccine: No  Varicella Vaccine: will get  TDaP:No         Physical Exam     Vitals: BP (!) 176/97 (BP Location: Left arm, Patient Position: Sitting, Cuff Size: Adult Regular)   Pulse 85   Temp 98  F (36.7  C) (Oral)   Resp 16   Ht 1.562 m (5' 1.5\")   Wt 75.8 kg (167 lb 3.2 oz)   SpO2 98%   BMI 31.08 kg/m    BMI= Body mass index is 31.08 kg/m .  GENERAL APPEARANCE: healthy, alert and no distress  EYES: Eyes grossly normal to inspection, PERRL and conjunctivae and sclerae normal  HENT: ear canals and TM's normal, nose and mouth without ulcers or lesions, oropharynx clear and oral mucous membranes moist  NECK: no " adenopathy, no asymmetry, masses, or scars and thyroid normal to palpation  RESP: lungs clear to auscultation - no rales, rhonchi or wheezes  CV: regular rate and rhythm, normal S1 S2, no S3 or S4, no murmur, click or rub, no peripheral edema and peripheral pulses strong  ABDOMEN: soft, nontender, no hepatosplenomegaly, no masses and bowel sounds normal  MS: no musculoskeletal defects are noted, gait is a bit antalgic with knee OA.  No edema.   SKIN: no suspicious lesions or rashes  PSYCH: mentation appears normal and affect normal/bright      [facility]  Knee Injection Note    Zaira Fierro is a patient of Ange Alanis here for knee injection for Osteoarthritis of the left knee.    Consent: Affirmation of informed consent was signed and scanned into the medical record. Risks, benefits and alternatives were discussed. Patient's questions were elicited and answered.   Procedure safety checklist was completed:  Yes  Time Out (Pause for the Cause) completed: Yes    Preoperative Diagnosis:Knee OA - left  Postoperative Diagnosis: same       The left knee was prepped  in the usual sterile fashion INJECTION:  Using 4 cc of 1% lidocaine mixed with 40 mg of kenalog, the knee joint was successfully injected without complication.  Patient did experience some pain relief following injection.    Technique:   Skin prep [employer]are  Anesthesia 1% lidocaine  Complications:  No  Tolerance:  Pt tolerated procedure well and was in stable condition.         Follow up: Pt was instructed that there will be a return to pain in a couple hours followed by relief in the next several days to a week. Patient was advised to call in increasing redness and swelling.           Assessment and Plan   subsequent   Medicare Wellness Exam  Zaira was seen today for medicare visit and knee left.    Diagnoses and all orders for this visit:    Medicare annual wellness visit, subsequent - does not want further preventive testing.   Reviewed code and is full.      Essential hypertension with goal blood pressure less than 140/90 - Not controlled. She has gained 6 pounds and will start working on losing weight, joining [employer] and seeing if [place] can provide ride there.  Also added Norvasc 2.5 mg and will have her return for repeat BP in a month.   -     Comprehensive Metabolic Panel ([facility])  -     Lipid Panel ([facility])  -     Urinalysis, Micro If (UA) ([place])  -     Albumin Random Urine Quantitative with Creat Ratio  -     amLODIPine (NORVASC) 2.5 MG tablet; Take 1 tablet (2.5 mg) by mouth daily    Restless leg syndrome - only option that has worked and has had severe RLS in the past.  Continue.   -     traMADol (ULTRAM) 50 MG tablet; Take one  tab by mouth at 4 PM and take one tab by mouth at 10PM    Benign essential hypertension - as above.   -     losartan (COZAAR) 100 MG tablet; Take 1 tablet (100 mg) by mouth every morning  -     pravastatin (PRAVACHOL) 20 MG tablet; Take 1 tablet (20 mg) by mouth every evening  -     hydrochlorothiazide (HYDRODIURIL) 25 MG tablet; Take 1 tablet (25 mg) by mouth every morning    Arthritis of knee - s/p knee injection today, with real relief post.  Last time helped for 6 months.    -     nortriptyline (PAMELOR) 25 MG capsule; Take 1 capsule (25 mg) by mouth At Bedtime  -     triamcinolone (KENALOG-40) injection 40 mg  -     DRAIN/INJECT LARGE JOINT/BURSA  -     triamcinolone (KENALOG-40) injection 40 mg    Options for treatment and follow-up care were reviewed with the Zaira Fierro and/or guardian engaged in the decision making process and verbalized understanding of the options discussed and agreed with the final plan.    ILIANA CORTES

## 2019-06-10 NOTE — PATIENT INSTRUCTIONS
Personalized Prevention Plan  You are due for the preventive services outlined below.  Your care team is available to assist you in scheduling these services.  If you have already completed any of these items, please share that information with your care team to update in your medical record.  Health Maintenance Due   Topic Date Due     Zoster (Shingles) Vaccine (1 of 2) 06/10/1986     Annual Wellness Visit  06/10/2001     Osteoporosis Screening  10/09/2008     Discuss Advance Directive Planning  07/30/2018     PHQ-2  01/01/2019       Shingrix!!!    1. Knee injection: take it easy for 24 hours. OK to ice it if aches.  Then slowly increase activity the next day.     2. Try to go to [place] - use the [employer] ride to help you get there.     3. Blood pressure - I will call in Norvasc 2.5 mg to add to your current medications. I will then need to see you back in 1 month to make sure it works.

## 2019-06-11 RX ORDER — TRIAMCINOLONE ACETONIDE 40 MG/ML
40 INJECTION, SUSPENSION INTRA-ARTICULAR; INTRAMUSCULAR ONCE
Status: DISCONTINUED | OUTPATIENT
Start: 2019-06-11 | End: 2020-01-25

## 2019-07-09 NOTE — PROGRESS NOTES
AARTI Meneses is a 84 year old  female  who presents for follow up of concern(s) listed below:    Chief Complaint   Patient presents with     Hypertension     [employer]ought ride here.  She is a bit stressed.   BP on 2.5 mg of Amlodipine in addition to her hydrochlorothiazide, and losartan.   Is taking the Losartan in the am with the amlodipine   SE: none   Exercising: her knee is hurting to walk, as is her back. She is wondering if she can use ibuprofen. She uses the tylenol regularly.    There is also a bicycle and treadmill where she now lives.   Right knee is OK - TKA there. Left knee hurts (better post injection ) and is weak.     Has a wrist blood pressure machine at home that today was 133/55 when our digital was 147/83     Patient Active Problem List   Diagnosis     Health Care Home     Diverticulosis of large intestine     Hyperlipidemia     Obesity     Disorder of bone and cartilage     Prediabetes     Advanced directives, counseling/discussion     Macular degeneration (senile) of retina     Cataract     Primary osteoarthritis of both knees     Arthritis of knee     Essential hypertension with goal blood pressure less than 140/90     Status post knee surgery     Restless leg syndrome       Current Outpatient Medications   Medication Sig Dispense Refill     acetaminophen (TYLENOL) 325 MG tablet Take 2 tablets (650 mg) by mouth 4 times daily 100 tablet 11     amLODIPine (NORVASC) 2.5 MG tablet Take 1 tablet (2.5 mg) by mouth daily 30 tablet 1     ASPIRIN PO Take 81 mg by mouth       hydrochlorothiazide (HYDRODIURIL) 25 MG tablet Take 1 tablet (25 mg) by mouth every morning 90 tablet 1     latanoprost (XALATAN) 0.005 % ophthalmic solution Place 1 drop into both eyes At Bedtime Reported on 5/1/2017       losartan (COZAAR) 100 MG tablet Take 1 tablet (100 mg) by mouth every morning 90 tablet 3     nortriptyline (PAMELOR) 25 MG capsule Take 1 capsule (25 mg) by mouth At Bedtime 90 capsule 1     pravastatin  (PRAVACHOL) 20 MG tablet Take 1 tablet (20 mg) by mouth every evening 90 tablet 3     traMADol (ULTRAM) 50 MG tablet Take one  tab by mouth at 4 PM and take one tab by mouth at 10PM 60 tablet 5          Allergies   Allergen Reactions     No Known Allergies                 Review of Systems:               Physical Exam:     Vitals:    07/10/19 1311 07/10/19 1314 07/10/19 1315 07/10/19 1336   BP: (!) 152/91 (!) 152/91 147/83 156/83   Pulse: 106 106     Resp: 16 16     Temp:  98.3  F (36.8  C)     TempSrc:  Oral     SpO2: 96%      Weight: 75.8 kg (167 lb) 75.8 kg (167 lb)       Body mass index is 31.04 kg/m .  Vital signs normal except BP      Office Visit on 06/10/2019   Component Date Value Ref Range Status     Albumin 06/10/2019 4.6  3.5 - 4.7 mg/dL Final     Alkaline Phosphatase 06/10/2019 89.1  31.7 - 110.5 U/L Final     ALT 06/10/2019 11.2  0.0 - 45.0 U/L Final     AST 06/10/2019 18.8  0.0 - 45.0 U/L Final     Bilirubin Total 06/10/2019 0.5  0.2 - 1.3 mg/dL Final     Urea Nitrogen 06/10/2019 13.3  7.0 - 19.0 mg/dL Final     Calcium 06/10/2019 9.3  8.5 - 10.1 mg/dL Final     Chloride 06/10/2019 100.6  98.0 - 110.0 mmol/L Final     Carbon Dioxide 06/10/2019 29.8  20.0 - 32.0 mmol/L Final     Creatinine 06/10/2019 0.6  0.5 - 1.0 mg/dL Final     Glucose 06/10/2019 114.6* 70.0 - 99.0 mg'dL Final     Potassium 06/10/2019 3.8  3.3 - 4.5 mmol/dL Final     Sodium 06/10/2019 138.7  132.6 - 141.4 mmol/L Final     Protein Total 06/10/2019 7.3  6.8 - 8.8 g/dL Final     GFR Estimate 06/10/2019 >90  >60.0 mL/min/1.7 m2 Final     GFR Estimate If Black 06/10/2019 >90  >60.0 mL/min/1.7 m2 Final     Cholesterol 06/10/2019 180.4  0.0 - 200.0 mg/dL Final     Cholesterol/HDL Ratio 06/10/2019 3.7  0.0 - 5.0 Final     HDL Cholesterol 06/10/2019 48.8  >40.0 mg/dL Final     LDL Cholesterol Calculated 06/10/2019 97  0 - 129 mg/dL Final     Triglycerides 06/10/2019 174.2* 0.0 - 150.0 mg/dL Final     VLDL Cholesterol 06/10/2019 34.8* 7.0 -  32.0 mg/dL Final     Specific Gravity Urine 06/10/2019 1.015  1.005 - 1.030 Final     pH Urine 06/10/2019 7.0  4.5 - 8.0 Final     Leukocyte Esterase UR 06/10/2019 Negative  -ATIVE Final     Nitrite Urine 06/10/2019 Negative  -ATIVE Final     Protein UR 06/10/2019 Negative  -ATIVE Final     Glucose Urine 06/10/2019 Negative  -ATIVE Final     Ketones Urine 06/10/2019 Negative  -ATIVE Final     Urobilinogen mg/dL 06/10/2019 0.2 E.U./dL  0.2 E.U./dL Final     Bilirubin UR 06/10/2019 Negative  -ATIVE Final     Blood UR 06/10/2019 Negative  -ATIVE Final     Creatinine Urine 06/10/2019 53  mg/dL Final     Albumin Urine mg/L 06/10/2019 <5  mg/L Final     Albumin Urine mg/g Cr 06/10/2019 Unable to calculate due to low value  0 - 25 mg/g Cr Final         Assessment and Plan     Zaira was seen today for hypertension.    Diagnoses and all orders for this visit:    Arthritis of knee - options discussed including the risk of NSAID (elevated BP and ulcers) with option to test for H Pylori first to decrease risk of ulcer, and PT as well. Not interested in PT.  Would need to be on PPI as well. Rather do her own exercises from past PT although she is worried that many are on the floor. Plan is for her to find her knee exercises and to start doing them. Consider PT when she returns if no better. Also, reminded her she could still get her knee replaced. She had hers replaced about 3 years ago.    Benign essential hypertension - increase to 5 mg and return in 1 month. Reviewed with her to double up for now.  Need to review at next visit her salt intake.  Otherwise no other potential causes.   -     amLODIPine (NORVASC) 5 MG tablet; Take 1 tablet (5 mg) by mouth daily    Total time: 25 minutes with more than half spent counseling on HTN mgmt options and options for her knees    Medications Discontinued During This Encounter   Medication Reason     amLODIPine (NORVASC) 2.5 MG tablet        Options for treatment and follow-up care were  reviewed with the patient . Zaira Fierro  engaged in the decision making process and verbalized understanding of the options discussed and agreed with the final plan.    Ange Crespo MD

## 2019-07-10 ENCOUNTER — OFFICE VISIT (OUTPATIENT)
Dept: FAMILY MEDICINE | Facility: CLINIC | Age: 84
End: 2019-07-10
Payer: COMMERCIAL

## 2019-07-10 VITALS
HEART RATE: 106 BPM | RESPIRATION RATE: 16 BRPM | DIASTOLIC BLOOD PRESSURE: 83 MMHG | BODY MASS INDEX: 31.04 KG/M2 | SYSTOLIC BLOOD PRESSURE: 156 MMHG | WEIGHT: 167 LBS | OXYGEN SATURATION: 96 % | TEMPERATURE: 98.3 F

## 2019-07-10 DIAGNOSIS — I10 BENIGN ESSENTIAL HYPERTENSION: ICD-10-CM

## 2019-07-10 DIAGNOSIS — M17.10 ARTHRITIS OF KNEE: Primary | ICD-10-CM

## 2019-07-10 RX ORDER — AMLODIPINE BESYLATE 5 MG/1
5 TABLET ORAL DAILY
Qty: 90 TABLET | Refills: 3 | Status: SHIPPED | OUTPATIENT
Start: 2019-07-10 | End: 2020-06-24

## 2019-07-10 NOTE — PATIENT INSTRUCTIONS
"Here is the plan from today's visit    1. Arthritis of knee  Look for your knee exercises.   Work on walking every day.   If you are not able to, come on back and we can talk about \"surgery\" or possible other injections or Physical Therapy     2. Benign essential hypertension  We will increase your amlodipine to 5 mg daily.  For now, take two of the pills and once you run out, take the new 5 mg pills      Please call or return to clinic if your symptoms don't go away.    Follow up plan  1 month      Thank you for coming to [facility] today.  Lab Testing:  **If you had lab testing today and your results are reassuring or normal they will be mailed to you or sent through [facility] within 7 days.   **If the lab tests need quick action we will call you with the results.  The phone number we will call with results is # [phone] (home) . If this is not the best number please call our clinic and change the number.  Medication Refills:  If you need any refills please call your pharmacy and they will contact us.   If you need to  your refill at a new pharmacy, please contact the new pharmacy directly. The new pharmacy will help you get your medications transferred faster.   Scheduling:  If you have any concerns about today's visit or wish to schedule another appointment please call our office during normal business hours [phone] (8-5:00 M-F)  If a referral was made to a [facility] and you don't get a call from central scheduling please call [phone].  If a Mammogram was ordered for you at [facility] call [phone] to schedule or change your appointment.  If you had an XRay/CT/Ultrasound/MRI ordered the number is [phone] to schedule or change your radiology appointment.   Medical Concerns:  If you have urgent medical concerns please call [phone] at any time of the day.    Ange Crespo MD    "

## 2019-08-23 ENCOUNTER — OFFICE VISIT (OUTPATIENT)
Dept: FAMILY MEDICINE | Facility: CLINIC | Age: 84
End: 2019-08-23
Payer: COMMERCIAL

## 2019-08-23 VITALS
BODY MASS INDEX: 30.95 KG/M2 | HEART RATE: 94 BPM | DIASTOLIC BLOOD PRESSURE: 79 MMHG | TEMPERATURE: 98.2 F | WEIGHT: 168.2 LBS | SYSTOLIC BLOOD PRESSURE: 133 MMHG | OXYGEN SATURATION: 97 % | HEIGHT: 62 IN | RESPIRATION RATE: 16 BRPM

## 2019-08-23 DIAGNOSIS — I10 BENIGN ESSENTIAL HYPERTENSION: Primary | ICD-10-CM

## 2019-08-23 DIAGNOSIS — M17.10 ARTHRITIS OF KNEE: ICD-10-CM

## 2019-08-23 DIAGNOSIS — H91.93 DECREASED HEARING OF BOTH EARS: ICD-10-CM

## 2019-08-23 ASSESSMENT — MIFFLIN-ST. JEOR: SCORE: 1163.26

## 2019-08-23 NOTE — PROGRESS NOTES
"       HPI         Chief Complaint   Patient presents with     Hypertension     recheck   Zaira is a 84 year old female  who presents for follow up of medication changes. Has been compliant with medication and has not noticed any adverse effects. However, reports she is a \"salt-a-holic\" and was unaware this could affect her BP. Also reports that pain in L knee is much better after receiving steroid shot in June; is happy with these results. Also interested in following up on hearing test and getting hearing aids. Has been pursuing the Shingrix vaccine but her local pharmacy has been out of stock. Done 4/2018    Exercise room with treadmill in her new home.  Is doing couple of times a week she does 10 minutes,     Hearing test - wants to get hearing aides now.    Patient Active Problem List   Diagnosis     Health Care Home     Diverticulosis of large intestine     Hyperlipidemia     Obesity     Disorder of bone and cartilage     Prediabetes     Advanced directives, counseling/discussion     Macular degeneration (senile) of retina     Cataract     Primary osteoarthritis of both knees     Arthritis of knee     Essential hypertension with goal blood pressure less than 140/90     Status post knee surgery     Restless leg syndrome       Current Outpatient Medications   Medication Sig Dispense Refill     acetaminophen (TYLENOL) 325 MG tablet Take 2 tablets (650 mg) by mouth 4 times daily 100 tablet 11     amLODIPine (NORVASC) 5 MG tablet Take 1 tablet (5 mg) by mouth daily 90 tablet 3     ASPIRIN PO Take 81 mg by mouth       hydrochlorothiazide (HYDRODIURIL) 25 MG tablet Take 1 tablet (25 mg) by mouth every morning 90 tablet 1     latanoprost (XALATAN) 0.005 % ophthalmic solution Place 1 drop into both eyes At Bedtime Reported on 5/1/2017       losartan (COZAAR) 100 MG tablet Take 1 tablet (100 mg) by mouth every morning 90 tablet 3     Multiple Vitamins-Minerals (EYE VITAMINS PO)        nortriptyline (PAMELOR) 25 MG " "capsule Take 1 capsule (25 mg) by mouth At Bedtime 90 capsule 1     pravastatin (PRAVACHOL) 20 MG tablet Take 1 tablet (20 mg) by mouth every evening 90 tablet 3     traMADol (ULTRAM) 50 MG tablet Take one  tab by mouth at 4 PM and take one tab by mouth at 10PM 60 tablet 5          Allergies   Allergen Reactions     No Known Allergies                 Review of Systems:     CONSTITUTIONAL: no fatigue, no unexpected change in weight  SKIN: no worrisome rashes, no worrisome moles, no worrisome lesions  EYES: no acute vision problems or changes  ENT: no ear problems, no mouth problems, no throat problems  RESP: no significant cough, no shortness of breath  CV: no chest pain, no palpitations, no new or worsening peripheral edema  GI: no nausea, no vomiting, no constipation, no diarrhea            Physical Exam:     Vitals:    08/23/19 1016   BP: 133/79   BP Location: Right arm   Patient Position: Sitting   Cuff Size: Adult Regular   Pulse: 94   Resp: 16   Temp: 98.2  F (36.8  C)   TempSrc: Oral   SpO2: 97%   Weight: 76.3 kg (168 lb 3.2 oz)   Height: 1.562 m (5' 1.5\")     Body mass index is 31.27 kg/m .    Vital signs normal except BP slightly elevated   GENERAL: healthy, alert and no distress  NECK: no tenderness, no adenopathy, no asymmetry, no masses, no stiffness; thyroid- normal to palpation  RESP: lungs clear to auscultation - no rales, no rhonchi, no wheezes  CV: regular rates and rhythm, normal S1 S2, no S3 or S4 and no murmur, no click or rub -  MS: extremities- no gross deformities noted, bilateral LE 2+ pitting edema     Results for orders placed or performed in visit on 06/10/19   Comprehensive Metabolic Panel ([facility])   Result Value Ref Range    Albumin 4.6 3.5 - 4.7 mg/dL    Alkaline Phosphatase 89.1 31.7 - 110.5 U/L    ALT 11.2 0.0 - 45.0 U/L    AST 18.8 0.0 - 45.0 U/L    Bilirubin Total 0.5 0.2 - 1.3 mg/dL    Urea Nitrogen 13.3 7.0 - 19.0 mg/dL    Calcium 9.3 8.5 - 10.1 mg/dL    Chloride 100.6 98.0 - 110.0 " mmol/L    Carbon Dioxide 29.8 20.0 - 32.0 mmol/L    Creatinine 0.6 0.5 - 1.0 mg/dL    Glucose 114.6 (H) 70.0 - 99.0 mg'dL    Potassium 3.8 3.3 - 4.5 mmol/dL    Sodium 138.7 132.6 - 141.4 mmol/L    Protein Total 7.3 6.8 - 8.8 g/dL    GFR Estimate >90 >60.0 mL/min/1.7 m2    GFR Estimate If Black >90 >60.0 mL/min/1.7 m2   Lipid Panel ([facility])   Result Value Ref Range    Cholesterol 180.4 0.0 - 200.0 mg/dL    Cholesterol/HDL Ratio 3.7 0.0 - 5.0    HDL Cholesterol 48.8 >40.0 mg/dL    LDL Cholesterol Calculated 97 0 - 129 mg/dL    Triglycerides 174.2 (H) 0.0 - 150.0 mg/dL    VLDL Cholesterol 34.8 (H) 7.0 - 32.0 mg/dL   Urinalysis, Micro If (UA) ([facility])   Result Value Ref Range    Specific Gravity Urine 1.015 1.005 - 1.030    pH Urine 7.0 4.5 - 8.0    Leukocyte Esterase UR Negative -ATIVE    Nitrite Urine Negative -ATIVE    Protein UR Negative -ATIVE    Glucose Urine Negative -ATIVE    Ketones Urine Negative -ATIVE    Urobilinogen mg/dL 0.2 E.U./dL 0.2 E.U./dL    Bilirubin UR Negative -ATIVE    Blood UR Negative -ATIVE   Albumin Random Urine Quantitative with Creat Ratio   Result Value Ref Range    Creatinine Urine 53 mg/dL    Albumin Urine mg/L <5 mg/L    Albumin Urine mg/g Cr Unable to calculate due to low value 0 - 25 mg/g Cr          Assessment and Plan     Zaira was seen today for hypertension.    Diagnoses and all orders for this visit:    Benign essential hypertension - is better. We spent time thinking through how she might become more active. Will try to walk every day so that she has to make less decisions. Also will halve her salt intake and then might be able to get off the amlodipine. Otherwise doing well.     Decreased hearing of both ears  -     AUDIOLOGY ADULT REFERRAL - INTERNAL    Arthritis of knee - stable. Able to walk at this point.     Total time: 30 minutes with more than half spent counseling on salt, and exercise.     There are no discontinued medications.    Options for treatment and  follow-up care were reviewed with the patient . Zaira ROSARIO Marestevan  engaged in the decision making process and verbalized understanding of the options discussed and agreed with the final plan.    LORETTA Deras     Preceptor Attestation:  I was present with the medical student who participated in the service and in the documentation of this note. I have verified the history and personally performed the physical exam and medical decision making. I have verified the content of the note, which accurately reflects my assessment of the patient and the plan of care.   Supervising Physician:  MD MD Ange Morrison MD

## 2019-09-05 ENCOUNTER — OFFICE VISIT (OUTPATIENT)
Dept: AUDIOLOGY | Facility: CLINIC | Age: 84
End: 2019-09-05
Attending: FAMILY MEDICINE
Payer: COMMERCIAL

## 2019-09-05 DIAGNOSIS — H91.90 HEARING LOSS, UNSPECIFIED HEARING LOSS TYPE, UNSPECIFIED LATERALITY: Primary | ICD-10-CM

## 2019-09-05 NOTE — TELEPHONE ENCOUNTER
FUTURE VISIT INFORMATION      FUTURE VISIT INFORMATION:    Date: 9/19/19    Time: 9:00 am ENT  11:00 am Audiology    [place] [facility]  REFERRAL INFORMATION:    Referring provider:  Josee Morales    Referring providers clinic:  [facility] Aud[facility]    Reason for visit/diagnosis  Ear Cleaning    RECORDS REQUESTED FROM:       Clinic name Comments Records Status Imaging Status   [facility] Audiology Office Visit-9/5/19  Audiogram-2/3/15 [facility] Office Visit-8/23/19-Dr. Ange Combs    [facility] ENT Office Visit-2/3/15-Dr. Nissen Epic

## 2019-09-05 NOTE — PROGRESS NOTES
AUDIOLOGY REPORT    SUBJECTIVE:  Zaira Fierro is a 84 year old female who was seen in the Audiology Clinic at the [facility] for audiologic evaluation, referred by Ange Crespo M.D. .The patient has been seen previously in this clinic on 2/3/2015 for assessment and results indicated a bilateral sensorineural hearing loss. She is interested in pursuing hearing aids as she reports the hearing loss has gradually worsened. The patient denies bilateral tinnitus, bilateral otalgia, bilateral drainage, bilateral aural fullness, and dizziness.  The patient notes difficulty with communication in a variety of listening situations, mostly in group situations and noted that her TV volume is increased.    OBJECTIVE:  Abuse Screening:  Do you feel unsafe at home or work/school? No  Do you feel threatened by someone? No  Does anyone try to keep you from having contact with others, or doing things outside of your home? No  Physical signs of abuse present? No     Fall Risk Screen:  1. Have you fallen two or more times in the past year? No  2. Have you fallen and had an injury in the past year? No    Timed Up and Go Score (in seconds): not tested  Is patient a fall risk? No  Referral initiated: No  Fall Risk Assessment Completed by Audiology    Otoscopic exam indicates cerumen bilaterally, left ear worse than right ear     Provider attempted to remove the cerumen with a lighted curette.  Cerumen was partially removed from the left ear but there was excessive cerumen deeper in the ear canal that could not be removed. This was reviewed with the patient who expressed understanding that it would need to be removed prior to testing.     The patient had questions regarding obtaining hearing aids. The process of obtaining hearing aids was reviewed, she expressed that she would like to pursue hearing aids through our clinic.      ASSESSMENT:   Testing could not be completed due to excessive cerumen in the  ears.    PLAN:  It is recommended that the patient follow-up with ENT or her PCP for cerumen removal, she decided to have the cerumen removed by an ENT so that a hearing evaluation could be performed the same day.  Patient was also scheduled for hearing aid appointments per her request. Today's appointment is  No Charge visit as no billable service was provided. Please call this clinic with questions regarding these results or recommendations.        Ghulam Valderrama  Audiologist  [place] License  #[id number]

## 2019-09-18 DIAGNOSIS — H91.90 HEARING LOSS, UNSPECIFIED HEARING LOSS TYPE, UNSPECIFIED LATERALITY: Primary | ICD-10-CM

## 2019-09-19 ENCOUNTER — OFFICE VISIT (OUTPATIENT)
Dept: AUDIOLOGY | Facility: CLINIC | Age: 84
End: 2019-09-19
Attending: OTOLARYNGOLOGY
Payer: COMMERCIAL

## 2019-09-19 ENCOUNTER — PRE VISIT (OUTPATIENT)
Dept: OTOLARYNGOLOGY | Facility: CLINIC | Age: 84
End: 2019-09-19

## 2019-09-19 ENCOUNTER — OFFICE VISIT (OUTPATIENT)
Dept: OTOLARYNGOLOGY | Facility: CLINIC | Age: 84
End: 2019-09-19
Payer: COMMERCIAL

## 2019-09-19 VITALS — WEIGHT: 169 LBS | HEIGHT: 62 IN | BODY MASS INDEX: 31.1 KG/M2

## 2019-09-19 DIAGNOSIS — H90.3 SENSORY HEARING LOSS, BILATERAL: Primary | ICD-10-CM

## 2019-09-19 DIAGNOSIS — H61.23 BILATERAL IMPACTED CERUMEN: Primary | ICD-10-CM

## 2019-09-19 DIAGNOSIS — H91.90 HEARING LOSS, UNSPECIFIED HEARING LOSS TYPE, UNSPECIFIED LATERALITY: ICD-10-CM

## 2019-09-19 ASSESSMENT — MIFFLIN-ST. JEOR: SCORE: 1171.83

## 2019-09-19 ASSESSMENT — PAIN SCALES - GENERAL: PAINLEVEL: NO PAIN (0)

## 2019-09-19 NOTE — NURSING NOTE
"Chief Complaint   Patient presents with     Consult     ear cleaning      Height 1.57 m (5' 1.81\"), weight 76.7 kg (169 lb), not currently breastfeeding.    Oj Branham LPN    "

## 2019-09-19 NOTE — PROGRESS NOTES
The patient presents with a history of cerumen impaction in the ears. She had been scheduled for an Audiogram and Tympanogram, but that could not be performed due to cerumen impaction. She presents for removal of this material prior to an Audiogram and Tympanogram. The patient denies sinusitis, rhinitis, facial pain, nasal obstruction or purulent nasal discharge. The patient denies chronic or recurrent tonsillitis, chronic or recurrent pharyngitis.     This patient is seen in consultation at the request of Dr. Josee Morales.    All other systems were reviewed and they are either negative or they are not directly pertinent to this Otolaryngology examination.      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      Restless leg syndrome        Past Surgical History:    Past Surgical History:   Procedure Laterality Date     ARTHROPLASTY KNEE Right 4/7/2017    Procedure: ARTHROPLASTY KNEE;  Surgeon: Aamir Sutton MD;  Location: [facility] OR     TONSILLECTOMY  1940       Medications:      Current Outpatient Medications:      acetaminophen (TYLENOL) 325 MG tablet, Take 2 tablets (650 mg) by mouth 4 times daily, Disp: 100 tablet, Rfl: 11     amLODIPine (NORVASC) 5 MG tablet, Take 1 tablet (5 mg) by mouth daily, Disp: 90 tablet, Rfl: 3     ASPIRIN PO, Take 81 mg by mouth, Disp: , Rfl:      hydrochlorothiazide (HYDRODIURIL) 25 MG tablet, Take 1 tablet (25 mg) by mouth every morning, Disp: 90 tablet, Rfl: 1     latanoprost (XALATAN) 0.005 % ophthalmic solution, Place 1 drop into both eyes At Bedtime Reported on 5/1/2017, Disp: , Rfl:      losartan (COZAAR) 100 MG tablet, Take 1 tablet (100 mg) by mouth every morning, Disp: 90 tablet, Rfl: 3     Multiple  Vitamins-Minerals (EYE VITAMINS PO), , Disp: , Rfl:      nortriptyline (PAMELOR) 25 MG capsule, Take 1 capsule (25 mg) by mouth At Bedtime, Disp: 90 capsule, Rfl: 1     pravastatin (PRAVACHOL) 20 MG tablet, Take 1 tablet (20 mg) by mouth every evening, Disp: 90 tablet, Rfl: 3     traMADol (ULTRAM) 50 MG tablet, Take one  tab by mouth at 4 PM and take one tab by mouth at 10PM, Disp: 60 tablet, Rfl: 5    Current Facility-Administered Medications:      triamcinolone (KENALOG-40) injection 40 mg, 40 mg, INTRA-ARTICULAR, Once, Ange Crespo MD    Allergies:    No known allergies    Physical Examination:    The patient is a well developed, well nourished female in no apparent distress.  She is normocephalic, atraumatic with pupils equally round and reactive to light.    Oral Cavity Examination:  Normal mucosa with no masses or lesions  Nasal Examination: Normal mucosa with no masses or lesions  Ear Examination: Ear canals impacted with cerumen bilaterally.  The ear canals are cleaned today using an alligator forceps, a currette and a suction using a binocular microscope for visualization. The tympanic membranes and middle ear spaces normal bilaterally.  Neurological Examination: Facial nerve function intact and symmetric  Integumentary Examination: No lesions on the skin of the head and neck  Neck Examination: No masses or lesions, no lymphadenopathy  Endocrine Examination: Normal thyroid examination    Assessment and Plan:    The patient presents with a history of cerumen impaction and sensorineural hearing loss.  The patient's ears are cleaned today. She will now proceed with her Audiogram and Tympanogram and she will be seen again as needed.     CC: Dr. Josee Morales

## 2019-09-19 NOTE — PATIENT INSTRUCTIONS
1.  You were seen in the ENT Clinic today by Dr. Lopes.  If you have any questions or concerns after your appointment, please call [phone]. Press option #1 for scheduling related needs. Press option #3 for Nurse advice.    2.  Plan is to return to clinic as needed.      Eliza Blanco LPN  [facility] Otolaryngology  [phone]  The patient presents with a history of cerumen impaction and sensorineural hearing loss.  The patient's ears are cleaned today. She will now proceed with her Audiogram and Tympanogram and she will be seen again as needed.

## 2019-09-19 NOTE — PROGRESS NOTES
AUDIOLOGY REPORT    SUMMARY: Audiology visit completed. See audiogram for results.      RECOMMENDATIONS: Follow-up with ENT.    Naresh Babcock.  Licensed Audiologist  [place] # [id number]

## 2019-09-28 ENCOUNTER — HEALTH MAINTENANCE LETTER (OUTPATIENT)
Age: 84
End: 2019-09-28

## 2019-10-31 ENCOUNTER — OFFICE VISIT (OUTPATIENT)
Dept: AUDIOLOGY | Facility: CLINIC | Age: 84
End: 2019-10-31
Payer: COMMERCIAL

## 2019-10-31 DIAGNOSIS — H90.3 SENSORINEURAL HEARING LOSS, BILATERAL: Primary | ICD-10-CM

## 2019-10-31 NOTE — PROGRESS NOTES
AUDIOLOGY REPORT    SUBJECTIVE: Zaira Fierro is an 84 year old female who was seen in the Audiology Clinic at [facility] on 10/31/2019 to discuss concerns with hearing and functional communication difficulties. 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 The patient was medically evaluated and determined to be cleared for bilateral hearing aids by Alcon Jaffe M.D. Zaira notes difficulty with communication in a variety of listening situations.    OBJECTIVE: The patient is a hearing aid candidate. The patient would like to move forward with a hearing aid evaluation today. Therefore, the patient was presented with different options for amplification to help aid in communication. Discussed styles, levels of technology, iPhone compatibility, and rechargeable options.     The hearing aids mutually chosen were:  BILATERAL: Signia Pure 3Nx Charge and Go  COLOR: Kenna white  BATTERY SIZE: Rechargeable  EARMOLD/TIPS: RIGHT: 8/10 double dome LEFT: Small vented sleeve dome (Note: The patient has macular degeneration and will use the different sized domes to distinguish between the right and left hearing aid)  CANAL/ LENGTH: 1    ASSESSMENT: Reviewed purchase information and warranty information with the patient. The 45 day trial period was explained to the patient. The patient was given a copy of the [place] Department of Health consumer brochure on purchasing hearing instruments. Patient risk factors have been provided to the patient in writing prior to the sale of the hearing aid per FDA regulation. The risk factors are also available in the User Instructional Booklet to be presented on the day of the hearing aid fitting. Hearing aids ordered. Hearing aid  evaluation completed.    PLAN: Zaira is scheduled to return in 2-3 weeks for a hearing aid fitting and programming. Purchase agreement will be completed on that date. Please contact this clinic with any questions or concerns.      Elsa Haynes, [facility]  Licensed Audiologist  [place] #[id number]

## 2019-12-05 DIAGNOSIS — G25.81 RESTLESS LEG SYNDROME: ICD-10-CM

## 2019-12-05 RX ORDER — TRAMADOL HYDROCHLORIDE 50 MG/1
TABLET ORAL
Qty: 14 TABLET | Refills: 0 | Status: SHIPPED | OUTPATIENT
Start: 2019-12-05 | End: 2019-12-11

## 2019-12-05 NOTE — TELEPHONE ENCOUNTER

## 2019-12-05 NOTE — TELEPHONE ENCOUNTER
My chart message sent. Pt taking Tramadol chronically (2 tabs/day) for RLS. Last filled 11/6/19. Last office visit 8/2019 (did not discuss RLS at that time however).     Sent Sqrrlt message to patient offering 1 week supply until PCP returns and can review request for refill and determine if appointment is necessary. Will await her reply before filling.     Madiha Yeboah, DO

## 2019-12-10 DIAGNOSIS — I10 BENIGN ESSENTIAL HYPERTENSION: ICD-10-CM

## 2019-12-10 RX ORDER — HYDROCHLOROTHIAZIDE 25 MG/1
25 TABLET ORAL EVERY MORNING
Qty: 90 TABLET | Refills: 1 | Status: SHIPPED | OUTPATIENT
Start: 2019-12-10 | End: 2020-06-04

## 2019-12-10 NOTE — TELEPHONE ENCOUNTER

## 2019-12-11 DIAGNOSIS — M17.10 ARTHRITIS OF KNEE: ICD-10-CM

## 2019-12-11 DIAGNOSIS — G25.81 RESTLESS LEG SYNDROME: ICD-10-CM

## 2019-12-11 RX ORDER — TRAMADOL HYDROCHLORIDE 50 MG/1
TABLET ORAL
Qty: 60 TABLET | Refills: 1 | Status: SHIPPED | OUTPATIENT
Start: 2019-12-11 | End: 2020-01-14

## 2019-12-11 RX ORDER — NORTRIPTYLINE HCL 25 MG
25 CAPSULE ORAL AT BEDTIME
Qty: 90 CAPSULE | Refills: 1 | Status: SHIPPED | OUTPATIENT
Start: 2019-12-11 | End: 2020-08-28

## 2019-12-11 NOTE — TELEPHONE ENCOUNTER
"Request for medication refill:  Ultram needs the rest of her prescription   One week less as Edilia gave enough for a week.      Providers if patient needs an appointment and you are willing to give a one month supply please refill for one month and  send a letter/MyChart using \".SMILLIMITEDREFILL\" .smillimited and route chart to \"P SMI \" (Giving one month refill in non controlled medications is strongly recommended before denial)    If refill has been denied, meaning absolutely no refills without visit, please complete the smart phrase \".smirxrefuse\" and route it to the \"P SMI MED REFILLS\"  pool to inform the patient and the pharmacy.    Suzan Chaudhary, CMA        "

## 2019-12-11 NOTE — LETTER
January 14, 2020      Zaira Fierro  [address]   [place] [address]        Dear Zaira,    It is time to come in to see me.  I have to see you every 3 months to fill your Tramadol - which is an opioid. I saw you last in September, so time to come in.      I have refilled your medication for another month.   See you soon,       Sincerely,    Ange Crespo MD

## 2019-12-19 ENCOUNTER — OFFICE VISIT (OUTPATIENT)
Dept: AUDIOLOGY | Facility: CLINIC | Age: 84
End: 2019-12-19
Payer: COMMERCIAL

## 2019-12-19 DIAGNOSIS — H90.3 SENSORINEURAL HEARING LOSS, BILATERAL: Primary | ICD-10-CM

## 2019-12-19 NOTE — PROGRESS NOTES
AUDIOLOGY REPORT    SUBJECTIVE: Zaira Fierro is an 84 year old female who was seen in the Audiology Clinic at [facility] for a fitting of bilateral Signia Pure Charge and Go 3Nx hearing aids. 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 The patient was given medical clearance to pursue amplification by Alcon Lopes M.D.    OBJECTIVE: The hearing aid conformity evaluation was completed.The hearing aids were placed and they provided a good fit. Real-ear-probe-microphone measurements were completed on the Positronifit system and were a good match to NAL-NL1 targets with soft sounds audible, moderate sounds comfortable, and loud sounds below discomfort. UCLs are verified through maximum power output measures and demonstrate appropriate limiting of loud inputs. NOTE: The Verifit system unexpectedly shut down before real-ear measurements could be transferred to [facility] and printed.  Zaira was oriented to proper hearing aid use, care, cleaning (no water, dry brush), batteries (rechargeable, low-battery signal), aid insertion/removal, user booklet, warranty information, storage cases, and other hearing aid details. The patient confirmed understanding of hearing aid use and care and showed proper insertion of the hearing aids while in the office today. Zaira reported good volume and sound quality.   Hearing aids were programmed as follows:  Program 1: Universal  Volume control and program button were deactivated today. Zaira would like to wait to pair the hearing aids with her iPhone until after she has had time to adjust to the new sound.    EARS FIT: Bilateral  HEARING AID MODEL NAME: Signia Pure Charge and Go 3Nx  HEARING AID STYLE: -in-the-ear behind-the-ear  EARMOLDS/TIP: Semi open domes  SERIAL  NUMBERS: Right: [id number] Left: [id number]  WARRANTY END DATE: 12/4/2022    ASSESSMENT: Bilateral Signia Pure Charge and Go 3Nx hearing aids were fit today. Verification measures were performed. Zaira signed the Hearing Aid Purchase Agreement and was given a copy, as well as details on her hearing aids. The patient was counseled that exact out of pocket amounts cannot be determined for hearing aid claims being sent to insurance. Any insurance coverage information presented to the patient is an estimate only, and is not a guarantee of payment. The patient has been advised to check with their own insurance.    PLAN: Zaira will return for follow-up in 2-3 weeks for a hearing aid review appointment. We will pair the hearing aids to her iPhone at that time. Please call this clinic with questions regarding today s appointment.    Elsa Haynes, [facility]  Licensed Audiologist  [place] #[id number]

## 2020-01-13 DIAGNOSIS — M25.562 CHRONIC PAIN OF LEFT KNEE: ICD-10-CM

## 2020-01-13 DIAGNOSIS — G89.29 CHRONIC PAIN OF LEFT KNEE: ICD-10-CM

## 2020-01-13 RX ORDER — ACETAMINOPHEN 325 MG/1
650 TABLET ORAL 4 TIMES DAILY
Qty: 100 TABLET | Refills: 11 | Status: SHIPPED | OUTPATIENT
Start: 2020-01-13

## 2020-01-13 NOTE — TELEPHONE ENCOUNTER

## 2020-01-14 RX ORDER — TRAMADOL HYDROCHLORIDE 50 MG/1
TABLET ORAL
Qty: 60 TABLET | Refills: 0 | Status: SHIPPED | OUTPATIENT
Start: 2020-01-14 | End: 2020-01-24

## 2020-01-22 ENCOUNTER — OFFICE VISIT (OUTPATIENT)
Dept: AUDIOLOGY | Facility: CLINIC | Age: 85
End: 2020-01-22
Payer: COMMERCIAL

## 2020-01-22 DIAGNOSIS — H90.3 SENSORINEURAL HEARING LOSS, BILATERAL: Primary | ICD-10-CM

## 2020-01-22 NOTE — PROGRESS NOTES
AUDIOLOGY REPORT    SUBJECTIVE: Zaira Fierro is an 84 year old female who was seen in the Audiology Clinic at [facility] on 1/22/2020 for a follow-up check regarding the fitting of new hearing aids. 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 The patient has been seen previously in this clinic and was fit with bilateral Signia Pure Charge and Go 3 Nx hearing aids on 12/19/2019.  Zaira reports she is hearing more in group conversations with her friends and that she has been able to turn the TV down. She feels the hearing aids are still too loud though. She also notes background noise, like her heater running, is bothersome.     OBJECTIVE: The International Outcome Inventory-Hearing Aids (IOI-HA) was administered today.The patient s responses to the 7 questions can be compared to normative data relative to how others are performing with their hearing aids, as well as focusing audiologic care and counseling.This patient s Quality of Life score (Question 7) was 3, which is at normative average.     Datalogging showed the patient is wearing the hearing aids an average of 2 hours per day. Encouraged increasing wear time to 6-8 hours per day to allow her brain to adapt to the new sound more before decreasing the volume in the programming software. Paired the hearing aids to the patient's iPhone. Reviewed use. She preferred to make adjustments to the hearing aids via the iPhone rather than the Learning Hyperdrive sushila. Reminded her to avoid decreasing the volume significantly as she continues through the adaptation period. Gain was decreased for low frequencies to help with the background noise she has been experiencing.       Reviewed 45 day trial period, care, cleaning (no water, dry brush), batteries (rechargeable,  low-battery signal), aid insertion/removal, volume adjustment (if applicable), user booklet, warranty information, storage cases, and other hearing aid details.     ASSESSMENT: A follow-up appointment for hearing aid fitting was completed today. IOI-HA administered. Changes to the hearing aids were made as outlined above. No charge visit, as it is within 90 days of the hearing aid fitting.     PLAN: Zaira will return in 2 weeks to follow-up on the changes made today. Please call this clinic with any questions regarding today s appointment.      Elsa Haynes, [facility]  Licensed Audiologist  [place] #[id number]

## 2020-01-24 ENCOUNTER — OFFICE VISIT (OUTPATIENT)
Dept: FAMILY MEDICINE | Facility: CLINIC | Age: 85
End: 2020-01-24
Payer: COMMERCIAL

## 2020-01-24 VITALS
SYSTOLIC BLOOD PRESSURE: 146 MMHG | RESPIRATION RATE: 16 BRPM | OXYGEN SATURATION: 98 % | BODY MASS INDEX: 30.44 KG/M2 | WEIGHT: 171.8 LBS | DIASTOLIC BLOOD PRESSURE: 87 MMHG | TEMPERATURE: 98.6 F | HEIGHT: 63 IN | HEART RATE: 97 BPM

## 2020-01-24 DIAGNOSIS — G25.81 RESTLESS LEG SYNDROME: ICD-10-CM

## 2020-01-24 DIAGNOSIS — H91.93 DECREASED HEARING OF BOTH EARS: ICD-10-CM

## 2020-01-24 DIAGNOSIS — I10 ESSENTIAL HYPERTENSION WITH GOAL BLOOD PRESSURE LESS THAN 140/90: Primary | ICD-10-CM

## 2020-01-24 RX ORDER — TRAMADOL HYDROCHLORIDE 50 MG/1
TABLET ORAL
Qty: 60 TABLET | Refills: 3 | Status: SHIPPED | OUTPATIENT
Start: 2020-01-24 | End: 2020-06-01

## 2020-01-24 ASSESSMENT — MIFFLIN-ST. JEOR: SCORE: 1198.59

## 2020-01-24 NOTE — PATIENT INSTRUCTIONS
Here is the plan from today's visit    1. Essential hypertension with goal blood pressure less than 140/90  - Continue to work on decreasing salt, try to use less at dinner time. Will try using Ms. Dash instead and maybe not refill salt shaker. May also try to limit salt available.  - Will work on exercising more, walking around the apartment  Floors or on the treadmill or biking  - Can try podcasts while you exercise. It is a purple application on your phone and it is free. Just search what you want to listen to!  - Consider checking your blood pressures at home - I will see if our  can help get you a blood pressure cuff.   - Consider starting to keep a journal of exercise and salt use    2. Restless leg syndrome  - Continue to use 50 at 4 and at 10pm  - Follow up in 3 months    3. Hearing aids  Going well! Has 4th appointment scheduled      Follow up plan  3 months    Thank you for coming to [facility] today.  Lab Testing:  **If you had lab testing today and your results are reassuring or normal they will be mailed to you or sent through [employer] within 7 days.   **If the lab tests need quick action we will call you with the results.  The phone number we will call with results is # [phone] (home) . If this is not the best number please call our clinic and change the number.  Medication Refills:  If you need any refills please call your pharmacy and they will contact us.   If you need to  your refill at a new pharmacy, please contact the new pharmacy directly. The new pharmacy will help you get your medications transferred faster.   Scheduling:  If you have any concerns about today's visit or wish to schedule another appointment please call our office during normal business hours [phone] (8-5:00 M-F)  If a referral was made to a [facility] and you don't get a call from central scheduling please call [phone].  If a Mammogram was ordered for you at [facility] call [phone] to schedule or change your appointment.  If you had an XRay/CT/Ultrasound/MRI ordered the number is [phone] to schedule or change your radiology appointment.   Medical Concerns:  If you have urgent medical concerns please call [phone] at any time of the day.    Ange Crespo MD

## 2020-01-24 NOTE — PROGRESS NOTES
AARTI Meneses is a 84 year old  female  who presents for follow up of concern(s) listed below:    Chief Complaint   Patient presents with     RECHECK     Med check for Tramadol, everything has been going well     Blood pressure - Hasn't been taking at home because was told it isn't that the wrist monitor she has isn't very accurate, so hasn't been using. Wasn't able to cut back on salt. Feels like she doesn't cut it back and is adding salt during meal times. Got Mrs. Dash but hasn't really been using it. Could try and do things to cut out and knows what to do, just struggling to find that motivation. Knows that with exercise and eating better it can make you feel better. Currently lives in an apartment and will walk around the floors. They have a treadmill and swimming pool and bicycle. Has tried the bike for a while. Will listen to music while she bikes. Feels that these changes will also help her weight.    Has her hearing aids in now. Working on keeping them in long enough for her brain to also get used to them. Trying for 6-8 hours a day. Feels like they are very beneficial and is hearing more in group settings. Uses her iPhone to adjust her hearing aids. Feels like it very easy to use.       Restless legs - Taking Tramadol. Takes it 4pm 1 (50) and then 10pm (50) with her statin and tylenol. Does this everyday. Even if she misses one or two doses her legs will start to feel restless.     Wt Readings from Last 4 Encounters:   01/24/20 77.9 kg (171 lb 12.8 oz)   09/19/19 76.7 kg (169 lb)   08/23/19 76.3 kg (168 lb 3.2 oz)   07/10/19 75.8 kg (167 lb)          Patient Active Problem List   Diagnosis     Health Care Home     Diverticulosis of large intestine     Hyperlipidemia     Obesity     Disorder of bone and cartilage     Prediabetes     Advanced directives, counseling/discussion     Macular degeneration (senile) of retina     Cataract     Primary osteoarthritis of both knees     Arthritis of knee      Essential hypertension with goal blood pressure less than 140/90     Status post knee surgery     Restless leg syndrome       Current Outpatient Medications   Medication Sig Dispense Refill     acetaminophen (TYLENOL) 325 MG tablet Take 2 tablets (650 mg) by mouth 4 times daily 100 tablet 11     amLODIPine (NORVASC) 5 MG tablet Take 1 tablet (5 mg) by mouth daily 90 tablet 3     hydrochlorothiazide (HYDRODIURIL) 25 MG tablet Take 1 tablet (25 mg) by mouth every morning 90 tablet 1     latanoprost (XALATAN) 0.005 % ophthalmic solution Place 1 drop into both eyes At Bedtime Reported on 5/1/2017       losartan (COZAAR) 100 MG tablet Take 1 tablet (100 mg) by mouth every morning 90 tablet 3     Multiple Vitamins-Minerals (EYE VITAMINS PO)        nortriptyline (PAMELOR) 25 MG capsule Take 1 capsule (25 mg) by mouth At Bedtime 90 capsule 1     pravastatin (PRAVACHOL) 20 MG tablet Take 1 tablet (20 mg) by mouth every evening 90 tablet 3     traMADol (ULTRAM) 50 MG tablet Take one  tab by mouth at 4 PM and take one tab by mouth at 10PM 60 tablet 3     ASPIRIN PO Take 81 mg by mouth            Allergies   Allergen Reactions     No Known Allergies         Results from the last 24 hoursNo results found for this or any previous visit (from the past 24 hour(s)).         Review of Systems:     CONSTITUTIONAL: no fatigue, no unexpected change in weight  EYES: no acute vision problems or changes  RESP: no significant cough, no shortness of breath  CV: no chest pain, no palpitations, no new or worsening peripheral edema  GI: no nausea, no vomiting, no constipation, no diarrhea            Physical Exam:     Vitals:    01/24/20 1005 01/24/20 1007 01/24/20 1101   BP: (!) 138/104 (!) 156/81 (!) 146/87   BP Location: Left arm Right arm    Patient Position: Sitting Sitting    Cuff Size: Adult Regular Adult Regular    Pulse: 97     Resp: 16     Temp: 98.6  F (37  C)     TempSrc: Oral     SpO2: 98%     Weight: 77.9 kg (171 lb 12.8 oz)    "  Height: 1.593 m (5' 2.7\")       Body mass index is 30.73 kg/m .  Vitals were reviewed and were normal except elevated blood pressure  Blood pressure (!) 146/87, pulse 97, temperature 98.6  F (37  C), temperature source Oral, resp. rate 16, height 1.593 m (5' 2.7\"), weight 77.9 kg (171 lb 12.8 oz), SpO2 98 %, not currently breastfeeding.    GENERAL: healthy, alert and no distress  EYES: Eyes grossly normal to inspection, extraocular movements - intact, and PERRL  RESP: lungs clear to auscultation - no rales, no rhonchi, no wheezes  CV: regular rates and rhythm, normal S1 S2, no S3 or S4 and no murmur, no click or rub -  MS: extremities- no gross deformities noted, no edema      No results found for any visits on 01/24/20.       Assessment and Plan     Zaira was seen today for recheck.    Diagnoses and all orders for this visit:    Essential hypertension with goal blood pressure less than 140/90  Had elevated blood pressure today after eating a lot of potato chips yesterday. Has not made any changes at home with exercise or decreasing salt intake. Today, we discussed some ways that she can decrease her salt intake such as using Correlor Dash (her K levels are not high normal), keeping track of salt intake, cutting out potato chips and other salty snacks, and not refilling the salt shaker/only giving herself a limited amount. We also discussed ways to keep her motivated to workout such as listening to music or listening to podcasts. She will try to increase her walking and biking. She will also try to keep a journal to help motivate herself and will take blood pressures at home.        - Follow up in 1-2 weeks to check blood pressure        - Will work on getting home blood pressure cuff - DME referral in (Per Gabriela [facility]ER order and [facility] or [facility] can deliver)    Restless leg syndrome  Currently using 100 mg daily,  into 2 doses. Is sleeping well.         -  Follow up in 3 months  -     " traMADol (ULTRAM) 50 MG tablet; Take one  tab by mouth at 4 PM and take one tab by mouth at 10PM    Decreased hearing of both ears  Has had appointment for hearing aid fitting and now has aids in. They have been very beneficial and her hearing is much better, especially noticeable in crowds or large groups. Has her 4th follow up visit scheduled for February.      Medications Discontinued During This Encounter   Medication Reason     traMADol (ULTRAM) 50 MG tablet Reorder       Total time: 25 minutes with more than half spent counseling on her diet, exercise and plan for BP moving forward     Preceptor Attestation:  I was present with the medical student who participated in the service and in the documentation of this note. I have verified the history and personally performed the physical exam and medical decision making. I have verified the content of the note, which accurately reflects my assessment of the patient and the plan of care.   Supervising Physician:  Ange Crespo MD.         Options for treatment and follow-up care were reviewed with the patient . Zaira Fierro  engaged in the decision making process and verbalized understanding of the options discussed and agreed with the final plan.    Celia Jovel, MS3  Ange Crespo MD

## 2020-01-25 PROBLEM — H91.93 DECREASED HEARING OF BOTH EARS: Status: ACTIVE | Noted: 2020-01-25

## 2020-02-03 ENCOUNTER — OFFICE VISIT (OUTPATIENT)
Dept: AUDIOLOGY | Facility: CLINIC | Age: 85
End: 2020-02-03
Payer: COMMERCIAL

## 2020-02-03 DIAGNOSIS — H90.3 SENSORINEURAL HEARING LOSS, BILATERAL: Primary | ICD-10-CM

## 2020-02-03 NOTE — PROGRESS NOTES
AUDIOLOGY REPORT    SUBJECTIVE: Zaira Fierro is an 84 year old female who was seen in the Audiology Clinic at [facility] on 2/3/2020 for a follow-up check regarding the fitting of new hearing aids. 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 The patient has been seen previously in this clinic and was fit with bilateral [place]ia Pure Charge and Go 3Nx hearing aids on 12/19/2019.  Zaira reports she has adjusted more to the new sound and is pleased with the changes made at the last appointment. She notes that the left hearing aid has not been working lately but still charges and appears connected to her phone.      OBJECTIVE: The left wax guard was changed. The hearing aid was found to be working after this. No programming changes were made, as the patient is pleased with the volume and sound quality.     Reviewed 45 day trial period, care, cleaning (no water, dry brush), batteries (rechargeable, low-battery signal), aid insertion/removal, volume adjustment (if applicable), user booklet, warranty information, storage cases, and other hearing aid details.     ASSESSMENT: A follow-up appointment for hearing aid fitting was completed today. Changes to the hearing aids were made as outlined above. No charge visit, as it is within 90 days of the hearing aid fitting.     PLAN: Zaira will return for follow-up as needed, or at least every 6-9 months for cleaning and assessment of the hearing aids. Please call this clinic with any questions regarding today s appointment.      Elsa Haynes, [facility]  Licensed Audiologist  [place] #[id number]

## 2020-02-23 NOTE — PROGRESS NOTES
AARTI Meneses is a 84 year old  female  who presents for follow up of concern(s) listed below:    Chief Complaint   Patient presents with     RECHECK     Recheck BP, has eliminated salt from the diet since last visit and has been going well     BP:   She is doing a good job with avoiding salt and salty foods. Exercise is tough when it is cold, but she is planning on walking for 10 minutes a day to start, and will increase this as the weather gets better. Sometimes this is tough because of her osteoarthritis, but using tylenol around the clock every 6 hours, 600 mg, and the tramadol as prescribed, seems to help her knee osteoarthritis.  She also got a wrist BP cuff that was inaccurate. She is working on getting a better one at [facility] and will plan on keeping a BP log.     She mentioned that she enjoys snacking on black licorice. We discussed trying to keep that down to a stick daily (rather than 3 as she has been doing) so it doesn't effect her potassium or blood pressure.     Last visit:   Essential hypertension with goal blood pressure less than 140/90  Had elevated blood pressure today after eating a lot of potato chips yesterday. Has not made any changes at home with exercise or decreasing salt intake. Today, we discussed some ways that she can decrease her salt intake such as using Tourlandish Dash (her K levels are not high normal), keeping track of salt intake, cutting out potato chips and other salty snacks, and not refilling the salt shaker/only giving herself a limited amount. We also discussed ways to keep her motivated to workout such as listening to music or listening to podcasts. She will try to increase her walking and biking. She will also try to keep a journal to help motivate herself and will take blood pressures at home.        -     Follow up in 1-2 weeks to check blood pressure        -     Will work on getting home blood pressure cuff - DME referral in (Per Gabriela WEAVER order and [facility] or [facility] can deliver)      Advanced Care Directives - any changes? So far no, she has the booklet.     Patient Active Problem List   Diagnosis     Health Care Home     Diverticulosis of large intestine     Hyperlipidemia     Obesity     Disorder of bone and cartilage     Prediabetes     Advanced directives, counseling/discussion     Macular degeneration (senile) of retina     Cataract     Primary osteoarthritis of both knees     Arthritis of knee     Essential hypertension with goal blood pressure less than 140/90     Status post knee surgery     Restless leg syndrome     Decreased hearing of both ears       Current Outpatient Medications   Medication Sig Dispense Refill     acetaminophen (TYLENOL) 325 MG tablet Take 2 tablets (650 mg) by mouth 4 times daily 100 tablet 11     amLODIPine (NORVASC) 5 MG tablet Take 1 tablet (5 mg) by mouth daily 90 tablet 3     hydrochlorothiazide (HYDRODIURIL) 25 MG tablet Take 1 tablet (25 mg) by mouth every morning 90 tablet 1     latanoprost (XALATAN) 0.005 % ophthalmic solution Place 1 drop into both eyes At Bedtime Reported on 5/1/2017       losartan (COZAAR) 100 MG tablet Take 1 tablet (100 mg) by mouth every morning 90 tablet 3     Multiple Vitamins-Minerals (EYE VITAMINS PO)        nortriptyline (PAMELOR) 25 MG capsule Take 1 capsule (25 mg) by mouth At Bedtime 90 capsule 1     pravastatin (PRAVACHOL) 20 MG tablet Take 1 tablet (20 mg) by mouth every evening 90 tablet 3     traMADol (ULTRAM) 50 MG tablet Take one  tab by mouth at 4 PM and take one tab by mouth at 10PM 60 tablet 3     ASPIRIN PO Take 81 mg by mouth            Allergies   Allergen Reactions     No Known Allergies        Results from last visit:  Office Visit on 06/10/2019   Component Date Value Ref Range Status     Albumin 06/10/2019 4.6  3.5 - 4.7 mg/dL Final     Alkaline Phosphatase 06/10/2019 89.1  31.7 - 110.5 U/L Final     ALT 06/10/2019 11.2  0.0 - 45.0 U/L Final     AST 06/10/2019 18.8  0.0  - 45.0 U/L Final     Bilirubin Total 06/10/2019 0.5  0.2 - 1.3 mg/dL Final     Urea Nitrogen 06/10/2019 13.3  7.0 - 19.0 mg/dL Final     Calcium 06/10/2019 9.3  8.5 - 10.1 mg/dL Final     Chloride 06/10/2019 100.6  98.0 - 110.0 mmol/L Final     Carbon Dioxide 06/10/2019 29.8  20.0 - 32.0 mmol/L Final     Creatinine 06/10/2019 0.6  0.5 - 1.0 mg/dL Final     Glucose 06/10/2019 114.6* 70.0 - 99.0 mg'dL Final     Potassium 06/10/2019 3.8  3.3 - 4.5 mmol/dL Final     Sodium 06/10/2019 138.7  132.6 - 141.4 mmol/L Final     Protein Total 06/10/2019 7.3  6.8 - 8.8 g/dL Final     GFR Estimate 06/10/2019 >90  >60.0 mL/min/1.7 m2 Final     GFR Estimate If Black 06/10/2019 >90  >60.0 mL/min/1.7 m2 Final     Cholesterol 06/10/2019 180.4  0.0 - 200.0 mg/dL Final     Cholesterol/HDL Ratio 06/10/2019 3.7  0.0 - 5.0 Final     HDL Cholesterol 06/10/2019 48.8  >40.0 mg/dL Final     LDL Cholesterol Calculated 06/10/2019 97  0 - 129 mg/dL Final     Triglycerides 06/10/2019 174.2* 0.0 - 150.0 mg/dL Final     VLDL Cholesterol 06/10/2019 34.8* 7.0 - 32.0 mg/dL Final     Specific Gravity Urine 06/10/2019 1.015  1.005 - 1.030 Final     pH Urine 06/10/2019 7.0  4.5 - 8.0 Final     Leukocyte Esterase UR 06/10/2019 Negative  -ATIVE Final     Nitrite Urine 06/10/2019 Negative  -ATIVE Final     Protein UR 06/10/2019 Negative  -ATIVE Final     Glucose Urine 06/10/2019 Negative  -ATIVE Final     Ketones Urine 06/10/2019 Negative  -ATIVE Final     Urobilinogen mg/dL 06/10/2019 0.2 E.U./dL  0.2 E.U./dL Final     Bilirubin UR 06/10/2019 Negative  -ATIVE Final     Blood UR 06/10/2019 Negative  -ATIVE Final     Creatinine Urine 06/10/2019 53  mg/dL Final     Albumin Urine mg/L 06/10/2019 <5  mg/L Final     Albumin Urine mg/g Cr 06/10/2019 Unable to calculate due to low value  0 - 25 mg/g Cr Final            Review of Systems:     CONSTITUTIONAL: no fatigue, no unexpected change in weight  SKIN: no worrisome rashes, no worrisome moles, no worrisome  "lesions  EYES: no acute vision problems or changes, macular degeneration that she is seeing the ophthalmologist for  ENT: no ear problems, no mouth problems, no throat problems  RESP: no significant cough, no shortness of breath  CV: no chest pain, no palpitations, no new or worsening peripheral edema  GI: no nausea, no vomiting, no constipation, no diarrhea            Physical Exam:     Vitals:    02/24/20 1000   BP: 138/81   BP Location: Left arm   Patient Position: Sitting   Cuff Size: Adult Regular   Pulse: 92   Resp: 16   Temp: 98.5  F (36.9  C)   TempSrc: Oral   SpO2: 97%   Weight: 76.8 kg (169 lb 6.4 oz)   Height: 1.588 m (5' 2.5\")     Body mass index is 30.49 kg/m .  Vitals were reviewed and were normal, BP was improved this visit.  GENERAL: healthy, alert, well nourished, well hydrated, no distress  Ext: no edema    No results found for any visits on 02/24/20.       Assessment and Plan   Deborah is an 84 year old female with history of macular degeneration, hypertension, and osteoarthritis who is here today for a follow up visit for hypertension.    1. Encounter for follow-up for hypertension  Her BP today looks better than last visit with just dietary changes (low-salt foods, not adding salt to her foods). We talked about exercise today as well, and she agreed she can start with a goal of walking for 10 minutes a day and plan to increase that once the weather improves and she can go on longer walks outside.    - continue hydrochlorothiazide, amlodipine, and losartan as prescribed  Return in 3 months    There are no discontinued medications.        I have reviewed this patient with the attending physician, Dr. Crespo.   Polly Borden, MS3  [facility] [employer]    Preceptor Attestation:  I was present with the medical student who participated in the service and in the documentation of this note. I have verified the history and personally performed the physical exam and medical decision making. I " have verified the content of the note, which accurately reflects my assessment of the patient and the plan of care.   Supervising Physician:  Ange Crespo MD.         Options for treatment and follow-up care were reviewed with the patient . Zaira Fierro  engaged in the decision making process and verbalized understanding of the options discussed and agreed with the final plan.    Ange Crespo MD

## 2020-02-24 ENCOUNTER — OFFICE VISIT (OUTPATIENT)
Dept: FAMILY MEDICINE | Facility: CLINIC | Age: 85
End: 2020-02-24
Payer: COMMERCIAL

## 2020-02-24 VITALS
HEART RATE: 92 BPM | WEIGHT: 169.4 LBS | SYSTOLIC BLOOD PRESSURE: 138 MMHG | TEMPERATURE: 98.5 F | DIASTOLIC BLOOD PRESSURE: 81 MMHG | BODY MASS INDEX: 30.02 KG/M2 | OXYGEN SATURATION: 97 % | RESPIRATION RATE: 16 BRPM | HEIGHT: 63 IN

## 2020-02-24 DIAGNOSIS — I10 ENCOUNTER FOR FOLLOW-UP FOR HYPERTENSION: Primary | ICD-10-CM

## 2020-02-24 ASSESSMENT — MIFFLIN-ST. JEOR: SCORE: 1184.58

## 2020-02-24 NOTE — PATIENT INSTRUCTIONS
Exercise and salt avoidance:  - You are doing a good job with avoiding salt and salty foods! Exercise is tough when it is cold. Continue your plan to walk for 10 minutes a day to start, and increase this as the weather gets better (maybe to 20 or 30 minutes).  -  your blood pressure cuff when you can and try to measure your blood pressure every day or every other day. Write down with the date and the measurement to bring to your next visit.     ---------------------------------------------------    Here is the plan from today's visit    1. Encounter for follow-up for hypertension  Your blood pressure looks great!!  Goal is less than 140/90  Come in if you are consistently at 160/100    2. Constipation - OK to use Miralax at [facility].        Please call or return to clinic if your symptoms don't go away.    Follow up plan  3 months     Thank you for coming to [facility] today.  Lab Testing:  **If you had lab testing today and your results are reassuring or normal they will be mailed to you or sent through [employer] within 7 days.   **If the lab tests need quick action we will call you with the results.  The phone number we will call with results is # [phone] (home) . If this is not the best number please call our clinic and change the number.  Medication Refills:  If you need any refills please call your pharmacy and they will contact us.   If you need to  your refill at a new pharmacy, please contact the new pharmacy directly. The new pharmacy will help you get your medications transferred faster.   Scheduling:  If you have any concerns about today's visit or wish to schedule another appointment please call our office during normal business hours [phone] (8-5:00 M-F)  If a referral was made to a [facility] and you don't get a call from central scheduling please call [phone].  If a Mammogram was ordered for you at [facility] call [phone] to schedule  or change your appointment.  If you had an XRay/CT/Ultrasound/MRI ordered the number is [phone] to schedule or change your radiology appointment.   Medical Concerns:  If you have urgent medical concerns please call [phone] at any time of the day.    Ange Crespo MD

## 2020-05-06 ENCOUNTER — DOCUMENTATION ONLY (OUTPATIENT)
Dept: FAMILY MEDICINE | Facility: CLINIC | Age: 85
End: 2020-05-06

## 2020-05-07 NOTE — PROGRESS NOTES
Telephone call to the client's home for annual health risk assessment. COVID-19 Remote Assessment.     Current situation/living environment  Deborah lives in a one bedroom apartment in a senior community.     Activities of daily living (ADL)/instrumental activities of daily living (IADL) and functional issues  Client needs help with the following ADL's: Independent  Client needs help with the following IADL's: Independent      Health concerns for today  No new health concerns states that her Arthritis is her main issue but states that taking tylenol helps with the pain. States that she will start walking again and see if that helps with the pain. She states that she was has been lazy since it was winter but hope that the warmer weather motivates her. She feels her eye sight is getting worse but she still is able to drive short distance to the grocery store etc. States that her eyes are worse at night.  Has patient fallen 2 or more times in the last year? No  Has patient fallen with injury in the last year? No    Cognition/mental health  No issue with MH or cognition.    STARS/Med Adherence  States that she takes all medications as prescribed     Client's Plan of Care consists of:  No formal service. Independent in all cares. Will continue to monitor and revisit in 6 months. Member understands to call with any questions or concerns.    Nurys Smyth,CASANDRA  [facility]IC[facility] Care Coordinator   [phone]

## 2020-06-01 ENCOUNTER — MYC REFILL (OUTPATIENT)
Dept: FAMILY MEDICINE | Facility: CLINIC | Age: 85
End: 2020-06-01

## 2020-06-01 DIAGNOSIS — G25.81 RESTLESS LEG SYNDROME: ICD-10-CM

## 2020-06-01 RX ORDER — TRAMADOL HYDROCHLORIDE 50 MG/1
TABLET ORAL
Qty: 60 TABLET | Refills: 3 | Status: SHIPPED | OUTPATIENT
Start: 2020-06-01 | End: 2020-10-01

## 2020-06-01 NOTE — TELEPHONE ENCOUNTER

## 2020-06-03 DIAGNOSIS — I10 BENIGN ESSENTIAL HYPERTENSION: ICD-10-CM

## 2020-06-03 NOTE — TELEPHONE ENCOUNTER
"Request for medication refill: hydrochlorothiazide (HYDRODIURIL) 25 MG tablet   AND  pravastatin (PRAVACHOL) 20 MG tablet   AND  losartan (COZAAR) 100 MG tablet     Providers if patient needs an appointment and you are willing to give a one month supply please refill for one month and  send a letter/MyChart using \".SMILLIMITEDREFILL\" .smillimited and route chart to \"P [facility] \" (Giving one month refill in non controlled medications is strongly recommended before denial)    If refill has been denied, meaning absolutely no refills without visit, please complete the smart phrase \".smirxrefuse\" and route it to the \"P [facility] MED REFILLS\"  pool to inform the patient and the pharmacy.    Fallon Garibay MA        "

## 2020-06-04 RX ORDER — HYDROCHLOROTHIAZIDE 25 MG/1
25 TABLET ORAL EVERY MORNING
Qty: 90 TABLET | Refills: 1 | Status: SHIPPED | OUTPATIENT
Start: 2020-06-04 | End: 2021-10-25

## 2020-06-04 RX ORDER — PRAVASTATIN SODIUM 20 MG
20 TABLET ORAL EVERY EVENING
Qty: 90 TABLET | Refills: 3 | Status: SHIPPED | OUTPATIENT
Start: 2020-06-04 | End: 2021-06-14

## 2020-06-04 RX ORDER — LOSARTAN POTASSIUM 100 MG/1
100 TABLET ORAL EVERY MORNING
Qty: 90 TABLET | Refills: 3 | Status: SHIPPED | OUTPATIENT
Start: 2020-06-04 | End: 2021-06-14

## 2020-06-24 DIAGNOSIS — I10 BENIGN ESSENTIAL HYPERTENSION: ICD-10-CM

## 2020-06-24 RX ORDER — AMLODIPINE BESYLATE 5 MG/1
5 TABLET ORAL DAILY
Qty: 90 TABLET | Refills: 0 | Status: SHIPPED | OUTPATIENT
Start: 2020-06-24 | End: 2020-10-19

## 2020-06-24 NOTE — TELEPHONE ENCOUNTER
"Request for medication refill: amLODIPine (NORVASC) 5 MG tablet     Providers if patient needs an appointment and you are willing to give a one month supply please refill for one month and  send a letter/MyChart using \".SMILLIMITEDREFILL\" .smillimited and route chart to \"P [facility] \" (Giving one month refill in non controlled medications is strongly recommended before denial)    If refill has been denied, meaning absolutely no refills without visit, please complete the smart phrase \".smirxrefuse\" and route it to the \"P [facility] MED REFILLS\"  pool to inform the patient and the pharmacy.    Paz Peoples, CMA        "

## 2020-07-13 ENCOUNTER — OFFICE VISIT (OUTPATIENT)
Dept: FAMILY MEDICINE | Facility: CLINIC | Age: 85
End: 2020-07-13
Payer: COMMERCIAL

## 2020-07-13 VITALS
RESPIRATION RATE: 16 BRPM | HEART RATE: 98 BPM | SYSTOLIC BLOOD PRESSURE: 122 MMHG | TEMPERATURE: 98.1 F | WEIGHT: 162.4 LBS | OXYGEN SATURATION: 100 % | DIASTOLIC BLOOD PRESSURE: 78 MMHG | BODY MASS INDEX: 29.88 KG/M2 | HEIGHT: 62 IN

## 2020-07-13 DIAGNOSIS — I10 ESSENTIAL HYPERTENSION WITH GOAL BLOOD PRESSURE LESS THAN 140/90: Primary | ICD-10-CM

## 2020-07-13 DIAGNOSIS — G89.29 CHRONIC PAIN OF LEFT KNEE: ICD-10-CM

## 2020-07-13 DIAGNOSIS — M17.12 PRIMARY LOCALIZED OSTEOARTHROSIS OF LEFT LOWER LEG: ICD-10-CM

## 2020-07-13 DIAGNOSIS — G25.81 RESTLESS LEG SYNDROME: ICD-10-CM

## 2020-07-13 DIAGNOSIS — M25.562 CHRONIC PAIN OF LEFT KNEE: ICD-10-CM

## 2020-07-13 LAB
AMPHETAMINES QUAL: NEGATIVE
BARBITURATES QUAL URINE: NEGATIVE
BENZODIAZEPINE QUAL URINE: NEGATIVE
BUN SERPL-MCNC: 10.6 MG/DL (ref 7–19)
BUPRENORPHINE QUAL URINE: NEGATIVE
CALCIUM SERPL-MCNC: 9.8 MG/DL (ref 8.5–10.1)
CANNABINOIDS UR QL SCN: NEGATIVE
CHLORIDE SERPLBLD-SCNC: 98.3 MMOL/L (ref 98–110)
CHOLEST SERPL-MCNC: 201.5 MG/DL (ref 0–200)
CHOLEST/HDLC SERPL: 4.3 {RATIO} (ref 0–5)
CO2 SERPL-SCNC: 28.2 MMOL/L (ref 20–32)
COCAINE QUAL URINE: NEGATIVE
CREAT SERPL-MCNC: 0.6 MG/DL (ref 0.5–1)
CREAT UR-MCNC: 141 MG/DL
GFR SERPL CREATININE-BSD FRML MDRD: >90 ML/MIN/1.7 M2
GLUCOSE SERPL-MCNC: 127.4 MG'DL (ref 70–99)
HDLC SERPL-MCNC: 47.2 MG/DL
LDLC SERPL CALC-MCNC: 117 MG/DL (ref 0–129)
METHAMPHETAMINE: NEGATIVE
METHODONE QUAL: NEGATIVE
MICROALBUMIN UR-MCNC: 36 MG/L
MICROALBUMIN/CREAT UR: 25.18 MG/G CR (ref 0–25)
MORPHINE QUAL: NEGATIVE
OXYCODONE QUAL: NEGATIVE
PHENCYCLIDINE: NEGATIVE
POTASSIUM SERPL-SCNC: 3.6 MMOL/L (ref 3.3–4.5)
PROPOXYPHENE: NEGATIVE
SODIUM SERPL-SCNC: 133.8 MMOL/L (ref 132.6–141.4)
TEMPERATURE OF URINE WAS BETWEEN 90-100 DEGREES F: YES
TRICYCLIC ANTIDEPRESSANTS: POSITIVE
TRIGL SERPL-MCNC: 188.6 MG/DL (ref 0–150)
TSH SERPL DL<=0.005 MIU/L-ACNC: 2.54 MU/L (ref 0.4–4)
VLDL CHOLESTEROL: 37.7 MG/DL (ref 7–32)

## 2020-07-13 RX ORDER — TRIAMCINOLONE ACETONIDE 40 MG/ML
40 INJECTION, SUSPENSION INTRA-ARTICULAR; INTRAMUSCULAR ONCE
Status: COMPLETED | OUTPATIENT
Start: 2020-07-13 | End: 2020-07-13

## 2020-07-13 RX ADMIN — TRIAMCINOLONE ACETONIDE 40 MG: 40 INJECTION, SUSPENSION INTRA-ARTICULAR; INTRAMUSCULAR at 13:25

## 2020-07-13 ASSESSMENT — MIFFLIN-ST. JEOR: SCORE: 1143.86

## 2020-07-13 NOTE — PATIENT INSTRUCTIONS
Here is the plan from today's visit    1. Essential hypertension with goal blood pressure less than 140/90 - I will send you your results on [employer]   - TSH with free T4 reflex; Future  - Lipid Panel ([facility]); Future  - Basic Metabolic Panel ([facility]); Future  - Albumin Random Urine Quantitative with Creat Ratio; Future    2. Chronic pain of left knee  We injected you today.  Take it easy for 24 - 48 hours and then be more active.  If any redness or bad pain, then go to the ER.   - Rapid Urine Drug Screen ([facility]); Future    3. Restless leg syndrome  Tramadol is OK for now.  Plan to come in for your next refill in about 2 months.        Please call or return to clinic if your symptoms don't go away.    Follow up plan  3 months for repeat Tramadol refill       Thank you for coming to [facility] today.  Lab Testing:  **If you had lab testing today and your results are reassuring or normal they will be mailed to you or sent through [employer] within 7 days.   **If the lab tests need quick action we will call you with the results.  The phone number we will call with results is # [phone] (home) . If this is not the best number please call our clinic and change the number.  Medication Refills:  If you need any refills please call your pharmacy and they will contact us.   If you need to  your refill at a new pharmacy, please contact the new pharmacy directly. The new pharmacy will help you get your medications transferred faster.   Scheduling:  If you have any concerns about today's visit or wish to schedule another appointment please call our office during normal business hours [phone] (8-5:00 M-F)  If a referral was made to a [facility] and you don't get a call from central scheduling please call [phone].  If a Mammogram was ordered for you at [facility] call [phone] to schedule or change your appointment.  If you had an XRay/CT/Ultrasound/MRI ordered the  number is [phone] to schedule or change your radiology appointment.   Medical Concerns:  If you have urgent medical concerns please call [phone] at any time of the day.    Ange Crespo MD

## 2020-07-13 NOTE — PROGRESS NOTES
AARTI Cabrera is a 85 year old  female  who presents for follow up of concern(s) listed below:    Chief Complaint   Patient presents with     Hypertension     FOllowing up oin BP and getting lab work done     Imm/Inj     Left Knee     BP:   Taking her BPs at home. Does it three times when she takes it. Usually 130- 140/70. Never super low. Does always get dizzy when she stands up and then is careful.  Denies falls.  Does not miss any meds.  Denies CPs or SOB.  Denies edema.   Is doing Heriberto Chi but cannot lean in and hurts on the left side.    Continues to limit her walking but is trying.       Left knee: continues to have left knee pain with ambulation     Patient Active Problem List   Diagnosis     Health Care Home     Diverticulosis of large intestine     Hyperlipidemia     Obesity     Disorder of bone and cartilage     Prediabetes     Advanced directives, counseling/discussion     Macular degeneration (senile) of retina     Cataract     Primary osteoarthritis of both knees     Arthritis of knee     Essential hypertension with goal blood pressure less than 140/90     Status post knee surgery     Restless leg syndrome     Decreased hearing of both ears     Chronic, continuous use of opioids       Current Outpatient Medications   Medication Sig Dispense Refill     acetaminophen (TYLENOL) 325 MG tablet Take 2 tablets (650 mg) by mouth 4 times daily 100 tablet 11     amLODIPine (NORVASC) 5 MG tablet Take 1 tablet (5 mg) by mouth daily 90 tablet 0     hydrochlorothiazide (HYDRODIURIL) 25 MG tablet Take 1 tablet (25 mg) by mouth every morning 90 tablet 1     latanoprost (XALATAN) 0.005 % ophthalmic solution Place 1 drop into both eyes At Bedtime Reported on 5/1/2017       losartan (COZAAR) 100 MG tablet Take 1 tablet (100 mg) by mouth every morning 90 tablet 3     Multiple Vitamins-Minerals (EYE VITAMINS PO)        nortriptyline (PAMELOR) 25 MG capsule Take 1 capsule (25 mg) by mouth At Bedtime 90 capsule 1      "pravastatin (PRAVACHOL) 20 MG tablet Take 1 tablet (20 mg) by mouth every evening 90 tablet 3     traMADol (ULTRAM) 50 MG tablet Take one  tab by mouth at 4 PM and take one tab by mouth at 10PM 60 tablet 3     ASPIRIN PO Take 81 mg by mouth            Allergies   Allergen Reactions     No Known Allergies                 Review of Systems:                 Physical Exam:     Vitals:    07/13/20 1139   BP: 122/78   Pulse: 98   Resp: 16   Temp: 98.1  F (36.7  C)   TempSrc: Oral   SpO2: 100%   Weight: 73.7 kg (162 lb 6.4 oz)   Height: 1.581 m (5' 2.25\")     Body mass index is 29.47 kg/m .  Vitals were reviewed and were normal  GENERAL: healthy, alert, well nourished, well hydrated, no distress  MS: extremities- no gross deformities noted, no edema    No results found for any visits on 07/13/20.       Assessment and Plan     Zaira was seen today for hypertension and imm/inj.    Diagnoses and all orders for this visit:    Essential hypertension with goal blood pressure less than 140/90 - is controlled. Time for annual labs to assure no endstage disease. Doing well and asymptomatic.   -     Albumin Random Urine Quantitative with Creat Ratio  -     Basic Metabolic Panel ([facility])  -     Lipid Panel ([facility])  -     TSH with free T4 reflex    Chronic pain of left knee - is actually not on the Tramadol for her knee pain but rather is on the Tramadol for RLS.  We reviewed and completed the Annual Pain Contract and substituted Restless Legs for Pain in that document.   -     Rapid Urine Drug Screen ([facility]); Future  -     Rapid Urine Drug Screen ([facility])    Restless leg syndrome - suffered severe restless leg syndrome where unable to sleep at all post Right TKA and only resolved with opioids so transitioned to Tramadol. Is on afternoon and evening dose of Tramadol and now has no RLS concerns at this point.          There are no discontinued medications.      Options for treatment and follow-up care were reviewed with the " patient . Zaira Fierro  engaged in the decision making process and verbalized understanding of the options discussed and agreed with the final plan.    Ange Crespo MD    Knee Injection Note    Zaira Fierro is a patient of Ange Alanis here for knee injection for Osteoarthritis of the left knee.    Consent: Affirmation of informed consent was signed and scanned into the medical record. Risks, benefits and alternatives were discussed. Patient's questions were elicited and answered.   Procedure safety checklist was completed:  Yes  Time Out (Pause for the Cause) completed: Yes    Preoperative Diagnosis:OA of left knee  Postoperative Diagnosis: same       The left knee was prepped  in the usual sterile fashion INJECTION:  Using 4 cc of 1% lidocaine mixed with 40 mg of kenalog, the knee joint was successfully injected without complication.  Patient did experience some pain relief following injection.    Kenalog:   Lot#: [id number]  Exp: 10/2021    Technique:   Skin prep: [employer]are  Anesthesia: 1% lidocaine  Complications:  No  Tolerance:  Pt tolerated procedure well and was in stable condition.     Was entire vial of medication used? Yes    Follow up: Patient was instructed that there will be a return to pain in a couple hours followed by relief in the next several days to a week. Patient was advised to call in increasing redness and swelling.

## 2020-07-15 ENCOUNTER — MYC MEDICAL ADVICE (OUTPATIENT)
Dept: FAMILY MEDICINE | Facility: CLINIC | Age: 85
End: 2020-07-15

## 2020-07-15 DIAGNOSIS — E66.3 OVERWEIGHT: ICD-10-CM

## 2020-07-15 DIAGNOSIS — R73.9 HYPERGLYCEMIA: Primary | ICD-10-CM

## 2020-08-28 DIAGNOSIS — M17.10 ARTHRITIS OF KNEE: ICD-10-CM

## 2020-08-28 RX ORDER — NORTRIPTYLINE HCL 25 MG
25 CAPSULE ORAL AT BEDTIME
Qty: 90 CAPSULE | Refills: 1 | Status: SHIPPED | OUTPATIENT
Start: 2020-08-28 | End: 2021-03-31

## 2020-08-28 NOTE — TELEPHONE ENCOUNTER

## 2020-10-01 DIAGNOSIS — G25.81 RESTLESS LEG SYNDROME: ICD-10-CM

## 2020-10-01 RX ORDER — TRAMADOL HYDROCHLORIDE 50 MG/1
TABLET ORAL
Qty: 60 TABLET | Refills: 3 | Status: SHIPPED | OUTPATIENT
Start: 2020-10-01 | End: 2020-10-19

## 2020-10-01 NOTE — TELEPHONE ENCOUNTER
"Patient requesting refill of tramadol. Last office visit 7/13/20. RN unable to fill as patient is controlled substance. Routing to PCP high priority to address as patient is out of medication.   Tiffany Green RN      Request for medication refill:    Providers if patient needs an appointment and you are willing to give a one month supply please refill for one month and  send a letter/MyChart using \".SMILLIMITEDREFILL\" .smillimited and route chart to \"P [facility] \" (Giving one month refill in non controlled medications is strongly recommended before denial)    If refill has been denied, meaning absolutely no refills without visit, please complete the smart phrase \".smirxrefuse\" and route it to the \"P [facility] MED REFILLS\"  pool to inform the patient and the pharmacy.    Tiffany Green RN         "

## 2020-10-01 NOTE — TELEPHONE ENCOUNTER
Verify that the refill encounter hasn't been started Yes    [facility] phone call message- patient requesting a refill:    Full Medication Name: traMADol (ULTRAM) 50 MG tablet       Dose: Take one  tab by mouth at 4 PM and take one tab by mouth at 10PM     Pharmacy confirmed as   [facility][id number] - [address] & [address] [place] [address]  Phone: [phone] Fax: [phone]  : Yes    Medication tab checked to see if medication has been sent  Yes    Additional Comments: Out of medication      OK to leave a message on voice mail? Yes    Advised patient refill may take up to 2 business days? No:     Primary language: English      needed? No    Call taken on October 1, 2020 at 10:31 AM by She Arroyo    Route to P [facility] MED RE[facility]

## 2020-10-19 ENCOUNTER — MYC MEDICAL ADVICE (OUTPATIENT)
Dept: FAMILY MEDICINE | Facility: CLINIC | Age: 85
End: 2020-10-19

## 2020-10-19 DIAGNOSIS — G25.81 RESTLESS LEG SYNDROME: ICD-10-CM

## 2020-10-19 DIAGNOSIS — I10 BENIGN ESSENTIAL HYPERTENSION: ICD-10-CM

## 2020-10-19 RX ORDER — TRAMADOL HYDROCHLORIDE 50 MG/1
TABLET ORAL
Qty: 60 TABLET | Refills: 0 | Status: SHIPPED | OUTPATIENT
Start: 2020-10-19 | End: 2020-11-04

## 2020-10-19 RX ORDER — AMLODIPINE BESYLATE 5 MG/1
5 TABLET ORAL DAILY
Qty: 90 TABLET | Refills: 1 | Status: SHIPPED | OUTPATIENT
Start: 2020-10-19 | End: 2021-04-28

## 2020-11-03 NOTE — PROGRESS NOTES
AARTI Cabrera is a 85 year old  female  who presents for follow up of concern(s) listed below:    Chief Complaint   Patient presents with     Follow Up     Tramadol and flu shots     Imm/Inj     Flu Shot       1. Restless Legs   Deborah states that the Tramadol has greatly improved her restless legs. She states that she is no longer losing any sleep over her legs, which is significantly different from the past when she went one month getting little sleep.     2. Blood Pressure   Taking hydrochlorothiazide, amlodipine, and losartan for elevated blood pressures. She is trying to limit added salt in her diet and working on increasing fruit and vegetable intake. However, she states she has been drinking a lot of 0 calorie Gatorade lately, which she did not know had added salts. She has a BP monitor at home, but has not been using it. Deborah endorses orthostatic hypotension which she states is getting worse. Notes sitting most of day. Every time she stands up from sitting, she gets dizzy and needs to put her head down until the feeling passes a few seconds later. She denies any headaches or chest pain. No loss of consiousness or falls.     3. 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 She states this knee is much better, but has continued pain in her left knee. She received an steroid injection at her last visit, which she states helped for about two months. Deborha is trying to increase her walking, but states that she is limited due to fatigue and hip and back pain.     Patient Active Problem List   Diagnosis     Diverticulosis of large intestine     Hyperlipidemia     Obesity     Prediabetes     Advanced directives, counseling/discussion     Macular degeneration (senile) of retina     Cataract     Primary osteoarthritis of both knees     Essential hypertension with goal blood pressure less than 140/90     Status post knee surgery     Restless leg syndrome     Decreased hearing of  "both ears     Chronic, continuous use of opioids       Current Outpatient Medications   Medication Sig Dispense Refill     acetaminophen (TYLENOL) 325 MG tablet Take 2 tablets (650 mg) by mouth 4 times daily 100 tablet 11     amLODIPine (NORVASC) 5 MG tablet Take 1 tablet (5 mg) by mouth daily 90 tablet 1     hydrochlorothiazide (HYDRODIURIL) 25 MG tablet Take 1 tablet (25 mg) by mouth every morning 90 tablet 1     latanoprost (XALATAN) 0.005 % ophthalmic solution Place 1 drop into both eyes At Bedtime Reported on 5/1/2017       losartan (COZAAR) 100 MG tablet Take 1 tablet (100 mg) by mouth every morning 90 tablet 3     Multiple Vitamins-Minerals (EYE VITAMINS PO)        nortriptyline (PAMELOR) 25 MG capsule Take 1 capsule (25 mg) by mouth At Bedtime 90 capsule 1     pravastatin (PRAVACHOL) 20 MG tablet Take 1 tablet (20 mg) by mouth every evening 90 tablet 3     traMADol (ULTRAM) 50 MG tablet Take one  tab by mouth at 4 PM and take one tab by mouth at 10PM 60 tablet 2     ASPIRIN PO Take 81 mg by mouth            Allergies   Allergen Reactions     No Known Allergies         Results from the last 24 hoursNo results found for this or any previous visit (from the past 24 hour(s)).         Review of Systems:     CONSTITUTIONAL: increased fatigue, no unexpected change in weight  EYES: worsening vision loss attributed to macular degeneration   CV: no chest pain, some peripheral edema-has not worsened, orthostatic hypotension   MS: back, hip, and bilateral knee pain worse with movement; restless legs   Neuro: no headaches             Physical Exam:     Vitals:    11/04/20 1422 11/04/20 1423   BP: (!) 143/79 (!) 145/82   Pulse: 97    Temp: 98  F (36.7  C)    TempSrc: Oral    SpO2: 98%    Weight: 74.1 kg (163 lb 6.4 oz)    Height: 1.588 m (5' 2.5\")      Body mass index is 29.41 kg/m .  Vitals were reviewed and were normal  Blood pressure (!) 145/82, pulse 97, temperature 98  F (36.7  C), temperature source Oral, height " "1.588 m (5' 2.5\"), weight 74.1 kg (163 lb 6.4 oz), SpO2 98 %, not currently breastfeeding.    GENERAL: healthy, alert, well nourished, well hydrated, no distress  RESP: lungs clear to auscultation - no rales, no rhonchi, no wheezes  CV: regular rates and rhythm, normal S1 S2, no S3 or S4 and no murmur, no click or rub -  MS: extremities- no gross deformities noted, trace edema  PSYCH: Alert and oriented times 3; speech- coherent , normal rate and volume; able to articulate logical thoughts, able to abstract reason, no tangential thoughts, no hallucinations or delusions, affect- normal    No results found for any visits on 11/04/20.       Assessment and Plan     1. Benign essential hypertension  Baseline tachycardia due to stress - gets anxious prior to coming in due to difficulty complying with past recommendations.   Encouraged Deborah to continue to limit salt intake and discontinue drinking Gatorade and increase fruits and vegetables in diet. Recommended increased water intake to help with orthostatic hypotension. Advised to regularly check blood pressure at home. Will not make any changes to medications at this time. Will check electrolyte levels at next visit.   - Albumin Random Urine Quantitative with Creat Ratio    2. Restless leg syndrome - refilled today for 3 months.  Return in 3 months.   - traMADol (ULTRAM) 50 MG tablet; Take one  tab by mouth at 4 PM and take one tab by mouth at 10PM  Dispense: 60 tablet; Refill: 2    3. Hyperglycemia  Elevated \"fasting\" glucose (some creamer in coffee) at last visit. Will plan on checking A1C and BMP at next visit. Will return fasting.     4. Arthritis of knee  Recommended activity, specifically Heriberto Chi to help prevent progression of arthritis and physical deconditioning. Reviewed ways to do Heriberto Chi so that limits stress on her knee. She notes more back pain and overall aching which may well be due to deconditioning due to isolation during COVID and limiting activity to " her home.       Medications Discontinued During This Encounter   Medication Reason     traMADol (ULTRAM) 50 MG tablet          Kristina Fernandes    Preceptor Attestation:  I was present with the medical student who participated in the service and in the documentation of this note. I have verified the history and personally performed the physical exam and medical decision making. I have verified the content of the note, which accurately reflects my assessment of the patient and the plan of care.   Supervising Physician:  Ange Crespo MD.         Options for treatment and follow-up care were reviewed with the patient . Zaira Fierro  engaged in the decision making process and verbalized understanding of the options discussed and agreed with the final plan.    Ange Crespo MD

## 2020-11-04 ENCOUNTER — OFFICE VISIT (OUTPATIENT)
Dept: FAMILY MEDICINE | Facility: CLINIC | Age: 85
End: 2020-11-04
Payer: COMMERCIAL

## 2020-11-04 VITALS
WEIGHT: 163.4 LBS | BODY MASS INDEX: 28.95 KG/M2 | OXYGEN SATURATION: 98 % | TEMPERATURE: 98 F | SYSTOLIC BLOOD PRESSURE: 145 MMHG | HEART RATE: 97 BPM | DIASTOLIC BLOOD PRESSURE: 82 MMHG | HEIGHT: 63 IN

## 2020-11-04 DIAGNOSIS — M17.10 ARTHRITIS OF KNEE: ICD-10-CM

## 2020-11-04 DIAGNOSIS — G25.81 RESTLESS LEG SYNDROME: ICD-10-CM

## 2020-11-04 DIAGNOSIS — Z23 NEED FOR PROPHYLACTIC VACCINATION AND INOCULATION AGAINST INFLUENZA: ICD-10-CM

## 2020-11-04 DIAGNOSIS — I10 BENIGN ESSENTIAL HYPERTENSION: Primary | ICD-10-CM

## 2020-11-04 DIAGNOSIS — R73.9 HYPERGLYCEMIA: ICD-10-CM

## 2020-11-04 LAB
CREAT UR-MCNC: 135 MG/DL
MICROALBUMIN UR-MCNC: 24 MG/L
MICROALBUMIN/CREAT UR: 18 MG/G CR (ref 0–25)

## 2020-11-04 PROCEDURE — G0008 ADMIN INFLUENZA VIRUS VAC: HCPCS | Performed by: FAMILY MEDICINE

## 2020-11-04 PROCEDURE — 90662 IIV NO PRSV INCREASED AG IM: CPT | Performed by: FAMILY MEDICINE

## 2020-11-04 PROCEDURE — 82043 UR ALBUMIN QUANTITATIVE: CPT | Performed by: FAMILY MEDICINE

## 2020-11-04 PROCEDURE — 99214 OFFICE O/P EST MOD 30 MIN: CPT | Mod: 25 | Performed by: FAMILY MEDICINE

## 2020-11-04 RX ORDER — TRAMADOL HYDROCHLORIDE 50 MG/1
TABLET ORAL
Qty: 60 TABLET | Refills: 2 | Status: SHIPPED | OUTPATIENT
Start: 2020-11-04 | End: 2021-01-19

## 2020-11-04 ASSESSMENT — ANXIETY QUESTIONNAIRES
6. BECOMING EASILY ANNOYED OR IRRITABLE: NOT AT ALL
5. BEING SO RESTLESS THAT IT IS HARD TO SIT STILL: NOT AT ALL
GAD7 TOTAL SCORE: 1
2. NOT BEING ABLE TO STOP OR CONTROL WORRYING: NOT AT ALL
7. FEELING AFRAID AS IF SOMETHING AWFUL MIGHT HAPPEN: NOT AT ALL
1. FEELING NERVOUS, ANXIOUS, OR ON EDGE: SEVERAL DAYS
IF YOU CHECKED OFF ANY PROBLEMS ON THIS QUESTIONNAIRE, HOW DIFFICULT HAVE THESE PROBLEMS MADE IT FOR YOU TO DO YOUR WORK, TAKE CARE OF THINGS AT HOME, OR GET ALONG WITH OTHER PEOPLE: NOT DIFFICULT AT ALL
3. WORRYING TOO MUCH ABOUT DIFFERENT THINGS: NOT AT ALL

## 2020-11-04 ASSESSMENT — PATIENT HEALTH QUESTIONNAIRE - PHQ9
5. POOR APPETITE OR OVEREATING: NOT AT ALL
SUM OF ALL RESPONSES TO PHQ QUESTIONS 1-9: 4

## 2020-11-04 ASSESSMENT — MIFFLIN-ST. JEOR: SCORE: 1152.37

## 2020-11-04 NOTE — PATIENT INSTRUCTIONS
Here is the plan from today's visit    1. Benign essential hypertension  -Continue the 3 medications   - Albumin Random Urine Quantitative with Creat Ratio  -Encourage more water to help with dizziness   - will help you get up off the couch to go to the bathroom more  - stop the Gatorade    2. Restless leg syndrome  -Tramadol     3. Hyperglycemia  -check sugars at next visit   - come fasting     4. Arthritis of knee  -Heriberto Chi exercises - Ok to not bend your bad knee so much  (goal is that it help your back to)       Please call or return to clinic if your symptoms don't go away.    Follow up plan  Return in about 3 months (around 2/4/2021) for Follow up, in person.  Thank you for coming to [facility] today.  Lab Testing:  **If you had lab testing today and your results are reassuring or normal they will be mailed to you or sent through [employer] within 7 days.   **If the lab tests need quick action we will call you with the results.  The phone number we will call with results is # [phone] (home) . If this is not the best number please call our clinic and change the number.  Medication Refills:  If you need any refills please call your pharmacy and they will contact us.   If you need to  your refill at a new pharmacy, please contact the new pharmacy directly. The new pharmacy will help you get your medications transferred faster.   Scheduling:  If you have any concerns about today's visit or wish to schedule another appointment please call our office during normal business hours [phone] (8-5:00 M-F)  If a referral was made to a [facility] and you don't get a call from central scheduling please call [phone].  If a Mammogram was ordered for you at [facility] call [phone] to schedule or change your appointment.  If you had an XRay/CT/Ultrasound/MRI ordered the number is [phone] to schedule or change your radiology appointment.   Medical Concerns:  If you  have urgent medical concerns please call [phone] at any time of the day.    Aneg Crespo MD

## 2020-11-05 ASSESSMENT — ANXIETY QUESTIONNAIRES: GAD7 TOTAL SCORE: 1

## 2021-01-10 ENCOUNTER — HEALTH MAINTENANCE LETTER (OUTPATIENT)
Age: 86
End: 2021-01-10

## 2021-01-19 ENCOUNTER — MYC MEDICAL ADVICE (OUTPATIENT)
Dept: FAMILY MEDICINE | Facility: CLINIC | Age: 86
End: 2021-01-19

## 2021-01-19 DIAGNOSIS — G25.81 RESTLESS LEG SYNDROME: ICD-10-CM

## 2021-01-19 RX ORDER — TRAMADOL HYDROCHLORIDE 50 MG/1
TABLET ORAL
Qty: 60 TABLET | Refills: 0 | Status: SHIPPED | OUTPATIENT
Start: 2021-01-19 | End: 2021-02-01

## 2021-02-01 ENCOUNTER — OFFICE VISIT (OUTPATIENT)
Dept: FAMILY MEDICINE | Facility: CLINIC | Age: 86
End: 2021-02-01
Payer: COMMERCIAL

## 2021-02-01 VITALS
SYSTOLIC BLOOD PRESSURE: 127 MMHG | DIASTOLIC BLOOD PRESSURE: 84 MMHG | WEIGHT: 165 LBS | BODY MASS INDEX: 30.36 KG/M2 | HEART RATE: 98 BPM | TEMPERATURE: 98.1 F | HEIGHT: 62 IN | OXYGEN SATURATION: 97 % | RESPIRATION RATE: 12 BRPM

## 2021-02-01 DIAGNOSIS — G25.81 RESTLESS LEG SYNDROME: Primary | ICD-10-CM

## 2021-02-01 DIAGNOSIS — I10 BENIGN ESSENTIAL HYPERTENSION: ICD-10-CM

## 2021-02-01 DIAGNOSIS — F11.90 CHRONIC, CONTINUOUS USE OF OPIOIDS: ICD-10-CM

## 2021-02-01 DIAGNOSIS — R73.9 HYPERGLYCEMIA: ICD-10-CM

## 2021-02-01 LAB
AMPHETAMINES QUAL: NEGATIVE
BARBITURATES QUAL URINE: NEGATIVE
BENZODIAZEPINE QUAL URINE: NEGATIVE
BUN SERPL-MCNC: 10.6 MG/DL (ref 7–19)
BUPRENORPHINE QUAL URINE: NEGATIVE
CALCIUM SERPL-MCNC: 9.6 MG/DL (ref 8.5–10.1)
CANNABINOIDS UR QL SCN: NEGATIVE
CHLORIDE SERPLBLD-SCNC: 97.2 MMOL/L (ref 98–110)
CO2 SERPL-SCNC: 30 MMOL/L (ref 20–32)
COCAINE QUAL URINE: NEGATIVE
CREAT SERPL-MCNC: 0.6 MG/DL (ref 0.5–1)
GFR SERPL CREATININE-BSD FRML MDRD: >90 ML/MIN/1.7 M2
GLUCOSE SERPL-MCNC: 120.4 MG'DL (ref 70–99)
HBA1C MFR BLD: 5.5 % (ref 4.1–5.7)
METHAMPHETAMINE: NEGATIVE
METHODONE QUAL: NEGATIVE
MORPHINE QUAL: NEGATIVE
OXYCODONE QUAL: NEGATIVE
PHENCYCLIDINE: NEGATIVE
POTASSIUM SERPL-SCNC: 3.5 MMOL/L (ref 3.3–4.5)
PROPOXYPHENE: NEGATIVE
SODIUM SERPL-SCNC: 138.8 MMOL/L (ref 132.6–141.4)
TEMPERATURE OF URINE WAS BETWEEN 90-100 DEGREES F: YES
TRICYCLIC ANTIDEPRESSANTS: POSITIVE

## 2021-02-01 PROCEDURE — 99214 OFFICE O/P EST MOD 30 MIN: CPT | Performed by: FAMILY MEDICINE

## 2021-02-01 PROCEDURE — 83036 HEMOGLOBIN GLYCOSYLATED A1C: CPT | Performed by: FAMILY MEDICINE

## 2021-02-01 PROCEDURE — 80306 DRUG TEST PRSMV INSTRMNT: CPT | Mod: GZ | Performed by: FAMILY MEDICINE

## 2021-02-01 PROCEDURE — 80048 BASIC METABOLIC PNL TOTAL CA: CPT | Performed by: FAMILY MEDICINE

## 2021-02-01 PROCEDURE — 36416 COLLJ CAPILLARY BLOOD SPEC: CPT | Performed by: FAMILY MEDICINE

## 2021-02-01 RX ORDER — TRAMADOL HYDROCHLORIDE 50 MG/1
TABLET ORAL
Qty: 60 TABLET | Refills: 0 | Status: SHIPPED | OUTPATIENT
Start: 2021-02-01 | End: 2021-04-28

## 2021-02-01 ASSESSMENT — MIFFLIN-ST. JEOR: SCORE: 1154.94

## 2021-02-01 NOTE — PATIENT INSTRUCTIONS
Patient Education   Here is the plan from today's visit    1. Hyperglycemia  This was normal   - Hemoglobin A1c ([facility])  - Basic Metabolic Panel ([facility])    2. Restless leg syndrome  Come back in 3 months  - Rapid Urine Drug Screen ([facility])  - traMADol (ULTRAM) 50 MG tablet; Take one  tab by mouth at 4 PM and take one tab by mouth at 10PM  Dispense: 60 tablet; Refill: 0      Please call or return to clinic if your symptoms don't go away.    Follow up plan  Return in about 3 months (around 5/1/2021).      Thank you for coming to [facility] today.  Lab Testing:  **If you had lab testing today and your results are reassuring or normal they will be mailed to you or sent through [employer] within 7 days.   **If the lab tests need quick action we will call you with the results.  The phone number we will call with results is # [phone] (home) . If this is not the best number please call our clinic and change the number.  Medication Refills:  If you need any refills please call your pharmacy and they will contact us.   If you need to  your refill at a new pharmacy, please contact the new pharmacy directly. The new pharmacy will help you get your medications transferred faster.   Scheduling:  If you have any concerns about today's visit or wish to schedule another appointment please call our office during normal business hours [phone] (8-5:00 M-F)  If a referral was made to a [facility] and you don't get a call from central scheduling please call [phone].  If a Mammogram was ordered for you at [facility] call [phone] to schedule or change your appointment.  If you had an XRay/CT/Ultrasound/MRI ordered the number is [phone] to schedule or change your radiology appointment.   Medical Concerns:  If you have urgent medical concerns please call [phone] at any time of the day.    Ange Crespo MD

## 2021-02-01 NOTE — LETTER
Zaira Fierro  [id number]         February 1, 2021  Informed  Consent  and  Agreement  for  Opioid  Therapy  of  Pain        Reason  for  Review  and  Signing  of  this  Document       Pain  relief  is  an  important  goal  for  your  care.  Opioid  medications  may  be  a  helpful  part  of   chronic  pain  treatment  for  some  people;  however,  misuse  of  opioid  medications  may result  in   serious  harm  to  patients  prescribed  them  and,  when  the  medications  are  diverted,  to  the  public  at   large.  As  opioid  use  for  pain  management  has  increased  in  recent  years,  injury,  addiction,  and   death  due  to  misuse  of  opioids  have  also  increased.    NOTE: you are on the Tramadol for your Restless Legs.    Patients  and  health  care  providers  both  have  responsibilities  for  the  safe  use  of  opioid   medications  when  they  are  prescribed  for  pain.  This  agreement  provides  important information   on  the  potential  benefits  and  risks  of  opioid  medications  and shows that both  you and  your  provider  agree  on  a  care  plan  so  that  opioid  medications  are  used  in  a  way  that  is  safe   and  effective  in  treating  your  pain.    This  agreement  is  reviewed  and  signed  by  all  patients  in  our   practice  who  receive  opioids  for  chronic  pain.          Expected  Benefits  or  Goals  of  Opioid  Treatment     Improved  pain  (N/A)    Improved  ability  to  engage  in  work,  social,  recreational  and/or  physical  activities      Improved  quality  of  life            Potential Risks or Side Effects of Opioid Treatment    Overdose Taking more than the prescribed amount of medication or using with alcohol or other drugs can cause you to stop breathing, resulting in coma, brain damage, or even death. Every single day, more than 40 Americans die from prescription opioid overdoses. The Center for Disease Control and Prevention (CDC) has  declared that the [place] is in the middle of an epidemic opioid overdose deaths.      Physical side effects May include mood changes, drowsiness, nausea, constipation, urination difficulties, depressed breathing, itching, bone thinning and sexual difficulties, such as lowering male hormone in men and stopping menstrual periods in women.      Physical dependence Suddenly stopping an opioid may lead to withdrawal symptoms including abdominal cramping, pain, diarrhea, sweating, anxiety, irritability and aching.     Tolerance A dose of an opioid may become less effective over time even though there is no change in your physical condition. If this happens repeatedly, your medication may need to be changed or discontinued.      Addiction Is more common in people with personal or family history of addiction, but can occur in anyone. It is suggested by drug craving, loss of control and may lead to money, relationship, or legal problems.     Hyperalgesia The use of opioids can increase the experience of pain. If this happens, it may require change or discontinuation of medication.      Sleep apnea  (periods of not breathing while asleep) may be caused or worsened by opioids.   Risk to unborn child Risks to unborn children may include: physical dependence at birth, possible alterations in pain perception, possible increased risk for addiction later in life, among others. Tell your provider if you are or intend to become pregnant.       Victimization There is a risk that you or someone in your household may be the victim of theft, deceit, assault or abuse by people trying to take your medications to misuse them.          Responsibilities  in  Opioid  Therapy  of  Chronic  Pain      Your provider's responsibilities: listening carefully to your concerns, treating you with  care and with due respect, and making clinical decisions based on what he/she believes is in  your best interest.    Your responsibilities: In order to maximize  the potential benefits of opioid medications and to  minimize the potential risks, it is important that you accept the following responsibilities. In  signing this agreement, you agree to:    1. Use your opioid medications as prescribed for the purpose of relieving pain  2. Keep your medications locked up to avoid intentional or unintentional use or diversion  by others. Discard all unused medications.  3. Be honest with your providers about your medication or other drug use.  4. Use no illegal drugs and not abuse alcohol while being prescribed opioids.  5. Not to share, sell, trade or in any way provide your medications to others.  6. Receive opioid medications from this practice only. If opioids are prescribed  unexpectedly by another office (for example due to an accident or dental procedure),  inform this office within 24 hours.  7. Fill your opioid medications at one pharmacy only. Inform this practice within 24 hours  if you must use a pharmacy different from your usual one.  8. Have urine drugs tests on a random basis and as requested by your provider. (Opioid  may be discontinued if the tests are declined illicit drugs found or medication not present when should be.)  9. Bring your opioid medications to the practice when requested.  10. Participate in other pain treatments agreed to with your provider and keep all  appointments scheduled for your care.  11. Permit this practice to communicate with other care providers and/or your significant  others as needed to assure opioids are being used appropriately and are beneficial to  your health and well-being  12.  I understand that my clinic can track controlled substance prescriptions from other providers through a central database (prescription monitoring program).  13.   I will tell my clinic right away if I become pregnant or have a new medical problem treated at another clinic    Your medications may be continued if they improve your pain, help you engage  in valued  activities, and/or enhance your quality of life and if you follow the above responsibilities.  Your medications may be discontinued if your goals for treatment are not met, if you experience negative  effects from using them, or if you do not follow this agreement.  If you develop complications of opioid use, such as addiction, we will assist you in finding  treatment. Please be aware, however, that our practice cooperates fully with law enforcement,  the [place] Drug Enforcement Agency and other agencies in the investigation of opioid-related  crimes including sharing, selling, trading or other potential harmful use of these powerful  Medications.    I have reviewed this document and been given the opportunity to have any questions  answered. I understand the possible benefits and risks of opioid medications and I accept the  responsibilities described above.    __________________________________         ____________________________________  Patient     Date   Ange Crespo MD                     Date

## 2021-02-01 NOTE — PROGRESS NOTES
"  Assessment & Plan     Hyperglycemia  Reassured is nl. Repeat in a year  Is doing better with healthy eating  - Hemoglobin A1c ([facility])  - Basic Metabolic Panel ([facility])    Restless leg syndrome  Will be on this indetermined time frame. Did very poorly post her TKA and opioid wean where she did not sleep for weeks and was started on this by sleep med. She has not shown any misuse. Signed the Opioid Agreement today.  Return in 3 mo  - Rapid Urine Drug Screen ([facility])  - traMADol (ULTRAM) 50 MG tablet  Dispense: 60 tablet; Refill: 0    Benign essential hypertension  Much better. She spikes at times and then we discover that she had increased salt intake. Continue with current meds.         29 minutes spent on the date of the encounter doing chart review, history and exam, documentation and further activities as noted above         BMI:   Estimated body mass index is 29.98 kg/m  as calculated from the following:    Height as of this encounter: 1.58 m (5' 2.21\").    Weight as of this encounter: 74.8 kg (165 lb).             Return in about 3 months (around 5/1/2021).    Ange Crespo MD  [facility] SELAM Cabrera is a 85 year old who presents to clinic today for the following health issues     HPI     Restless Legs:  Is doing well.  Takes her Tramadol at 4 pm and then again before bed.    Opioid agreement letter.     Right knee - doing OK right now.      HTN:  Has a cuff at home.  Sometimes 140/80.    Gatorade: stopped this.   No salt on her foods.   Denies CP, SOB or worsening edema    Follow up on her sugar:   Drinks water, a lot of milk and some 7-Up.   Not really good with fruit and vegetables.   Is avoiding the sweets.       Review of Systems         Objective    /84   Pulse 98   Temp 98.1  F (36.7  C) (Oral)   Resp 12   Ht 5' 2.21\" (1.58 m)   Wt 165 lb (74.8 kg)   SpO2 97%   BMI 29.98 kg/m    Body mass index is 29.98 kg/m .  Physical Exam       Results for orders " placed or performed in visit on 02/01/21 (from the past 24 hour(s))   Hemoglobin A1c ([facility])   Result Value Ref Range    Hemoglobin A1C 5.5 4.1 - 5.7 %   Basic Metabolic Panel ([facility])   Result Value Ref Range    Calcium 9.6 8.5 - 10.1 mg/dL    Chloride 97.2 (L) 98.0 - 110.0 mmol/L    Carbon Dioxide 30.0 20.0 - 32.0 mmol/L    Creatinine 0.6 0.5 - 1.0 mg/dL    Glucose 120.4 (H) 70.0 - 99.0 mg'dL    Potassium 3.5 3.3 - 4.5 mmol/L    Sodium 138.8 132.6 - 141.4 mmol/L    GFR Estimate >90 >60.0 mL/min/1.7 m2    GFR Estimate If Black >90 >60.0 mL/min/1.7 m2    Urea Nitrogen 10.6 7.0 - 19.0 mg/dL   Rapid Urine Drug Screen ([facility])   Result Value Ref Range    Cannabinoids Qual Urine NEGATIVE NEGATIVE    Phencyclidine NEGATIVE NEGATIVE    Cocaine Qual Urine NEGATIVE NEGATIVE    Methamphetamine Qual NEGATIVE NEGATIVE    Morphine Qual NEGATIVE NEGATIVE    Amphetamines Qual NEGATIVE NEGATIVE    Benzodiazepine Qual Urine NEGATIVE NEGATIVE    Tricyclic Antidepressants POSITIVE (A) NEGATIVE    Methadone Qual NEGATIVE NEGATIVE    Barbiturates Qual Urine NEGATIVE NEGATIVE    Oxycodone Qual NEGATIVE NEGATIVE    Propoxyphene NEGATIVE NEGATIVE    Buprenorphine Qual Urine NEGATIVE NEGATIVE    Temperature of Urine was Between  Degrees F YES YES

## 2021-02-13 NOTE — MR AVS SNAPSHOT
After Visit Summary   6/1/2018    Zaira Fierro    MRN: [id number]           Patient Information     Date Of Birth          1935        Visit Information        Provider Department      6/1/2018 10:40 AM Ange Crespo MD [facility]        Today's Diagnoses     Preop general physical exam    -  1    Other hyperlipidemia        Essential hypertension with goal blood pressure less than 140/90          Care Instructions      Presurgery Checklist  You are scheduled to have surgery. The healthcare staff will try to make your stay comfortable. Use the guidelines below to remind yourself what to do before surgery. Be sure to follow any specific pre-op instructions from your surgeon or nurse.   Preparing for Surgery  Ask your surgeon if you ll need a blood transfusion during surgery and if so, how to prepare for it. In some cases, you can donate blood before surgery. If needed, this blood can be given back (transfused) to you during or after surgery.  If you are having abdominal surgery, ask what you need to do to clear your bowel.  Tell your surgeon if you have allergies to any medications or foods.  Arrange for an adult family member or friend to drive you home after surgery. If possible, have someone ready to help you at home as you recover.  Call the surgeon if you get a cold, fever, sore throat, diarrhea, or other health problem just before surgery. Your surgeon can decide whether or not to postpone the surgery.  Medications  Tell your surgeon about all medications you take, including prescription and over-the-counter products such as herbal remedies and vitamins. Ask if you should continue taking them.  If you take ibuprofen, naproxen, or  blood thinners  such as aspirin, clopidogrel (Plavix), or warfarin (Coumadin), ask your surgeon whether you should stop taking them and how long before surgery you should stop.  You may be told to take antibiotics just before surgery to  prevent infection. If so, follow instructions carefully on how to take them.  If you are told to take medications called anticoagulants to prevent blood clots after surgery, be sure to follow the instructions on how to take them.  Stop Smoking  If you smoke, healing may take longer. So at least 2 week(s) before surgery, stop smoking.  Bathing or Showering Before Surgery  If instructed, wash with antibacterial soap. Afterward, do not use lotions or powders.  If you are having surgery on the head, you may be asked to shampoo with antibacterial soap. Follow instructions for doing so.  Do Not Remove Hair from the Surgery Site  Do not shave hair from the incision site, unless you are given specific instructions to do so. Usually, if hair needs to be removed, it will be done at the hospital right before surgery.  Don t Eat or Drink  Your doctor will tell you when to stop eating and drinking. If you do not follow your doctor's instructions, your procedure may be postponed or rescheduled for another day.  If your surgeon tells you to continue any medications, take them with small sips of water.  You can brush your teeth and rinse your mouth, but don t swallow any water.  Day of Surgery  Do not wear makeup. Do not use perfume, deodorant, or hairspray. Remove nail polish and artificial nails.  Leave jewelry (including rings), watches, and other valuables at home.  Be sure to bring health insurance cards or forms and a photo ID.  Bring a list of your medications (include the name, dose, how often you take them, and the time last dose was taken).  Arrive on time at the hospital or surgery facility.          Follow-ups after your visit        Who to contact     Please call your clinic at [phone] to:    Ask questions about your health    Make or cancel appointments    Discuss your medicines    Learn about your test results    Speak to your doctor            Additional Information About Your Visit        MyChart Information      [facility] gives you secure access to your electronic health record. If you see a primary care provider, you can also send messages to your care team and make appointments. If you have questions, please call your primary care clinic.  If you do not have a primary care provider, please call [phone] and they will assist you.      [facility] is an electronic gateway that provides easy, online access to your medical records. With [facility], you can request a clinic appointment, read your test results, renew a prescription or communicate with your care team.     To access your existing account, please contact your [facility] or call [phone] for assistance.        Care EveryWhere ID     This is your Care EveryWhere ID. This could be used by other organizations to access your [facility] medical records  [id number]        Your Vitals Were     Pulse Temperature Respirations Height Pulse Oximetry Breastfeeding?    97 97.7  F (36.5  C) (Oral) 16 5' (152.4 cm) 94% No    BMI (Body Mass Index)                   32.81 kg/m2            Blood Pressure from Last 3 Encounters:   06/01/18 112/71   11/03/17 122/83   07/03/17 148/86    Weight from Last 3 Encounters:   06/01/18 168 lb (76.2 kg)   11/03/17 165 lb (74.8 kg)   07/03/17 166 lb (75.3 kg)              We Performed the Following     Basic Metabolic Panel ([facility])        Primary Care Provider Office Phone # Fax #    Ange Crespo [phone] [address] [address]        Equal Access to Services     PAOLA SKELTON : Hadii aad ku hadasho Soomaali, waaxda luqadaha, qaybta kaalmada adeegyada, vaughn odell . So [facility] [phone].    ATENCIÓN: Si habla español, tiene a gibbons disposición servicios gratuitos de asistencia lingüística. Llame al [phone].    We comply with applicable federal civil rights laws and [place] laws. We do not discriminate on the basis of race,  color, national origin, age, disability, sex, sexual orientation, or gender identity.            Thank you!     Thank you for choosing hospitals [facility]  for your care. Our goal is always to provide you with excellent care. Hearing back from our patients is one way we can continue to improve our services. Please take a few minutes to complete the written survey that you may receive in the mail after your visit with us. Thank you!             Your Updated Medication List - Protect others around you: Learn how to safely use, store and throw away your medicines at www.disposemymeds.org.          This list is accurate as of 6/1/18 11:41 AM.  Always use your most recent med list.                   Brand Name Dispense Instructions for use Diagnosis    acetaminophen 325 MG tablet    TYLENOL    100 tablet    Take 2 tablets (650 mg) by mouth 4 times daily    Chronic pain of left knee       ASPIRIN PO      Take 81 mg by mouth        calcium carb 1250 mg (500 mg [place])/vitamin D 200 unit 500-200 MG-UNIT per tablet    OSCAL 500/200 D-3    90 tablet    Take 1 tablet by mouth 2 times daily    Routine health maintenance       hydrochlorothiazide 25 MG tablet    HYDRODIURIL    90 tablet    Take 1 tablet (25 mg) by mouth every morning    Benign essential hypertension       IRON SUPPLEMENT PO           latanoprost 0.005 % ophthalmic solution    XALATAN     Place 1 drop into both eyes At Bedtime Reported on 5/1/2017        losartan 100 MG tablet    COZAAR    90 tablet    Take 1 tablet (100 mg) by mouth every morning    Benign essential hypertension       MELATONIN PO      Take 5 mg by mouth        nortriptyline 25 MG capsule    PAMELOR    90 capsule    Take 1 capsule (25 mg) by mouth At Bedtime    Nutrition disorder, Arthritis of knee       pravastatin 20 MG tablet    PRAVACHOL    90 tablet    Take 1 tablet (20 mg) by mouth every evening    Benign essential hypertension       traMADol 50 MG tablet    ULTRAM    60 tablet     Take one  tab by mouth at 4 PM and take one tab by mouth at 10PM        UNABLE TO FIND      MEDICATION NAME: Occuvite           70 year old Male with a medical history of CAD s/p prior PCI to RCA, LAD, and diag, presented with symptomatic chest pain.  +Multivessel CAD found on cardiac cath 2/10/21. Now s/p C3L with Dr. Izquierdo 2/12/21.

## 2021-02-19 ENCOUNTER — IMMUNIZATION (OUTPATIENT)
Dept: NURSING | Facility: CLINIC | Age: 86
End: 2021-02-19
Payer: COMMERCIAL

## 2021-02-19 PROCEDURE — 0001A PR COVID VAC PFIZER DIL RECON 30 MCG/0.3 ML IM: CPT

## 2021-02-19 PROCEDURE — 91300 PR COVID VAC PFIZER DIL RECON 30 MCG/0.3 ML IM: CPT

## 2021-03-12 ENCOUNTER — IMMUNIZATION (OUTPATIENT)
Dept: NURSING | Facility: CLINIC | Age: 86
End: 2021-03-12
Attending: INTERNAL MEDICINE
Payer: COMMERCIAL

## 2021-03-12 PROCEDURE — 0002A PR COVID VAC PFIZER DIL RECON 30 MCG/0.3 ML IM: CPT

## 2021-03-12 PROCEDURE — 91300 PR COVID VAC PFIZER DIL RECON 30 MCG/0.3 ML IM: CPT

## 2021-03-31 ENCOUNTER — MYC MEDICAL ADVICE (OUTPATIENT)
Dept: FAMILY MEDICINE | Facility: CLINIC | Age: 86
End: 2021-03-31

## 2021-03-31 DIAGNOSIS — M17.10 ARTHRITIS OF KNEE: ICD-10-CM

## 2021-03-31 RX ORDER — NORTRIPTYLINE HCL 25 MG
25 CAPSULE ORAL AT BEDTIME
Qty: 90 CAPSULE | Refills: 1 | Status: SHIPPED | OUTPATIENT
Start: 2021-03-31 | End: 2021-09-28

## 2021-03-31 NOTE — TELEPHONE ENCOUNTER
"Request for medication refill:  medication interaction popping up. Routing to PCP to review. Pt is out    Providers if patient needs an appointment and you are willing to give a one month supply please refill for one month and  send a letter/MyChart using \".SMILLIMITEDREFILL\" .smillimited and route chart to \"P SMI \" (Giving one month refill in non controlled medications is strongly recommended before denial)    If refill has been denied, meaning absolutely no refills without visit, please complete the smart phrase \".smirxrefuse\" and route it to the \"P SMI MED REFILLS\"  pool to inform the patient and the pharmacy.    Asmita Hammer RN        "

## 2021-04-28 ENCOUNTER — OFFICE VISIT (OUTPATIENT)
Dept: FAMILY MEDICINE | Facility: CLINIC | Age: 86
End: 2021-04-28
Payer: COMMERCIAL

## 2021-04-28 VITALS
HEIGHT: 62 IN | BODY MASS INDEX: 30.88 KG/M2 | SYSTOLIC BLOOD PRESSURE: 136 MMHG | RESPIRATION RATE: 16 BRPM | WEIGHT: 167.8 LBS | TEMPERATURE: 97.8 F | OXYGEN SATURATION: 97 % | HEART RATE: 100 BPM | DIASTOLIC BLOOD PRESSURE: 79 MMHG

## 2021-04-28 DIAGNOSIS — R53.83 OTHER FATIGUE: Primary | ICD-10-CM

## 2021-04-28 DIAGNOSIS — G25.81 RESTLESS LEG SYNDROME: ICD-10-CM

## 2021-04-28 DIAGNOSIS — I10 BENIGN ESSENTIAL HYPERTENSION: ICD-10-CM

## 2021-04-28 DIAGNOSIS — R00.2 PALPITATIONS: ICD-10-CM

## 2021-04-28 LAB — FERRITIN SERPL-MCNC: 122 NG/ML (ref 8–252)

## 2021-04-28 PROCEDURE — 36415 COLL VENOUS BLD VENIPUNCTURE: CPT | Performed by: FAMILY MEDICINE

## 2021-04-28 PROCEDURE — 82728 ASSAY OF FERRITIN: CPT | Mod: GZ | Performed by: FAMILY MEDICINE

## 2021-04-28 PROCEDURE — 93010 ELECTROCARDIOGRAM REPORT: CPT | Performed by: FAMILY MEDICINE

## 2021-04-28 PROCEDURE — 85018 HEMOGLOBIN: CPT | Performed by: FAMILY MEDICINE

## 2021-04-28 PROCEDURE — 99215 OFFICE O/P EST HI 40 MIN: CPT | Performed by: FAMILY MEDICINE

## 2021-04-28 RX ORDER — AMLODIPINE BESYLATE 5 MG/1
5 TABLET ORAL DAILY
Qty: 90 TABLET | Refills: 1 | OUTPATIENT
Start: 2021-04-28

## 2021-04-28 RX ORDER — TRAMADOL HYDROCHLORIDE 50 MG/1
TABLET ORAL
Qty: 60 TABLET | Refills: 2 | Status: SHIPPED | OUTPATIENT
Start: 2021-04-28 | End: 2021-07-27

## 2021-04-28 RX ORDER — AMLODIPINE BESYLATE 5 MG/1
5 TABLET ORAL DAILY
Qty: 90 TABLET | Refills: 1 | Status: SHIPPED | OUTPATIENT
Start: 2021-04-28 | End: 2021-10-25

## 2021-04-28 RX ORDER — GABAPENTIN 300 MG/1
300 CAPSULE ORAL 3 TIMES DAILY
Qty: 30 CAPSULE | Refills: 0 | Status: SHIPPED | OUTPATIENT
Start: 2021-04-28 | End: 2021-04-29

## 2021-04-28 ASSESSMENT — MIFFLIN-ST. JEOR: SCORE: 1167.64

## 2021-04-28 NOTE — PATIENT INSTRUCTIONS
Patient Education   Here is the plan from today's visit    1. Other fatigue  We are checking your blood count to make sure that you do not have anemia.  If you have anemia, we will need to treat you with iron (and that will make your restless legs better) and figure out why. Usually we do endoscopies looking for bleeding somewhere.   - Hemoglobin    2. Restless leg syndrome  We are doing 2 things.   1) checking blood count.  And will treat if need to.  2) starting you on Gabapentin now, so that you can start sleeping better.  If it turns out you are not anemic, take the Gabapentin for about 1 - 2 weeks and then try without. If you are anemic, I will tell you when to stop (more like a month).   - Ferritin  - traMADol (ULTRAM) 50 MG tablet; Take one  tab by mouth at 4 PM and take one tab by mouth at 10PM  Dispense: 60 tablet; Refill: 2  - gabapentin (NEURONTIN) 300 MG capsule; Take 1 capsule (300 mg) by mouth 3 times daily  Dispense: 30 capsule; Refill: 0    3. Benign essential hypertension  Looks GOOD!  - amLODIPine (NORVASC) 5 MG tablet; Take 1 tablet (5 mg) by mouth daily  Dispense: 90 tablet; Refill: 1      Please call or return to clinic if your symptoms don't go away.    Follow up plan  No follow-ups on file.   folliculitis    Thank you for coming to [facility] today.  Lab Testing:  **If you had lab testing today and your results are reassuring or normal they will be mailed to you or sent through [facility] within 7 days.   **If the lab tests need quick action we will call you with the results.  **If you are having labs done on a different day, please call [phone] to schedule at [facility] or [phone] for other [facility] locations.   The phone number we will call with results is # [phone] (home) . If this is not the best number please call our clinic and change the number.  Medication Refills:  If you need any refills please call your pharmacy and they will contact us.   If you  need to  your refill at a new pharmacy, please contact the new pharmacy directly. The new pharmacy will help you get your medications transferred faster.   Scheduling:  If you have any concerns about today's visit or wish to schedule another appointment please call our office during normal business hours [phone] (8-5:00 M-F)  If a referral was made to a [facility] and you don't get a call from central scheduling please call [phone].  If a Mammogram was ordered for you at [facility] call [phone] to schedule or change your appointment.  If you had an EKG/XRay/CT/Ultrasound/MRI ordered the number is [phone] to schedule or change your radiology appointment.   Medical Concerns:  If you have urgent medical concerns please call [phone] at any time of the day.    Ange Crespo MD   Return in about 3 months (around 7/28/2021).Return in about 3 months (around 7/28/2021).

## 2021-04-28 NOTE — PROGRESS NOTES
"    Assessment & Plan     Other fatigue  With her fatigue and increased RLS am concerned she is anemic. 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  Is eating meat (hamburgers). Aware we would need to figure out cause.    Also relatively deconditioned. She will contact her [facility] coordinator to see if there are exercise groups she can join to build up her stamina.   - Hemoglobin    Restless leg syndrome  While waiting to figure out cause of exacerbation (drop in iron stores or poor sleep leading to vicous cycle) will start her on gabapentin. Last time she tried this, she was in a severe state of no sleep at all so it did not help much. Hoping it will at this time. Plan to stop once on for a couple of weeks and sleeping better with better RLS or iron stores back up.    - Ferritin  - traMADol (ULTRAM) 50 MG tablet; Take one  tab by mouth at 4 PM and take one tab by mouth at 10PM  - gabapentin (NEURONTIN) 300 MG capsule; Take 1 capsule (300 mg) by mouth 3 times daily    Benign essential hypertension  Controlled. refilled  - amLODIPine (NORVASC) 5 MG tablet; Take 1 tablet (5 mg) by mouth daily    Palpitations  Seems to skip every 5th beat. On EKG she is in NSR without pauses.  No other concerning symptoms and don't think fatigue is cardiac but rather anemia/poor sleep from RLS/ or deconditioning.   - EKG 12-lead complete w/read - Clinics      48 minutes spent on the date of the encounter doing chart review, history and exam, documentation and further activities per the note       BMI:   Estimated body mass index is 30.49 kg/m  as calculated from the following:    Height as of this encounter: 1.58 m (5' 2.21\").    Weight as of this encounter: 76.1 kg (167 lb 12.8 oz).           Return in about 3 months (around 7/28/2021) for in person, with me.    Ange Crespo MD  [facility] SELAM Cabrera is a 85 year old who presents for the following health issues     HPI     Is is so tired all the time. Takes her so long to " "do things. If she dusts a bit (5 - 10 minutes) she has to sit.  Her back starts to hurt, in addition.  Can only vacuum once in her apartment and then has to be on the couch for the rest of the day.  She can do things but feels very low in energy. Gradually worse.      Is not sleeping very well - gets up at 2 - 3 am but may not get back to sleep.  Sometimes wakes up and her heart is racing and then slows down relatively quickly. No sob or cp. Swelling in her legs is no worse.     Not moving   Can't drive so limits her getting out and doingthings.     Wt Readings from Last 4 Encounters:   04/28/21 76.1 kg (167 lb 12.8 oz)   02/01/21 74.8 kg (165 lb)   11/04/20 74.1 kg (163 lb 6.4 oz)   07/13/20 73.7 kg (162 lb 6.4 oz)             Review of Systems         Objective    /79   Pulse 100   Temp 97.8  F (36.6  C) (Oral)   Resp 16   Ht 1.58 m (5' 2.21\")   Wt 76.1 kg (167 lb 12.8 oz)   SpO2 97%   BMI 30.49 kg/m    Body mass index is 30.49 kg/m .  Physical Exam   GENERAL: healthy, alert and no distress  NECK: no adenopathy, no asymmetry, masses, or scars and thyroid normal to palpation  RESP: lungs clear to auscultation - no rales, rhonchi or wheezes  CV: regular rate and rhythm, normal S1 S2, no S3 or S4, no murmur, click or rub, no peripheral edema and peripheral pulses strong  MS: no gross musculoskeletal defects noted, trace edema with support hose    EKG - Reviewed and interpreted by me appears normal, NSR, normal axis, normal intervals, no acute ST/T changes c/w ischemia, no LVH by voltage criteria, unchanged from previous tracings            "

## 2021-04-28 NOTE — TELEPHONE ENCOUNTER
"Request for medication refill: amlodipine     Providers if patient needs an appointment and you are willing to give a one month supply please refill for one month and  send a letter/MyChart using \".SMILLIMITEDREFILL\" .smillimited and route chart to \"P SMI \" (Giving one month refill in non controlled medications is strongly recommended before denial)    If refill has been denied, meaning absolutely no refills without visit, please complete the smart phrase \".smirxrefuse\" and route it to the \"P SMI MED REFILLS\"  pool to inform the patient and the pharmacy.    Suzan Chaudhary, CMA        "

## 2021-04-29 ENCOUNTER — MYC MEDICAL ADVICE (OUTPATIENT)
Dept: FAMILY MEDICINE | Facility: CLINIC | Age: 86
End: 2021-04-29

## 2021-04-29 DIAGNOSIS — G25.81 RESTLESS LEG SYNDROME: ICD-10-CM

## 2021-04-29 LAB — HGB BLD-MCNC: 14.1 G/DL (ref 11.7–15.7)

## 2021-04-29 RX ORDER — GABAPENTIN 300 MG/1
300 CAPSULE ORAL AT BEDTIME
Qty: 30 CAPSULE | Refills: 0 | COMMUNITY
Start: 2021-04-29 | End: 2021-06-01

## 2021-05-03 ENCOUNTER — DOCUMENTATION ONLY (OUTPATIENT)
Dept: FAMILY MEDICINE | Facility: CLINIC | Age: 86
End: 2021-05-03

## 2021-05-26 NOTE — PROGRESS NOTES
Visit to the client's home for annual health risk assessment.  Covid-19 Remote Assessment    Current situation/living environment  Member lives in a one bedroom apartment in a senior community. She is well connected with her neighbors and her commnuity    Activities of daily living (ADL)/instrumental activities of daily living (IADL) and functional issues  Client needs help with the following ADL's: Independent  Client needs help with the following IADL's: independent  Deborah states that she takes her time doing all task. States that she cleans for a few minutes and then rest and then she will continue with the task. She no longer is driving but is able to run errands and get groceries as needed with a friend in her building.      Health concerns for today  No new health concerns.   Has patient fallen 2 or more times in the last year? No  Has patient fallen with injury in the last year? No    Cognition/mental health  No issues      Client's Plan of Care consists of:  Deborah does not have any formal services. Continues to be as independent as possible.  Member expresses consent and understands to call with any additional service needs, questions or concerns. Will revisit in 6 months sooner if needed.    Nurys Smyth,[facility]ANDR[facility] Care Coordinator   [phone]         no

## 2021-05-31 ENCOUNTER — MYC MEDICAL ADVICE (OUTPATIENT)
Dept: FAMILY MEDICINE | Facility: CLINIC | Age: 86
End: 2021-05-31

## 2021-05-31 DIAGNOSIS — G25.81 RESTLESS LEG SYNDROME: ICD-10-CM

## 2021-06-01 RX ORDER — GABAPENTIN 300 MG/1
300 CAPSULE ORAL AT BEDTIME
Qty: 90 CAPSULE | Refills: 1 | Status: SHIPPED | OUTPATIENT
Start: 2021-06-01 | End: 2021-11-24

## 2021-06-14 ENCOUNTER — MYC MEDICAL ADVICE (OUTPATIENT)
Dept: FAMILY MEDICINE | Facility: CLINIC | Age: 86
End: 2021-06-14

## 2021-06-14 DIAGNOSIS — I10 BENIGN ESSENTIAL HYPERTENSION: ICD-10-CM

## 2021-06-14 RX ORDER — LOSARTAN POTASSIUM 100 MG/1
100 TABLET ORAL EVERY MORNING
Qty: 90 TABLET | Refills: 3 | Status: SHIPPED | OUTPATIENT
Start: 2021-06-14 | End: 2022-06-24

## 2021-06-14 RX ORDER — PRAVASTATIN SODIUM 20 MG
20 TABLET ORAL EVERY EVENING
Qty: 90 TABLET | Refills: 3 | Status: SHIPPED | OUTPATIENT
Start: 2021-06-14 | End: 2022-06-24

## 2021-08-09 ENCOUNTER — OFFICE VISIT (OUTPATIENT)
Dept: FAMILY MEDICINE | Facility: CLINIC | Age: 86
End: 2021-08-09
Payer: COMMERCIAL

## 2021-08-09 VITALS
SYSTOLIC BLOOD PRESSURE: 130 MMHG | OXYGEN SATURATION: 95 % | DIASTOLIC BLOOD PRESSURE: 82 MMHG | HEART RATE: 94 BPM | WEIGHT: 173 LBS | HEIGHT: 62 IN | BODY MASS INDEX: 31.83 KG/M2 | RESPIRATION RATE: 16 BRPM | TEMPERATURE: 98.4 F

## 2021-08-09 DIAGNOSIS — M17.0 PRIMARY OSTEOARTHRITIS OF BOTH KNEES: ICD-10-CM

## 2021-08-09 DIAGNOSIS — G25.81 RESTLESS LEG SYNDROME: Primary | ICD-10-CM

## 2021-08-09 PROCEDURE — 99213 OFFICE O/P EST LOW 20 MIN: CPT | Performed by: FAMILY MEDICINE

## 2021-08-09 ASSESSMENT — MIFFLIN-ST. JEOR: SCORE: 1186.22

## 2021-08-09 NOTE — PATIENT INSTRUCTIONS
Patient Education   Here is the plan from today's visit    1. Restless leg syndrome  Will refill. Completed the Agreement today!    2. Primary osteoarthritis of both knees  Start the Heriberto Chi again to help with strengthening and balance.   Think about whether now is a good time for a knee replacement. OK to not do that!  Just making sure we have thought through the pros and cons.   Keep trying to move more!      Please call or return to clinic if your symptoms don't go away.    Follow up plan  No follow-ups on file.    Thank you for coming to [facility] today.  COVID-19 Vaccine:  Starting Monday 8/2/21: [facility] will have walk-in appointments for Moderna, Pfizer and Jeff&Jeff vaccines. No appointment needed! You will also have the option of receiving Moderna vaccine during your physician appointment. Please ask your care team for more information!  You may be eligible for a $100 Visa giftcard if you receive your first dose between Friday July 30th and Sunday August 15th. Visit https://[place].gov/covid19/100/ for more information.   Lab Testing:  **If you had lab testing today and your results are reassuring or normal they will be mailed to you or sent through [employer] within 7 days.   **If the lab tests need quick action we will call you with the results.  **If you are having labs done on a different day, please call [phone] to schedule at [facility] or [phone] for other [facility] locations.   The phone number we will call with results is # [phone] (home) . If this is not the best number please call our clinic and change the number.  Medication Refills:  If you need any refills please call your pharmacy and they will contact us.   If you need to  your refill at a new pharmacy, please contact the new pharmacy directly. The new pharmacy will help you get your medications transferred faster.   Scheduling:  If you have any concerns about today's visit or wish  to schedule another appointment please call our office during normal business hours [phone] (8-5:00 M-F)  If a referral was made to a [facility] and you don't get a call from central scheduling please call [phone].  If a Mammogram was ordered for you at [facility] call [phone] to schedule or change your appointment.  If you had an EKG/XRay/CT/Ultrasound/MRI ordered the number is [phone] to schedule or change your radiology appointment.   Medical Concerns:  If you have urgent medical concerns please call [phone] at any time of the day.    Ange Crespo MD

## 2021-08-09 NOTE — PROGRESS NOTES
"    Assessment & Plan     Restless leg syndrome  Controlled with Tramadol and doing well. Also has come up with other ways to treat, including Cubii.    Completed the controlled substance agreement and signed.      Primary osteoarthritis of both knees  Reviewed whether she wanted to pursue TKA at this time. She has a good awareness of what that means with regard to recovery. Notes that her left knee is not nearly as bad as her right one was before so not really necessary at this time. Also aware that the risk of surgery will increase with age as well.   Plan in the meantime to think about what she wants and to restart her Heriberto Chi.                 BMI:   Estimated body mass index is 31.43 kg/m  as calculated from the following:    Height as of this encounter: 1.58 m (5' 2.21\").    Weight as of this encounter: 78.5 kg (173 lb).           No follow-ups on file.    Ange Crespo MD  [facility] SELAM Cabrera is a 86 year old who presents for the following health issues     HPI   Hearing aid not currently working due to the battery issues.      RLS:  The gabapentin is helping and she can sleep.  The machine she uses she rides it and helps. Cubii - it takes the feeling away and goes until the feeling goes away.  Is on the couch so she can fall asleep there.     Weight - knows that there is a lot of sitting. Is not moving at all - just hurts all over. A friend swims but doesn't like getting wet.   Does have an sushila for Heriberto Chi - was doing it and forgot to keep up.   Is not going to get her knee to replaced on the left since experienced the right one.   Wt Readings from Last 4 Encounters:   08/09/21 78.5 kg (173 lb)   04/28/21 76.1 kg (167 lb 12.8 oz)   02/01/21 74.8 kg (165 lb)   11/04/20 74.1 kg (163 lb 6.4 oz)     Fatigue is better now.           Review of Systems         Objective    /82   Pulse 94   Temp 98.4  F (36.9  C)   Resp 16   Ht 1.58 m (5' 2.21\")   Wt 78.5 kg (173 lb)   " SpO2 95%   BMI 31.43 kg/m    Body mass index is 31.43 kg/m .  Physical Exam

## 2021-08-09 NOTE — LETTER
Opioid / Opioid Plus Controlled Substance Agreement    This is an agreement between you and your provider about the safe and appropriate use of controlled substance/opioids prescribed by your care team. Controlled substances are medicines that can cause physical and mental dependence (abuse).    There are strict laws about having and using these medicines. We here at [facility] are committing to working with you in your efforts to get better. To support you in this work, we ll help you schedule regular office appointments for medicine refills. If we must cancel or change your appointment for any reason, we ll make sure you have enough medicine to last until your next appointment.     As a Provider, I will:    Listen carefully to your concerns and treat you with respect.     Recommend a treatment plan that I believe is in your best interest. This plan may involve therapies other than opioid pain medication.     Talk with you often about the possible benefits, and the risk of harm of any medicine that we prescribe for you.     Provide a plan on how to taper (discontinue or go off) using this medicine if the decision is made to stop its use.    As a Patient, I understand that opioid(s):     Are a controlled substance prescribed by my care team to help me function or work and manage my condition(s).     Are strong medicines and can cause serious side effects such as:    Drowsiness, which can seriously affect my driving ability    A lower breathing rate, enough to cause death    Harm to my thinking ability     Depression     Abuse of and addiction to this medicine    Need to be taken exactly as prescribed. Combining opioids with certain medicines or chemicals (such as illegal drugs, sedatives, sleeping pills, and benzodiazepines) can be dangerous or even fatal. If I stop opioids suddenly, I may have severe withdrawal symptoms.    Do not work for all types of pain nor for all patients. If they re not helpful, I may  be asked to stop them.      The risks, benefits and side effects of these medicine(s) were explained to me. I agree that:  1. I will take part in other treatments as advised by my care team. This may be psychiatry or counseling, physical therapy, behavioral therapy, group treatment or a referral to a specialist.     2. I will keep all my appointments. I understand that this is part of the monitoring of opioids. My care team may require an office visit for EVERY opioid/controlled substance refill. If I miss appointments or don t follow instructions, my care team may stop my medicine.    3. I will take my medicines as prescribed. I will not change the dose or schedule unless my care team tells me to. There will be no refills if I run out early.     4. I may be asked to come to the clinic and complete a urine drug test or complete a pill count at any time. If I don t give a urine sample or participate in a pill count, the care team may stop my medicine.    5. I will only receive prescriptions from this clinic for chronic pain. If I am treated by another provider for acute pain issues, I will tell them that I am taking opioid pain medication for chronic pain and that I have a treatment agreement with this provider. I will inform my [facility] care team within one business day if I am given a prescription for any pain medication by another healthcare provider. My [facility] care team can contact other providers and pharmacists about my use of any medicines.    6. It is up to me to make sure that I don t run out of my medicines on weekends or holidays. If my care team is willing to refill my opioid prescription without a visit, I must request refills only during office hours. Refills may take up to 3 business days to process. I will use one pharmacy to fill all my opioid and other controlled substance prescriptions. I will notify the clinic about any changes to my insurance or medication  availability.    7. I am responsible for my prescriptions. If the medicine/prescription is lost, stolen or destroyed, it will not be replaced. I also agree not to share controlled substance medicines with anyone.    8. I am aware I should not use any illegal or recreational drugs. I agree not to drink alcohol unless my care team says I can.       9. If I enroll in the [place] Medical Cannabis program, I will tell my care team prior to my next refill.     10. I will tell my care team right away if I become pregnant, have a new medical problem treated outside of my regular clinic, or have a change in my medications.    11. I understand that this medicine can affect my thinking, judgment and reaction time. Alcohol and drugs affect the brain and body, which can affect the safety of my driving. Being under the influence of alcohol or drugs can affect my decision-making, behaviors, personal safety, and the safety of others. Driving while impaired (DWI) can occur if a person is driving, operating, or in physical control of a car, motorcycle, boat, snowmobile, ATV, motorbike, off-road vehicle, or any other motor vehicle ([place] Statute 169A.20). I understand the risk if I choose to drive or operate any vehicle or machinery.    I understand that if I do not follow any of the conditions above, my prescriptions or treatment may be stopped or changed.          Opioids  What You Need to Know    What are opioids?   Opioids are pain medicines that must be prescribed by a doctor. They are also known as narcotics.     Examples are:   1. morphine (MS Contin, Awilda)  2. oxycodone (Oxycontin)  3. oxycodone and acetaminophen (Percocet)  4. hydrocodone and acetaminophen (Vicodin, Norco)   5. fentanyl patch (Duragesic)   6. hydromorphone (Dilaudid)   7. methadone  8. codeine (Tylenol #3)     What do opioids do well?   Opioids are best for severe short-term pain such as after a surgery or injury. They may work well for cancer pain. They may  help some people with long-lasting (chronic) pain.     What do opioids NOT do well?   Opioids never get rid of pain entirely, and they don t work well for most patients with chronic pain. Opioids don t reduce swelling, one of the causes of pain.                                    Other ways to manage chronic pain and improve function include:       Treat the health problem that may be causing pain    Anti-inflammation medicines, which reduce swelling and tenderness, such as ibuprofen (Advil, Motrin) or naproxen (Aleve)    Acetaminophen (Tylenol)    Antidepressants and anti-seizure medicines, especially for nerve pain    Topical treatments such as patches or creams    Injections or nerve blocks    Chiropractic or osteopathic treatment    Acupuncture, massage, deep breathing, meditation, visual imagery, aromatherapy    Use heat or ice at the pain site    Physical therapy     Exercise    Stop smoking    Take part in therapy       Risks and side effects     Talk to your doctor before you start or decide to keep taking opioids. Possible side effects include:      Lowering your breathing rate enough to cause death    Overdose, including death, especially if taking higher than prescribed doses    Worse depression symptoms; less pleasure in things you usually enjoy    Feeling tired or sluggish    Slower thoughts or cloudy thinking    Being more sensitive to pain over time; pain is harder to control    Trouble sleeping or restless sleep    Changes in hormone levels (for example, less testosterone)    Changes in sex drive or ability to have sex    Constipation    Unsafe driving    Itching and sweating    Dizziness    Nausea, throwing up and dry mouth    What else should I know about opioids?    Opioids may lead to dependence, tolerance, or addiction.      Dependence means that if you stop or reduce the medicine too quickly, you will have withdrawal symptoms. These include loose poop (diarrhea), jitters, flu-like symptoms,  nervousness and tremors. Dependence is not the same as addiction.                       Tolerance means needing higher doses over time to get the same effect. This may increase the chance of serious side effects.      Addiction is when people improperly use a substance that harms their body, their mind or their relations with others. Use of opiates can cause a relapse of addiction if you have a history of drug or alcohol abuse.      People who have used opioids for a long time may have a lower quality of life, worse depression, higher levels of pain and more visits to doctors.    You can overdose on opioids. Take these steps to lower your risk of overdose:    1. Recognize the signs:  Signs of overdose include decrease or loss of consciousness (blackout), slowed breathing, trouble waking up and blue lips. If someone is worried about overdose, they should call 911.    2. Talk to your doctor about Narcan (naloxone).   If you are at risk for overdose, you may be given a prescription for Narcan. This medicine very quickly reverses the effects of opioids.   If you overdose, a friend or family member can give you Narcan while waiting for the ambulance. They need to know the signs of overdose and how to give Narcan.     3. Don't use alcohol or street drugs.   Taking them with opioids can cause death.    4. Do not take any of these medicines unless your doctor says it s OK. Taking these with opioids can cause death:    Benzodiazepines, such as lorazepam (Ativan), alprazolam (Xanax) or diazepam (Valium)    Muscle relaxers, such as cyclobenzaprine (Flexeril)    Sleeping pills like zolpidem (Ambien)     Other opioids      How to keep you and other people safe while taking opioids:    1. Never share your opioids with others.  Opioid medicines are regulated by the Drug Enforcement Agency ([place]). Selling or sharing medications is a criminal act.    2. Be sure to store opioids in a secure place, locked up if possible. Young children  can easily swallow them and overdose.    3. When you are traveling with your medicines, keep them in the original bottles. If you use a pill box, be sure you also carry a copy of your medicine list from your clinic or pharmacy.    4. Safe disposal of opioids    Most pharmacies have places to get rid of medicine, called disposal kiosks. Medicine disposal options are also available in every [facility]. Search your county and  medication disposal  to find more options. You can find more details at:  [email or web address]-[email or web address]-[email or web address]     I agree that my provider, clinic care team, and pharmacy may work with any city, state or federal law enforcement agency that investigates the misuse, sale, or other diversion of my controlled medicine. I will allow my provider to discuss my care with, or share a copy of, this agreement with any other treating provider, pharmacy or emergency room where I receive care.    I have read this agreement and have asked questions about anything I did not understand.    _______________________________________________________  Patient Signature - Zaira Fierro _____________________                   Date     _______________________________________________________  Provider Signature - Ange Crespo MD   _____________________                   Date     _______________________________________________________  Witness Signature (required if provider not present while patient signing)   _____________________                   Date

## 2021-09-13 ENCOUNTER — OFFICE VISIT (OUTPATIENT)
Dept: AUDIOLOGY | Facility: CLINIC | Age: 86
End: 2021-09-13
Payer: COMMERCIAL

## 2021-09-13 DIAGNOSIS — H90.3 SENSORINEURAL HEARING LOSS, BILATERAL: Primary | ICD-10-CM

## 2021-09-13 PROCEDURE — 99207 PR ASSESSMENT FOR HEARING AID: CPT | Performed by: AUDIOLOGIST

## 2021-09-13 NOTE — PROGRESS NOTES
AUDIOLOGY REPORT    SUBJECTIVE: Zaira Fierro is an 86 year old female who was seen in the Audiology Clinic at [facility] on 9/13/2021 for a hearing aid check. 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 The patient has been seen previously in this clinic and was fit with bilateral [employer]ia Pure Charge and Go 3Nx hearing aids on 12/19/2019. Zaira reports the hearing aid  is not working.     OBJECTIVE: Both hearing aids were placed on the  and the indicator light showed 3 red lines. The hearing aids and  will be sent to [employer] for an in-warranty repair.     ASSESSMENT: No charge visit, as the hearing aids are in warranty.    PLAN: Zaira will be contacted to  the hearing aids and  once they are back from repair. Please call this clinic with any questions regarding today s appointment.      Elsa Whitlock, [facility]  Licensed Audiologist  [place] #[id number]

## 2021-09-27 DIAGNOSIS — M17.10 ARTHRITIS OF KNEE: ICD-10-CM

## 2021-09-27 NOTE — TELEPHONE ENCOUNTER
"Request for medication refill: Nortriptyline    Providers if patient needs an appointment and you are willing to give a one month supply please refill for one month and  send a letter/MyChart using \".SMILLIMITEDREFILL\" .smillimited and route chart to \"P SMI \" (Giving one month refill in non controlled medications is strongly recommended before denial)    If refill has been denied, meaning absolutely no refills without visit, please complete the smart phrase \".smirxrefuse\" and route it to the \"P SMI MED REFILLS\"  pool to inform the patient and the pharmacy.    Suzan Chaudhary, CMA        "

## 2021-09-28 RX ORDER — NORTRIPTYLINE HCL 25 MG
25 CAPSULE ORAL AT BEDTIME
Qty: 90 CAPSULE | Refills: 1 | Status: SHIPPED | OUTPATIENT
Start: 2021-09-28 | End: 2022-03-21

## 2021-10-04 ENCOUNTER — MYC MEDICAL ADVICE (OUTPATIENT)
Dept: AUDIOLOGY | Facility: CLINIC | Age: 86
End: 2021-10-04

## 2021-10-11 NOTE — NURSING NOTE
Patient is returning call to RN Bushra from last week.   Tramadol RX faxed to [facility] in [employer] at [phone]    Confirmed fax through right fax     ALISA Mccray  Clinic Coordinator   Registered Medical Assistant   [facility]

## 2021-10-23 ENCOUNTER — HEALTH MAINTENANCE LETTER (OUTPATIENT)
Age: 86
End: 2021-10-23

## 2021-10-25 ENCOUNTER — OFFICE VISIT (OUTPATIENT)
Dept: FAMILY MEDICINE | Facility: CLINIC | Age: 86
End: 2021-10-25
Payer: COMMERCIAL

## 2021-10-25 VITALS
SYSTOLIC BLOOD PRESSURE: 139 MMHG | TEMPERATURE: 98.1 F | HEART RATE: 92 BPM | OXYGEN SATURATION: 99 % | DIASTOLIC BLOOD PRESSURE: 84 MMHG | BODY MASS INDEX: 31.98 KG/M2 | WEIGHT: 173.8 LBS | HEIGHT: 62 IN

## 2021-10-25 DIAGNOSIS — Z23 NEED FOR PROPHYLACTIC VACCINATION AND INOCULATION AGAINST INFLUENZA: ICD-10-CM

## 2021-10-25 DIAGNOSIS — I10 BENIGN ESSENTIAL HYPERTENSION: ICD-10-CM

## 2021-10-25 DIAGNOSIS — G25.81 RESTLESS LEG SYNDROME: Primary | ICD-10-CM

## 2021-10-25 PROCEDURE — G0008 ADMIN INFLUENZA VIRUS VAC: HCPCS | Performed by: FAMILY MEDICINE

## 2021-10-25 PROCEDURE — 99214 OFFICE O/P EST MOD 30 MIN: CPT | Mod: 25 | Performed by: FAMILY MEDICINE

## 2021-10-25 PROCEDURE — 90662 IIV NO PRSV INCREASED AG IM: CPT | Performed by: FAMILY MEDICINE

## 2021-10-25 RX ORDER — AMLODIPINE BESYLATE 5 MG/1
5 TABLET ORAL DAILY
Qty: 90 TABLET | Refills: 1 | Status: SHIPPED | OUTPATIENT
Start: 2021-10-25 | End: 2022-05-05

## 2021-10-25 RX ORDER — TRAMADOL HYDROCHLORIDE 50 MG/1
TABLET ORAL
Qty: 60 TABLET | Refills: 2 | Status: SHIPPED | OUTPATIENT
Start: 2021-10-25 | End: 2022-06-06

## 2021-10-25 RX ORDER — HYDROCHLOROTHIAZIDE 25 MG/1
25 TABLET ORAL EVERY MORNING
Qty: 90 TABLET | Refills: 1 | Status: SHIPPED | OUTPATIENT
Start: 2021-10-25 | End: 2022-05-05

## 2021-10-25 ASSESSMENT — MIFFLIN-ST. JEOR: SCORE: 1186.6

## 2021-10-25 NOTE — PROGRESS NOTES
"  Assessment & Plan     Restless leg syndrome  Stable. At times a bit worse but able to work through it.  To see me in 3 months.   - traMADol (ULTRAM) 50 MG tablet; Take one  tab by mouth at 4 PM and take one tab by mouth at 10PM    Need for prophylactic vaccination and inoculation against influenza  - INFLUENZA, QUAD, HIGH DOSE, PF, 65YR + (FLUZONE HD)    Benign essential hypertension  Controlled.  Refill.    - amLODIPine (NORVASC) 5 MG tablet; Take 1 tablet (5 mg) by mouth daily  - hydrochlorothiazide (HYDRODIURIL) 25 MG tablet; Take 1 tablet (25 mg) by mouth every morning             BMI:   Estimated body mass index is 31.79 kg/m  as calculated from the following:    Height as of this encounter: 1.575 m (5' 2\").    Weight as of this encounter: 78.8 kg (173 lb 12.8 oz).           No follow-ups on file.    Ange Crespo MD  [facility] SELAM Cabrera is a 86 year old who presents for the following health issues     HPI   Her eyes are getting a bit worse.  Worried about her macular degeneration.    Knee is the same. Never as bad as her other TKA knee.       RLS - seems to be worse the earlier the sun sets.  Is still using the Cubii at night when it wakes her.    Taking tramadol at 4 pm, 10 pm     COVID vaccine - got her booster.      Hearing Aids - got her new hearing aids and working but too loud with the phone.  Needs to get those adjusted.      Wt Readings from Last 4 Encounters:   10/25/21 78.8 kg (173 lb 12.8 oz)   08/09/21 78.5 kg (173 lb)   04/28/21 76.1 kg (167 lb 12.8 oz)   02/01/21 74.8 kg (165 lb)             Review of Systems         Objective    /84   Pulse 92   Temp 98.1  F (36.7  C) (Oral)   Ht 1.575 m (5' 2\")   Wt 78.8 kg (173 lb 12.8 oz)   SpO2 99%   BMI 31.79 kg/m    Body mass index is 31.79 kg/m .  Physical Exam                   "

## 2021-10-25 NOTE — PATIENT INSTRUCTIONS
Patient Education   Here is the plan from today's visit    1. Need for prophylactic vaccination and inoculation against influenza  - INFLUENZA, QUAD, HIGH DOSE, PF, 65YR + (FLUZONE HD)    2. Benign essential hypertension  Ok to switch the Cozaar to the night.   - amLODIPine (NORVASC) 5 MG tablet; Take 1 tablet (5 mg) by mouth daily  Dispense: 90 tablet; Refill: 1  - hydrochlorothiazide (HYDRODIURIL) 25 MG tablet; Take 1 tablet (25 mg) by mouth every morning  Dispense: 90 tablet; Refill: 1    3. Restless leg syndrome  Refilled again.   - traMADol (ULTRAM) 50 MG tablet; Take one  tab by mouth at 4 PM and take one tab by mouth at 10PM  Dispense: 60 tablet; Refill: 2      Please call or return to clinic if your symptoms don't go away.    Follow up plan  No follow-ups on file.    Thank you for coming to [facility] today.  COVID-19 Vaccine:  [facility] has walk-in appointments for COVID-19 vaccines. No appointment needed!   You also have the option of receiving Pfizer vaccine during your physician appointment. Please ask your care team for more information!  Lab Testing:  **If you had lab testing today and your results are reassuring or normal they will be mailed to you or sent through [employer] within 7 days.   **If the lab tests need quick action we will call you with the results.  **If you are having labs done on a different day, please call [phone] to schedule at [facility] or [phone] for other [facility] locations.   The phone number we will call with results is # [phone] (home) . If this is not the best number please call our clinic and change the number.  Medication Refills:  If you need any refills please call your pharmacy and they will contact us.   If you need to  your refill at a new pharmacy, please contact the new pharmacy directly. The new pharmacy will help you get your medications transferred faster.   Scheduling:  If you have any concerns about  today's visit or wish to schedule another appointment please call our office during normal business hours [phone] (8-5:00 M-F)  If a referral was made to a [facility] and you don't get a call from central scheduling please call [phone].  If a Mammogram was ordered for you at [facility] call [phone] to schedule or change your appointment.  If you had an EKG/XRay/CT/Ultrasound/MRI ordered the number is [phone] to schedule or change your radiology appointment.   Medical Concerns:  If you have urgent medical concerns please call [phone] at any time of the day.    Ange Crespo MD

## 2021-11-24 DIAGNOSIS — G25.81 RESTLESS LEG SYNDROME: ICD-10-CM

## 2021-11-24 RX ORDER — GABAPENTIN 300 MG/1
300 CAPSULE ORAL AT BEDTIME
Qty: 90 CAPSULE | Refills: 1 | Status: SHIPPED | OUTPATIENT
Start: 2021-11-24 | End: 2022-01-04

## 2021-11-24 NOTE — TELEPHONE ENCOUNTER
[facility] phone call message- patient requesting a refill:    Full Medication Name: Gabapentin        Pharmacy confirmed as   [id number] - [address] & [address] [place] [address]  Phone: [phone] Fax: [phone]  : Yes    Additional Comments: Patient is out of medication    OK to leave a message on voice mail? no    Primary language: English      needed? No    Call taken on November 24, 2021 at 3:15 PM by Jeanette Calzada CMA

## 2022-01-04 ENCOUNTER — TELEPHONE (OUTPATIENT)
Dept: FAMILY MEDICINE | Facility: CLINIC | Age: 87
End: 2022-01-04
Payer: COMMERCIAL

## 2022-01-04 DIAGNOSIS — G25.81 RESTLESS LEG SYNDROME: ICD-10-CM

## 2022-01-04 RX ORDER — GABAPENTIN 300 MG/1
300 CAPSULE ORAL 3 TIMES DAILY PRN
Qty: 90 CAPSULE | Refills: 3 | Status: SHIPPED | OUTPATIENT
Start: 2022-01-04 | End: 2022-05-06

## 2022-01-04 NOTE — TELEPHONE ENCOUNTER
RN spoke with pharmacy who state that they are currently processing new prescription, doesn't appear to have any issues on it.     Montrell Belal RN

## 2022-01-04 NOTE — TELEPHONE ENCOUNTER
[facility] phone call message- medication clarification/question:    Full Medication Name: Gabapentin         Question/Clarification needed: Patient called stating that the pharmacy cannot process the refill of Gabapentin yet due to it being too early for insurance to pay for the RX. Patient had a mychart conversation on 12/02/2021 where Dr. Crespo instructed her to increase her Gabapentin but the pharmacy did not get the change in dosage until today. Patient stated that a call to the pharmacy to clarify the issue and get the refill in because she is out of medication tomorrow    Pharmacy confirmed as   [id number] [place], [place] - [address] [place] [address] & [address] [place] [address]  Phone: [phone] Fax: [phone]  : Yes    Please leave ONLY preferred pharmacy    OK to leave a message on voice mail? Yes    Advised patient that RN would call back within 3 hours, unless emergent.    Primary language: English      needed? No    Call taken on January 4, 2022 at 2:23 PM by Jeanette Calzada CMA    Route to [facility] TRIAGE

## 2022-02-12 ENCOUNTER — HEALTH MAINTENANCE LETTER (OUTPATIENT)
Age: 87
End: 2022-02-12

## 2022-02-12 ENCOUNTER — MYC MEDICAL ADVICE (OUTPATIENT)
Dept: FAMILY MEDICINE | Facility: CLINIC | Age: 87
End: 2022-02-12
Payer: COMMERCIAL

## 2022-02-12 DIAGNOSIS — G25.81 RESTLESS LEG SYNDROME: Primary | ICD-10-CM

## 2022-02-14 RX ORDER — PRAMIPEXOLE DIHYDROCHLORIDE 0.12 MG/1
0.12 TABLET ORAL 3 TIMES DAILY
Qty: 90 TABLET | Refills: 0 | Status: SHIPPED | OUTPATIENT
Start: 2022-02-14 | End: 2022-03-15

## 2022-02-14 NOTE — TELEPHONE ENCOUNTER
Trial of Mirapex since she is trying to get of the Tramadol which has worked well for her RLS.  Tried in the past but not sure if helped.    Ange Crespo MD

## 2022-03-15 DIAGNOSIS — G25.81 RESTLESS LEG SYNDROME: ICD-10-CM

## 2022-03-15 RX ORDER — PRAMIPEXOLE DIHYDROCHLORIDE 0.12 MG/1
0.12 TABLET ORAL 3 TIMES DAILY
Qty: 90 TABLET | Refills: 0 | Status: SHIPPED | OUTPATIENT
Start: 2022-03-15 | End: 2022-04-18

## 2022-03-21 DIAGNOSIS — M17.10 ARTHRITIS OF KNEE: ICD-10-CM

## 2022-03-21 RX ORDER — NORTRIPTYLINE HCL 25 MG
25 CAPSULE ORAL AT BEDTIME
Qty: 90 CAPSULE | Refills: 1 | Status: SHIPPED | OUTPATIENT
Start: 2022-03-21 | End: 2022-09-13

## 2022-03-21 NOTE — TELEPHONE ENCOUNTER
"Request for medication refill:    nortriptyline (PAMELOR) 25 MG capsule    Providers if patient needs an appointment and you are willing to give a one month supply please refill for one month and  send a letter/MyChart using \".SMILLIMITEDREFILL\" .smillimited and route chart to \"P [facility] \" (Giving one month refill in non controlled medications is strongly recommended before denial)    If refill has been denied, meaning absolutely no refills without visit, please complete the smart phrase \".smirxrefuse\" and route it to the \"P [facility] MED REFILLS\"  pool to inform the patient and the pharmacy.    Virginia Vernon, CMA          "

## 2022-04-18 DIAGNOSIS — G25.81 RESTLESS LEG SYNDROME: ICD-10-CM

## 2022-04-18 RX ORDER — PRAMIPEXOLE DIHYDROCHLORIDE 0.12 MG/1
TABLET ORAL
Qty: 90 TABLET | Refills: 0 | Status: SHIPPED | OUTPATIENT
Start: 2022-04-18 | End: 2022-04-19

## 2022-04-19 DIAGNOSIS — G25.81 RESTLESS LEG SYNDROME: ICD-10-CM

## 2022-04-19 RX ORDER — PRAMIPEXOLE DIHYDROCHLORIDE 0.12 MG/1
TABLET ORAL
Qty: 180 TABLET | Refills: 1 | Status: SHIPPED | OUTPATIENT
Start: 2022-04-19 | End: 2022-05-31

## 2022-04-19 NOTE — TELEPHONE ENCOUNTER
"Received fax from pharmacy requesting 90 day supply of pramipexole (MIRAPEX) 0.125 MG tablet.    Request for medication refill:    Providers if patient needs an appointment and you are willing to give a one month supply please refill for one month and  send a letter/MyChart using \".SMILLIMITEDREFILL\" .smillimited and route chart to \"P [facility] \" (Giving one month refill in non controlled medications is strongly recommended before denial)    If refill has been denied, meaning absolutely no refills without visit, please complete the smart phrase \".smirxrefuse\" and route it to the \"P [facility] MED REFILLS\"  pool to inform the patient and the pharmacy.    Marybeth Wagner MA        "
The patient is a 66y Female complaining of back pain/injury.

## 2022-05-06 DIAGNOSIS — G25.81 RESTLESS LEG SYNDROME: ICD-10-CM

## 2022-05-06 RX ORDER — GABAPENTIN 300 MG/1
300 CAPSULE ORAL 3 TIMES DAILY PRN
Qty: 90 CAPSULE | Refills: 3 | Status: SHIPPED | OUTPATIENT
Start: 2022-05-06 | End: 2022-06-06

## 2022-05-06 NOTE — TELEPHONE ENCOUNTER
"Request for medication refill:  gabapentin (NEURONTIN) 300 MG capsule    Providers if patient needs an appointment and you are willing to give a one month supply please refill for one month and  send a letter/MyChart using \".SMILLIMITEDREFILL\" .smillimited and route chart to \"[facility] \" (Giving one month refill in non controlled medications is strongly recommended before denial)    If refill has been denied, meaning absolutely no refills without visit, please complete the smart phrase \".smirxrefuse\" and route it to the \"P [facility] MED REFILLS\"  pool to inform the patient and the pharmacy.    Marybeth Wagner MA        "

## 2022-06-06 ENCOUNTER — OFFICE VISIT (OUTPATIENT)
Dept: FAMILY MEDICINE | Facility: CLINIC | Age: 87
End: 2022-06-06
Payer: COMMERCIAL

## 2022-06-06 VITALS
SYSTOLIC BLOOD PRESSURE: 137 MMHG | DIASTOLIC BLOOD PRESSURE: 85 MMHG | HEART RATE: 97 BPM | HEIGHT: 62 IN | WEIGHT: 185.4 LBS | BODY MASS INDEX: 34.12 KG/M2 | OXYGEN SATURATION: 97 % | TEMPERATURE: 98.5 F

## 2022-06-06 DIAGNOSIS — G25.81 RESTLESS LEG SYNDROME: Primary | ICD-10-CM

## 2022-06-06 DIAGNOSIS — I10 BENIGN ESSENTIAL HYPERTENSION: ICD-10-CM

## 2022-06-06 DIAGNOSIS — R60.0 LOCALIZED EDEMA: ICD-10-CM

## 2022-06-06 DIAGNOSIS — G56.20 ULNAR NERVE ENTRAPMENT, UNSPECIFIED LATERALITY: ICD-10-CM

## 2022-06-06 PROBLEM — F11.90 CHRONIC, CONTINUOUS USE OF OPIOIDS: Status: RESOLVED | Noted: 2020-03-19 | Resolved: 2022-06-06

## 2022-06-06 LAB
AMPHETAMINES UR QL: NOT DETECTED
BARBITURATES UR QL SCN: NOT DETECTED
BENZODIAZ UR QL SCN: NOT DETECTED
BUPRENORPHINE UR QL: NOT DETECTED
CANNABINOIDS UR QL: NOT DETECTED
COCAINE UR QL SCN: NOT DETECTED
D-METHAMPHET UR QL: NOT DETECTED
METHADONE UR QL SCN: NOT DETECTED
OPIATES UR QL SCN: NOT DETECTED
OXYCODONE UR QL SCN: NOT DETECTED
PCP UR QL SCN: NOT DETECTED
PROPOXYPH UR QL: NOT DETECTED
TRICYCLICS UR QL SCN: DETECTED

## 2022-06-06 PROCEDURE — 99214 OFFICE O/P EST MOD 30 MIN: CPT | Performed by: FAMILY MEDICINE

## 2022-06-06 PROCEDURE — 80306 DRUG TEST PRSMV INSTRMNT: CPT | Performed by: FAMILY MEDICINE

## 2022-06-06 RX ORDER — GABAPENTIN 300 MG/1
300 CAPSULE ORAL 3 TIMES DAILY PRN
Qty: 90 CAPSULE | Refills: 3 | Status: SHIPPED | OUTPATIENT
Start: 2022-06-06 | End: 2022-07-20

## 2022-06-06 ASSESSMENT — ANXIETY QUESTIONNAIRES
GAD7 TOTAL SCORE: 4
6. BECOMING EASILY ANNOYED OR IRRITABLE: NOT AT ALL
2. NOT BEING ABLE TO STOP OR CONTROL WORRYING: SEVERAL DAYS
7. FEELING AFRAID AS IF SOMETHING AWFUL MIGHT HAPPEN: NOT AT ALL
5. BEING SO RESTLESS THAT IT IS HARD TO SIT STILL: MORE THAN HALF THE DAYS
IF YOU CHECKED OFF ANY PROBLEMS ON THIS QUESTIONNAIRE, HOW DIFFICULT HAVE THESE PROBLEMS MADE IT FOR YOU TO DO YOUR WORK, TAKE CARE OF THINGS AT HOME, OR GET ALONG WITH OTHER PEOPLE: NOT DIFFICULT AT ALL
3. WORRYING TOO MUCH ABOUT DIFFERENT THINGS: NOT AT ALL
GAD7 TOTAL SCORE: 4
1. FEELING NERVOUS, ANXIOUS, OR ON EDGE: SEVERAL DAYS

## 2022-06-06 ASSESSMENT — PATIENT HEALTH QUESTIONNAIRE - PHQ9
5. POOR APPETITE OR OVEREATING: NOT AT ALL
SUM OF ALL RESPONSES TO PHQ QUESTIONS 1-9: 7

## 2022-06-06 NOTE — PROGRESS NOTES
"  Assessment & Plan     Restless leg syndrome  Is controlled with Mirapex and Neurontin although does sound quite debilitated with it. Was on Tramadol due to opioids being the only thing that helped.   - gabapentin (NEURONTIN) 300 MG capsule; Take 1 capsule (300 mg) by mouth 3 times daily as needed (restless legs)    Benign essential hypertension  Controlled. Should have done BMP today - missed that was more than a year ago. Would add TSH and also HgAic.     Localized edema  Likely from the amlodipine but would rule out renal.  No signs of CHF which she was worried about. Will order DME compression hose as well.     Ulnar nerve entrapment, unspecified laterality  Possibly - and bilateral but much worse on the left in the hand that she holds the ipad all the time (cannot see well enough to not hold it close to her).  Will try wrist brace and see if that supports and possibly limits excess extension or flexion. If no better, could do EMG.        I spent a total of 32 minutes on the day of the visit.   Time spent doing chart review, history and exam, documentation and further activities per the note       BMI:   Estimated body mass index is 33.91 kg/m  as calculated from the following:    Height as of this encounter: 1.575 m (5' 2\").    Weight as of this encounter: 84.1 kg (185 lb 6.4 oz).           No follow-ups on file.    Ange Crespo MD  [facility] SELAM Cabrera is a 86 year old who presents for the following health issues     HPI     Chief Complaint   Patient presents with     RECHECK     BP -     Pain     Swelling in legs and consult on compression stockings        Restless legs:  Has been off the Tramadol for the last 6 months.  Has managed her restless legs (even there first thing in the am) with the Gabapentin and the Pramipexole (which is newer).   Finds when this kicks in she cannot concentrate. Does push her to eat a bit.     Swelling in her legs:   First noted it after her " "left knee TKA and now is up the foot. Now feels like it has moved up her leg into her knee. Right is the same.   Able to lie on her side with 1 pillow.  Not exercising much due to her knees. Denies SOB    ROS:  Also notes that her 3 - 5th fingers are numb all the time. Left worse than the right. Right handed. When holds her ipad for long times and her left shoulder hurts and thinks might be related. Needs to keep it close to her face.           Review of Systems         Objective    /85   Pulse 97   Temp 98.5  F (36.9  C) (Oral)   Ht 1.575 m (5' 2\")   Wt 84.1 kg (185 lb 6.4 oz)   SpO2 97%   BMI 33.91 kg/m    Body mass index is 33.91 kg/m .  Physical Exam   Lungs:CTA  CV: RRR with some skipped beats and soft 2/6 systolic ejection murmur  Ext: both legs with 1+ edema to ankles.    Hands: nl strength, nl pulses.  Elbows nl.                   "

## 2022-06-07 NOTE — PATIENT INSTRUCTIONS
Here is the plan from today's visit    1. Restless leg syndrome  Refilled this  - gabapentin (NEURONTIN) 300 MG capsule; Take 1 capsule (300 mg) by mouth 3 times daily as needed (restless legs)  Dispense: 90 capsule; Refill: 3    2. Benign essential hypertension  Is looking good. You were right (!), we had not checked your blood work for more than a year and so I am hoping you can come by for just blood work and urine test.  These would help make sure the swelling is from the medication (amlodipine) which is likely but would want to make sure    3. Localized edema  I ordered compression hose.  You can get those at a medical supply store. You actually don't need to be measured although I know that [place] did. If they are not sure what size, I would use medium.    - Compression Sleeve/Stocking Order for DME - ONLY FOR DME  - Basic metabolic panel; Future  - TSH; Future  - UA reflex to Microscopic; Future  - Hemoglobin A1c; Future    4. Ulnar nerve entrapment, unspecified laterality  Wear the brace all the time that you are carrying the ipad to make sure that your wrist is not working too hard. Get back to me if you do not notice an improvement.           Please call or return to clinic if your symptoms don't go away.    Follow up plan  Please make a clinic appointment for follow up with me (ILIANA CORTES) in 12  weeks for routine check.  Please make a lab appointment in the next few weeks.    Thank you for coming to [facility] today.  Lab Testing:  **If you had lab testing today and your results are reassuring or normal they will be mailed to you or sent through [employer] within 7 days.   **If the lab tests need quick action we will call you with the results.  The phone number we will call with results is # [phone] (home) . If this is not the best number please call our clinic and change the number.  Medication Refills:  If you need any refills please call your pharmacy and they will contact us.   If you need  to  your refill at a new pharmacy, please contact the new pharmacy directly. The new pharmacy will help you get your medications transferred faster.   Scheduling:  If you have any concerns about today's visit or wish to schedule another appointment please call our office during normal business hours [phone] (8-5:00 M-F)  If a referral was made to a [facility] and you don't get a call from central scheduling please call [phone].  If a Mammogram was ordered for you at [facility] call [phone] to schedule or change your appointment.  If you had an XRay/CT/Ultrasound/MRI ordered the number is [phone] to schedule or change your radiology appointment.   Medical Concerns:  If you have urgent medical concerns please call [phone] at any time of the day.  If you have a medical emergency please call [phone].

## 2022-06-23 DIAGNOSIS — I10 BENIGN ESSENTIAL HYPERTENSION: ICD-10-CM

## 2022-06-23 NOTE — TELEPHONE ENCOUNTER
"Request for medication refill:  pravastatin (PRAVACHOL) 20 MG tablet    Providers if patient needs an appointment and you are willing to give a one month supply please refill for one month and  send a letter/MyChart using \".SMILLIMITEDREFILL\" .smillimited and route chart to \"P [facility] \" (Giving one month refill in non controlled medications is strongly recommended before denial)    If refill has been denied, meaning absolutely no refills without visit, please complete the smart phrase \".smirxrefuse\" and route it to the \"P [facility] MED REFILLS\"  pool to inform the patient and the pharmacy.    Marybeth Wagner MA        "

## 2022-06-24 RX ORDER — PRAVASTATIN SODIUM 20 MG
20 TABLET ORAL EVERY EVENING
Qty: 90 TABLET | Refills: 3 | Status: SHIPPED | OUTPATIENT
Start: 2022-06-24 | End: 2023-06-26

## 2022-06-24 RX ORDER — LOSARTAN POTASSIUM 100 MG/1
100 TABLET ORAL EVERY MORNING
Qty: 90 TABLET | Refills: 3 | Status: SHIPPED | OUTPATIENT
Start: 2022-06-24 | End: 2023-09-01

## 2022-07-20 ENCOUNTER — OFFICE VISIT (OUTPATIENT)
Dept: FAMILY MEDICINE | Facility: CLINIC | Age: 87
End: 2022-07-20
Payer: COMMERCIAL

## 2022-07-20 VITALS
WEIGHT: 185.4 LBS | HEART RATE: 99 BPM | HEIGHT: 62 IN | DIASTOLIC BLOOD PRESSURE: 83 MMHG | RESPIRATION RATE: 16 BRPM | SYSTOLIC BLOOD PRESSURE: 143 MMHG | BODY MASS INDEX: 34.12 KG/M2 | OXYGEN SATURATION: 97 % | TEMPERATURE: 98 F

## 2022-07-20 DIAGNOSIS — M17.0 PRIMARY OSTEOARTHRITIS OF BOTH KNEES: ICD-10-CM

## 2022-07-20 DIAGNOSIS — G25.81 RESTLESS LEG SYNDROME: ICD-10-CM

## 2022-07-20 DIAGNOSIS — I10 ESSENTIAL HYPERTENSION WITH GOAL BLOOD PRESSURE LESS THAN 140/90: Primary | ICD-10-CM

## 2022-07-20 DIAGNOSIS — E61.1 IRON DEFICIENCY: ICD-10-CM

## 2022-07-20 LAB
ANION GAP SERPL CALCULATED.3IONS-SCNC: 12 MMOL/L (ref 7–15)
BUN SERPL-MCNC: 16.4 MG/DL (ref 8–23)
CALCIUM SERPL-MCNC: 9.8 MG/DL (ref 8.8–10.2)
CHLORIDE SERPL-SCNC: 103 MMOL/L (ref 98–107)
CREAT SERPL-MCNC: 0.55 MG/DL (ref 0.51–0.95)
CREAT UR-MCNC: 81.8 MG/DL
DEPRECATED HCO3 PLAS-SCNC: 23 MMOL/L (ref 22–29)
FERRITIN SERPL-MCNC: 154 NG/ML (ref 11–328)
GFR SERPL CREATININE-BSD FRML MDRD: 89 ML/MIN/1.73M2
GLUCOSE SERPL-MCNC: 130 MG/DL (ref 70–99)
MICROALBUMIN UR-MCNC: <12 MG/L
MICROALBUMIN/CREAT UR: NORMAL MG/G{CREAT}
POTASSIUM SERPL-SCNC: 4.4 MMOL/L (ref 3.4–5.3)
SODIUM SERPL-SCNC: 138 MMOL/L (ref 136–145)

## 2022-07-20 PROCEDURE — 82043 UR ALBUMIN QUANTITATIVE: CPT | Performed by: FAMILY MEDICINE

## 2022-07-20 PROCEDURE — 82728 ASSAY OF FERRITIN: CPT | Performed by: FAMILY MEDICINE

## 2022-07-20 PROCEDURE — 36415 COLL VENOUS BLD VENIPUNCTURE: CPT | Performed by: FAMILY MEDICINE

## 2022-07-20 PROCEDURE — 99214 OFFICE O/P EST MOD 30 MIN: CPT | Mod: 25 | Performed by: FAMILY MEDICINE

## 2022-07-20 PROCEDURE — 80048 BASIC METABOLIC PNL TOTAL CA: CPT | Performed by: FAMILY MEDICINE

## 2022-07-20 PROCEDURE — 20605 DRAIN/INJ JOINT/BURSA W/O US: CPT | Performed by: FAMILY MEDICINE

## 2022-07-20 RX ORDER — TRIAMCINOLONE ACETONIDE 40 MG/ML
40 INJECTION, SUSPENSION INTRA-ARTICULAR; INTRAMUSCULAR ONCE
Status: COMPLETED | OUTPATIENT
Start: 2022-07-20 | End: 2022-07-20

## 2022-07-20 RX ORDER — GABAPENTIN 300 MG/1
CAPSULE ORAL
Qty: 90 CAPSULE | Refills: 3 | Status: SHIPPED | OUTPATIENT
Start: 2022-07-20 | End: 2022-10-20

## 2022-07-20 RX ADMIN — TRIAMCINOLONE ACETONIDE 40 MG: 40 INJECTION, SUSPENSION INTRA-ARTICULAR; INTRAMUSCULAR at 14:10

## 2022-07-20 NOTE — PATIENT INSTRUCTIONS
Patient Education   Here is the plan from today's visit    1. Essential hypertension with goal blood pressure less than 140/90  Checking labs today - kidney and urine   - Basic metabolic panel; Future  - Albumin Random Urine Quantitative with Creat Ratio; Future    2. Restless leg syndrome  Checking Iron. If too low, we will start you on iron again.   Now take TWO gabapentin at nightime.   If you need more, you can do: 1, 1, 3  - Ferritin; Future  - gabapentin (NEURONTIN) 300 MG capsule; Take 1 capsule (300 mg) by mouth 2 times daily AND 2 capsules (600 mg) At Bedtime. Do all this for 120 days.  Dispense: 90 capsule; Refill: 3    3. Primary osteoarthritis of both knees  If your knee gets red, swollen and more painful, then call 911.   - DRAIN/INJECT MEDIUM JOINT/BURSA  - triamcinolone (KENALOG-40) injection 40 mg    4. Iron deficiency   - Ferritin; Future        Please call or return to clinic if your symptoms don't go away.    Follow up plan  No follow-ups on file.    Thank you for coming to [facility] today.  Lab Testing:  **If you had lab testing today and your results are reassuring or normal they will be mailed to you or sent through [employer] within 7 days.   **If the lab tests need quick action we will call you with the results.  **If you are having labs done on a different day, please call [phone] to schedule at [facility] or [phone] for other [facility] locations. Labs do not offer walk-in appointments.  The phone number we will call with results is # [phone] (home) . If this is not the best number please call our clinic and change the number.  Medication Refills:  If you need any refills please call your pharmacy and they will contact us.   If you need to  your refill at a new pharmacy, please contact the new pharmacy directly. The new pharmacy will help you get your medications transferred faster.   Scheduling:  If you have any concerns about today's visit  or wish to schedule another appointment please call our office during normal business hours [phone] (8-5:00 M-F)  If a referral was made to an [facility] specialty provider and you do not get a call from central scheduling, please refer to directions on your visit summary or call our office during normal business hours for assistance.   If a [facility] was [facility] for [facility] at [facility] call [phone] to schedule or change your appointment.  If you had an XRay/CT/Ultrasound/MRI ordered the number is [phone] to schedule or change your radiology appointment.   [facility] has limited ultrasound appointments available on Wednesdays, if you would like your ultrasound at [facility], please call [phone] to schedule.   Medical Concerns:  If you have urgent medical concerns please call [phone] at any time of the day.    Ange Crespo MD

## 2022-07-20 NOTE — PROGRESS NOTES
"  Assessment & Plan     Essential hypertension with goal blood pressure less than 140/90  Not quite at goal but at 86 is reasonable.  Will not change meds today. Already with side effects from the amlodipine. Could, at next appointment, switch to Chlorthalidone.   - Basic metabolic panel; Future  - Albumin Random Urine Quantitative with Creat Ratio; Future  - Albumin Random Urine Quantitative with Creat Ratio  - Basic metabolic panel    Restless leg syndrome  Worse. Was on Tramadol to control in past, tapered off and really reluctant to restart this. Runs in family - all sisters have it quite severely. Since worse, will recheck Ferritin and treat if below 50.  Otherwise, will increase to 600 mg of Gabapentin at bedtime.   - Ferritin; Future  - gabapentin (NEURONTIN) 300 MG capsule; Take 1 capsule (300 mg) by mouth 2 times daily AND 2 capsules (600 mg) At Bedtime. Do all this for 120 days.  - Ferritin    Primary osteoarthritis of both knees  Did well with injection. Was better post injection. Aware, if this does not help, that time for her to see ortho for possible knee replacement/.   - DRAIN/INJECT MEDIUM JOINT/BURSA  - triamcinolone (KENALOG-40) injection 40 mg    Iron deficiency   As above.   - Ferritin; Future  - Ferritin             BMI:   Estimated body mass index is 33.91 kg/m  as calculated from the following:    Height as of this encounter: 1.575 m (5' 2\").    Weight as of this encounter: 84.1 kg (185 lb 6.4 oz).           No follow-ups on file.    MD JENNA [facility] SELAM Cabrera is a 87 year old, presenting for the following health issues:  Musculoskeletal Problem (Pt reports bilateral leg edema and pt request compression socks), Imm/Inj (Pt here for  knee injection), and Recheck Medication (Medications.)      HPI       Hoping for knee injection - left knee - last done 2020.     RLS worse  - will at times take 3 hours to fall asleep and has been taking more " "gabapentin intermittently.  Taking 300 mg three times a day. Also wakes up with the RLS so even there in the am.  It wakes her up at around 3 am and then is up.   For the Mirapex and has been taking at 4 and 10 pm.      Edema - wondering about what mmHG she should be using for a compression hose            Review of Systems         Objective    BP (!) 143/83   Pulse 99   Temp 98  F (36.7  C) (Oral)   Resp 16   Ht 1.575 m (5' 2\")   Wt 84.1 kg (185 lb 6.4 oz)   SpO2 97%   BMI 33.91 kg/m    Body mass index is 33.91 kg/m .  Physical Exam                 [facility]   Injection Note    Deborah Fierro is a patient of Ange Alanis here for injection for DJD of the left knee.    Consent: Affirmation of informed consent was signed and scanned into the medical record. Risks, benefits and alternatives were discussed. Patient's questions were elicited and answered.   Procedure safety checklist was completed:  Yes  Time Out (Pause for the Cause) completed: Yes    Preoperative Diagnosis:DJD of left knee  Postoperative Diagnosis: same       The left knee was prepped  in the usual sterile fashion INJECTION:  Using 4 mL of 1% lidocaine mixed with 40 mg of kenalog, the   was successfully injected without complication.  Patient did experience some pain relief following injection.    Technique:   Skin prep [employer]icare  Anesthesia 1% lidocaine  Complications:  No  Tolerance:  Pt tolerated procedure well and was in stable condition.     Follow up: Patient was instructed to use ice on the affected area tonight for at least 30 minutes and  that there will be a return to pain in a couple hours followed by relief in the next several days to a week. Patient was advised to call if increasing redness and swelling.     Follow up only if unimproved.    Faculty: Ange Crespo MD present for and supervised this entire procedure.          .  ..  "

## 2022-09-13 DIAGNOSIS — M17.10 ARTHRITIS OF KNEE: ICD-10-CM

## 2022-09-13 RX ORDER — NORTRIPTYLINE HCL 25 MG
25 CAPSULE ORAL AT BEDTIME
Qty: 90 CAPSULE | Refills: 1 | Status: SHIPPED | OUTPATIENT
Start: 2022-09-13 | End: 2023-03-13

## 2022-09-13 NOTE — TELEPHONE ENCOUNTER

## 2022-10-09 ENCOUNTER — HEALTH MAINTENANCE LETTER (OUTPATIENT)
Age: 87
End: 2022-10-09

## 2022-10-20 ENCOUNTER — MYC MEDICAL ADVICE (OUTPATIENT)
Dept: FAMILY MEDICINE | Facility: CLINIC | Age: 87
End: 2022-10-20

## 2022-10-20 DIAGNOSIS — G25.81 RESTLESS LEG SYNDROME: ICD-10-CM

## 2022-10-20 RX ORDER — GABAPENTIN 300 MG/1
CAPSULE ORAL
Qty: 150 CAPSULE | Refills: 3 | Status: SHIPPED | OUTPATIENT
Start: 2022-10-20 | End: 2023-02-24

## 2022-10-31 ENCOUNTER — MYC MEDICAL ADVICE (OUTPATIENT)
Dept: FAMILY MEDICINE | Facility: CLINIC | Age: 87
End: 2022-10-31

## 2022-10-31 DIAGNOSIS — G25.81 RESTLESS LEG SYNDROME: Primary | ICD-10-CM

## 2022-10-31 DIAGNOSIS — I10 BENIGN ESSENTIAL HYPERTENSION: ICD-10-CM

## 2022-10-31 RX ORDER — HYDROCHLOROTHIAZIDE 25 MG/1
25 TABLET ORAL EVERY MORNING
Qty: 90 TABLET | Refills: 1 | Status: SHIPPED | OUTPATIENT
Start: 2022-10-31 | End: 2023-04-25

## 2022-10-31 RX ORDER — PREGABALIN 50 MG/1
50 CAPSULE ORAL AT BEDTIME
Qty: 30 CAPSULE | Refills: 0 | Status: SHIPPED | OUTPATIENT
Start: 2022-10-31 | End: 2023-02-24

## 2022-10-31 RX ORDER — AMLODIPINE BESYLATE 5 MG/1
5 TABLET ORAL DAILY
Qty: 90 TABLET | Refills: 1 | Status: SHIPPED | OUTPATIENT
Start: 2022-10-31 | End: 2023-01-11 | Stop reason: SINTOL

## 2022-10-31 NOTE — TELEPHONE ENCOUNTER

## 2023-01-11 PROBLEM — Z98.890 STATUS POST KNEE SURGERY: Status: ACTIVE | Noted: 2017-04-07

## 2023-01-21 ENCOUNTER — OFFICE VISIT (OUTPATIENT)
Dept: URGENT CARE | Facility: URGENT CARE | Age: 88
End: 2023-01-21
Payer: COMMERCIAL

## 2023-01-21 VITALS
HEART RATE: 91 BPM | DIASTOLIC BLOOD PRESSURE: 86 MMHG | OXYGEN SATURATION: 97 % | SYSTOLIC BLOOD PRESSURE: 154 MMHG | TEMPERATURE: 98 F

## 2023-01-21 DIAGNOSIS — M79.89 LEG SWELLING: Primary | ICD-10-CM

## 2023-01-21 DIAGNOSIS — I10 BENIGN ESSENTIAL HYPERTENSION: ICD-10-CM

## 2023-01-21 DIAGNOSIS — R06.09 OTHER FORMS OF DYSPNEA: ICD-10-CM

## 2023-01-21 DIAGNOSIS — R53.83 OTHER FATIGUE: ICD-10-CM

## 2023-01-21 LAB
ANION GAP SERPL CALCULATED.3IONS-SCNC: 14 MMOL/L (ref 7–15)
BUN SERPL-MCNC: 16 MG/DL (ref 8–23)
CALCIUM SERPL-MCNC: 10 MG/DL (ref 8.8–10.2)
CHLORIDE SERPL-SCNC: 100 MMOL/L (ref 98–107)
CREAT SERPL-MCNC: 0.57 MG/DL (ref 0.51–0.95)
DEPRECATED HCO3 PLAS-SCNC: 25 MMOL/L (ref 22–29)
GFR SERPL CREATININE-BSD FRML MDRD: 88 ML/MIN/1.73M2
GLUCOSE SERPL-MCNC: 154 MG/DL (ref 70–99)
NT-PROBNP SERPL-MCNC: 52 PG/ML (ref 0–1800)
POTASSIUM SERPL-SCNC: 4.8 MMOL/L (ref 3.4–5.3)
SODIUM SERPL-SCNC: 139 MMOL/L (ref 136–145)
TSH SERPL DL<=0.005 MIU/L-ACNC: 3.96 UIU/ML (ref 0.3–4.2)

## 2023-01-21 PROCEDURE — 84443 ASSAY THYROID STIM HORMONE: CPT | Performed by: FAMILY MEDICINE

## 2023-01-21 PROCEDURE — 83880 ASSAY OF NATRIURETIC PEPTIDE: CPT | Performed by: FAMILY MEDICINE

## 2023-01-21 PROCEDURE — 99214 OFFICE O/P EST MOD 30 MIN: CPT | Performed by: FAMILY MEDICINE

## 2023-01-21 PROCEDURE — 80048 BASIC METABOLIC PNL TOTAL CA: CPT | Performed by: FAMILY MEDICINE

## 2023-01-21 PROCEDURE — 36415 COLL VENOUS BLD VENIPUNCTURE: CPT | Performed by: FAMILY MEDICINE

## 2023-01-21 RX ORDER — METOPROLOL SUCCINATE 100 MG/1
100 TABLET, EXTENDED RELEASE ORAL DAILY
Qty: 30 TABLET | Refills: 0 | Status: SHIPPED | OUTPATIENT
Start: 2023-01-21 | End: 2023-02-24

## 2023-01-21 NOTE — PROGRESS NOTES
Assessment & Plan     Leg swelling  - BNP-N terminal pro  - BNP-N terminal pro    Other fatigue  - TSH with free T4 reflex  - Basic metabolic panel  (Ca, Cl, CO2, Creat, Gluc, K, Na, BUN)  - BNP-N terminal pro  - TSH with free T4 reflex  - Basic metabolic panel  (Ca, Cl, CO2, Creat, Gluc, K, Na, BUN)  - BNP-N terminal pro    Other forms of dyspnea  - BNP-N terminal pro  - BNP-N terminal pro    Benign essential hypertension  - metoprolol succinate ER (TOPROL XL) 100 MG 24 hr tablet  Dispense: 30 tablet; Refill: 0     At this time start metoprolol 100 mg of the long-acting formulation and discontinue amlodipine to treat for hypertension.  Although suspicion is low given her fatigue and mild shortness of breath we will also do blood work to assess for early heart failure, this resulted later .  Patient has no chest pain or shortness of breath this time.  Additional blood work to assess electrolytes and renal function.    Discussed in the future can consider compression stockings for suspected lower extremity venous stasis    Yonatan Smyth MD   [facility]    Tamia Cabrera is a 87 year old female who presents to clinic today for the following health issues:  Chief Complaint   Patient presents with     Urgent Care     Ryan blood pressure and swollen legs. Did take meds correctly. They took her off of Amlodipine and now she is starting to have ryan blood pressure.      HPI    No chest pain or shortness of breath  Patient on amlodipine and recently had it reduced.     At home had BP of 200 systolic prior to taking her medications. She has no visual changes or headache.   Would like to proceed with metoprolol as discussed with her PCP office yesterday late evening          Patient Active Problem List    Diagnosis Date Noted     Decreased hearing of both ears 01/25/2020     Priority: Medium     Restless leg syndrome 06/13/2018     Priority: Medium     Severe - treated with nightly tramadol        Status post knee surgery 04/07/2017     Priority: Medium     S/p Right TKA 2017       Essential hypertension with goal blood pressure less than 140/90 07/01/2016     Priority: Medium     Primary osteoarthritis of both knees 10/17/2015     Priority: Medium     Macular degeneration (senile) of retina 04/07/2015     Priority: Medium     Non -exudative. Seen at [facility]. On latanoprost and Ocuvite       Cataract 04/07/2015     Priority: Medium     Advanced directives, counseling/discussion 07/30/2013     Priority: Medium     Completed POLST 7/30/2013 - see note for details.        Diverticulosis of large intestine 10/29/2012     Priority: Medium     Problem list name updated by automated process. Provider to review       Hyperlipidemia 10/29/2012     Priority: Medium     Problem list name updated by automated process. Provider to review       Obesity 10/29/2012     Priority: Medium     Problem list name updated by automated process. Provider to review       Prediabetes 10/29/2012     Priority: Medium       Current Outpatient Medications   Medication     acetaminophen (TYLENOL) 325 MG tablet     gabapentin (NEURONTIN) 300 MG capsule     hydrochlorothiazide (HYDRODIURIL) 25 MG tablet     latanoprost (XALATAN) 0.005 % ophthalmic solution     losartan (COZAAR) 100 MG tablet     metoprolol succinate ER (TOPROL XL) 100 MG 24 hr tablet     Multiple Vitamins-Minerals (EYE VITAMINS PO)     nortriptyline (PAMELOR) 25 MG capsule     pramipexole (MIRAPEX) 0.125 MG tablet     pravastatin (PRAVACHOL) 20 MG tablet     pregabalin (LYRICA) 50 MG capsule     No current facility-administered medications for this visit.           Objective    BP (!) 154/86 (BP Location: Right arm, Patient Position: Sitting, Cuff Size: Adult Large)   Pulse 91   Temp 98  F (36.7  C) (Oral)   SpO2 97%   Breastfeeding No   Physical Exam     CV: RRR no m/r/g  Pulm: non-labored  GEN: pleasant, normal mentation  Legs: 1+ edema to mid-ankle right  foot, trace edema left ankle    Results for orders placed or performed in visit on 01/21/23   TSH with free T4 reflex     Status: Normal   Result Value Ref Range    TSH 3.96 0.30 - 4.20 uIU/mL   Basic metabolic panel  (Ca, Cl, CO2, Creat, Gluc, K, Na, BUN)     Status: Abnormal   Result Value Ref Range    Sodium 139 136 - 145 mmol/L    Potassium 4.8 3.4 - 5.3 mmol/L    Chloride 100 98 - 107 mmol/L    Carbon Dioxide (CO2) 25 22 - 29 mmol/L    Anion Gap 14 7 - 15 mmol/L    Urea Nitrogen 16.0 8.0 - 23.0 mg/dL    Creatinine 0.57 0.51 - 0.95 mg/dL    Calcium 10.0 8.8 - 10.2 mg/dL    Glucose 154 (H) 70 - 99 mg/dL    GFR Estimate 88 >60 mL/min/1.73m2   BNP-N terminal pro     Status: Normal   Result Value Ref Range    N Terminal Pro BNP Outpatient 52 0 - 1,800 pg/mL                     The use of Dragon/Leeo dictation services may have been used to construct the content in this note; any grammatical or spelling errors are non-intentional. Please contact the author of this note directly if you are in need of any clarification.

## 2023-01-21 NOTE — PATIENT INSTRUCTIONS
Metoprolol take as prescribed       Stop taking the amlodipine      At your next Doctor's visit bring in the blood pressure cuff so it can be inspected      Our team will call you if the blood work is abnormal

## 2023-01-22 DIAGNOSIS — I10 BENIGN ESSENTIAL HYPERTENSION: ICD-10-CM

## 2023-01-24 RX ORDER — METOPROLOL SUCCINATE 50 MG/1
TABLET, EXTENDED RELEASE ORAL
Qty: 90 TABLET | OUTPATIENT
Start: 2023-01-24

## 2023-02-06 ENCOUNTER — OFFICE VISIT (OUTPATIENT)
Dept: FAMILY MEDICINE | Facility: CLINIC | Age: 88
End: 2023-02-06
Payer: COMMERCIAL

## 2023-02-06 VITALS
RESPIRATION RATE: 16 BRPM | BODY MASS INDEX: 35.33 KG/M2 | WEIGHT: 192 LBS | DIASTOLIC BLOOD PRESSURE: 82 MMHG | OXYGEN SATURATION: 97 % | HEART RATE: 83 BPM | SYSTOLIC BLOOD PRESSURE: 147 MMHG | HEIGHT: 62 IN

## 2023-02-06 DIAGNOSIS — R73.9 HYPERGLYCEMIA: ICD-10-CM

## 2023-02-06 DIAGNOSIS — E66.01 MORBID OBESITY (H): ICD-10-CM

## 2023-02-06 DIAGNOSIS — M17.12 PRIMARY OSTEOARTHRITIS OF LEFT KNEE: ICD-10-CM

## 2023-02-06 DIAGNOSIS — E61.1 IRON DEFICIENCY: ICD-10-CM

## 2023-02-06 DIAGNOSIS — E11.9 TYPE 2 DIABETES MELLITUS WITHOUT COMPLICATION, WITHOUT LONG-TERM CURRENT USE OF INSULIN (H): ICD-10-CM

## 2023-02-06 DIAGNOSIS — G25.81 RESTLESS LEG SYNDROME: Primary | ICD-10-CM

## 2023-02-06 DIAGNOSIS — I10 BENIGN ESSENTIAL HYPERTENSION: ICD-10-CM

## 2023-02-06 LAB
FERRITIN SERPL-MCNC: 116 NG/ML (ref 11–328)
HBA1C MFR BLD: 7.2 % (ref 0–5.6)
HGB BLD-MCNC: 14.8 G/DL (ref 11.7–15.7)

## 2023-02-06 PROCEDURE — 99214 OFFICE O/P EST MOD 30 MIN: CPT | Mod: 25 | Performed by: FAMILY MEDICINE

## 2023-02-06 PROCEDURE — 82728 ASSAY OF FERRITIN: CPT | Performed by: FAMILY MEDICINE

## 2023-02-06 PROCEDURE — 83036 HEMOGLOBIN GLYCOSYLATED A1C: CPT | Performed by: FAMILY MEDICINE

## 2023-02-06 PROCEDURE — 36415 COLL VENOUS BLD VENIPUNCTURE: CPT | Performed by: FAMILY MEDICINE

## 2023-02-06 PROCEDURE — 85018 HEMOGLOBIN: CPT | Performed by: FAMILY MEDICINE

## 2023-02-06 PROCEDURE — 20610 DRAIN/INJ JOINT/BURSA W/O US: CPT | Performed by: FAMILY MEDICINE

## 2023-02-06 RX ORDER — TRIAMCINOLONE ACETONIDE 40 MG/ML
40 INJECTION, SUSPENSION INTRA-ARTICULAR; INTRAMUSCULAR ONCE
Status: COMPLETED | OUTPATIENT
Start: 2023-02-06 | End: 2023-02-06

## 2023-02-06 RX ADMIN — TRIAMCINOLONE ACETONIDE 40 MG: 40 INJECTION, SUSPENSION INTRA-ARTICULAR; INTRAMUSCULAR at 11:50

## 2023-02-06 NOTE — PROGRESS NOTES
Assessment & Plan     Restless leg syndrome  Has really struggled with this. Post her TKA she was unable to sleep at all once off her Opioids and was started on tramadol which controlled it (3 a day I believe). She really didn't want to be on that so we weaned her and started gabapentin which controls it but at a higher dose. She is also on dopaminergic. We tried to start her on Lyrica and looks like I might have transitioned on too low dose of Lyrica so she restarted the gabapentin. Think with her poor overall sleep, fatigue and her difficult to manage RLS, will have her see sleep. Have been watching and treating her with iron intermittently. Will check levels today  - Adult Sleep Eval & Management Referral; Future  - Hemoglobin; Future  - Hemoglobin    Benign essential hypertension  Maybe controlled if she has white coat phenomena. Will try to get her a cuff that works. If not, then will try amb BP monitoring on her meds so we don't overtreat her.  She is now on 50 mg of metoprolol and could increase to 100 mg but with her fatigue as well, will first more accurately assess home BPs.   - UA reflex to Microscopic; Future    Hyperglycemia  New for her. Unfortunately she has DM now. She is aware and will see me to discuss this further. Has gained a fair amount of weight.   - Hemoglobin A1c; Future  - Hemoglobin A1c    Morbid obesity (H)  As above, has gained weight which has been super frustrating for her. She is not moving much due to her knee and the gabapentin is likely contributing. An option might be, if she is able to do self injections and it is covered, to start her on GLP1    Iron deficiency  In the past, so will check   - Ferritin; Future  - Ferritin    Type 2 diabetes mellitus without complication, without long-term current use of insulin (H)  As above.     Primary osteoarthritis of left knee  S/p injection today from the medial side. Landmarks a bit harder to determine due to her jazzmine changes. She found  "that her knee felt much better after, so hoping I got into the joint. Able to get most of needle into joint but there was resistance this time.   - DRAIN/INJECT LARGE JOINT/BURSA  - triamcinolone (KENALOG-40) injection 40 mg             BMI:   Estimated body mass index is 35.33 kg/m  as calculated from the following:    Height as of this encounter: 1.57 m (5' 1.81\").    Weight as of this encounter: 87.1 kg (192 lb).   Weight management plan: as above        No follow-ups on file.    Ange Crespo MD  [facility] SELAM Cabrera is a 87 year old, presenting for the following health issues:  Follow Up (Present for blood pressure check and BP monitor check. ), Injections (Left Knee injection ), and Medication Follow-up (Following up on medications)      HPI     Left knee injected 7/2022. Wanting to have her left knee injected. Been waiting for this. Every time in the past it worked. Is not sure if she can get TKA at her age.      BP  Brought her BP cuff and found that it is 30 points too high systolic and 5 or so diastolic. Her home BPs range in the 120 - 160/ 80-95  Felt very weak and fatigue post stopping the amlodipine - then started the metoprolol and was a bit better but is still not quite able to physically do as much.   She is worried that she is gaining weight which she thinks is from not being active.      Swelling in the legs continues.  Feet themselves are swollen.  Better post stopping the amlodipine. Is not back to normal.   Feels like all this got worse after starting the gabapentin - worse weight gain too - now can't control it.   Weakness and fatigue started after she stopped the amlodipine    Tried to get off the gabapentin but her restless legs were so bad she had to continue. Did not end up taking the lyrica.   One at 10, one at 4 pm and 2 at night but doesn't always take the last one.   Not sure if that is helping her at this time.     Never sleeps more than 2 hours at a " "time. Not sure if she snores.     Labs - has had elevated glucose in the last set of labs. HgA1cs nl in the past. Mother with DM.  Wt Readings from Last 4 Encounters:   02/06/23 87.1 kg (192 lb)   07/20/22 84.1 kg (185 lb 6.4 oz)   06/06/22 84.1 kg (185 lb 6.4 oz)   10/25/21 78.8 kg (173 lb 12.8 oz)                 Review of Systems         Objective    BP (!) 147/82   Pulse 83   Resp 16   Ht 1.57 m (5' 1.81\")   Wt 87.1 kg (192 lb)   SpO2 97%   BMI 35.33 kg/m    Body mass index is 35.33 kg/m .  Physical Exam   GENERAL: healthy, alert and no distress  NECK: no adenopathy, no asymmetry, masses, or scars and thyroid normal to palpation  RESP: lungs clear to auscultation - no rales, rhonchi or wheezes  CV: regular rate and rhythm, normal S1 S2, no S3 or S4, no murmur, click or rub,  MS: no gross musculoskeletal defects noted, 1+ edema bilaterally                [facility]  Knee Injection Note    Deborah Fierro is a patient of Ange Alanis here for knee injection for Osteoarthritis of the left knee.    Consent: Affirmation of informed consent was signed and scanned into the medical record. Risks, benefits and alternatives were discussed. Patient's questions were elicited and answered.   Procedure safety checklist was completed:  Yes  Time Out (Pause for the Cause) completed: Yes    Preoperative Diagnosis:DJD of left knee  Postoperative Diagnosis: same       The left knee was prepped  in the usual sterile fashion INJECTION:  Using 4 cc of 1% lidocaine mixed with 40 mg of kenalog, the knee joint was successfully injected without complication.  Patient did experience some pain relief following injection.          Technique:   Skin prep Technicare  Anesthesia 1% lidocaine  Complications:  No  Tolerance:  Pt tolerated procedure well and was in stable condition.         Follow up: Pt was instructed that there will be a return to pain in a couple hours followed by relief in the next several days to a " week. Patient was advised to call in increasing redness and swelling.     Follow up if not improving in next 24-48 hours.    Faculty: Ange Crespo MD present for and supervised this entire procedure.

## 2023-02-06 NOTE — PATIENT INSTRUCTIONS
Patient Education   Here is the plan from today's visit    1. Restless leg syndrome  We will NOT change anything.   - Adult Sleep Eval & Management Referral; Future    2. Benign essential hypertension  I will see if we can get you a cuff.    If not, think about the ambulatory blood pressure monitory - we can discuss by [email or web address]  Do not increase the metoloprolol for now.   - UA reflex to Microscopic; Future    3. Hyperglycemia  - Hemoglobin A1c; Future    4. Morbid obesity (H)    5. Get rid of the Pamelor (nortryptiline)         Please call or return to clinic if your symptoms don't go away.    Follow up plan  No follow-ups on file.    Thank you for coming to [facility] today.  Lab Testing:  **If you had lab testing today and your results are reassuring or normal they will be mailed to you or sent through [email or web address] within 7 days.   **If the lab tests need quick action we will call you with the results.  **If you are having labs done on a different day, please call [phone] to schedule at [facility] or [phone] for other [facility] Outpatient Lab locations. Labs do not offer walk-in appointments.  The phone number we will call with results is # [phone] (home) . If this is not the best number please call our clinic and change the number.  Medication Refills:  If you need any refills please call your pharmacy and they will contact us.   If you need to  your refill at a new pharmacy, please contact the new pharmacy directly. The new pharmacy will help you get your medications transferred faster.   Scheduling:  If you have any concerns about today's visit or wish to schedule another appointment please call our office during normal business hours [phone] (8-5:00 M-F). If you can no longer make a scheduled visit, please cancel via [email or web address] or call us to cancel.   If a referral was made to an [facility] specialty provider and you do not get a call from central scheduling, please refer  to directions on your visit summary or call our office during normal business hours for assistance.   If a Mammogram was ordered for you at the Breast Center call [phone] to schedule or change your appointment.  If you had an XRay/CT/Ultrasound/MRI ordered the number is [phone] to schedule or change your radiology appointment.   [facility] has limited ultrasound appointments available on Wednesdays, if you would like your ultrasound at [facility], please call [phone] to schedule.   Medical Concerns:  If you have urgent medical concerns please call [phone] at any time of the day.    Ange Crespo MD

## 2023-02-15 DIAGNOSIS — G25.81 RESTLESS LEG SYNDROME: ICD-10-CM

## 2023-02-15 RX ORDER — PRAMIPEXOLE DIHYDROCHLORIDE 0.12 MG/1
TABLET ORAL
Qty: 180 TABLET | Refills: 1 | Status: SHIPPED | OUTPATIENT
Start: 2023-02-15

## 2023-02-15 NOTE — TELEPHONE ENCOUNTER
"Last visit 2/6/2023    Request for medication refill:  pramipexole (MIRAPEX) 0.125 MG tablet    Providers if patient needs an appointment and you are willing to give a one month supply please refill for one month and  send a letter/MyChart using \".SMILLIMITEDREFILL\" .smillimited and route chart to \"P SMI \" (Giving one month refill in non controlled medications is strongly recommended before denial)    If refill has been denied, meaning absolutely no refills without visit, please complete the smart phrase \".smirxrefuse\" and route it to the \"P SMI MED REFILLS\"  pool to inform the patient and the pharmacy.    RAYO GREER RN        "

## 2023-02-18 ENCOUNTER — HEALTH MAINTENANCE LETTER (OUTPATIENT)
Age: 88
End: 2023-02-18

## 2023-02-18 DIAGNOSIS — I10 BENIGN ESSENTIAL HYPERTENSION: ICD-10-CM

## 2023-02-20 RX ORDER — METOPROLOL SUCCINATE 50 MG/1
TABLET, EXTENDED RELEASE ORAL
Qty: 90 TABLET | Refills: 1 | Status: SHIPPED | OUTPATIENT
Start: 2023-02-20

## 2023-02-20 NOTE — TELEPHONE ENCOUNTER
"Last seen : 2/6/2023   Request for medication refill:metoprolol succinate ER (TOPROL XL) 50 MG 24 hr tablet    Providers if patient needs an appointment and you are willing to give a one month supply please refill for one month and  send a letter/MyChart using \".SMILLIMITEDREFILL\" .smillimited and route chart to \"P SMI \" (Giving one month refill in non controlled medications is strongly recommended before denial)    If refill has been denied, meaning absolutely no refills without visit, please complete the smart phrase \".smirxrefuse\" and route it to the \"P SMI MED REFILLS\"  pool to inform the patient and the pharmacy.    RAYO GREER RN       "

## 2023-02-24 ENCOUNTER — OFFICE VISIT (OUTPATIENT)
Dept: FAMILY MEDICINE | Facility: CLINIC | Age: 88
End: 2023-02-24
Payer: COMMERCIAL

## 2023-02-24 VITALS
SYSTOLIC BLOOD PRESSURE: 138 MMHG | HEART RATE: 84 BPM | TEMPERATURE: 98.8 F | RESPIRATION RATE: 16 BRPM | BODY MASS INDEX: 35.51 KG/M2 | OXYGEN SATURATION: 95 % | HEIGHT: 62 IN | WEIGHT: 193 LBS | DIASTOLIC BLOOD PRESSURE: 81 MMHG

## 2023-02-24 DIAGNOSIS — M17.10 PRIMARY OSTEOARTHRITIS OF KNEE, UNSPECIFIED LATERALITY: Primary | ICD-10-CM

## 2023-02-24 DIAGNOSIS — G25.81 RESTLESS LEG SYNDROME: ICD-10-CM

## 2023-02-24 DIAGNOSIS — I10 ESSENTIAL HYPERTENSION WITH GOAL BLOOD PRESSURE LESS THAN 140/90: ICD-10-CM

## 2023-02-24 DIAGNOSIS — E11.9 TYPE 2 DIABETES MELLITUS WITHOUT COMPLICATION, WITHOUT LONG-TERM CURRENT USE OF INSULIN (H): ICD-10-CM

## 2023-02-24 PROCEDURE — 99214 OFFICE O/P EST MOD 30 MIN: CPT | Performed by: FAMILY MEDICINE

## 2023-02-24 RX ORDER — GABAPENTIN 300 MG/1
CAPSULE ORAL
Qty: 150 CAPSULE | Refills: 3 | Status: SHIPPED | OUTPATIENT
Start: 2023-02-24 | End: 2023-07-06

## 2023-02-24 ASSESSMENT — ANXIETY QUESTIONNAIRES
GAD7 TOTAL SCORE: 0
5. BEING SO RESTLESS THAT IT IS HARD TO SIT STILL: NOT AT ALL
1. FEELING NERVOUS, ANXIOUS, OR ON EDGE: NOT AT ALL
4. TROUBLE RELAXING: NOT AT ALL
2. NOT BEING ABLE TO STOP OR CONTROL WORRYING: NOT AT ALL
IF YOU CHECKED OFF ANY PROBLEMS ON THIS QUESTIONNAIRE, HOW DIFFICULT HAVE THESE PROBLEMS MADE IT FOR YOU TO DO YOUR WORK, TAKE CARE OF THINGS AT HOME, OR GET ALONG WITH OTHER PEOPLE: NOT DIFFICULT AT ALL
7. FEELING AFRAID AS IF SOMETHING AWFUL MIGHT HAPPEN: NOT AT ALL
6. BECOMING EASILY ANNOYED OR IRRITABLE: NOT AT ALL
GAD7 TOTAL SCORE: 0
3. WORRYING TOO MUCH ABOUT DIFFERENT THINGS: NOT AT ALL

## 2023-02-24 NOTE — PATIENT INSTRUCTIONS
Patient Education   Here is the plan from today's visit    1. Primary osteoarthritis of knee, unspecified laterality  Knee to get your exercising that knee   - Physical Therapy Referral; Future    2. Type 2 diabetes mellitus without complication, without long-term current use of insulin (H)  Call to set that up.   Start the metformin   - AMB Adult Diabetes Educator Referral; Future  - metFORMIN (GLUCOPHAGE) 500 MG tablet; Take 1 tablet (500 mg) by mouth 2 times daily (with meals)  Dispense: 60 tablet; Refill: 1          Please call or return to clinic if your symptoms don't go away.    Follow up plan  No follow-ups on file.    Thank you for coming to [facility] today.  Lab Testing:  **If you had lab testing today and your results are reassuring or normal they will be mailed to you or sent through [employer] within 7 days.   **If the lab tests need quick action we will call you with the results.  **If you are having labs done on a different day, please call [phone] to schedule at [facility] or [phone] for other [facility] Outpatient Lab locations. Labs do not offer walk-in appointments.  The phone number we will call with results is # [phone] (home) . If this is not the best number please call our clinic and change the number.  Medication Refills:  If you need any refills please call your pharmacy and they will contact us.   If you need to  your refill at a new pharmacy, please contact the new pharmacy directly. The new pharmacy will help you get your medications transferred faster.   Scheduling:  If you have any concerns about today's visit or wish to schedule another appointment please call our office during normal business hours [phone] (8-5:00 M-F). If you can no longer make a scheduled visit, please cancel via [employer] or call us to cancel.   If a referral was made to an [facility] specialty provider and you do not get a call from central scheduling, please refer to  directions on your visit summary or call our office during normal business hours for assistance.   If a Mammogram was ordered for you at the Breast Center call [phone] to schedule or change your appointment.  If you had an XRay/CT/Ultrasound/MRI ordered the number is [phone] to schedule or change your radiology appointment.   [facility] has limited ultrasound appointments available on Wednesdays, if you would like your ultrasound at [facility], please call [phone] to schedule.   Medical Concerns:  If you have urgent medical concerns please call [phone] at any time of the day.    Ange Crespo MD

## 2023-02-25 ENCOUNTER — MYC MEDICAL ADVICE (OUTPATIENT)
Dept: FAMILY MEDICINE | Facility: CLINIC | Age: 88
End: 2023-02-25
Payer: COMMERCIAL

## 2023-02-25 DIAGNOSIS — E11.9 TYPE 2 DIABETES MELLITUS WITHOUT COMPLICATION, WITHOUT LONG-TERM CURRENT USE OF INSULIN (H): Primary | ICD-10-CM

## 2023-02-28 DIAGNOSIS — E11.9 TYPE 2 DIABETES MELLITUS WITHOUT COMPLICATION, WITHOUT LONG-TERM CURRENT USE OF INSULIN (H): Primary | ICD-10-CM

## 2023-03-09 ENCOUNTER — THERAPY VISIT (OUTPATIENT)
Dept: PHYSICAL THERAPY | Facility: CLINIC | Age: 88
End: 2023-03-09
Attending: FAMILY MEDICINE
Payer: COMMERCIAL

## 2023-03-09 DIAGNOSIS — M17.10 PRIMARY OSTEOARTHRITIS OF KNEE, UNSPECIFIED LATERALITY: ICD-10-CM

## 2023-03-09 PROCEDURE — 97140 MANUAL THERAPY 1/> REGIONS: CPT | Mod: GP | Performed by: PHYSICAL THERAPIST

## 2023-03-09 PROCEDURE — 97110 THERAPEUTIC EXERCISES: CPT | Mod: GP | Performed by: PHYSICAL THERAPIST

## 2023-03-09 PROCEDURE — 97161 PT EVAL LOW COMPLEX 20 MIN: CPT | Mod: GP | Performed by: PHYSICAL THERAPIST

## 2023-03-09 NOTE — PROGRESS NOTES
Physical Therapy Initial Evaluation - Knee    Therapist Impression:  Patient presents with signs and symptoms consistent with L knee pain secondary to osteoarthritis. Patient demonstrates impairments including decreased L knee range of motion and joint mobility, with decreased flexibility and myofascial restrictions around the knee joint. Patient's functional limitations include walking for longer distances and standing for longer periods of time - patient would like to exercise more often but is afraid to do so     Subjective:  Zaira Fierro is a 87 year old female. HPI given by patient  Patient's chief complaints: Patient got a knee injection on February 5th and the MD told her to come to therapy. With walking she is feeling pretty wobbly.  She hesitates going places because of the knee, she goes to familiar places.  Condition occurred due to overuse activity - chronic arthritis  Date of Onset: 2/24/23 PT order.  Location of symptoms is it changes, anterior knee in general   Symptoms other than pain include: achy pains, some days are worse     Pain dependence on time of day is: more active in the morning because she is more rested.   Pain rating is: 2/10.    Symptoms are exacerbated by: walking longer distances - she would like to be able to exercise more, standing is terrible for her.    Symptoms are relieved by:  Injection made her feel way better  Progression of symptoms is that symptoms are:  Gotten better    Imaging/Special tests include: none at this time     Previous treatments include: none, history of R knee replacement.   Patient reports that general health is: good.     Current occupation is: retired  Primary job tasks include: computer work    Red flags include: none at this time    Patient's expectations for therapy include: be able to walk more and exercise more    Pertinent medical history includes:     Past Medical History:   Diagnosis Date     Glaucoma      Hearing loss     doesn't wear  hearing aids     Hyperlipidaemia      Hypertension      Macular degeneration      Multiple joint pain     secondary to arthritis     Obesity      Osteoarthritis involving multiple joints on both sides of body      Restless leg syndrome      Surgical history includes:   Past Surgical History:   Procedure Laterality Date     ARTHROPLASTY KNEE Right 4/7/2017    Procedure: ARTHROPLASTY KNEE;  Surgeon: Aamir Sutton MD;  Location: [facility] OR     TONSILLECTOMY  1940       Current Outpatient Medications:      acetaminophen (TYLENOL) 325 MG tablet, Take 2 tablets (650 mg) by mouth 4 times daily, Disp: 100 tablet, Rfl: 11     blood glucose (NO BRAND SPECIFIED) lancets standard, Use to test blood sugar 2 times daily or as directed., Disp: 100 lancet., Rfl: 3     blood glucose (NO BRAND SPECIFIED) test strip, Use to test blood sugar 2 times daily or as directed., Disp: 100 strip, Rfl: 3     blood glucose monitoring (NO BRAND SPECIFIED) meter device kit, Use to test blood sugar 2 times daily or as directed., Disp: 1 kit, Rfl: 0     gabapentin (NEURONTIN) 300 MG capsule, TAKE ONE CAPSULE BY MOUTH TWICE DAILY AND TAKE 3 CAPSULES EVERY NIGHT AT BEDTIME, Disp: 150 capsule, Rfl: 3     hydrochlorothiazide (HYDRODIURIL) 25 MG tablet, Take 1 tablet (25 mg) by mouth every morning, Disp: 90 tablet, Rfl: 1     latanoprost (XALATAN) 0.005 % ophthalmic solution, Place 1 drop into both eyes At Bedtime Reported on 5/1/2017, Disp: , Rfl:      losartan (COZAAR) 100 MG tablet, Take 1 tablet (100 mg) by mouth every morning, Disp: 90 tablet, Rfl: 3     metFORMIN (GLUCOPHAGE) 500 MG tablet, Take 1 tablet (500 mg) by mouth 2 times daily (with meals), Disp: 60 tablet, Rfl: 1     metoprolol succinate ER (TOPROL XL) 50 MG 24 hr tablet, TAKE 1 TABLET(50 MG) BY MOUTH DAILY, Disp: 90 tablet, Rfl: 1     Multiple Vitamins-Minerals (EYE VITAMINS PO), , Disp: , Rfl:      nortriptyline (PAMELOR) 25 MG capsule, Take 1 capsule (25 mg) by mouth At Bedtime, Disp: 90  capsule, Rfl: 1     pramipexole (MIRAPEX) 0.125 MG tablet, TAKE 1 TABLET BY MOUTH DAILY AT 4:00PM AND 1 TABLET AT BEDTIME, Disp: 180 tablet, Rfl: 1     pravastatin (PRAVACHOL) 20 MG tablet, Take 1 tablet (20 mg) by mouth every evening, Disp: 90 tablet, Rfl: 3    KNEE:    PROM:   L  R   Hyperextension     Extension Limited ~5 deg    Flexion 113 124     Strength: WFL no increase of P symtpoms today    Hip PROM:     L R   IR limited    ER Excessive in supine Slight P at end range into hip   Maye's     Zaheer       Palpation: TTP that decreased with treatment around quad tendon, medial>latera knee joint    Patellar tracking: hypomobile bilaterally    Functional: limited sit to stands    Gait: limited but not assessed today, feels off estefany    Assessment/Plan:    Patient is a 87 year old female with left side knee complaints.    Patient has the following significant findings with corresponding treatment plan.                Diagnosis 1:  L knee arthritis  Pain -  hot/cold therapy, US, manual therapy and splint/taping/bracing/orthotics  Decreased ROM/flexibility - manual therapy and therapeutic exercise  Decreased joint mobility - manual therapy and therapeutic exercise  Decreased strength - therapeutic exercise and therapeutic activities    Therapy Evaluation Codes:   Cumulative Therapy Evaluation is: Low complexity.    Previous and current functional limitations:  (See Goal Flow Sheet for this information)    Short term and Long term goals: (See Goal Flow Sheet for this information)     Communication ability:  Patient appears to be able to clearly communicate and understand verbal and written communication and follow directions correctly.  Treatment Explanation - The following has been discussed with the patient:   RX ordered/plan of care  Anticipated outcomes  Possible risks and side effects  This patient would benefit from PT intervention to resume normal activities.   Rehab potential is fair.    Frequency:  1 X  week, once daily  Duration:  for 4 weeks tapering to 2 X a month over 6 weeks  Discharge Plan:  Achieve all LTG.  Independent in home treatment program.  Reach maximal therapeutic benefit.    Please refer to the daily flowsheet for treatment today, total treatment time and time spent performing 1:1 timed codes.     Faith Felix, PT

## 2023-03-13 DIAGNOSIS — M17.10 ARTHRITIS OF KNEE: ICD-10-CM

## 2023-03-13 RX ORDER — NORTRIPTYLINE HCL 25 MG
CAPSULE ORAL
Qty: 90 CAPSULE | Refills: 1 | Status: SHIPPED | OUTPATIENT
Start: 2023-03-13 | End: 2023-03-22 | Stop reason: ALTCHOICE

## 2023-03-13 NOTE — TELEPHONE ENCOUNTER
"Request for medication refill: nortriptyline (PAMELOR) 25 MG capsule    Providers if patient needs an appointment and you are willing to give a one month supply please refill for one month and  send a letter/MyChart using \".SMILLIMITEDREFILL\" .smillimited and route chart to \"[facility] \" (Giving one month refill in non controlled medications is strongly recommended before denial)    If refill has been denied, meaning absolutely no refills without visit, please complete the smart phrase \".smirxrefuse\" and route it to the \"P [facility] MED REFILLS\"  pool to inform the patient and the pharmacy.    Laurie Fuentes, CMA      "

## 2023-03-16 ENCOUNTER — THERAPY VISIT (OUTPATIENT)
Dept: PHYSICAL THERAPY | Facility: CLINIC | Age: 88
End: 2023-03-16
Attending: FAMILY MEDICINE
Payer: COMMERCIAL

## 2023-03-16 DIAGNOSIS — M17.10 PRIMARY OSTEOARTHRITIS OF KNEE, UNSPECIFIED LATERALITY: Primary | ICD-10-CM

## 2023-03-16 PROCEDURE — 97110 THERAPEUTIC EXERCISES: CPT | Mod: GP | Performed by: PHYSICAL THERAPIST

## 2023-03-16 NOTE — PROGRESS NOTES
[facility]    OUTPATIENT Physical Therapy ORTHOPEDIC EVALUATION  PLAN OF TREATMENT FOR OUTPATIENT REHABILITATION  (COMPLETE FOR INITIAL CLAIMS ONLY)  Patient's Last Name, First Name, M.I.  YOB: 1935  Zaira Fierro    Provider s Name:  [facility]   Medical Record No.  [id number]   Start of Care Date:  03/09/23   Onset Date:   02/24/23 (PT order)   Treatment Diagnosis:  L knee OA Medical Diagnosis:  Primary osteoarthritis of knee, unspecified laterality       Goals:     03/09/23 0500   Body Part   Goals listed below are for L knee   Other Goal   Activity Bending the knee   Current Functional Level Patient demosntrates 113 deg of knee flexion with end range limitation and P of LLE   STG Target Performance Patient will improve L knee flexion by at least 5 deg in order to climb stairs.   Rationale In order to improve function   Due Date 04/23/23   LTG Target Performance Patient will be able to demonstrate improved L knee flexion by at least 10 degrees in order to ambulate and climb stairs without P   Rationale In order to improve functional mobility   Due Date 06/07/23         Therapy Frequency:  1x/week  Predicted Duration of Therapy Intervention:  10 weeks    DEANGELO SIDDIQI, PETE                 I CERTIFY THE NEED FOR THESE SERVICES FURNISHED UNDER        THIS PLAN OF TREATMENT AND WHILE UNDER MY CARE     (Physician attestation of this document indicates review and certification of the therapy plan).                     Certification Date From:  03/09/23   Certification Date To:  06/07/23    Referring Provider:  Ange Crespo MD    Initial Assessment        See Epic Evaluation SOC Date: 03/09/23

## 2023-03-22 ENCOUNTER — OFFICE VISIT (OUTPATIENT)
Dept: FAMILY MEDICINE | Facility: CLINIC | Age: 88
End: 2023-03-22
Payer: COMMERCIAL

## 2023-03-22 VITALS
DIASTOLIC BLOOD PRESSURE: 73 MMHG | HEIGHT: 62 IN | RESPIRATION RATE: 20 BRPM | OXYGEN SATURATION: 97 % | HEART RATE: 83 BPM | TEMPERATURE: 98.3 F | SYSTOLIC BLOOD PRESSURE: 136 MMHG | BODY MASS INDEX: 35.15 KG/M2 | WEIGHT: 191 LBS

## 2023-03-22 DIAGNOSIS — E11.9 TYPE 2 DIABETES MELLITUS WITHOUT COMPLICATION, WITHOUT LONG-TERM CURRENT USE OF INSULIN (H): Primary | ICD-10-CM

## 2023-03-22 PROCEDURE — 99213 OFFICE O/P EST LOW 20 MIN: CPT | Performed by: FAMILY MEDICINE

## 2023-03-22 NOTE — PATIENT INSTRUCTIONS
Patient Education   Here is the plan from today's visit    1. Type 2 diabetes mellitus without complication, without long-term current use of insulin (H)  Please start taking 1000mg after breakfast and 1000mg after dinner. This will be two of your current pills twice per day, and then with new prescription it will be one pill twice per day. Keep an eye out for low blood sugar symptoms including headache, shakiness, and sweating. If you have any of these symptoms, please measure your blood sugar.  - metFORMIN (GLUCOPHAGE) 1000 MG tablet; Take 1 tablet (1,000 mg) by mouth 2 times daily (with meals)  Dispense: 180 tablet; Refill: 0      Please call or return to clinic if your symptoms don't go away.    Follow up plan  Return in about 4 weeks (around 4/19/2023).    Thank you for coming to [facility] today.  Lab Testing:  **If you had lab testing today and your results are reassuring or normal they will be mailed to you or sent through [employer] within 7 days.   **If the lab tests need quick action we will call you with the results.  **If you are having labs done on a different day, please call [phone] to schedule at [facility] or [phone] for other [facility] locations. Labs do not offer walk-in appointments.  The phone number we will call with results is # [phone] (home) . If this is not the best number please call our clinic and change the number.  Medication Refills:  If you need any refills please call your pharmacy and they will contact us.   If you need to  your refill at a new pharmacy, please contact the new pharmacy directly. The new pharmacy will help you get your medications transferred faster.   Scheduling:  If you have any concerns about today's visit or wish to schedule another appointment please call our office during normal business hours [phone] (8-5:00 M-F). If you can no longer make a scheduled visit, please cancel via [employer] or call us to cancel.    If a referral was made to an [facility] provider and you do not get a call from central scheduling, please refer to directions on your visit summary or call our office during normal business hours for assistance.   If a Mammogram was ordered for you at the [facility] call [phone] to schedule or change your appointment.  If you had an XRay/CT/Ultrasound/MRI ordered the number is [phone] to schedule or change your radiology appointment.   [facility] has limited ultrasound appointments available on Wednesdays, if you would like your ultrasound at [facility], please call [phone] to schedule.   Medical Concerns:  If you have urgent medical concerns please call [phone] at any time of the day.    Ange Crespo MD

## 2023-03-22 NOTE — PROGRESS NOTES
Assessment & Plan     Type 2 diabetes mellitus without complication, without long-term current use of insulin (H)  Had good discussion with patient today regarding her new diagnosis. She brought in her morning fasting blood sugars which were all in normal ranges. She has begun to make diet changes and is motivated to improve her health. Discussed hypoglycemia symptoms with patient today and checking her blood sugar if she experiences any symptoms. She is planning on meeting with diabetes education on 3/28. In the interim, will increase metformin to 1000mg bid. Discussed side effects including GI upset and diarrhea. Discussed to go back down to 500mg BID if patient is unable to tolerate because of side effects. Patient agreeable to plan. Will return in 4 weeks for check-in.   - metFORMIN (GLUCOPHAGE) 1000 MG tablet; Take 1 tablet (1,000 mg) by mouth 2 times daily (with meals)    Patient seen and discussed with Dr. Gayle Barry, MS3    Preceptor Attestation:   I was present with the medical student who participated in the service and in the documentation of this note. I have verified the history and personally performed the physical exam and medical decision making. I have verified the content of the note, which accurately reflects my assessment of the patient and the plan of care.   Supervising Physician:  Ange Crespo MD.              Return in about 4 weeks (around 4/19/2023).    Ange Crespo MD  [facility] SELAM Cabrera is a 87 year old, presenting for the following health issues:  RECHECK (Patient here for diabetes follow up)    Additional Questions 2/24/2023   Roomed by Gaby Sheth MA   Accompanied by Self     HPI     DM - been doing well. Has made some diet changes. Said she has some headaches at night that do improve with 7up. Wondering if this is from her metformin medication. Eats oatmeal with raisins for breakfast and has salad with chicken or beef for  "lunch/dinner. She is trying to eat more vegetables.     She has been staying away from sweets and processed foods as much as she can.     DM ed - meeting with them on 28th.     Metformin? Been going well. Did have GI upset and diarrhea for the first few days but that has resolved.      Wt Readings from Last 4 Encounters:   03/22/23 86.6 kg (191 lb)   02/24/23 87.5 kg (193 lb)   02/06/23 87.1 kg (192 lb)   07/20/22 84.1 kg (185 lb 6.4 oz)                 Review of Systems   Constitutional, HEENT, cardiovascular, pulmonary, gi and gu systems are negative, except as otherwise noted.      Objective    /73   Pulse 83   Temp 98.3  F (36.8  C) (Oral)   Resp 20   Ht 1.585 m (5' 2.4\")   Wt 86.6 kg (191 lb)   SpO2 97%   BMI 34.49 kg/m    Body mass index is 34.49 kg/m .  Physical Exam   GENERAL: healthy, alert and no distress  RESP: no respiratory distress  CV no peripheral edema  MS: no gross musculoskeletal defects noted, no edema    ----- Services Performed by a MEDICAL STUDENT in Presence of ATTENDING Physician-------            "

## 2023-03-23 ENCOUNTER — THERAPY VISIT (OUTPATIENT)
Dept: PHYSICAL THERAPY | Facility: CLINIC | Age: 88
End: 2023-03-23
Attending: FAMILY MEDICINE
Payer: COMMERCIAL

## 2023-03-23 DIAGNOSIS — M17.10 PRIMARY OSTEOARTHRITIS OF KNEE, UNSPECIFIED LATERALITY: Primary | ICD-10-CM

## 2023-03-23 PROCEDURE — 97116 GAIT TRAINING THERAPY: CPT | Mod: GP | Performed by: PHYSICAL THERAPIST

## 2023-03-23 PROCEDURE — 97140 MANUAL THERAPY 1/> REGIONS: CPT | Mod: GP | Performed by: PHYSICAL THERAPIST

## 2023-03-23 PROCEDURE — 97110 THERAPEUTIC EXERCISES: CPT | Mod: GP | Performed by: PHYSICAL THERAPIST

## 2023-03-28 ENCOUNTER — ALLIED HEALTH/NURSE VISIT (OUTPATIENT)
Dept: EDUCATION SERVICES | Facility: CLINIC | Age: 88
End: 2023-03-28
Payer: COMMERCIAL

## 2023-03-28 DIAGNOSIS — E11.9 TYPE 2 DIABETES MELLITUS WITHOUT COMPLICATION, WITHOUT LONG-TERM CURRENT USE OF INSULIN (H): ICD-10-CM

## 2023-03-28 PROCEDURE — G0108 DIAB MANAGE TRN  PER INDIV: HCPCS | Performed by: DIETITIAN, REGISTERED

## 2023-03-28 NOTE — PATIENT INSTRUCTIONS
Count carbohydrates at meals & snacks - 45-75 grams/meal & 15-30 grams/snack  Check your blood sugar once daily, either first thing in the  morning or 2 hours after a meal  Increase exercise as able  Continue metformin as prescribed     Call or send a [employer] message with any questions or concerns    Ashley Esqueda RD, LD, [facility]   Diabetes Education Triage Line: [phone]  Diabetes Education Appointment [phone]

## 2023-03-28 NOTE — PROGRESS NOTES
"Diabetes Self-Management Education & Support    Presents for: Initial Assessment for new diagnosis    Type of Service: In Person Visit    Assessment Type:   ASSESSMENT:  Deborah was recently diagnosed with T2DM. Started listening to audiobook - \"The Diabetes Solution\" - and that has helped her a bit. She has been working on improving her diet, cutting out snacking at night. She has arthritis so exercise is difficult. She has macular degeneration so needs a magnifying glass for reading. She is testing blood sugar once daily, consistent about this and numbers have all been in target range.     Patient's most recent   Lab Results   Component Value Date    A1C 7.2 02/06/2023    A1C 5.5 02/01/2021     is not meeting goal of <7.0    Diabetes knowledge and skills assessment:   Patient is knowledgeable in diabetes management concepts related to: none, new diagnosis     Continue education with the following diabetes management concepts: Healthy Eating, Being Active, Monitoring, Taking Medication, Problem Solving, Reducing Risks and Healthy Coping    Based on learning assessment above, most appropriate setting for further diabetes education would be: Group class or Individual setting.      PLAN    Count carbohydrates at meals & snacks - 45-75 grams/meal & 15-30 grams/snack  Increase exercise  Continue to check blood sugars once daily  No change in med dosing   Topics to cover at upcoming visits: Problem Solving, Reducing Risks and Healthy Coping    Follow-up: 5/16/23    See Care Plan for co-developed, patient-state behavior change goals.  AVS provided for patient today.    Education Materials Provided:   [employer] [place] Understanding Diabetes Booklet and BG Log Sheet      SUBJECTIVE/OBJECTIVE:  Presents for: Initial Assessment for new diagnosis  Accompanied by: Self  Diabetes education in the past 24mo: No  Focus of Visit: Patient Unsure  Diabetes type: Type 2  Date of diagnosis: 2/2023  Disease course: Other  How confident " "are you filling out medical forms by yourself:: Not at all  Diabetes management related comments/concerns: I have macular degeneration that sometimes makes it a challenge to get the blood and the test strip to meet!  Transportation concerns: No  Difficulty affording diabetes medication?: No  Difficulty affording diabetes testing supplies?: No  Other concerns:: Visual impairment  Cultural Influences/Ethnic Background:  Not  or       Diabetes Symptoms & Complications:  Fatigue: No  Neuropathy: No  Polydipsia: No  Polyphagia: No  Polyuria: Yes  Visual change: Yes  Slow healing wounds: No  Symptom course: Stable  Complications assessed today?: No  Autonomic neuropathy: No  CVA: No  Heart disease: No  Nephropathy: No  Peripheral neuropathy: No  Peripheral Vascular Disease: No  Retinopathy: Other  Sexual dysfunction: No    Patient Problem List and Family Medical History reviewed for relevant medical history, current medical status, and diabetes risk factors.    Vitals:  There were no vitals taken for this visit.  Estimated body mass index is 34.49 kg/m  as calculated from the following:    Height as of 3/22/23: 1.585 m (5' 2.4\").    Weight as of 3/22/23: 86.6 kg (191 lb).   Last 3 BP:   BP Readings from Last 3 Encounters:   03/22/23 136/73   02/24/23 138/81   02/06/23 (!) 147/82       History   Smoking Status     Never   Smokeless Tobacco     Never       Labs:  Lab Results   Component Value Date    A1C 7.2 02/06/2023    A1C 5.5 02/01/2021     Lab Results   Component Value Date     01/21/2023    .4 02/01/2021     Lab Results   Component Value Date     07/13/2020     HDL Cholesterol   Date Value Ref Range Status   07/13/2020 47.2 >40.0 mg/dL Final   ]  GFR Estimate   Date Value Ref Range Status   01/21/2023 88 >60 mL/min/1.73m2 Final     Comment:     Effective December 21, 2021 eGFRcr in adults is calculated using the 2021 CKD-EPI creatinine equation which includes age and gender (Fabienne et " al., PRISCILA, DOI: 10.1056/RMTOzy1359870)   02/01/2021 >90 >60.0 mL/min/1.7 m2 Final     GFR Estimate If Black   Date Value Ref Range Status   02/01/2021 >90 >60.0 mL/min/1.7 m2 Final     Lab Results   Component Value Date    CR 0.57 01/21/2023    CR 0.6 02/01/2021     No results found for: MICROALBUMIN    Healthy Eating:  Healthy Eating Assessed Today: Yes  Cultural/Restorationist diet restrictions?: No  Meal planning/habits: Avoiding sweets, Low salt  How many times a week on average do you eat food made away from home (restaurant/take-out)?: 0  Meals include: Breakfast, Lunch  Breakfast: oatmeal w/ raisins + Sweet & Low  Lunch: 1/2 Express salad + protein (boiled egg or chicken or beef)  Dinner: peanut butter sandwich  Other: cutting back on snacking at HS - used to snack on Doritos and sweets in the past  Beverages: Water, Coffee, Milk, Other    Being Active:  Being Active Assessed Today: Yes  Exercise:: Currently not exercising  Barrier to exercise: None, Physical limitation    Monitoring:  Monitoring Assessed Today: Yes  Did patient bring glucose meter to appointment? : No  Blood Glucose Meter: One Touch  Times checking blood sugar at home (number): 1  Times checking blood sugar at home (per): Day  Blood glucose trend: Fluctuating    Patient reports blood sugars all <130 mg/dL fasting since getting started     Taking Medications:  Diabetes Medication(s)     Biguanides       metFORMIN (GLUCOPHAGE) 1000 MG tablet    Take 1 tablet (1,000 mg) by mouth 2 times daily (with meals)     metFORMIN (GLUCOPHAGE) 500 MG tablet    Take 1 tablet (500 mg) by mouth 2 times daily (with meals)          Taking Medication Assessed Today: Yes  Current Treatments: Oral Medication (taken by mouth)  Problems taking diabetes medications regularly?: No  Diabetes medication side effects?: No    Problem Solving:  Problem Solving Assessed Today: Yes  Is the patient at risk for hypoglycemia?: No  Is the patient at risk for DKA?: No  Does patient  have severe weather/disaster plan for diabetes management?: No  Does patient have sick day plan for diabetes management?: No    Reducing Risks:  Reducing Risks Assessed Today: No  Diabetes Risks: Age over 45 years, Family History, Sedentary Lifestyle  CAD Risks: Dyslipidemia, Family history, Hypertension, Obesity, Sedentary lifestyle  Has dilated eye exam at least once a year?: No  Sees dentist every 6 months?: Yes  Feet checked by healthcare provider in the last year?: No    Healthy Coping:  Healthy Coping Assessed Today: Yes  Emotional response to diabetes: Concern for health and well-being, Guilt/Self-blame  Informal Support system:: Friends  Stage of change: ACTION (Actively working towards change)  Support resources: None  Patient Activation Measure Survey Score:  No flowsheet data found.      Care Plan and Education Provided:  Care Plan: Diabetes   Updates made by Ashley Esqueda RD since 3/28/2023 12:00 AM      Problem: HbA1C Not In Goal       Goal: Establish Regular Follow-Ups with PCP       Task: Discuss with PCP the recommended timing for patient's next follow up visit(s)    Responsible User: Ashley Esqueda RD      Task: Discuss schedule for PCP visits with patient    Responsible User: Ashley Esqueda RD      Goal: Get HbA1C Level in Goal       Task: Educate patient on diabetes education self-management topics    Responsible User: Ashley Esqueda RD      Task: Educate patient on benefits of regular glucose monitoring    Responsible User: Ashley Esqueda RD      Task: Refer patient to appropriate extended care team member, as needed (Medication Therapy Management, Behavioral Health, Physical Therapy, etc.)    Responsible User: Ashley Esqueda RD      Task: Discuss diabetes treatment plan with patient    Responsible User: Ashley Esqueda RD      Problem: Diabetes Self-Management Education Needed to Optimize Self-Care Behaviors       Goal: Understand diabetes pathophysiology and disease progression       Task:  Provide education on diabetes pathophysiology and disease progression specfic to patient's diabetes type Completed 3/28/2023   Responsible User: Ashley Esqueda RD      Goal: Healthy Eating - follow a healthy eating pattern for diabetes    This Visit's Progress: 0%   Note:    Include 45g carb with meals, 15 g carb with snacks daily.     Task: Provide education on portion control and consistency in amount, composition and timing of food intake Completed 3/28/2023   Responsible User: Ashley Esqueda RD      Task: Provide education on managing carbohydrate intake (carbohydrate counting, plate planning method, etc.) Completed 3/28/2023   Responsible User: Ashley Esqueda RD      Task: Provide education on weight management Completed 3/28/2023   Responsible User: Ashley Esqueda RD      Task: Provide education on heart healthy eating Completed 3/28/2023   Responsible User: Ashley Esqueda RD      Task: Provide education on eating out    Responsible User: Ashley Esqueda RD      Task: Develop individualized healthy eating plan with patient    Responsible User: Ashley Esqueda RD      Goal: Being Active - get regular physical activity, working up to at least 150 minutes per week       Task: Provide education on relationship of activity to glucose and precautions to take if at risk for low glucose    Responsible User: Ashley Esqueda RD      Task: Discuss barriers to physical activity with patient    Responsible User: Ashley Esqueda RD      Task: Develop physical activity plan with patient    Responsible User: Ashley Esqueda RD      Task: Explore community resources including walking groups, assistance programs, and home videos    Responsible User: Ashley Esqueda RD      Goal: Monitoring - monitor glucose and ketones as directed       Task: Provide education on blood glucose monitoring (purpose, proper technique, frequency, glucose targets, interpreting results, when to use glucose control solution, sharps disposal) Completed  3/28/2023   Responsible User: Ashley Esqueda RD      Task: Provide education on continuous glucose monitoring (sensor placement, use of sushila or /reader, understanding glucose trends, alerts and alarms, differences between sensor glucose and blood glucose)    Responsible User: Ashley Esqueda RD      Task: Provide education on ketone monitoring (when to monitor, frequency, etc.)    Responsible User: Ashley Esqueda RD      Goal: Taking Medication - patient is consistently taking medications as directed       Task: Provide education on action of prescribed medication, including when to take and possible side effects Completed 3/28/2023   Responsible User: Ashley Esqueda RD      Task: Provide education on insulin and injectable diabetes medications, including administration, storage, site selection and rotation for injection sites    Responsible User: Ashley Esqueda RD      Task: Discuss barriers to medication adherence with patient and provide management technique ideas as appropriate Completed 3/28/2023   Responsible User: Ashley Esqueda RD      Task: Provide education on frequency and refill details of medications Completed 3/28/2023   Responsible User: Ashley Esqueda RD      Goal: Problem Solving - know how to prevent and manage short-term diabetes complications       Task: Provide education on high blood glucose - causes, signs/symptoms, prevention and treatment    Responsible User: Ashley Esqueda RD      Task: Provide education on low blood glucose - causes, signs/symptoms, prevention, treatment, carrying a carbohydrate source at all times, and medical identification Completed 3/28/2023   Responsible User: Ashley Esqueda RD      Task: Provide education on safe travel with diabetes    Responsible User: Ashley Esqueda RD      Task: Provide education on how to care for diabetes on sick days    Responsible User: Ashley Esqueda RD      Task: Provide education on when to call a health care provider    Responsible  User: Ashley Esqueda RD      Goal: Reducing Risks - know how to prevent and treat long-term diabetes complications       Task: Provide education on major complications of diabetes, prevention, early diagnostic measures and treatment of complications    Responsible User: Ashley Esqueda RD      Task: Provide education on recommended care for dental, eye and foot health    Responsible User: Ashley Esqueda RD      Task: Provide education on Hemoglobin A1c - goals and relationship to blood glucose levels    Responsible User: Ashley Esqueda RD      Task: Provide education on recommendations for heart health - lipid levels and goals, blood pressure and goals, and aspirin therapy, if indicated    Responsible User: Ashley Esqueda RD      Task: Provide education on tobacco cessation    Responsible User: Ashley Esqueda RD      Goal: Healthy Coping - use available resources to cope with the challenges of managing diabetes       Task: Discuss recognizing feelings about having diabetes    Responsible User: Ashley Esqueda RD      Task: Provide education on the benefits of making appropriate lifestyle changes    Responsible User: Ashley Esqueda RD      Task: Provide education on benefits of utilizing support systems    Responsible User: Ashley Esqueda RD      Task: Discuss methods for coping with stress    Responsible User: Ashley Esqueda RD      Task: Provide education on when to seek professional counseling    Responsible User: Ashley Esqueda RD Elise Graham, RD, EVELINA, [facility]     Time Spent: 45 minutes  Encounter Type: Individual    Any diabetes medication dose changes were made via the CDE Protocol per the patient's referring provider. A copy of this encounter was shared with the provider.

## 2023-03-28 NOTE — LETTER
"    3/28/2023         RE: Zaira Fierro  [address]  [facility] [address]        Dear Colleague,    Thank you for referring your patient, Zaira Fierro, to the [facility] [place]. Please see a copy of my visit note below.    Diabetes Self-Management Education & Support    Presents for: Initial Assessment for new diagnosis    Type of Service: In Person Visit    Assessment Type:   ASSESSMENT:  Deborah was recently diagnosed with T2DM. Started listening to audiobook - \"The Diabetes Solution\" - and that has helped her a bit. She has been working on improving her diet, cutting out snacking at night. She has arthritis so exercise is difficult. She has macular degeneration so needs a magnifying glass for reading. She is testing blood sugar once daily, consistent about this and numbers have all been in target range.     Patient's most recent   Lab Results   Component Value Date    A1C 7.2 02/06/2023    A1C 5.5 02/01/2021     is not meeting goal of <7.0    Diabetes knowledge and skills assessment:   Patient is knowledgeable in diabetes management concepts related to: none, new diagnosis     Continue education with the following diabetes management concepts: Healthy Eating, Being Active, Monitoring, Taking Medication, Problem Solving, Reducing Risks and Healthy Coping    Based on learning assessment above, most appropriate setting for further diabetes education would be: Group class or Individual setting.      PLAN    Count carbohydrates at meals & snacks - 45-75 grams/meal & 15-30 grams/snack  Increase exercise  Continue to check blood sugars once daily  No change in med dosing   Topics to cover at upcoming visits: Problem Solving, Reducing Risks and Healthy Coping    Follow-up: 5/16/23    See Care Plan for co-developed, patient-state behavior change goals.  AVS provided for patient today.    Education Materials Provided:  [facility] Understanding Diabetes Booklet and BG Log " "Sheet      SUBJECTIVE/OBJECTIVE:  Presents for: Initial Assessment for new diagnosis  Accompanied by: Self  Diabetes education in the past 24mo: No  Focus of Visit: Patient Unsure  Diabetes type: Type 2  Date of diagnosis: 2/2023  Disease course: Other  How confident are you filling out medical forms by yourself:: Not at all  Diabetes management related comments/concerns: I have macular degeneration that sometimes makes it a challenge to get the blood and the test strip to meet!  Transportation concerns: No  Difficulty affording diabetes medication?: No  Difficulty affording diabetes testing supplies?: No  Other concerns:: Visual impairment  Cultural Influences/Ethnic Background:  Not  or       Diabetes Symptoms & Complications:  Fatigue: No  Neuropathy: No  Polydipsia: No  Polyphagia: No  Polyuria: Yes  Visual change: Yes  Slow healing wounds: No  Symptom course: Stable  Complications assessed today?: No  Autonomic neuropathy: No  CVA: No  Heart disease: No  Nephropathy: No  Peripheral neuropathy: No  Peripheral Vascular Disease: No  Retinopathy: Other  Sexual dysfunction: No    Patient Problem List and Family Medical History reviewed for relevant medical history, current medical status, and diabetes risk factors.    Vitals:  There were no vitals taken for this visit.  Estimated body mass index is 34.49 kg/m  as calculated from the following:    Height as of 3/22/23: 1.585 m (5' 2.4\").    Weight as of 3/22/23: 86.6 kg (191 lb).   Last 3 BP:   BP Readings from Last 3 Encounters:   03/22/23 136/73   02/24/23 138/81   02/06/23 (!) 147/82       History   Smoking Status     Never   Smokeless Tobacco     Never       Labs:  Lab Results   Component Value Date    A1C 7.2 02/06/2023    A1C 5.5 02/01/2021     Lab Results   Component Value Date     01/21/2023    .4 02/01/2021     Lab Results   Component Value Date     07/13/2020     HDL Cholesterol   Date Value Ref Range Status   07/13/2020 " 47.2 >40.0 mg/dL Final   ]  GFR Estimate   Date Value Ref Range Status   01/21/2023 88 >60 mL/min/1.73m2 Final     Comment:     Effective December 21, 2021 eGFRcr in adults is calculated using the 2021 CKD-EPI creatinine equation which includes age and gender (Fabienne sullivan al., [place], DOI: 10.1056/WZVUks0373195)   02/01/2021 >90 >60.0 mL/min/1.7 m2 Final     GFR Estimate If Black   Date Value Ref Range Status   02/01/2021 >90 >60.0 mL/min/1.7 m2 Final     Lab Results   Component Value Date    CR 0.57 01/21/2023    CR 0.6 02/01/2021     No results found for: MICROALBUMIN    Healthy Eating:  Healthy Eating Assessed Today: Yes  Cultural/[place] diet restrictions?: No  Meal planning/habits: Avoiding sweets, Low salt  How many times a week on average do you eat food made away from home (restaurant/take-out)?: 0  Meals include: Breakfast, Lunch  Breakfast: oatmeal w/ raisins + Sweet & Low  Lunch: 1/2 Express salad + protein (boiled egg or chicken or beef)  Dinner: peanut butter sandwich  Other: cutting back on snacking at  - used to snack on Doritos and sweets in the past  Beverages: Water, Coffee, Milk, Other    Being Active:  Being Active Assessed Today: Yes  Exercise:: Currently not exercising  Barrier to exercise: None, Physical limitation    Monitoring:  Monitoring Assessed Today: Yes  Did patient bring glucose meter to appointment? : No  Blood Glucose Meter: One Touch  Times checking blood sugar at home (number): 1  Times checking blood sugar at home (per): Day  Blood glucose trend: Fluctuating    Patient reports blood sugars all <130 mg/dL fasting since getting started     Taking Medications:  Diabetes Medication(s)     Biguanides       metFORMIN (GLUCOPHAGE) 1000 MG tablet    Take 1 tablet (1,000 mg) by mouth 2 times daily (with meals)     metFORMIN (GLUCOPHAGE) 500 MG tablet    Take 1 tablet (500 mg) by mouth 2 times daily (with meals)          Taking Medication Assessed Today: Yes  Current Treatments: Oral  Medication (taken by mouth)  Problems taking diabetes medications regularly?: No  Diabetes medication side effects?: No    Problem Solving:  Problem Solving Assessed Today: Yes  Is the patient at risk for hypoglycemia?: No  Is the patient at risk for DKA?: No  Does patient have severe weather/disaster plan for diabetes management?: No  Does patient have sick day plan for diabetes management?: No    Reducing Risks:  Reducing Risks Assessed Today: No  Diabetes Risks: Age over 45 years, Family History, Sedentary Lifestyle  CAD Risks: Dyslipidemia, Family history, Hypertension, Obesity, Sedentary lifestyle  Has dilated eye exam at least once a year?: No  Sees dentist every 6 months?: Yes  Feet checked by healthcare provider in the last year?: No    Healthy Coping:  Healthy Coping Assessed Today: Yes  Emotional response to diabetes: Concern for health and well-being, Guilt/Self-blame  Informal Support system:: Friends  Stage of change: ACTION (Actively working towards change)  Support resources: None  Patient Activation Measure Survey Score:  No flowsheet data found.      Care Plan and Education Provided:  Care Plan: Diabetes   Updates made by Ashley Esqueda RD since 3/28/2023 12:00 AM      Problem: HbA1C Not In Goal       Goal: Establish Regular Follow-Ups with PCP       Task: Discuss with PCP the recommended timing for patient's next follow up visit(s)    Responsible User: Ashley Esqueda RD      Task: Discuss schedule for PCP visits with patient    Responsible User: Ashley Esqueda RD      Goal: Get HbA1C Level in Goal       Task: Educate patient on diabetes education self-management topics    Responsible User: Ashley Esqueda RD      Task: Educate patient on benefits of regular glucose monitoring    Responsible User: Ashley Esqueda RD      Task: Refer patient to appropriate extended care team member, as needed (Medication Therapy Management, Behavioral Health, Physical Therapy, etc.)    Responsible User: Ashley Esqueda  DIMITRIS      Task: Discuss diabetes treatment plan with patient    Responsible User: Ashley Esqueda RD      Problem: Diabetes Self-Management Education Needed to Optimize Self-Care Behaviors       Goal: Understand diabetes pathophysiology and disease progression       Task: Provide education on diabetes pathophysiology and disease progression specfic to patient's diabetes type Completed 3/28/2023   Responsible User: Ashley Esqueda RD      Goal: Healthy Eating - follow a healthy eating pattern for diabetes    This Visit's Progress: 0%   Note:    Include 45g carb with meals, 15 g carb with snacks daily.     Task: Provide education on portion control and consistency in amount, composition and timing of food intake Completed 3/28/2023   Responsible User: Ashley Esqueda RD      Task: Provide education on managing carbohydrate intake (carbohydrate counting, plate planning method, etc.) Completed 3/28/2023   Responsible User: Ashley Esqueda RD      Task: Provide education on weight management Completed 3/28/2023   Responsible User: Ashley Esqueda RD      Task: Provide education on heart healthy eating Completed 3/28/2023   Responsible User: Ashley Esqueda RD      Task: Provide education on eating out    Responsible User: Ashley Esqueda RD      Task: Develop individualized healthy eating plan with patient    Responsible User: Ashley Esqueda RD      Goal: Being Active - get regular physical activity, working up to at least 150 minutes per week       Task: Provide education on relationship of activity to glucose and precautions to take if at risk for low glucose    Responsible User: Ashley Esqueda RD      Task: Discuss barriers to physical activity with patient    Responsible User: Ashley Esqueda RD      Task: Develop physical activity plan with patient    Responsible User: Ashley Esqueda RD      Task: Explore community resources including walking groups, assistance programs, and home videos    Responsible User: Ashley Esqueda RD       Goal: Monitoring - monitor glucose and ketones as directed       Task: Provide education on blood glucose monitoring (purpose, proper technique, frequency, glucose targets, interpreting results, when to use glucose control solution, sharps disposal) Completed 3/28/2023   Responsible User: Ashley Esqueda RD      Task: Provide education on continuous glucose monitoring (sensor placement, use of sushila or /reader, understanding glucose trends, alerts and alarms, differences between sensor glucose and blood glucose)    Responsible User: Ashley Esqueda RD      Task: Provide education on ketone monitoring (when to monitor, frequency, etc.)    Responsible User: Ashley Esqueda RD      Goal: Taking Medication - patient is consistently taking medications as directed       Task: Provide education on action of prescribed medication, including when to take and possible side effects Completed 3/28/2023   Responsible User: Ashley Esuqeda RD      Task: Provide education on insulin and injectable diabetes medications, including administration, storage, site selection and rotation for injection sites    Responsible User: Ashley Esqueda RD      Task: Discuss barriers to medication adherence with patient and provide management technique ideas as appropriate Completed 3/28/2023   Responsible User: Ashley Esqueda RD      Task: Provide education on frequency and refill details of medications Completed 3/28/2023   Responsible User: Ashley Esqueda RD      Goal: Problem Solving - know how to prevent and manage short-term diabetes complications       Task: Provide education on high blood glucose - causes, signs/symptoms, prevention and treatment    Responsible User: Ashley Esqueda RD      Task: Provide education on low blood glucose - causes, signs/symptoms, prevention, treatment, carrying a carbohydrate source at all times, and medical identification Completed 3/28/2023   Responsible User: Ashley Esqueda RD      Task: Provide  education on safe travel with diabetes    Responsible User: Ashley Esqueda RD      Task: Provide education on how to care for diabetes on sick days    Responsible User: Ashley Esqueda RD      Task: Provide education on when to call a health care provider    Responsible User: Ashley Esqueda RD      Goal: Reducing Risks - know how to prevent and treat long-term diabetes complications       Task: Provide education on major complications of diabetes, prevention, early diagnostic measures and treatment of complications    Responsible User: Ashley Esqueda RD      Task: Provide education on recommended care for dental, eye and foot health    Responsible User: Ashley Esqueda RD      Task: Provide education on Hemoglobin A1c - goals and relationship to blood glucose levels    Responsible User: Ashley Esqueda RD      Task: Provide education on recommendations for heart health - lipid levels and goals, blood pressure and goals, and aspirin therapy, if indicated    Responsible User: Ashley Esqueda RD      Task: Provide education on tobacco cessation    Responsible User: Ashley Esqueda RD      Goal: Healthy Coping - use available resources to cope with the challenges of managing diabetes       Task: Discuss recognizing feelings about having diabetes    Responsible User: Ashley Esqueda RD      Task: Provide education on the benefits of making appropriate lifestyle changes    Responsible User: Ashley Esqueda RD      Task: Provide education on benefits of utilizing support systems    Responsible User: Ashley Esqueda RD      Task: Discuss methods for coping with stress    Responsible User: Ashley Esqueda RD      Task: Provide education on when to seek professional counseling    Responsible User: Ashley Esqueda RD Elise Graham, RD, LD, CDCES     Time Spent: 45 minutes  Encounter Type: Individual    Any diabetes medication dose changes were made via the CDE Protocol per the patient's referring provider. A copy of this  encounter was shared with the provider.

## 2023-04-01 ENCOUNTER — TRANSFERRED RECORDS (OUTPATIENT)
Dept: MULTI SPECIALTY CLINIC | Facility: CLINIC | Age: 88
End: 2023-04-01
Payer: COMMERCIAL

## 2023-04-01 LAB — RETINOPATHY: NORMAL

## 2023-04-10 ENCOUNTER — THERAPY VISIT (OUTPATIENT)
Dept: PHYSICAL THERAPY | Facility: CLINIC | Age: 88
End: 2023-04-10
Payer: COMMERCIAL

## 2023-04-10 DIAGNOSIS — M17.10 PRIMARY OSTEOARTHRITIS OF KNEE, UNSPECIFIED LATERALITY: Primary | ICD-10-CM

## 2023-04-10 PROCEDURE — 97110 THERAPEUTIC EXERCISES: CPT | Mod: GP | Performed by: PHYSICAL THERAPIST

## 2023-04-10 PROCEDURE — 97140 MANUAL THERAPY 1/> REGIONS: CPT | Mod: GP | Performed by: PHYSICAL THERAPIST

## 2023-04-19 ENCOUNTER — OFFICE VISIT (OUTPATIENT)
Dept: FAMILY MEDICINE | Facility: CLINIC | Age: 88
End: 2023-04-19
Payer: COMMERCIAL

## 2023-04-19 VITALS
WEIGHT: 185 LBS | HEART RATE: 100 BPM | TEMPERATURE: 99.3 F | SYSTOLIC BLOOD PRESSURE: 130 MMHG | BODY MASS INDEX: 34.04 KG/M2 | OXYGEN SATURATION: 97 % | RESPIRATION RATE: 16 BRPM | HEIGHT: 62 IN | DIASTOLIC BLOOD PRESSURE: 80 MMHG

## 2023-04-19 DIAGNOSIS — E11.9 TYPE 2 DIABETES MELLITUS WITHOUT COMPLICATION, WITHOUT LONG-TERM CURRENT USE OF INSULIN (H): Primary | ICD-10-CM

## 2023-04-19 DIAGNOSIS — I10 ESSENTIAL HYPERTENSION WITH GOAL BLOOD PRESSURE LESS THAN 140/90: ICD-10-CM

## 2023-04-19 DIAGNOSIS — G25.81 RESTLESS LEG SYNDROME: ICD-10-CM

## 2023-04-19 PROCEDURE — 99213 OFFICE O/P EST LOW 20 MIN: CPT | Performed by: FAMILY MEDICINE

## 2023-04-19 NOTE — PROGRESS NOTES
Assessment & Plan     Type 2 diabetes mellitus without complication, without long-term current use of insulin (H)  Likely much improved based on her fasting BG levels. Has lost significant weight due to the metformin side effect. Will continue without changes and have her return in 1 mo for labs.     Essential hypertension with goal blood pressure less than 140/90  Is at goal. Wonder if will improve with weight loss    Restless leg syndrome  Is also better. Appears when she manages her insomnia better, then she does better with her RLS as well.  Is not interested in seeing the sleep doctor again.         I spent a total of 25 minutes on the day of the visit.   Time spent by me doing chart review, history and exam, documentation and further activities per the note           Return in about 4 weeks (around 5/17/2023) for with me.    Ange Crespo MD  [facility] SELAM Cabrera is a 87 year old, presenting for the following health issues:  RECHECK (Pt states that she is here for diabetes follow up)        4/19/2023    12:55 PM   Additional Questions   Roomed by Virginia   Accompanied by self     HPI     DM  BG has always been in the green - the lowest it was was 87 with the highest at 112.  Only checks fasting  At first couldn't see well enough to do do the blood testing  Diabetes Ed - recipes are hard to read. So can't follow that. She is working on keeping her carbs at 45 mg per meal.  Ex: made chicken stew and put the amount of potatoes that e qual the size of a computer mouse. Ex: she likes coffee with 2% and ues only a bit of the milk and tries not to go over. Only eats two meals a day which is what she always did and before she was also adding chips and other   Has noticed that she has less of an appetite with the metformin. Gets diarrhea quite a bit from it. So she has tried to eat more bulk.     Wt Readings from Last 4 Encounters:   04/19/23 83.9 kg (185 lb)   03/22/23 86.6 kg (191  "lb)   02/24/23 87.5 kg (193 lb)   02/06/23 87.1 kg (192 lb)       eye exam - 4/1/4/2023 at [facility]     RLS - about the same and at times really bad. Is sleeping better. We did not change anything. It might be since she is  More active during the day. If she does not nap she sleeps better at night. Is also getting out of bed until she feels sleepy.      ROS:  Legs are a bit more swollen          Review of Systems         Objective    /80 (BP Location: Right arm, Patient Position: Sitting, Cuff Size: Adult Regular)   Pulse 100   Temp 99.3  F (37.4  C) (Oral)   Resp 16   Ht 1.575 m (5' 2\")   Wt 83.9 kg (185 lb)   SpO2 97%   BMI 33.84 kg/m    Body mass index is 33.84 kg/m .  Physical Exam   Edema - 1+ bilaterally                       "

## 2023-04-19 NOTE — PATIENT INSTRUCTIONS
Patient Education   Here is the plan from today's visit    1. Type 2 diabetes mellitus without complication, without long-term current use of insulin (H)  Keep doing what you are doing!!!! Next visit in 1 month, we will do labs    2. Essential hypertension with goal blood pressure less than 140/90  Excellent!!    3. Restless leg syndrome  Steady - no changes.  Keep working on not sleeping in the day and getting out of bed if you are not tired    4. Liquids   Anywhere between 1.5 liters and 2 liters a day is reasonable. All liquids.         Please call or return to clinic if your symptoms don't go away.    Follow up plan  No follow-ups on file.    Thank you for coming to [facility] today.  Lab Testing:  **If you had lab testing today and your results are reassuring or normal they will be mailed to you or sent through [facility] within 7 days.   **If the lab tests need quick action we will call you with the results.  **If you are having labs done on a different day, please call [phone] to schedule at [facility] or [phone] for other [facility] locations. Labs do not offer walk-in appointments.  The phone number we will call with results is # [phone] (home) . If this is not the best number please call our clinic and change the number.  Medication Refills:  If you need any refills please call your pharmacy and they will contact us.   If you need to  your refill at a new pharmacy, please contact the new pharmacy directly. The new pharmacy will help you get your medications transferred faster.   Scheduling:  If you have any concerns about today's visit or wish to schedule another appointment please call our office during normal business hours [phone] (8-5:00 M-F). If you can no longer make a scheduled visit, please cancel via [facility] or call us to cancel.   If a referral was made to an [facility] specialty provider and you do not get a call from central scheduling,  please refer to directions on your visit summary or call our office during normal business hours for assistance.   If a Mammogram was ordered for you at the [facility] Center call [phone] to schedule or change your appointment.  If you had an XRay/CT/Ultrasound/MRI ordered the number is [phone] to schedule or change your radiology appointment.   [facility] has limited ultrasound appointments available on Wednesdays, if you would like your ultrasound at [facility], please call [phone] to schedule.   Medical Concerns:  If you have urgent medical concerns please call [phone] at any time of the day.    Ange Crespo MD

## 2023-04-25 DIAGNOSIS — I10 BENIGN ESSENTIAL HYPERTENSION: ICD-10-CM

## 2023-04-25 RX ORDER — HYDROCHLOROTHIAZIDE 25 MG/1
TABLET ORAL
Qty: 90 TABLET | Refills: 1 | Status: SHIPPED | OUTPATIENT
Start: 2023-04-25

## 2023-04-25 NOTE — TELEPHONE ENCOUNTER
"Request for medication refill:  hydrochlorothiazide (HYDRODIURIL) 25 MG tablet    Providers if patient needs an appointment and you are willing to give a one month supply please refill for one month and  send a letter/MyChart using \".SMILLIMITEDREFILL\" .smillimited and route chart to \"[facility] \" (Giving one month refill in non controlled medications is strongly recommended before denial)    If refill has been denied, meaning absolutely no refills without visit, please complete the smart phrase \".smirxrefuse\" and route it to the \"[facility] MED REFILLS\"  pool to inform the patient and the pharmacy.    Annamaria Kelly MA        "

## 2023-05-01 ENCOUNTER — THERAPY VISIT (OUTPATIENT)
Dept: PHYSICAL THERAPY | Facility: CLINIC | Age: 88
End: 2023-05-01
Payer: COMMERCIAL

## 2023-05-01 DIAGNOSIS — M17.10 PRIMARY OSTEOARTHRITIS OF KNEE, UNSPECIFIED LATERALITY: Primary | ICD-10-CM

## 2023-05-01 PROCEDURE — 97140 MANUAL THERAPY 1/> REGIONS: CPT | Mod: GP | Performed by: PHYSICAL THERAPIST

## 2023-05-01 PROCEDURE — 97110 THERAPEUTIC EXERCISES: CPT | Mod: GP | Performed by: PHYSICAL THERAPIST

## 2023-05-17 ENCOUNTER — OFFICE VISIT (OUTPATIENT)
Dept: FAMILY MEDICINE | Facility: CLINIC | Age: 88
End: 2023-05-17
Payer: COMMERCIAL

## 2023-05-17 VITALS
HEART RATE: 92 BPM | WEIGHT: 180 LBS | TEMPERATURE: 97.8 F | DIASTOLIC BLOOD PRESSURE: 68 MMHG | RESPIRATION RATE: 16 BRPM | OXYGEN SATURATION: 96 % | BODY MASS INDEX: 32.92 KG/M2 | SYSTOLIC BLOOD PRESSURE: 137 MMHG

## 2023-05-17 DIAGNOSIS — E11.9 TYPE 2 DIABETES MELLITUS WITHOUT COMPLICATION, WITHOUT LONG-TERM CURRENT USE OF INSULIN (H): Primary | ICD-10-CM

## 2023-05-17 DIAGNOSIS — I10 ESSENTIAL HYPERTENSION WITH GOAL BLOOD PRESSURE LESS THAN 140/90: ICD-10-CM

## 2023-05-17 LAB
CREAT UR-MCNC: 123 MG/DL
MICROALBUMIN UR-MCNC: <12 MG/L
MICROALBUMIN/CREAT UR: NORMAL MG/G{CREAT}

## 2023-05-17 PROCEDURE — 36415 COLL VENOUS BLD VENIPUNCTURE: CPT | Performed by: FAMILY MEDICINE

## 2023-05-17 PROCEDURE — 99214 OFFICE O/P EST MOD 30 MIN: CPT | Performed by: FAMILY MEDICINE

## 2023-05-17 PROCEDURE — 82570 ASSAY OF URINE CREATININE: CPT | Performed by: FAMILY MEDICINE

## 2023-05-17 PROCEDURE — 80061 LIPID PANEL: CPT | Performed by: FAMILY MEDICINE

## 2023-05-17 PROCEDURE — 82043 UR ALBUMIN QUANTITATIVE: CPT | Performed by: FAMILY MEDICINE

## 2023-05-17 NOTE — PROGRESS NOTES
Assessment & Plan     Type 2 diabetes mellitus without complication, without long-term current use of insulin (H)  Likely controlled. HgA1c pending. Plan to rotate checking also 2 hours post prandial to better understand the effect of her nutrition.  Return in 3 months.   - Hemoglobin A1c  - Albumin Random Urine Quantitative with Creat Ratio; Future  - Lipid Profile; Future  - Albumin Random Urine Quantitative with Creat Ratio  - Lipid Profile    Essential hypertension with goal blood pressure less than 140/90  Controlled. No change.     What Matters  See note for what matters to her. Connection with others and ability to hear books on tape. Is happy with managing her DM and supports her goals.         I spent a total of 27 minutes on the day of the visit.   Time spent by me doing chart review, history and exam, documentation and further activities per the note           Return in about 3 months (around 8/17/2023).    Ange Crespo MD  [facility] SELAM Cabrera is a 87 year old, presenting for the following health issues:  RECHECK (Pt here for diabetes follow up and labs.)        5/17/2023     1:17 PM   Additional Questions   Roomed by Kyung   Accompanied by Self     HPI     DM:  Always in the green. Usually 104 - 109 and rarely 99.  Only doing fasting in the am.   Not sure why doing post prandial   Working on 45 carbs per meal. Does not eat a real variety of food so knows how much she can have of the carbs.   Taking her metformin twice daily and at times has diarrhea. Also less hungry.   Gained a lot of her initial weight after moved and feeling bored so she ate more.   Gabapentin makes you want to eat more.   Is doing more movement. Has been practicing steps and doing self massage - learned     Wt Readings from Last 4 Encounters:   05/17/23 81.6 kg (180 lb)   04/19/23 83.9 kg (185 lb)   03/22/23 86.6 kg (191 lb)   02/24/23 87.5 kg (193 lb)       What matters to her:  Lives with a  community and hearing from them and her sister (Maggie). Visits others in the community during the week. Hearing about her family. Keeping in touch.   Watching baseball games. Like mysteries. Listens to books.   [employer] on Sunday - not sure if she will ever go back but so easy to stay at home.   Can see TV but not the tiny stuff - just seen on 4/14 and has appointment October.     Tries to pray a bit.                 Review of Systems         Objective    /68 (BP Location: Left arm, Patient Position: Sitting, Cuff Size: Adult Large)   Pulse 92   Temp 97.8  F (36.6  C) (Oral)   Resp 16   Wt 81.6 kg (180 lb)   SpO2 96%   BMI 32.92 kg/m    Body mass index is 32.92 kg/m .  Physical Exam

## 2023-05-17 NOTE — PATIENT INSTRUCTIONS
Patient Education   Here is the plan from today's visit    1. Type 2 diabetes mellitus without complication, without long-term current use of insulin (H)  Assume we do not need to change anything.   Other than check in a rotating fashion, 2 hours after meals.    OK to return in 3 months.   - Hemoglobin A1c  - Albumin Random Urine Quantitative with Creat Ratio; Future  - Lipid Profile; Future  - Albumin Random Urine Quantitative with Creat Ratio  - Lipid Profile    2. Essential hypertension with goal blood pressure less than 140/90  That is good     3. Keep doing the THERAPY  Consider Silver Sneakers as a way to keep moving   In Home Walking       Please call or return to clinic if your symptoms don't go away.    Follow up plan  No follow-ups on file.    Thank you for coming to [facility] today.  Lab Testing:  **If you had lab testing today and your results are reassuring or normal they will be mailed to you or sent through [employer] within 7 days.   **If the lab tests need quick action we will call you with the results.  **If you are having labs done on a different day, please call [phone] to schedule at [facility] or [phone] for other [facility] locations. Labs do not offer walk-in appointments.  The phone number we will call with results is # [phone] (home) . If this is not the best number please call our clinic and change the number.  Medication Refills:  If you need any refills please call your pharmacy and they will contact us.   If you need to  your refill at a new pharmacy, please contact the new pharmacy directly. The new pharmacy will help you get your medications transferred faster.   Scheduling:  If you have any concerns about today's visit or wish to schedule another appointment please call our office during normal business hours [phone] (8-5:00 M-F). If you can no longer make a scheduled visit, please cancel via [employer] or call us to cancel.   If a  referral was made to an [facility] specialty provider and you do not get a call from central scheduling, please refer to directions on your visit summary or call our office during normal business hours for assistance.   If a Mammogram was ordered for you at the [facility] call [phone] to schedule or change your appointment.  If you had an XRay/CT/Ultrasound/MRI ordered the number is [phone] to schedule or change your radiology appointment.   [facility] has limited ultrasound appointments available on Wednesdays, if you would like your ultrasound at [facility], please call [phone] to schedule.   Medical Concerns:  If you have urgent medical concerns please call [phone] at any time of the day.    Ange Crespo MD

## 2023-05-18 LAB
CHOLEST SERPL-MCNC: 151 MG/DL
HDLC SERPL-MCNC: 43 MG/DL
LDLC SERPL CALC-MCNC: 64 MG/DL
NONHDLC SERPL-MCNC: 108 MG/DL
TRIGL SERPL-MCNC: 221 MG/DL

## 2023-05-27 ENCOUNTER — HEALTH MAINTENANCE LETTER (OUTPATIENT)
Age: 88
End: 2023-05-27

## 2023-06-07 NOTE — PROGRESS NOTES
DISCHARGE  Reason for Discharge: Patient chooses to discontinue therapy.  Patient has failed to schedule further appointments.  Patient to continue home program.Discharge Plan: Patient to continue home program.    Referring Provider:  Ange Crespo

## 2023-06-20 DIAGNOSIS — E11.9 TYPE 2 DIABETES MELLITUS WITHOUT COMPLICATION, WITHOUT LONG-TERM CURRENT USE OF INSULIN (H): ICD-10-CM

## 2023-06-20 NOTE — TELEPHONE ENCOUNTER
"Request for medication refill: metFORMIN (GLUCOPHAGE) 1000 MG tablet   Last Visit- 05/17/2023    Providers if patient needs an appointment and you are willing to give a one month supply please refill for one month and  send a letter/MyChart using \".SMILLIMITEDREFILL\" .smillimited and route chart to \"P SMI \" (Giving one month refill in non controlled medications is strongly recommended before denial)    If refill has been denied, meaning absolutely no refills without visit, please complete the smart phrase \".smirxrefuse\" and route it to the \"P [facility]MI MED REFILLS\"  pool to inform the patient and the pharmacy.    Jorgito Arroyo [facility]        "

## 2023-06-23 DIAGNOSIS — I10 BENIGN ESSENTIAL HYPERTENSION: ICD-10-CM

## 2023-06-23 RX ORDER — LOSARTAN POTASSIUM 100 MG/1
100 TABLET ORAL EVERY MORNING
Qty: 90 TABLET | Refills: 3 | Status: CANCELLED | OUTPATIENT
Start: 2023-06-23

## 2023-06-23 NOTE — TELEPHONE ENCOUNTER
"Request for medication refill: losartan (COZAAR) 100 MG tablet    Last  Visit 05/17/2023    Providers if patient needs an appointment and you are willing to give a one month supply please refill for one month and  send a letter/MyChart using \".SMILLIMITEDREFILL\" .smillimited and route chart to \"P SMI \" (Giving one month refill in non controlled medications is strongly recommended before denial)    If refill has been denied, meaning absolutely no refills without visit, please complete the smart phrase \".smirxrefuse\" and route it to the \"P SMI MED REFILLS\"  pool to inform the patient and the pharmacy.    Jorgito Arroyo CMA        "

## 2023-06-26 DIAGNOSIS — I10 BENIGN ESSENTIAL HYPERTENSION: ICD-10-CM

## 2023-06-26 RX ORDER — PRAVASTATIN SODIUM 20 MG
20 TABLET ORAL EVERY EVENING
Qty: 90 TABLET | Refills: 3 | Status: SHIPPED | OUTPATIENT
Start: 2023-06-26

## 2023-06-26 NOTE — TELEPHONE ENCOUNTER
"Request for medication refill:  pravastatin (PRAVACHOL) 20 MG tablet    Providers if patient needs an appointment and you are willing to give a one month supply please refill for one month and  send a letter/MyChart using \".SMILLIMITEDREFILL\" .smillimited and route chart to \"P [facility] \" (Giving one month refill in non controlled medications is strongly recommended before denial)    If refill has been denied, meaning absolutely no refills without visit, please complete the smart phrase \".smirxrefuse\" and route it to the \"P [facility] MED REFILLS\"  pool to inform the patient and the pharmacy.    Annamaria Kelly MA        "

## 2023-07-06 DIAGNOSIS — G25.81 RESTLESS LEG SYNDROME: ICD-10-CM

## 2023-07-06 RX ORDER — GABAPENTIN 300 MG/1
CAPSULE ORAL
Qty: 150 CAPSULE | Refills: 3 | Status: SHIPPED | OUTPATIENT
Start: 2023-07-06 | End: 2023-07-27

## 2023-07-06 NOTE — TELEPHONE ENCOUNTER
"Request for medication refill:  gabapentin (NEURONTIN) 300 MG capsule       Providers if patient needs an appointment and you are willing to give a one month supply please refill for one month and  send a letter/MyChart using \".SMILLIMITEDREFILL\" .smillimited and route chart to \"P SMI \" (Giving one month refill in non controlled medications is strongly recommended before denial)    If refill has been denied, meaning absolutely no refills without visit, please complete the smart phrase \".smirxrefuse\" and route it to the \"P [facility]MI MED REFILLS\"  pool to inform the patient and the pharmacy.    Annamaria Kelly MA        "

## 2023-07-06 NOTE — TELEPHONE ENCOUNTER
[facility] phone call message- patient requesting a refill:    Full Medication Name: gabapentin (NEURONTIN) 300 MG capsule    Dose: TAKE ONE CAPSULE BY MOUTH TWICE DAILY AND TAKE 3 CAPSULES EVERY NIGHT AT BEDTIME    Pharmacy confirmed as   [id number] - [address] & [address] [place] [address]  Phone: [phone] Fax: [phone]  : Yes    Additional Comments:      OK to leave a message on voice mail? Yes    Primary language: English      needed? No    Call taken on July 6, 2023 at 12:34 PM by Carson Gil

## 2023-07-10 ENCOUNTER — TELEPHONE (OUTPATIENT)
Dept: FAMILY MEDICINE | Facility: CLINIC | Age: 88
End: 2023-07-10
Payer: COMMERCIAL

## 2023-07-10 NOTE — TELEPHONE ENCOUNTER
[facility] phone call message-patient reporting a symptom:     Symptom: Sever pain    Same Day Visit Offered: N/A    Additional comments: The patient have been having severe sciatica pain for the past 6 days and would like to speak with someone about it.    OK to leave message on voice mail? Yes    Primary language: English      needed? No    Call taken on July 10, 2023 at 10:16 AM by Rima Sanchez

## 2023-07-10 NOTE — TELEPHONE ENCOUNTER
"Called and talked w pt who reports last Wednesday 7/5/23 pt began having pain in left leg that starts at about the hip and shoots down the back of leg. Pt denies any numbness/tingling or loss of bladder/bowel control. Pt states that she has a history of weakness in left knee that Dr. Crespo has given injections for and is aware of. Reports being able to urinate and pass stool w/out difficulty. Pt \"googled symptoms\" and believes she has sciatica.      Reports using \"pain patches\" and \"oils\" that her friend uses with temporary relief. Pt reports not being able to sit long but is able to get up and walk around for some time before the pain becomes unbearable and then pt lays down in bed on back with relief. Pt has tried ice as well as attempting some stretches.  Pt is currently taking 650mg Tylenol q6h at 10AM, 4PM, 10PM, and sometimes if she is up at 4AM but usually skips that dose. Pt is wondering if she can increase the dosage temporarily as she reports some relief with the 650mg. Discussed Tylenol max dosage of 4,000mg in 24 hours.     Discussed trying heat for 20 minutes every 2-4 hours. Pt declined an appt at this time as she reports not being able to get into a car. Pt wants to wait it out another week to see if it gets better and if it does not, will call and schedule an appt. Advised pt if she begins to have any numbness/tingling, increased weakness, loss of bladder/bowel that she needs to be evaluated in clinic or UC/ER if unable to get an ASAP appt. Pt verbalizes understanding. Sending to provider for review.    Suzy Patino RN    "

## 2023-07-11 NOTE — TELEPHONE ENCOUNTER
Been going on for a week. When she stands, notes the pain in her hip and shoots down her legs. All the way to the calf. Fine if lying down.  When she sits it also hurts.   Can walk with the pain.   Just on the left leg. The weak side is the worse side.   A/P: likely more of a sciatica and radiculopathy. No warning signs. Plan to do stretches and core exercises while in bed.  Hoping that it will get better in the next few weeks. Consider steroids if she is not better in the next week.

## 2023-07-16 ENCOUNTER — MYC MEDICAL ADVICE (OUTPATIENT)
Dept: FAMILY MEDICINE | Facility: CLINIC | Age: 88
End: 2023-07-16
Payer: COMMERCIAL

## 2023-07-16 DIAGNOSIS — M54.42 ACUTE LEFT-SIDED LOW BACK PAIN WITH LEFT-SIDED SCIATICA: Primary | ICD-10-CM

## 2023-07-17 RX ORDER — METHYLPREDNISOLONE 4 MG
TABLET, DOSE PACK ORAL
Qty: 21 TABLET | Refills: 0 | Status: SHIPPED | OUTPATIENT
Start: 2023-07-17 | End: 2023-07-29

## 2023-07-17 NOTE — TELEPHONE ENCOUNTER
Attempted to call patient to triage and schedule, phone hung up when picked up, unable to leave a message.    Damaris Mondragon RN

## 2023-07-25 ENCOUNTER — ANCILLARY PROCEDURE (OUTPATIENT)
Dept: GENERAL RADIOLOGY | Facility: CLINIC | Age: 88
End: 2023-07-25
Attending: FAMILY MEDICINE
Payer: COMMERCIAL

## 2023-07-25 ENCOUNTER — OFFICE VISIT (OUTPATIENT)
Dept: FAMILY MEDICINE | Facility: CLINIC | Age: 88
End: 2023-07-25
Payer: COMMERCIAL

## 2023-07-25 VITALS
OXYGEN SATURATION: 95 % | RESPIRATION RATE: 17 BRPM | DIASTOLIC BLOOD PRESSURE: 68 MMHG | HEART RATE: 77 BPM | SYSTOLIC BLOOD PRESSURE: 130 MMHG

## 2023-07-25 DIAGNOSIS — M54.42 ACUTE LEFT-SIDED LOW BACK PAIN WITH LEFT-SIDED SCIATICA: ICD-10-CM

## 2023-07-25 DIAGNOSIS — M54.42 ACUTE LEFT-SIDED LOW BACK PAIN WITH LEFT-SIDED SCIATICA: Primary | ICD-10-CM

## 2023-07-25 LAB — RADIOLOGIST FLAGS: ABNORMAL

## 2023-07-25 PROCEDURE — 99214 OFFICE O/P EST MOD 30 MIN: CPT | Performed by: FAMILY MEDICINE

## 2023-07-25 PROCEDURE — 72100 X-RAY EXAM L-S SPINE 2/3 VWS: CPT | Mod: FY | Performed by: STUDENT IN AN ORGANIZED HEALTH CARE EDUCATION/TRAINING PROGRAM

## 2023-07-25 RX ORDER — TRAMADOL HYDROCHLORIDE 50 MG/1
50 TABLET ORAL EVERY 6 HOURS PRN
Qty: 10 TABLET | Refills: 0 | Status: SHIPPED | OUTPATIENT
Start: 2023-07-25 | End: 2023-07-29

## 2023-07-25 RX ORDER — LORAZEPAM 1 MG/1
1 TABLET ORAL EVERY 6 HOURS PRN
Qty: 1 TABLET | Refills: 0 | Status: ON HOLD | OUTPATIENT
Start: 2023-07-25 | End: 2023-08-01

## 2023-07-25 NOTE — PATIENT INSTRUCTIONS
Tramadol for breakthrough pain    Scheduled Tylenol  Ativan only for MRI    Will obtain MRI lumbar    Discussion with Dr. Crespo

## 2023-07-25 NOTE — PROGRESS NOTES
Assessment & Plan     Acute left-sided low back pain with left-sided sciatica  Consider XR Transforaminal injection - Left sided L4  - X-ray lumbar spine 2-3 views*; Future  - MR Lumbar Spine w/o Contrast; Future  - LORazepam (ATIVAN) 1 MG tablet; Take 1 tablet (1 mg) by mouth every 6 hours as needed for anxiety (20 min prior to MRI)  - traMADol (ULTRAM) 50 MG tablet; Take 1 tablet (50 mg) by mouth every 6 hours as needed for severe pain             See Patient Instructions    Return in about 3 weeks (around 8/15/2023), or if symptoms worsen or fail to improve.    Oanh Joshua MD  [facility] SELAM Cabrera is a 88 year old, presenting for the following health issues:  Pain (Pt here for sciatic nerve pain)        7/25/2023    10:53 AM   Additional Questions   Roomed by Doua   Accompanied by Friend         7/25/2023    10:53 AM   Patient Reported Additional Medications   Patient reports taking the following new medications N/A     HPI       Acute on Chronic/Recurring Back Pain Follow Up  Eats ham and bean soup, easiest to just reheat  Was doing PT for knee, unable now  Tylenol taken this a.m.  Where is your back pain located? (Select all that apply) low back left, down the left thigh and down to knee, goes into groin at times when more severe  Stands for 3 seconds and has to lay down, can't sit in a chair, walking kicks in  Look on her face says it all per friend  Seen by Dr. Crespo, steroid helped some but there were days of aggressive pain that was worsening, unrelenting pain  How would you describe your back pain?  sharp  Where does your back pain spread? the left buttock, the left  thigh, and the left  knee  Since your last clinic visit for back pain, how has your pain changed? rapidly worsening  Does your back pain interfere with your job? 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 Yes -  cream, lidocaine patches, acetaminophen (Tylenol), cold, heat, rest, and topical pain relievers  How many servings of fruits and vegetables do you eat daily?  2-3  On average, how many sweetened beverages do you drink each day (Examples: soda, juice, sweet tea, etc.  Do NOT count diet or artificially sweetened beverages)?   0  How many days per week do you exercise enough to make your heart beat faster? 3 or less  How many minutes a day do you exercise enough to make your heart beat faster? 10 - 19  How many days per week do you miss taking your medication? 0      Review of Systems   Constitutional, HEENT, cardiovascular, pulmonary, gi and gu systems are negative, except as otherwise noted.      Objective    /68 (BP Location: Right arm, Patient Position: Left side, Cuff Size: Adult Large)   Pulse 77   Resp 17   SpO2 95%   There is no height or weight on file to calculate BMI.  Physical Exam   BACK:   ROM: flexion -full /extension -full /lateral rotation- full /side bend- full   Bony tenderness: None   Paraspinal tenderness: None.   Sensation: intact in b/l lower extremities.   Strength: 5/5 w/ dorsiflexion/ plantarflexion/ inversion/ eversion/ knee flexion/ extension.   Maneuvers: Neg straight leg raise b/l. Neg Slump b/l Neg MARGARETH   Neuro: DTR's 2+. Babinski's downgoing. Neg Clonus       Xray - Reviewed and interpreted by me.  OA, degenerative spine with loss of discs

## 2023-07-27 ENCOUNTER — MYC MEDICAL ADVICE (OUTPATIENT)
Dept: FAMILY MEDICINE | Facility: CLINIC | Age: 88
End: 2023-07-27
Payer: COMMERCIAL

## 2023-07-27 DIAGNOSIS — M80.00XA AGE-RELATED OSTEOPOROSIS WITH CURRENT PATHOLOGICAL FRACTURE, INITIAL ENCOUNTER: ICD-10-CM

## 2023-07-27 DIAGNOSIS — S22.000A THORACIC COMPRESSION FRACTURE, CLOSED, INITIAL ENCOUNTER (H): Primary | ICD-10-CM

## 2023-07-27 DIAGNOSIS — M85.9 DISORDER OF BONE DENSITY AND STRUCTURE, UNSPECIFIED: ICD-10-CM

## 2023-07-27 DIAGNOSIS — G25.81 RESTLESS LEG SYNDROME: ICD-10-CM

## 2023-07-27 DIAGNOSIS — M81.6 LOCALIZED OSTEOPOROSIS (LEQUESNE): ICD-10-CM

## 2023-07-27 RX ORDER — CALCITONIN SALMON 200 [IU]/.09ML
1 SPRAY, METERED NASAL DAILY
Qty: 3.7 ML | Refills: 1 | Status: ON HOLD | OUTPATIENT
Start: 2023-07-27 | End: 2023-08-01

## 2023-07-27 RX ORDER — GABAPENTIN 300 MG/1
CAPSULE ORAL
Qty: 150 CAPSULE | Refills: 3 | Status: ON HOLD | COMMUNITY
Start: 2023-07-27 | End: 2023-08-01

## 2023-07-27 NOTE — TELEPHONE ENCOUNTER
Hi-   I see we have two compression fractures on lumbar imaging.  I would like to use calcitonin for pain.    She will likely require more pain relief when I'm away.  I'll put in the Calcitonin and bone health labs.  Gayle, do you think she needs inpatient with this severe of pain?

## 2023-07-28 NOTE — TELEPHONE ENCOUNTER
Continues better on her back and hoping to get to the MRI.  Will increase her Neurontin to 600 mg BID and 900 mg at bedtime. Continue the tylenol. Discussion around pros and cons of opioids.  Her sister had severe flare of her RLS after getting off opioids post a surgery and Deborah too has severe RLS.  She struggled to get off of Tramadol in the past due to flare of her RLS. So, we will try to avoid opioids if at all possible. She is managing OK (not well) at home so will hold off on considering an admission for pain and she wants to avoid as well.   If increased dosing of gabapentin not helpful, then will transition to Lyrica instead.    Ange Crespo MD

## 2023-07-29 ENCOUNTER — HOSPITAL ENCOUNTER (OUTPATIENT)
Facility: CLINIC | Age: 88
Setting detail: OBSERVATION
Discharge: HOME | End: 2023-08-01
Attending: EMERGENCY MEDICINE | Admitting: EMERGENCY MEDICINE
Payer: COMMERCIAL

## 2023-07-29 ENCOUNTER — APPOINTMENT (OUTPATIENT)
Dept: MRI IMAGING | Facility: CLINIC | Age: 88
End: 2023-07-29
Attending: HOSPITALIST
Payer: COMMERCIAL

## 2023-07-29 DIAGNOSIS — M54.42 ACUTE LEFT-SIDED LOW BACK PAIN WITH LEFT-SIDED SCIATICA: ICD-10-CM

## 2023-07-29 DIAGNOSIS — K59.03 DRUG-INDUCED CONSTIPATION: Primary | ICD-10-CM

## 2023-07-29 DIAGNOSIS — S22.070A COMPRESSION FRACTURE OF T10 VERTEBRA, INITIAL ENCOUNTER (H): ICD-10-CM

## 2023-07-29 DIAGNOSIS — S22.070A COMPRESSION FRACTURE OF T9 VERTEBRA, INITIAL ENCOUNTER (H): ICD-10-CM

## 2023-07-29 LAB
ANION GAP SERPL CALCULATED.3IONS-SCNC: 15 MMOL/L (ref 7–15)
BASOPHILS # BLD AUTO: 0 10E3/UL (ref 0–0.2)
BASOPHILS NFR BLD AUTO: 0 %
BUN SERPL-MCNC: 10.5 MG/DL (ref 8–23)
CALCIUM SERPL-MCNC: 9.5 MG/DL (ref 8.8–10.2)
CHLORIDE SERPL-SCNC: 100 MMOL/L (ref 98–107)
CREAT SERPL-MCNC: 0.52 MG/DL (ref 0.51–0.95)
DEPRECATED HCO3 PLAS-SCNC: 24 MMOL/L (ref 22–29)
EOSINOPHIL # BLD AUTO: 0.1 10E3/UL (ref 0–0.7)
EOSINOPHIL NFR BLD AUTO: 1 %
ERYTHROCYTE [DISTWIDTH] IN BLOOD BY AUTOMATED COUNT: 13.2 % (ref 10–15)
GFR SERPL CREATININE-BSD FRML MDRD: 89 ML/MIN/1.73M2
GLUCOSE BLDC GLUCOMTR-MCNC: 100 MG/DL (ref 70–99)
GLUCOSE BLDC GLUCOMTR-MCNC: 136 MG/DL (ref 70–99)
GLUCOSE SERPL-MCNC: 99 MG/DL (ref 70–99)
HCT VFR BLD AUTO: 40.6 % (ref 35–47)
HGB BLD-MCNC: 13.8 G/DL (ref 11.7–15.7)
IMM GRANULOCYTES # BLD: 0.1 10E3/UL
IMM GRANULOCYTES NFR BLD: 1 %
LYMPHOCYTES # BLD AUTO: 3 10E3/UL (ref 0.8–5.3)
LYMPHOCYTES NFR BLD AUTO: 27 %
MCH RBC QN AUTO: 33.5 PG (ref 26.5–33)
MCHC RBC AUTO-ENTMCNC: 34 G/DL (ref 31.5–36.5)
MCV RBC AUTO: 99 FL (ref 78–100)
MONOCYTES # BLD AUTO: 1.1 10E3/UL (ref 0–1.3)
MONOCYTES NFR BLD AUTO: 10 %
NEUTROPHILS # BLD AUTO: 6.9 10E3/UL (ref 1.6–8.3)
NEUTROPHILS NFR BLD AUTO: 61 %
NRBC # BLD AUTO: 0 10E3/UL
NRBC BLD AUTO-RTO: 0 /100
PLATELET # BLD AUTO: 253 10E3/UL (ref 150–450)
POTASSIUM SERPL-SCNC: 4.7 MMOL/L (ref 3.4–5.3)
RBC # BLD AUTO: 4.12 10E6/UL (ref 3.8–5.2)
SODIUM SERPL-SCNC: 139 MMOL/L (ref 136–145)
WBC # BLD AUTO: 11 10E3/UL (ref 4–11)

## 2023-07-29 PROCEDURE — 250N000011 HC RX IP 250 OP 636: Mod: JZ | Performed by: HOSPITALIST

## 2023-07-29 PROCEDURE — G0378 HOSPITAL OBSERVATION PER HR: HCPCS

## 2023-07-29 PROCEDURE — A9585 GADOBUTROL INJECTION: HCPCS | Performed by: HOSPITALIST

## 2023-07-29 PROCEDURE — 99222 1ST HOSP IP/OBS MODERATE 55: CPT | Performed by: HOSPITALIST

## 2023-07-29 PROCEDURE — 255N000002 HC RX 255 OP 636: Performed by: HOSPITALIST

## 2023-07-29 PROCEDURE — 99285 EMERGENCY DEPT VISIT HI MDM: CPT | Mod: 25

## 2023-07-29 PROCEDURE — 36415 COLL VENOUS BLD VENIPUNCTURE: CPT | Performed by: EMERGENCY MEDICINE

## 2023-07-29 PROCEDURE — 250N000013 HC RX MED GY IP 250 OP 250 PS 637: Performed by: HOSPITALIST

## 2023-07-29 PROCEDURE — 250N000013 HC RX MED GY IP 250 OP 250 PS 637: Performed by: EMERGENCY MEDICINE

## 2023-07-29 PROCEDURE — 80048 BASIC METABOLIC PNL TOTAL CA: CPT | Performed by: EMERGENCY MEDICINE

## 2023-07-29 PROCEDURE — 72157 MRI CHEST SPINE W/O & W/DYE: CPT

## 2023-07-29 PROCEDURE — 85025 COMPLETE CBC W/AUTO DIFF WBC: CPT | Performed by: EMERGENCY MEDICINE

## 2023-07-29 PROCEDURE — 72158 MRI LUMBAR SPINE W/O & W/DYE: CPT

## 2023-07-29 PROCEDURE — 96374 THER/PROPH/DIAG INJ IV PUSH: CPT | Mod: XU

## 2023-07-29 PROCEDURE — 82962 GLUCOSE BLOOD TEST: CPT

## 2023-07-29 RX ORDER — NICOTINE POLACRILEX 4 MG
15-30 LOZENGE BUCCAL
Status: DISCONTINUED | OUTPATIENT
Start: 2023-07-29 | End: 2023-08-01 | Stop reason: HOSPADM

## 2023-07-29 RX ORDER — PRAMIPEXOLE DIHYDROCHLORIDE 0.12 MG/1
0.12 TABLET ORAL 2 TIMES DAILY
Status: DISCONTINUED | OUTPATIENT
Start: 2023-07-29 | End: 2023-08-01 | Stop reason: HOSPADM

## 2023-07-29 RX ORDER — ACETAMINOPHEN 650 MG/1
650 SUPPOSITORY RECTAL EVERY 6 HOURS PRN
Status: DISCONTINUED | OUTPATIENT
Start: 2023-07-29 | End: 2023-08-01 | Stop reason: HOSPADM

## 2023-07-29 RX ORDER — POLYETHYLENE GLYCOL 3350 17 G/17G
17 POWDER, FOR SOLUTION ORAL DAILY PRN
Status: DISCONTINUED | OUTPATIENT
Start: 2023-07-29 | End: 2023-08-01 | Stop reason: HOSPADM

## 2023-07-29 RX ORDER — OXYCODONE HYDROCHLORIDE 5 MG/1
10 TABLET ORAL ONCE
Status: COMPLETED | OUTPATIENT
Start: 2023-07-29 | End: 2023-07-29

## 2023-07-29 RX ORDER — AMOXICILLIN 250 MG
2 CAPSULE ORAL 2 TIMES DAILY PRN
Status: DISCONTINUED | OUTPATIENT
Start: 2023-07-29 | End: 2023-08-01 | Stop reason: HOSPADM

## 2023-07-29 RX ORDER — PRAVASTATIN SODIUM 20 MG
20 TABLET ORAL EVERY EVENING
Status: DISCONTINUED | OUTPATIENT
Start: 2023-07-29 | End: 2023-08-01 | Stop reason: HOSPADM

## 2023-07-29 RX ORDER — LORAZEPAM 0.5 MG/1
0.5 TABLET ORAL
Status: COMPLETED | OUTPATIENT
Start: 2023-07-29 | End: 2023-07-29

## 2023-07-29 RX ORDER — DEXAMETHASONE SODIUM PHOSPHATE 4 MG/ML
4 INJECTION, SOLUTION INTRA-ARTICULAR; INTRALESIONAL; INTRAMUSCULAR; INTRAVENOUS; SOFT TISSUE DAILY
Status: DISCONTINUED | OUTPATIENT
Start: 2023-07-29 | End: 2023-08-01 | Stop reason: HOSPADM

## 2023-07-29 RX ORDER — LIDOCAINE 4 G/G
1 PATCH TOPICAL
Status: DISCONTINUED | OUTPATIENT
Start: 2023-07-29 | End: 2023-08-01 | Stop reason: HOSPADM

## 2023-07-29 RX ORDER — ONDANSETRON 2 MG/ML
4 INJECTION INTRAMUSCULAR; INTRAVENOUS EVERY 6 HOURS PRN
Status: DISCONTINUED | OUTPATIENT
Start: 2023-07-29 | End: 2023-08-01 | Stop reason: HOSPADM

## 2023-07-29 RX ORDER — AMOXICILLIN 250 MG
1 CAPSULE ORAL 2 TIMES DAILY PRN
Status: DISCONTINUED | OUTPATIENT
Start: 2023-07-29 | End: 2023-08-01 | Stop reason: HOSPADM

## 2023-07-29 RX ORDER — NALOXONE HYDROCHLORIDE 0.4 MG/ML
0.4 INJECTION, SOLUTION INTRAMUSCULAR; INTRAVENOUS; SUBCUTANEOUS
Status: DISCONTINUED | OUTPATIENT
Start: 2023-07-29 | End: 2023-08-01 | Stop reason: HOSPADM

## 2023-07-29 RX ORDER — NALOXONE HYDROCHLORIDE 0.4 MG/ML
0.2 INJECTION, SOLUTION INTRAMUSCULAR; INTRAVENOUS; SUBCUTANEOUS
Status: DISCONTINUED | OUTPATIENT
Start: 2023-07-29 | End: 2023-08-01 | Stop reason: HOSPADM

## 2023-07-29 RX ORDER — GADOBUTROL 604.72 MG/ML
8 INJECTION INTRAVENOUS ONCE
Status: COMPLETED | OUTPATIENT
Start: 2023-07-29 | End: 2023-07-29

## 2023-07-29 RX ORDER — GABAPENTIN 300 MG/1
600 CAPSULE ORAL 2 TIMES DAILY
Status: DISCONTINUED | OUTPATIENT
Start: 2023-07-30 | End: 2023-08-01 | Stop reason: HOSPADM

## 2023-07-29 RX ORDER — ACETAMINOPHEN 325 MG/1
650 TABLET ORAL EVERY 6 HOURS PRN
Status: DISCONTINUED | OUTPATIENT
Start: 2023-07-29 | End: 2023-08-01 | Stop reason: HOSPADM

## 2023-07-29 RX ORDER — GABAPENTIN 300 MG/1
900 CAPSULE ORAL AT BEDTIME
Status: DISCONTINUED | OUTPATIENT
Start: 2023-07-29 | End: 2023-08-01 | Stop reason: HOSPADM

## 2023-07-29 RX ORDER — DEXTROSE MONOHYDRATE 25 G/50ML
25-50 INJECTION, SOLUTION INTRAVENOUS
Status: DISCONTINUED | OUTPATIENT
Start: 2023-07-29 | End: 2023-08-01 | Stop reason: HOSPADM

## 2023-07-29 RX ORDER — ONDANSETRON 4 MG/1
4 TABLET, ORALLY DISINTEGRATING ORAL EVERY 6 HOURS PRN
Status: DISCONTINUED | OUTPATIENT
Start: 2023-07-29 | End: 2023-08-01 | Stop reason: HOSPADM

## 2023-07-29 RX ORDER — LOSARTAN POTASSIUM 100 MG/1
100 TABLET ORAL EVERY MORNING
Status: DISCONTINUED | OUTPATIENT
Start: 2023-07-30 | End: 2023-08-01 | Stop reason: HOSPADM

## 2023-07-29 RX ORDER — BISACODYL 10 MG
10 SUPPOSITORY, RECTAL RECTAL DAILY PRN
Status: DISCONTINUED | OUTPATIENT
Start: 2023-07-29 | End: 2023-08-01 | Stop reason: HOSPADM

## 2023-07-29 RX ORDER — CALCITONIN SALMON 200 [IU]/.09ML
1 SPRAY, METERED NASAL DAILY
Status: DISCONTINUED | OUTPATIENT
Start: 2023-07-29 | End: 2023-08-01 | Stop reason: HOSPADM

## 2023-07-29 RX ORDER — LIDOCAINE 4 G/G
2 PATCH TOPICAL
Status: DISCONTINUED | OUTPATIENT
Start: 2023-07-30 | End: 2023-08-01 | Stop reason: HOSPADM

## 2023-07-29 RX ORDER — METOPROLOL SUCCINATE 50 MG/1
50 TABLET, EXTENDED RELEASE ORAL DAILY
Status: DISCONTINUED | OUTPATIENT
Start: 2023-07-30 | End: 2023-08-01 | Stop reason: HOSPADM

## 2023-07-29 RX ORDER — LATANOPROST 50 UG/ML
1 SOLUTION/ DROPS OPHTHALMIC AT BEDTIME
Status: DISCONTINUED | OUTPATIENT
Start: 2023-07-29 | End: 2023-08-01 | Stop reason: HOSPADM

## 2023-07-29 RX ORDER — HYDROMORPHONE HCL IN WATER/PF 6 MG/30 ML
0.2 PATIENT CONTROLLED ANALGESIA SYRINGE INTRAVENOUS
Status: DISCONTINUED | OUTPATIENT
Start: 2023-07-29 | End: 2023-08-01 | Stop reason: HOSPADM

## 2023-07-29 RX ORDER — LIDOCAINE 40 MG/G
CREAM TOPICAL
Status: DISCONTINUED | OUTPATIENT
Start: 2023-07-29 | End: 2023-08-01 | Stop reason: HOSPADM

## 2023-07-29 RX ADMIN — GADOBUTROL 8 ML: 604.72 INJECTION INTRAVENOUS at 20:38

## 2023-07-29 RX ADMIN — DEXAMETHASONE SODIUM PHOSPHATE 4 MG: 4 INJECTION, SOLUTION INTRA-ARTICULAR; INTRALESIONAL; INTRAMUSCULAR; INTRAVENOUS; SOFT TISSUE at 21:33

## 2023-07-29 RX ADMIN — LATANOPROST 1 DROP: 50 SOLUTION/ DROPS OPHTHALMIC at 23:09

## 2023-07-29 RX ADMIN — LIDOCAINE 1 PATCH: 560 PATCH PERCUTANEOUS; TOPICAL; TRANSDERMAL at 21:33

## 2023-07-29 RX ADMIN — PRAVASTATIN SODIUM 20 MG: 20 TABLET ORAL at 21:33

## 2023-07-29 RX ADMIN — CALCITONIN SALMON 1 SPRAY: 200 SPRAY, METERED NASAL at 23:11

## 2023-07-29 RX ADMIN — GABAPENTIN 900 MG: 300 CAPSULE ORAL at 21:33

## 2023-07-29 RX ADMIN — PRAMIPEXOLE DIHYDROCHLORIDE 0.12 MG: 0.12 TABLET ORAL at 23:07

## 2023-07-29 RX ADMIN — OXYCODONE HYDROCHLORIDE 10 MG: 5 TABLET ORAL at 14:37

## 2023-07-29 RX ADMIN — LORAZEPAM 0.5 MG: 0.5 TABLET ORAL at 19:47

## 2023-07-29 ASSESSMENT — ACTIVITIES OF DAILY LIVING (ADL)
ADLS_ACUITY_SCORE: 35

## 2023-07-29 NOTE — H&P
[facility]    History and Physical - Hospitalist Service       Date of Admission:  7/29/2023    Assessment & Plan      Zaira Fierro is a 88 year old female admitted on 7/29/2023.     Acute back pain, age indeterminant compression deformity of T9 and cortical irregularity of the anterior superior endplate of T10: Patient with x-ray of the back completed prior to admission showing age-indeterminate compression fracture deformity of T9 and cortical irregularity at the anterior superior endplate of T10, see report for further details.  Patient with intermittent pain in the past week, evaluated previously on July 25 with x-rays revealing compression fractures, patient was on steroid therapy, continues to worsen with pain and seeking further medical evaluation and treatment in the emergency department, patient with doses of Tylenol and gabapentin, patient with MRI scheduled, however continues with worsening pain, patient denies loss of bowel or bladder function, endorses back pain with radiation, denies headache, ear pain, eye pain, fever, chest pain, abdominal pain, dysuria, rash, fever.  Endorses difficulty ambulating due to pain.  Patient did have brief improvement with medications, however now presenting with worsening pain.  Plan:  -decadron.  -Pain control.  -Lidocaine patch.  -MRI back.  -Spine surgery consult.  -Vitamin D level.  -Close hemodynamic monitoring.  -Therapy consults.  -Supportive management.  -Prior to admission calcitonin when verified.    History of neuropathic pain: Patient previously on gabapentin.  -Continue prior to admission gabapentin when verified.    Cardiac, hypertension, hyperlipidemia: Patient previously on hydrochlorothiazide, losartan, metoprolol, pravastatin.  -Hold prior to admission hydrochlorothiazide at this time.  -Continue prior to admission losartan when verified.  -Continue prior to admission metoprolol when verified.  -Continue prior to admission  pravastatin when verified.    History of diabetes: Patient previously on metformin.  -Hold prior to admission metformin.  -Insulin sliding scale.    History of restless leg syndrome.  Previously on pramipexole.  -Continue prior to admission pramipexole when verified.         DVT Prophylaxis: Pneumatic Compression Devices    Code Status:  full.     Clinically Significant Risk Factors Present on Admission                  # Hypertension: Noted on problem list     # DMII: A1C = N/A within past 6 months             Disposition Plan      Expected Discharge Date: 07/30/2023                  Jonathan Blanco DO  Hospitalist Service  [facility]  Securely message with [employer] (more info)  Text page via [facility] Paging/Directory     ______________________________________________________________________    Chief Complaint   Back pain        History of Present Illness   Zaira Fierro is a 88 year old female admitted on 7/29/2023.     Patient with x-ray of the back completed prior to admission showing age-indeterminate compression fracture deformity of T9 and cortical irregularity at the anterior superior endplate of T10, see report for further details.  Patient with intermittent pain in the past week, evaluated previously on July 25 with x-rays revealing compression fractures, patient was on steroid therapy, continues to worsen with pain and seeking further medical evaluation and treatment in the emergency department, patient with doses of Tylenol and gabapentin, patient with MRI scheduled, however continues with worsening pain, patient denies loss of bowel or bladder function, endorses back pain with radiation, denies headache, ear pain, eye pain, fever, chest pain, abdominal pain, dysuria, rash, fever.  Endorses difficulty ambulating due to pain.  Patient did have brief improvement with medications, however now presenting with worsening pain.      Past Medical History    Past Medical History:    Diagnosis Date    Glaucoma     Hearing loss     doesn't wear hearing aids    Hyperlipidaemia     Hypertension     Macular degeneration     Multiple joint pain     secondary to arthritis    Obesity     Osteoarthritis involving multiple joints on both sides of body     Restless leg syndrome        Past Surgical History   Past Surgical History:   Procedure Laterality Date    ARTHROPLASTY KNEE Right 4/7/2017    Procedure: ARTHROPLASTY KNEE;  Surgeon: Aamir Sutton MD;  Location: [facility] OR    TONSILLECTOMY  1940       Prior to Admission Medications   Prior to Admission Medications   Prescriptions Last Dose Informant Patient Reported? Taking?   LORazepam (ATIVAN) 1 MG tablet   No No   Sig: Take 1 tablet (1 mg) by mouth every 6 hours as needed for anxiety (20 min prior to MRI)   Multiple Vitamins-Minerals (EYE VITAMINS PO)   Yes No   acetaminophen (TYLENOL) 325 MG tablet   No No   Sig: Take 2 tablets (650 mg) by mouth 4 times daily   blood glucose (NO BRAND SPECIFIED) lancets standard   No No   Sig: Use to test blood sugar 2 times daily or as directed.   blood glucose (NO BRAND SPECIFIED) test strip   No No   Sig: Use to test blood sugar 2 times daily or as directed.   blood glucose monitoring (NO BRAND SPECIFIED) meter device kit   No No   Sig: Use to test blood sugar 2 times daily or as directed.   calcitonin, salmon, (MIACALCIN) 200 UNIT/ACT nasal spray   No No   Sig: Spray 1 spray into one nostril alternating nostrils daily Alternate nostril each day.   gabapentin (NEURONTIN) 300 MG capsule   Yes No   Sig: TAKE TWO CAPSULE BY MOUTH TWICE DAILY AND TAKE 3 CAPSULES EVERY NIGHT AT BEDTIME   hydrochlorothiazide (HYDRODIURIL) 25 MG tablet   No No   Sig: TAKE 1 TABLET(25 MG) BY MOUTH EVERY MORNING   latanoprost (XALATAN) 0.005 % ophthalmic solution  Self Yes No   Sig: Place 1 drop into both eyes At Bedtime Reported on 5/1/2017   losartan (COZAAR) 100 MG tablet   No No   Sig: Take 1 tablet (100 mg) by mouth every morning    metFORMIN (GLUCOPHAGE) 1000 MG tablet   No No   Sig: Take 1 tablet (1,000 mg) by mouth 2 times daily (with meals)   methylPREDNISolone (MEDROL DOSEPAK) 4 MG tablet therapy pack   No No   Sig: Follow Package Directions   Patient not taking: Reported on 7/25/2023   metoprolol succinate ER (TOPROL XL) 50 MG 24 hr tablet   No No   Sig: TAKE 1 TABLET(50 MG) BY MOUTH DAILY   pramipexole (MIRAPEX) 0.125 MG tablet   No No   Sig: TAKE 1 TABLET BY MOUTH DAILY AT 4:00PM AND 1 TABLET AT BEDTIME   pravastatin (PRAVACHOL) 20 MG tablet   No No   Sig: Take 1 tablet (20 mg) by mouth every evening   traMADol (ULTRAM) 50 MG tablet   No No   Sig: Take 1 tablet (50 mg) by mouth every 6 hours as needed for severe pain      Facility-Administered Medications: None           Physical Exam   Vital Signs:             SpO2: 96 %      Weight: 0 lbs 0 oz    GENERAL: Alert. Conversational, appropriate.   HEENT: Normocephalic. EOMI. No icterus or injection. Nares normal.   LUNGS: Clear to auscultation. No dyspnea at rest.   HEART: Regular rate. Extremities perfused.   ABDOMEN: Soft, nontender, and nondistended. Positive bowel sounds.   BACK/EXTREMITIES: low back pain with tenderness, distal cms in tact.   NEUROLOGIC: Moves extremities x4 spontaneously, follows commands.         Medical Decision Making       59 MINUTES SPENT BY ME on the date of service doing chart review, history, exam, documentation & further activities per the note.      Data   Imaging results reviewed over the past 24 hrs:   No results found for this or any previous visit (from the past 24 hour(s)).

## 2023-07-29 NOTE — ED NOTES
Bed: ED10  Expected date: 7/29/23  Expected time: 12:43 PM  Means of arrival: Ambulance  Comments:  432 [id number] back pain ETA 1247

## 2023-07-29 NOTE — ED NOTES
Pt was able to ambulate in and out of bed without assistance and walk down the hallway with a walker.

## 2023-07-29 NOTE — PHARMACY-ADMISSION MEDICATION HISTORY
Pharmacist Admission Medication History    Admission medication history is complete. The information provided in this note is only as accurate as the sources available at the time of the update.    Medication reconciliation/reorder completed by provider prior to medication history? No    Information Source(s): Patient and CareEverywhere/SureScripts via in-person    Pertinent Information: Patient confirmed she completed her course of Medrol and is out of tramadol. She also confirmed she is not taking nortriptyline (last fill 3/13)    Changes made to PTA medication list:  Added: None  Deleted: tramadol, methylprednisolone  Changed: directions added to eye vitamin    Medication Affordability:  Not including over the counter (OTC) medications, was there a time in the past 3 months when you did not take your medications as prescribed because of cost?: No    Allergies reviewed with patient and updates made in EHR: yes    Medication History Completed By: Yael Lugo RPH 7/29/2023 4:55 PM    Prior to Admission medications    Medication Sig Last Dose Taking? Auth Provider Long Term End Date   acetaminophen (TYLENOL) 325 MG tablet Take 2 tablets (650 mg) by mouth 4 times daily 7/29/2023 at AM Yes Ange Crespo MD Yes    calcitonin, salmon, (MIACALCIN) 200 UNIT/ACT nasal spray Spray 1 spray into one nostril alternating nostrils daily Alternate nostril each day. 7/28/2023 Yes Oanh Joshua MD Yes    gabapentin (NEURONTIN) 300 MG capsule TAKE TWO CAPSULE BY MOUTH TWICE DAILY AND TAKE 3 CAPSULES EVERY NIGHT AT BEDTIME 7/29/2023 at AM Yes Ange Crespo MD Yes    hydrochlorothiazide (HYDRODIURIL) 25 MG tablet TAKE 1 TABLET(25 MG) BY MOUTH EVERY MORNING 7/29/2023 at AM Yes Ange Crespo MD Yes    latanoprost (XALATAN) 0.005 % ophthalmic solution Place 1 drop into both eyes At Bedtime Reported on 5/1/2017 7/28/2023 at HS Yes Reported, Patient Yes    losartan (COZAAR) 100 MG tablet Take 1 tablet (100 mg) by mouth  every morning 7/29/2023 at AM Yes Ange Crespo MD Yes    metFORMIN (GLUCOPHAGE) 1000 MG tablet Take 1 tablet (1,000 mg) by mouth 2 times daily (with meals) 7/29/2023 at AM Yes Ange Crespo MD Yes    metoprolol succinate ER (TOPROL XL) 50 MG 24 hr tablet TAKE 1 TABLET(50 MG) BY MOUTH DAILY 7/29/2023 at AM Yes Ange Crespo MD Yes    Multiple Vitamins-Minerals (EYE VITAMINS PO) Take 1 tablet by mouth daily 7/29/2023 at AM Yes Reported, Patient No    pramipexole (MIRAPEX) 0.125 MG tablet TAKE 1 TABLET BY MOUTH DAILY AT 4:00PM AND 1 TABLET AT BEDTIME 7/28/2023 at HS Yes Ange Crespo MD Yes    pravastatin (PRAVACHOL) 20 MG tablet Take 1 tablet (20 mg) by mouth every evening 7/28/2023 at HS Yes Ange Crespo MD Yes    blood glucose (NO BRAND SPECIFIED) lancets standard Use to test blood sugar 2 times daily or as directed.   Ange Crespo MD     blood glucose (NO BRAND SPECIFIED) test strip Use to test blood sugar 2 times daily or as directed.   Ange Crespo MD     blood glucose monitoring (NO BRAND SPECIFIED) meter device kit Use to test blood sugar 2 times daily or as directed.   Ange Crespo MD     LORazepam (ATIVAN) 1 MG tablet Take 1 tablet (1 mg) by mouth every 6 hours as needed for anxiety (20 min prior to MRI)  at PRN - not taken yet  Oanh Joshua MD

## 2023-07-29 NOTE — ED NOTES
[facility]  ED Nurse Handoff Report    ED Chief complaint: Back Pain      ED Diagnosis:   Final diagnoses:   Acute left-sided low back pain with left-sided sciatica   Compression fracture of T9 vertebra, initial encounter (H)   Compression fracture of T10 vertebra, initial encounter (H)       Code Status: Full Code    Allergies:   Allergies   Allergen Reactions    No Known Allergies        Patient Story: the patient reports that she was sitting in a hard simental when she developed back pain. She was evaluated on 7/25 and received x-rays that revealed compression fractures. She was prescribed a 6 day course of Prednisone. The patient notes that she 2 days after taking this medication, her back pain improved and then began to worsen. She also had tried increasing her dose of Tylenol and Gabapentin which has not improved her symptoms. The patient had an MRI scheduled 8/4. The patient notes that her pain has worsened causing her to present to the ED. She states that her pain radiates down her left leg. She has a history of arthritis and notes that this pain is different than what she experiences with arthritis. She last took Tylenol at 1100. She notes that she lives in an apartment with her Hol[employer] sister (henrique). The patient denies paresthesias, incontinence of bowel or bladder, numbness, fever, dysuria, or hematuria.    Focused Assessment:  back pain     Treatments and/or interventions provided: iv, labs and meds   Patient's response to treatments and/or interventions:  tolerated     To be done/followed up on inpatient unit:  na    Does this patient have any cognitive concerns?:  none     Activity level - Baseline/Home:  Unknown  Activity Level - Current:   Unknown    Patient's Preferred language: English   Needed?: No    Isolation: None  Infection: Not Applicable  Patient tested for COVID 19 prior to admission: NO  Bariatric?: No    Vital Signs:   Vitals:    07/29/23 1621   SpO2: 96%        Cardiac Rhythm:     Was the PSS-3 completed:   Yes  What interventions are required if any?               Family Comments:   OBS brochure/video discussed/provided to patient/family: Yes              Name of person given brochure if not patient:               Relationship to patient:     For the majority of the shift this patient's behavior was Green.   Behavioral interventions performed were .    ED NURSE PHONE NUMBER: [phone]

## 2023-07-29 NOTE — ED PROVIDER NOTES
History     Chief Complaint:  Back pain      The history is provided by the patient.      Zaira Fierro is an 88 year old female who presents with left sided back pain that began about one month ago. The patient reports that she was sitting in a hard simental when she developed back pain. She was evaluated on 7/25 in her clinic and received x-rays that revealed compression fractures at T9 and T10. She was prescribed a 6 day course of Prednisone. The patient notes that 2 days after taking this medication, her back pain improved and then began to worsen again. She also had tried increasing her dose of Tylenol and Gabapentin which have not improved her symptoms. The patient has an MRI scheduled for 8/4. The patient notes that her pain has worsened, causing her to present to the ED. She states that her pain radiates down her left leg. She has a history of arthritis and notes that this pain is different than what she experiences with arthritis. She last took Tylenol at 1100. She notes that she lives in an apartment with her Oriental orthodox sister (henrique). The patient denies paresthesias, incontinence of bowel or bladder, weakness, numbness, fever, dysuria, or hematuria.     Independent Historian:    No independent historian     Review of External Notes:  I reviewed the Family Practice note from 7/25. The patient received X-ray imaging that revealed a T9 and T10 compression fracture. The patient had an MRI scheduled and was prescribed a Medrol Dosepak.       Medications:    Gabapentin  Hydrochlorothiazide   Ativan   Cozaar   Metformin   Toprol   Mirapex   Pravachol    Past Medical History:    Glaucoma   Hyperlipidemia   Hypertension   Macular degeneration   Osteoarthritis   RLS  Diverticulosis   Cataract   Diabetes Mellitus type II      Past Surgical History:    Arthroplasty   Tonsillectomy     Physical Exam   Patient Vitals for the past 24 hrs:   SpO2   07/29/23 1621 96 %        Physical Exam  Nursing note and vitals  reviewed.  Constitutional:  Oriented to person, place, and time. Cooperative.   HENT:   Nose:    Nose normal.   Mouth/Throat:   Mucous membranes are normal.   Eyes:    Conjunctivae normal and EOM are normal.      Pupils are equal, round, and reactive to light.   Neck:    Trachea normal.   Cardiovascular:  Normal rate, regular rhythm, normal heart sounds and normal pulses. No murmur heard.  Pulmonary/Chest:  Effort normal and breath sounds normal.   Abdominal:   Soft. Normal appearance and bowel sounds are normal.      There is no tenderness.      There is no rebound and no CVA tenderness.   Musculoskeletal:  Tenderness to palpation to the left low back region.  Extremities atraumatic x 4.   Lymphadenopathy:  No cervical adenopathy.   Neurological:   Alert and oriented to person, place, and time. Normal strength.      No cranial nerve deficit or sensory deficit. GCS eye subscore is 4. GCS verbal subscore is 5. GCS motor subscore is 6. 5/5 strength in all muscle groups of the lower extremities.  Sensation intact to light touch distally. DTRs equal and symmetric in the lower extremities. Straight leg raises negative bilaterally.  Skin:    Skin is intact. No rash noted.   Psychiatric:   Normal mood and affect.      Emergency Department Course     Laboratory:  Labs Ordered and Resulted from Time of ED Arrival to Time of ED Departure   CBC WITH PLATELETS AND DIFFERENTIAL - Abnormal       Result Value    WBC Count 11.0      RBC Count 4.12      Hemoglobin 13.8      Hematocrit 40.6      MCV 99      MCH 33.5 (*)     MCHC 34.0      RDW 13.2      Platelet Count 253      % Neutrophils 61      % Lymphocytes 27      % Monocytes 10      % Eosinophils 1      % Basophils 0      % Immature Granulocytes 1      NRBCs per 100 WBC 0      Absolute Neutrophils 6.9      Absolute Lymphocytes 3.0      Absolute Monocytes 1.1      Absolute Eosinophils 0.1      Absolute Basophils 0.0      Absolute Immature Granulocytes 0.1      Absolute NRBCs 0.0      ROUTINE UA WITH MICROSCOPIC REFLEX TO CULTURE        Emergency Department Course & Assessments:       Interventions:  Medications   Lidocaine (LIDOCARE) 4 % Patch 1 patch (has no administration in time range)   lidocaine 1 % 0.1-1 mL (has no administration in time range)   lidocaine (LMX4) cream (has no administration in time range)   sodium chloride (PF) 0.9% PF flush 3 mL (has no administration in time range)   sodium chloride (PF) 0.9% PF flush 3 mL (has no administration in time range)   melatonin tablet 1 mg (has no administration in time range)   senna-docusate (SENOKOT-S/PERICOLACE) 8.6-50 MG per tablet 1 tablet (has no administration in time range)     Or   senna-docusate (SENOKOT-S/PERICOLACE) 8.6-50 MG per tablet 2 tablet (has no administration in time range)   polyethylene glycol (MIRALAX) Packet 17 g (has no administration in time range)   bisacodyl (DULCOLAX) suppository 10 mg (has no administration in time range)   ondansetron (ZOFRAN ODT) ODT tab 4 mg (has no administration in time range)     Or   ondansetron (ZOFRAN) injection 4 mg (has no administration in time range)   acetaminophen (TYLENOL) tablet 650 mg (has no administration in time range)     Or   acetaminophen (TYLENOL) Suppository 650 mg (has no administration in time range)   oxyCODONE IR (ROXICODONE) half-tab 2.5 mg (has no administration in time range)   HYDROmorphone (DILAUDID) injection 0.2 mg (has no administration in time range)   Lidocaine (LIDOCARE) 4 % Patch 2 patch (has no administration in time range)   dexamethasone (DECADRON) injection 4 mg (has no administration in time range)   glucose gel 15-30 g (has no administration in time range)     Or   dextrose 50 % injection 25-50 mL (has no administration in time range)     Or   glucagon injection 1 mg (has no administration in time range)   insulin aspart (NovoLOG) injection (RAPID ACTING) ( Subcutaneous Not Given 7/29/23 1907)   insulin aspart (NovoLOG) injection (RAPID  ACTING) (has no administration in time range)   LORazepam (ATIVAN) tablet 0.5 mg (has no administration in time range)   calcitonin (salmon) (MIACALCIN) nasal spray 1 spray (has no administration in time range)   gabapentin (NEURONTIN) capsule 900 mg (has no administration in time range)   latanoprost (XALATAN) 0.005 % ophthalmic solution 1 drop (has no administration in time range)   losartan (COZAAR) tablet 100 mg (has no administration in time range)   metoprolol succinate ER (TOPROL XL) 24 hr tablet 50 mg (has no administration in time range)   pramipexole (MIRAPEX) tablet 0.125 mg (has no administration in time range)   pravastatin (PRAVACHOL) tablet 20 mg (has no administration in time range)   naloxone (NARCAN) injection 0.2 mg (has no administration in time range)     Or   naloxone (NARCAN) injection 0.4 mg (has no administration in time range)     Or   naloxone (NARCAN) injection 0.2 mg (has no administration in time range)     Or   naloxone (NARCAN) injection 0.4 mg (has no administration in time range)   gabapentin (NEURONTIN) capsule 600 mg (has no administration in time range)   oxyCODONE (ROXICODONE) tablet 10 mg (10 mg Oral $Given 7/29/23 1437)        Assessments:  1358 I obtained history and examined the patient as noted above.   1637 I rechecked the patient and explained findings. We discussed plan for admission and patient is in agreement with plan.     Independent Interpretation (X-rays, CTs, rhythm strip):  None    Consultations/Discussion of Management or Tests:  1644 I spoke with Dr. Blanco of the hospitalist team regarding the patient, who accepted the patient for admission.        Social Determinants of Health affecting care:  None     Disposition:  The patient was admitted to the hospital under the care of Dr. Blanco.     Impression & Plan    CMS Diagnoses: None    Medical Decision Making:  This is an 88-year-old female who came in for further evaluation of worsening low back pain.  I  suspect this is from her T9 and T10 compression fractures along with sciatica.  She does not have any worrisome symptoms or exam findings for cauda equina syndrome, and therefore do not feel that she requires emergent imaging with CT or MRI.  She also has not had any new trauma and therefore I do not feel that she requires additional plain x-rays.  We provided her 2 oxycodone here, which did help a little bit, but she is unable to ambulate due to the pain even with the oxycodone.  She does not feel comfortable going home, and therefore we will bring her into the hospital for further evaluation and management and likely TCU placement.  I ordered some basic labs including CBC, basic metabolic panel, and UA, and I also ordered a lidocaine patch which she apparently did not get placed on her.  I subsequently spoke with Dr. Blanco, who will be taking care of her.      Diagnosis:    ICD-10-CM    1. Acute left-sided low back pain with left-sided sciatica  M54.42       2. Compression fracture of T9 vertebra, initial encounter (H)  S22.070A       3. Compression fracture of T10 vertebra, initial encounter (H)  S22.070A          Scribe Disclosure:  I, Hilda Muro, am serving as a scribe at 2:58 PM on 7/29/2023 to document services personally performed by Tommie Sal MD based on my observations and the provider's statements to me.    7/29/2023   Tommie Sal MD Lashkowitz, Seth H, MD  07/29/23 1923

## 2023-07-30 LAB
ALBUMIN UR-MCNC: NEGATIVE MG/DL
ANION GAP SERPL CALCULATED.3IONS-SCNC: 11 MMOL/L (ref 7–15)
APPEARANCE UR: CLEAR
BILIRUB UR QL STRIP: NEGATIVE
BUN SERPL-MCNC: 15.9 MG/DL (ref 8–23)
CALCIUM SERPL-MCNC: 9.5 MG/DL (ref 8.8–10.2)
CHLORIDE SERPL-SCNC: 102 MMOL/L (ref 98–107)
COLOR UR AUTO: ABNORMAL
CREAT SERPL-MCNC: 0.53 MG/DL (ref 0.51–0.95)
DEPRECATED HCO3 PLAS-SCNC: 24 MMOL/L (ref 22–29)
ERYTHROCYTE [DISTWIDTH] IN BLOOD BY AUTOMATED COUNT: 13.1 % (ref 10–15)
GFR SERPL CREATININE-BSD FRML MDRD: 89 ML/MIN/1.73M2
GLUCOSE BLDC GLUCOMTR-MCNC: 146 MG/DL (ref 70–99)
GLUCOSE BLDC GLUCOMTR-MCNC: 159 MG/DL (ref 70–99)
GLUCOSE BLDC GLUCOMTR-MCNC: 184 MG/DL (ref 70–99)
GLUCOSE BLDC GLUCOMTR-MCNC: 231 MG/DL (ref 70–99)
GLUCOSE SERPL-MCNC: 160 MG/DL (ref 70–99)
GLUCOSE UR STRIP-MCNC: NEGATIVE MG/DL
HBA1C MFR BLD: 6.1 %
HCT VFR BLD AUTO: 39.7 % (ref 35–47)
HGB BLD-MCNC: 13.5 G/DL (ref 11.7–15.7)
HGB UR QL STRIP: NEGATIVE
KETONES UR STRIP-MCNC: NEGATIVE MG/DL
LEUKOCYTE ESTERASE UR QL STRIP: NEGATIVE
MCH RBC QN AUTO: 33.3 PG (ref 26.5–33)
MCHC RBC AUTO-ENTMCNC: 34 G/DL (ref 31.5–36.5)
MCV RBC AUTO: 98 FL (ref 78–100)
MUCOUS THREADS #/AREA URNS LPF: PRESENT /LPF
NITRATE UR QL: NEGATIVE
PH UR STRIP: 6 [PH] (ref 5–7)
PLATELET # BLD AUTO: 252 10E3/UL (ref 150–450)
POTASSIUM SERPL-SCNC: 4.3 MMOL/L (ref 3.4–5.3)
RBC # BLD AUTO: 4.06 10E6/UL (ref 3.8–5.2)
RBC URINE: <1 /HPF
SODIUM SERPL-SCNC: 137 MMOL/L (ref 136–145)
SP GR UR STRIP: 1.02 (ref 1–1.03)
SQUAMOUS EPITHELIAL: <1 /HPF
UROBILINOGEN UR STRIP-MCNC: NORMAL MG/DL
WBC # BLD AUTO: 6.4 10E3/UL (ref 4–11)
WBC URINE: 1 /HPF

## 2023-07-30 PROCEDURE — 81001 URINALYSIS AUTO W/SCOPE: CPT | Performed by: EMERGENCY MEDICINE

## 2023-07-30 PROCEDURE — G0378 HOSPITAL OBSERVATION PER HR: HCPCS

## 2023-07-30 PROCEDURE — 250N000012 HC RX MED GY IP 250 OP 636 PS 637: Performed by: HOSPITALIST

## 2023-07-30 PROCEDURE — 99232 SBSQ HOSP IP/OBS MODERATE 35: CPT | Performed by: STUDENT IN AN ORGANIZED HEALTH CARE EDUCATION/TRAINING PROGRAM

## 2023-07-30 PROCEDURE — 250N000013 HC RX MED GY IP 250 OP 250 PS 637: Performed by: HOSPITALIST

## 2023-07-30 PROCEDURE — 36415 COLL VENOUS BLD VENIPUNCTURE: CPT | Performed by: HOSPITALIST

## 2023-07-30 PROCEDURE — 83036 HEMOGLOBIN GLYCOSYLATED A1C: CPT | Performed by: HOSPITALIST

## 2023-07-30 PROCEDURE — 85027 COMPLETE CBC AUTOMATED: CPT | Performed by: HOSPITALIST

## 2023-07-30 PROCEDURE — 250N000013 HC RX MED GY IP 250 OP 250 PS 637: Performed by: STUDENT IN AN ORGANIZED HEALTH CARE EDUCATION/TRAINING PROGRAM

## 2023-07-30 PROCEDURE — 250N000011 HC RX IP 250 OP 636: Mod: JZ | Performed by: HOSPITALIST

## 2023-07-30 PROCEDURE — 82306 VITAMIN D 25 HYDROXY: CPT | Performed by: HOSPITALIST

## 2023-07-30 PROCEDURE — 96376 TX/PRO/DX INJ SAME DRUG ADON: CPT

## 2023-07-30 PROCEDURE — 80048 BASIC METABOLIC PNL TOTAL CA: CPT | Performed by: HOSPITALIST

## 2023-07-30 PROCEDURE — 99204 OFFICE O/P NEW MOD 45 MIN: CPT | Performed by: PHYSICIAN ASSISTANT

## 2023-07-30 PROCEDURE — 82962 GLUCOSE BLOOD TEST: CPT

## 2023-07-30 RX ADMIN — OXYCODONE HYDROCHLORIDE 2.5 MG: 5 TABLET ORAL at 04:03

## 2023-07-30 RX ADMIN — METOPROLOL SUCCINATE 50 MG: 50 TABLET, EXTENDED RELEASE ORAL at 09:41

## 2023-07-30 RX ADMIN — PRAMIPEXOLE DIHYDROCHLORIDE 0.12 MG: 0.12 TABLET ORAL at 17:52

## 2023-07-30 RX ADMIN — GABAPENTIN 900 MG: 300 CAPSULE ORAL at 22:04

## 2023-07-30 RX ADMIN — OXYCODONE HYDROCHLORIDE 2.5 MG: 5 TABLET ORAL at 09:41

## 2023-07-30 RX ADMIN — Medication 1 MG: at 00:09

## 2023-07-30 RX ADMIN — GABAPENTIN 600 MG: 300 CAPSULE ORAL at 09:41

## 2023-07-30 RX ADMIN — GABAPENTIN 600 MG: 300 CAPSULE ORAL at 14:37

## 2023-07-30 RX ADMIN — LOSARTAN POTASSIUM 100 MG: 100 TABLET, FILM COATED ORAL at 09:41

## 2023-07-30 RX ADMIN — INSULIN ASPART 1 UNITS: 100 INJECTION, SOLUTION INTRAVENOUS; SUBCUTANEOUS at 17:51

## 2023-07-30 RX ADMIN — ACETAMINOPHEN 650 MG: 325 TABLET, FILM COATED ORAL at 09:42

## 2023-07-30 RX ADMIN — PRAMIPEXOLE DIHYDROCHLORIDE 0.12 MG: 0.12 TABLET ORAL at 20:09

## 2023-07-30 RX ADMIN — DEXAMETHASONE SODIUM PHOSPHATE 4 MG: 4 INJECTION, SOLUTION INTRA-ARTICULAR; INTRALESIONAL; INTRAMUSCULAR; INTRAVENOUS; SOFT TISSUE at 09:41

## 2023-07-30 RX ADMIN — PRAVASTATIN SODIUM 20 MG: 20 TABLET ORAL at 20:09

## 2023-07-30 RX ADMIN — LATANOPROST 1 DROP: 50 SOLUTION/ DROPS OPHTHALMIC at 22:05

## 2023-07-30 RX ADMIN — INSULIN ASPART 1 UNITS: 100 INJECTION, SOLUTION INTRAVENOUS; SUBCUTANEOUS at 12:34

## 2023-07-30 RX ADMIN — OXYCODONE HYDROCHLORIDE 5 MG: 5 TABLET ORAL at 14:37

## 2023-07-30 ASSESSMENT — ACTIVITIES OF DAILY LIVING (ADL)
ADLS_ACUITY_SCORE: 37
ADLS_ACUITY_SCORE: 37
ADLS_ACUITY_SCORE: 35
ADLS_ACUITY_SCORE: 37
ADLS_ACUITY_SCORE: 35
ADLS_ACUITY_SCORE: 37

## 2023-07-30 NOTE — PLAN OF CARE
Observation goals  PRIOR TO DISCHARGE        Comments: -diagnostic tests and consults completed and resulted: not met   -vital signs normal or at patient baseline: met  -tolerating oral intake to maintain hydration: met  -adequate pain control on oral analgesics: not met  -returns to baseline functional status: not met  -safe disposition plan has been identified: not met  Nurse to notify provider when observation goals have been met and patient is ready for discharge.

## 2023-07-30 NOTE — CONSULTS
NEUROSURGERY CONSULT    Neurosurgery was asked by Dr. Blanco to consult for lumbar spine pain.      PLAN:    Ms. Fierro's most recent imaging exhibits a multilevel disc degeneration and facet hypertrophy. This is greatest at T10-T11 where there is moderate spinal canal stenosis with cord signal abnormality representing edema versus myelomalacia. There are no  compression fractures. She also has multilevel disc degeneration and facet hypertrophy of lumbar spine. This is greatest at L3-L4 where there is mild to moderate spinal canal stenosis. Although she has severe left neural foraminal stenosis at L5-S1 with compression of the exiting left L5 nerve root and facet hypertrophy at L3-L4 with associated facet effusions and reactive adjacent paraspinal soft tissue edema her pain does not correlate with these findings.    She is tender to palpation over the left SI joint that intermittently radiates laterally to her knee.     Based on her physical exam, imaging review, and past treatments, we feel that it would be in her best interest to try a conservative approach by participating in a physical therapy. We also discussed obtaining a steroid injection at the left SI level or as indicted and determined by our colleagues on the Interventional radiology team. She would like to proceed with these options.     We did review the images together and we explained her condition and the recommended treatment. We also discussed signs of a worsening problem that she should seek being evaluated.     Please page our on-call service for any questions or concerns.     It has been a pleasure meeting Zaira Fierro. Thank you for having us be involved in her care.    ______________________________________________________________________    HPI:    Zaira Fierro is a pleasant 88 year old female who presented to the [facility] Emergency Department for consultation on left sided lumbar spine pain with left leg radiculopathy.  She describes the symptoms as a daily with fluctuating intensity, dull, aching pain that initiates in the left sacroiliac region and radiates distally, laterally to the side of her knee. Nothing anteriorly or more distally. This pain is not accompanied with paresthesia, numbness or weakness. The symptoms have been present since  as a result of sitting in a hard simental. Prolonged walking, standing, and sitting seems to  aggravate the symptoms, while alleviation is obtained by lying down. She has participated in multiple conservative therapies to include six days of prednisone, tylenol and gabapentin. None of which have provided any significant long term relief. There are  no bowel or bladder changes. No other concerns are voiced.      Past Medical History:   Diagnosis Date    Glaucoma     Hearing loss     doesn't wear hearing aids    Hyperlipidaemia     Hypertension     Macular degeneration     Multiple joint pain     secondary to arthritis    Obesity     Osteoarthritis involving multiple joints on both sides of body     Restless leg syndrome        Past Surgical History:   Procedure Laterality Date    ARTHROPLASTY KNEE Right 2017    Procedure: ARTHROPLASTY KNEE;  Surgeon: Aamir Sutton MD;  Location: [facility] OR    TONSILLECTOMY         Allergies   Allergen Reactions    No Known Allergies        Social History     Tobacco Use    Smoking status: Never     Passive exposure: Never    Smokeless tobacco: Never   Substance Use Topics    Alcohol use: No       Family History   Problem Relation Age of Onset    Coronary Artery Disease Father         Age 62 when     Cardiovascular Father     Myocardial Infarction Father     Diabetes Mother         Occurred when older    Hypertension Mother     Arthritis Mother     Eye Disorder Mother     Arthritis Sister     Asthma Sister     Hypertension Sister     Osteoporosis Sister     Hypertension Sister     Hyperlipidemia Sister     Obesity Sister     Spine Problems  Sister         Scoliosis    Kidney Disease Brother        Scheduled Medications     calcitonin (salmon)  1 spray Alternating Nostrils Daily    dexamethasone  4 mg Intravenous Daily    gabapentin  600 mg Oral BID    gabapentin  900 mg Oral At Bedtime    insulin aspart  1-3 Units Subcutaneous TID AC    insulin aspart  1-3 Units Subcutaneous At Bedtime    latanoprost  1 drop Both Eyes At Bedtime    lidocaine  1 patch Transdermal Q24h    lidocaine  2 patch Transdermal Q24H    losartan  100 mg Oral QAM    metoprolol succinate ER  50 mg Oral Daily    pramipexole  0.125 mg Oral BID    pravastatin  20 mg Oral QPM    sodium chloride (PF)  3 mL Intracatheter Q8H       Home Medications  Gabapentin  Hydrochlorothiazide   Ativan   Cozaar   Metformin   Toprol   Mirapex   Pravachol    PRN Medications    acetaminophen **OR** acetaminophen, bisacodyl, glucose **OR** dextrose **OR** glucagon, HYDROmorphone, lidocaine 4%, lidocaine (buffered or not buffered), melatonin, naloxone **OR** naloxone **OR** naloxone **OR** naloxone, ondansetron **OR** ondansetron, oxyCODONE IR, polyethylene glycol, senna-docusate **OR** senna-docusate, sodium chloride (PF)    ROS: 10 point ROS neg other than the symptoms noted above in the HPI.    Vitals:    /61 (BP Location: Right arm)   Pulse 66   Temp 97.4  F (36.3  C) (Oral)   Resp 16   Wt 76.9 kg (169 lb 8.5 oz)   SpO2 92%   BMI 31.01 kg/m    Body mass index is 31.01 kg/m .  No intake/output data recorded.    Exam:    Patient appears comfortable, conversational, and in no apparent distress.   Head: Normocephalic, without obvious abnormality, atraumatic, no facial asymmetry.   Eyes: conjunctivae/corneas clear. PERRL, EOM's intact.   Throat: lips, mucosa, and tongue normal; teeth and gums normal.   Neck: supple, symmetrical, trachea midline, no adenopathy and thyroid: not enlarged, symmetric, no tenderness/mass/nodules.   Lungs: clear to auscultation bilaterally.   Heart: regular rate and  rhythm.   Abdomen: soft, non-tender; bowel sounds normal; no masses, no organomegaly.   Pulses: 2+ and symmetric.   Skin: Skin color, texture, turgor normal. No rashes or lesions.     CN II-XII grossly intact, alert and appropriate with conversation and following commands.   Cervical spine is non tender to palpation. Appropriate range of motion of neck, not concerning for lhermitte's phenomenon.   Bilateral bicep 2/4 and tricep reflexes 1/4. Sensation intact throughout upper extremities.     UE muscle strength  Right  Left    Deltoid  5/5  5/5    Biceps  5/5  5/5    Triceps  5/5  5/5    Hand intrinsics  5/5  5/5    Hand grasp  5/5  5/5    Koroma signs  neg  neg      Lumbar spine is non tender to palpation.  Left SI joint is TTP.  Intact sensation throughout lower extremities.   Bilateral patellar 2/4 and achilles reflex 1/4. Negative for pain with straight leg raise.     LE muscle strength  Right  Left    Iliopsoas (hip flexion)  5/5  5/5    Quad (knee extension)  5/5  5/5    Hamstring (knee flexion)  5/5  5/5    Gastrocnemius (PF)  5/5  5/5    Tibialis Ant. (DF)  5/5  5/5    EHL  5/5  5/5      Negative Babinski bilaterally. Negative for clonus.   Calves are soft and non-tender bilaterally.     Imaging:  Impression per radiology read - I have personally reviewed the images with the patient.    3 views lumbar spine radiographs 7/25/2023 2:46 PM     1. Age-indeterminant compression deformity of T9 and cortical  irregularity at the anterior superior endplate of T10. Correlate with  exam/focal pain.  2.  Multilevel degenerative changes most pronounced at L5-S1.     MR THORACIC SPINE W/O and W CONTRAST, MR LUMBAR SPINE W/O and W CONTRAST - 7/29/2023    THORACIC SPINE MRI:  1.  Multilevel disc degeneration and facet hypertrophy as described above. This is greatest at T10-T11 where there is moderate spinal canal stenosis with cord signal abnormality representing edema versus myelomalacia.  2.  Negative for acute  compression fracture.     LUMBAR SPINE MRI:  1.  Multilevel disc degeneration and facet hypertrophy of lumbar spine as described above. This is greatest at L3-L4 where there is mild to moderate spinal canal stenosis.  2.  Severe left neural foraminal stenosis at L5-S1 with compression of the exiting left L5 nerve root.  3.  Facet hypertrophy at L3-L4 with associated facet effusions and reactive adjacent paraspinal soft tissue edema.    Available labs at time of consult:       Recent Labs   Lab 07/30/23  0703 07/29/23  1705   WBC 6.4 11.0   HGB 13.5 13.8   HCT 39.7 40.6   MCV 98 99    253     Recent Labs   Lab 07/30/23  0703 07/29/23  1705   WBC 6.4 11.0   HGB 13.5 13.8   HCT 39.7 40.6   MCV 98 99    253     No results for input(s): SED, CRP in the last 168 hours.  Recent Labs   Lab 07/30/23  0703 07/29/23  1705   HGB 13.5 13.8     No results for input(s): INR in the last 168 hours.  Recent Labs   Lab 07/30/23  0703 07/29/23  1705    253     Recent Labs   Lab 07/30/23  0703 07/29/23  1705   WBC 6.4 11.0         Respectfully,    CELESTINA Murphy, PA-C  [facility] Neurosurgery  [facility]     Tel: [phone]      All imaging, physical findings, and the above plan have been reviewed with Dr. Dye.

## 2023-07-30 NOTE — PLAN OF CARE
Observation goals  PRIOR TO DISCHARGE        Comments: -diagnostic tests and consults completed and resulted: met   -vital signs normal or at patient baseline: met  -tolerating oral intake to maintain hydration: met  -adequate pain control on oral analgesics: met  -returns to baseline functional status: not met  -safe disposition plan has been identified: not met  Nurse to notify provider when observation goals have been met and patient is ready for discharge.

## 2023-07-30 NOTE — PLAN OF CARE
Orientation/Cognitive: A&Ox4  Observation Goals (Met/ Not Met): Not met  Mobility Level/Assist Equipment: A1 gb/w  Fall Risk (Y/N): Y  Behavior Concerns: None  Pain Management: Oxycodone PRN given x1  Tele/VS/O2: VSS on RA  ABNL Lab/BG: HS B  Pt MRI resulted abnormal. *See Neurosurgery note regarding results  Diet: Reg  Bowel/Bladder: Cont. Voiding  Skin Concerns: None  Drains/Devices: PIV SL  Tests/Procedures for next shift: Spine Surgery consult  Anticipated DC date & active delays: TBD  Patient Stated Goal for Today: To get sleep

## 2023-07-30 NOTE — PLAN OF CARE
Observation goals  PRIOR TO DISCHARGE        Comments: -diagnostic tests and consults completed and resulted: not met   -vital signs normal or at patient baseline: met  -tolerating oral intake to maintain hydration: met  -adequate pain control on oral analgesics: met  -returns to baseline functional status: not met  -safe disposition plan has been identified: not met  Nurse to notify provider when observation goals have been met and patient is ready for discharge.

## 2023-07-30 NOTE — PLAN OF CARE
Summary: back pain  Orientation: A&Ox4  Activity Level: Not OOB, SBA with walker in ED  Fall Risk: Yes  Behavior & Aggression: Green  Pain Management: Denies any when laying down, pain with sitting up/standing/walking  Diet: Mod carb  Bowel/Bladder: up to bathroom  Drains/Devices: PIV SL  Skin: Edema +2 on hands, feet, and L arm  ABNL VS/O2: VSS on RA   Tests/Procedures: MRI  Anticipated  DC Date: Pending

## 2023-07-30 NOTE — PROGRESS NOTES
[facility]    Medicine Progress Note - Hospitalist Service    Date of Admission:  7/29/2023    Assessment & Plan      # Acute on chronic back pain    MRI   THORACIC SPINE MRI:  1.  Multilevel disc degeneration and facet hypertrophy as described above. This is greatest at T10-T11 where there is moderate spinal canal stenosis with cord signal abnormality representing edema versus myelomalacia.  2.  Negative for acute compression fracture.     LUMBAR SPINE MRI:  1.  Multilevel disc degeneration and facet hypertrophy of lumbar spine as described above. This is greatest at L3-L4 where there is mild to moderate spinal canal stenosis.  2.  Severe left neural foraminal stenosis at L5-S1 with compression of the exiting left L5 nerve root.  3.  Facet hypertrophy at L3-L4 with associated facet effusions and reactive adjacent paraspinal soft tissue edema.    -Neurosurgery consulted.Recommending conservative management  -PT   -Steriod injection at SI joint   -Prn Tyelonol  -Continue PTA gabapentin  -Oxycodone 2.5-5mg prn   -Lidocaine patch  -Continue steriods  -Case management consult for discharge planning  # History of neuropathic pain: Patient previously on gabapentin.  -Continue prior to admission gabapentin when verified.     Cardiac, hypertension, hyperlipidemia: Patient previously on hydrochlorothiazide, losartan, metoprolol, pravastatin.  -Hold prior to admission hydrochlorothiazide at this time.  -Continue prior to admission losartan  -Continue prior to admission metoprolol  -Continue prior to admission pravastatin.     History of diabetes: Patient previously on metformin.  -Hold prior to admission metformin.  Hba1c 6.1  -Insulin sliding scale.     History of restless leg syndrome.  Previously on pramipexole.  -Continue prior to admission pramipexole      Diet: Regular Diet Adult    DVT Prophylaxis: Pneumatic Compression Devices  Renae Catheter: Not present  Lines: None     Cardiac Monitoring:  None  Code Status: Full Code      Clinically Significant Risk Factors Present on Admission                  # Hypertension: Noted on problem list               Disposition Plan      Expected Discharge Date: 07/31/2023        Discharge Comments: Ortho spine consult.  Pain management.        Might need TCU  Jose Alberto Gamez MD  Hospitalist Service  [facility]  Securely message with [employer] (more info)  Text page via [employer] Paging/Directory   ______________________________________________________________________    Interval History   Patient seen and examined at bedside  Denies chest pain  Denies abdominal pain  Has back pain  No bowel or bladder incontinence  Wants to be discharge to TCU        {Physical Exam   Physical Exam  Cardiovascular:      Rate and Rhythm: Normal rate and regular rhythm.      Heart sounds: Normal heart sounds.   Pulmonary:      Effort: No respiratory distress.      Breath sounds: Normal breath sounds. No wheezing.   Abdominal:      General: There is no distension.      Palpations: Abdomen is soft.      Tenderness: There is no abdominal tenderness.            Vital Signs: Temp: 97.8  F (36.6  C) Temp src: Oral BP: 132/66 Pulse: 88   Resp: 16 SpO2: 94 % O2 Device: None (Room air)    Weight: 169 lbs 8.54 oz    Medical Decision Making     Data   Recent Labs   Lab 07/30/23  1221 07/30/23  0802 07/30/23  0703 07/29/23  1856 07/29/23  1705   WBC  --   --  6.4  --  11.0   HGB  --   --  13.5  --  13.8   MCV  --   --  98  --  99   PLT  --   --  252  --  253   NA  --   --  137  --  139   POTASSIUM  --   --  4.3  --  4.7   CHLORIDE  --   --  102  --  100   CO2  --   --  24  --  24   BUN  --   --  15.9  --  10.5   CR  --   --  0.53  --  0.52   ANIONGAP  --   --  11  --  15   ANDRE  --   --  9.5  --  9.5   * 146* 160*   < > 99    < > = values in this interval not displayed.

## 2023-07-30 NOTE — PROGRESS NOTES
Neurosurgery    Received a call from ANALIA Sparrow, who was asked by the Hospitalist to call me and ask if there are any recommendations based on the imaging. Per report, there is only back pain with leg pain to the knee. No other information given. Since there seemed to be some confusion I suggested that the provider give me a call top further discuss.     Results:    MR THORACIC SPINE W/O and W CONTRAST, MR LUMBAR SPINE W/O and W CONTRAST  LOCATION: [facility]  DATE: 7/29/2023    THORACIC SPINE MRI:  1.  Multilevel disc degeneration and facet hypertrophy as described above. This is greatest at T10-T11 where there is moderate spinal canal stenosis with cord signal abnormality representing edema versus myelomalacia.  2.  Negative for acute compression fracture.     LUMBAR SPINE MRI:  1.  Multilevel disc degeneration and facet hypertrophy of lumbar spine as described above. This is greatest at L3-L4 where there is mild to moderate spinal canal stenosis.  2.  Severe left neural foraminal stenosis at L5-S1 with compression of the exiting left L5 nerve root.  3.  Facet hypertrophy at L3-L4 with associated facet effusions and reactive adjacent paraspinal soft tissue edema.     4.  Dr. Aguilar discussed the above findings by telephone with Dr. Diallo at 10:04 PM 07/29/2023.    Will consult in AM based on information provided.     CELESTINA Murphy, PA-C  [facility] Neurosurgery  [facility]     Tel: [phone]  Pager: [phone]

## 2023-07-30 NOTE — PLAN OF CARE
Summary: back pain  Orientation: A&Ox4  Activity Level: assist 1 GB/W  Fall Risk: Yes  Behavior & Aggression: Green  Pain Management: Decreased with oxy and ice  Bowel/Bladder: up to bathroom  Diet: Mod carb  Drains/Devices: PIV SL  Skin: Edema +2 on hands, feet, and L arm  ABNL VS/O2: VSS on RA   Tests/Procedures: steroid injection at SI joint  Anticipated  DC Date: Pending

## 2023-07-31 ENCOUNTER — APPOINTMENT (OUTPATIENT)
Dept: PHYSICAL THERAPY | Facility: CLINIC | Age: 88
End: 2023-07-31
Attending: HOSPITALIST
Payer: COMMERCIAL

## 2023-07-31 ENCOUNTER — APPOINTMENT (OUTPATIENT)
Dept: GENERAL RADIOLOGY | Facility: CLINIC | Age: 88
End: 2023-07-31
Attending: PHYSICIAN ASSISTANT
Payer: COMMERCIAL

## 2023-07-31 LAB
DEPRECATED CALCIDIOL+CALCIFEROL SERPL-MC: 26 UG/L (ref 20–75)
GLUCOSE BLDC GLUCOMTR-MCNC: 120 MG/DL (ref 70–99)
GLUCOSE BLDC GLUCOMTR-MCNC: 145 MG/DL (ref 70–99)
GLUCOSE BLDC GLUCOMTR-MCNC: 175 MG/DL (ref 70–99)
GLUCOSE BLDC GLUCOMTR-MCNC: 191 MG/DL (ref 70–99)

## 2023-07-31 PROCEDURE — 97161 PT EVAL LOW COMPLEX 20 MIN: CPT | Mod: GP

## 2023-07-31 PROCEDURE — 250N000011 HC RX IP 250 OP 636: Mod: JZ | Performed by: HOSPITALIST

## 2023-07-31 PROCEDURE — 82962 GLUCOSE BLOOD TEST: CPT

## 2023-07-31 PROCEDURE — 97116 GAIT TRAINING THERAPY: CPT | Mod: GP

## 2023-07-31 PROCEDURE — G0378 HOSPITAL OBSERVATION PER HR: HCPCS

## 2023-07-31 PROCEDURE — 96376 TX/PRO/DX INJ SAME DRUG ADON: CPT

## 2023-07-31 PROCEDURE — 99232 SBSQ HOSP IP/OBS MODERATE 35: CPT | Performed by: STUDENT IN AN ORGANIZED HEALTH CARE EDUCATION/TRAINING PROGRAM

## 2023-07-31 PROCEDURE — 250N000009 HC RX 250: Performed by: HOSPITALIST

## 2023-07-31 PROCEDURE — 250N000013 HC RX MED GY IP 250 OP 250 PS 637: Performed by: STUDENT IN AN ORGANIZED HEALTH CARE EDUCATION/TRAINING PROGRAM

## 2023-07-31 PROCEDURE — 250N000013 HC RX MED GY IP 250 OP 250 PS 637: Performed by: EMERGENCY MEDICINE

## 2023-07-31 PROCEDURE — 250N000013 HC RX MED GY IP 250 OP 250 PS 637: Performed by: HOSPITALIST

## 2023-07-31 PROCEDURE — 255N000002 HC RX 255 OP 636: Mod: JZ | Performed by: HOSPITALIST

## 2023-07-31 PROCEDURE — 27096 INJECT SACROILIAC JOINT: CPT | Mod: LT

## 2023-07-31 PROCEDURE — 97530 THERAPEUTIC ACTIVITIES: CPT | Mod: GP

## 2023-07-31 RX ORDER — IOPAMIDOL 408 MG/ML
10 INJECTION, SOLUTION INTRATHECAL ONCE
Status: COMPLETED | OUTPATIENT
Start: 2023-07-31 | End: 2023-07-31

## 2023-07-31 RX ORDER — TRIAMCINOLONE ACETONIDE 40 MG/ML
40 INJECTION, SUSPENSION INTRA-ARTICULAR; INTRAMUSCULAR ONCE
Status: COMPLETED | OUTPATIENT
Start: 2023-07-31 | End: 2023-07-31

## 2023-07-31 RX ADMIN — LIDOCAINE HYDROCHLORIDE 5 ML: 10 INJECTION, SOLUTION EPIDURAL; INFILTRATION; INTRACAUDAL; PERINEURAL at 15:27

## 2023-07-31 RX ADMIN — PRAVASTATIN SODIUM 20 MG: 20 TABLET ORAL at 19:50

## 2023-07-31 RX ADMIN — TRIAMCINOLONE ACETONIDE 40 MG: 40 INJECTION, SUSPENSION INTRA-ARTICULAR; INTRAMUSCULAR at 15:29

## 2023-07-31 RX ADMIN — LOSARTAN POTASSIUM 100 MG: 100 TABLET, FILM COATED ORAL at 08:35

## 2023-07-31 RX ADMIN — PRAMIPEXOLE DIHYDROCHLORIDE 0.12 MG: 0.12 TABLET ORAL at 21:44

## 2023-07-31 RX ADMIN — INSULIN ASPART 1 UNITS: 100 INJECTION, SOLUTION INTRAVENOUS; SUBCUTANEOUS at 13:11

## 2023-07-31 RX ADMIN — LIDOCAINE 1 PATCH: 560 PATCH PERCUTANEOUS; TOPICAL; TRANSDERMAL at 19:50

## 2023-07-31 RX ADMIN — GABAPENTIN 600 MG: 300 CAPSULE ORAL at 13:10

## 2023-07-31 RX ADMIN — GABAPENTIN 900 MG: 300 CAPSULE ORAL at 21:44

## 2023-07-31 RX ADMIN — OXYCODONE HYDROCHLORIDE 2.5 MG: 5 TABLET ORAL at 19:40

## 2023-07-31 RX ADMIN — IOPAMIDOL 1 ML: 408 INJECTION, SOLUTION INTRATHECAL at 15:29

## 2023-07-31 RX ADMIN — CALCITONIN SALMON 1 SPRAY: 200 SPRAY, METERED NASAL at 08:36

## 2023-07-31 RX ADMIN — INSULIN ASPART 1 UNITS: 100 INJECTION, SOLUTION INTRAVENOUS; SUBCUTANEOUS at 18:30

## 2023-07-31 RX ADMIN — GABAPENTIN 600 MG: 300 CAPSULE ORAL at 08:35

## 2023-07-31 RX ADMIN — DEXAMETHASONE SODIUM PHOSPHATE 4 MG: 4 INJECTION, SOLUTION INTRA-ARTICULAR; INTRALESIONAL; INTRAMUSCULAR; INTRAVENOUS; SOFT TISSUE at 08:35

## 2023-07-31 RX ADMIN — PRAMIPEXOLE DIHYDROCHLORIDE 0.12 MG: 0.12 TABLET ORAL at 16:12

## 2023-07-31 RX ADMIN — LATANOPROST 1 DROP: 50 SOLUTION/ DROPS OPHTHALMIC at 21:48

## 2023-07-31 RX ADMIN — METOPROLOL SUCCINATE 50 MG: 50 TABLET, EXTENDED RELEASE ORAL at 08:35

## 2023-07-31 ASSESSMENT — ACTIVITIES OF DAILY LIVING (ADL)
ADLS_ACUITY_SCORE: 38
ADLS_ACUITY_SCORE: 38
ADLS_ACUITY_SCORE: 37
ADLS_ACUITY_SCORE: 38
ADLS_ACUITY_SCORE: 37
ADLS_ACUITY_SCORE: 38
DEPENDENT_IADLS:: TRANSPORTATION
ADLS_ACUITY_SCORE: 38
ADLS_ACUITY_SCORE: 37

## 2023-07-31 NOTE — PROGRESS NOTES
Observation goals  PRIOR TO DISCHARGE        Comments:   -diagnostic tests and consults completed and resulted  Not met  -vital signs normal or at patient baseline  Met  -tolerating oral intake to maintain hydration  Met  -adequate pain control on oral analgesics  Met  -returns to baseline functional status  Not met  -safe disposition plan has been identified  Not Met  Nurse to notify provider when observation goals have been met and patient is ready for discharge.

## 2023-07-31 NOTE — PLAN OF CARE
Summary: Back pain  Orientation: A&Ox4  Observation Goals (met & not met): Not met  Activity Level: A1 gb/w  Fall Risk: Yes  Behavior & Aggression Tool Color: Green  Pain Management: PO Tylenol and Oxy PRN available. Pt declines pain meds.  ABNL VS/O2: VSS on RA  ABNL Lab/BG: No new labs on writers shift  Diet: Reg  Bowel/Bladder: Cont. Voiding  Drains/Devices: PIV SL  Tests/Procedures for next shift: MORIAH 7/31  Anticipated DC date: TBD  Other Important Info:

## 2023-07-31 NOTE — PROGRESS NOTES
[facility]    Medicine Progress Note - Hospitalist Service    Date of Admission:  7/29/2023    Assessment & Plan      # Acute on chronic back pain    MRI   THORACIC SPINE MRI:  1.  Multilevel disc degeneration and facet hypertrophy as described above. This is greatest at T10-T11 where there is moderate spinal canal stenosis with cord signal abnormality representing edema versus myelomalacia.  2.  Negative for acute compression fracture.     LUMBAR SPINE MRI:  1.  Multilevel disc degeneration and facet hypertrophy of lumbar spine as described above. This is greatest at L3-L4 where there is mild to moderate spinal canal stenosis.  2.  Severe left neural foraminal stenosis at L5-S1 with compression of the exiting left L5 nerve root.  3.  Facet hypertrophy at L3-L4 with associated facet effusions and reactive adjacent paraspinal soft tissue edema.    -Neurosurgery consulted.Recommending conservative management  -PT   -Steriod injection at SI joint   -Prn Tyelonol  -Continue PTA gabapentin  -Oxycodone 2.5-5mg prn   -Lidocaine patch  -Continue steriods  -Case management consult for discharge planning  -Patient wants to go to TCU  -Awaiting PT and OT recs    # History of neuropathic pain: Patient previously on gabapentin.  -Continue prior to admission gabapentin when verified.     Cardiac, hypertension, hyperlipidemia: Patient previously on hydrochlorothiazide, losartan, metoprolol, pravastatin.  -Hold prior to admission hydrochlorothiazide at this time.  -Continue prior to admission losartan  -Continue prior to admission metoprolol  -Continue prior to admission pravastatin.     History of diabetes: Patient previously on metformin.  -Hold prior to admission metformin.  Hba1c 6.1  -Insulin sliding scale.     History of restless leg syndrome.  Previously on pramipexole.  -Continue prior to admission pramipexole      Diet: Regular Diet Adult    DVT Prophylaxis: Pneumatic Compression Devices  [place]  Catheter: Not present  Lines: None     Cardiac Monitoring: None  Code Status: Full Code      Clinically Significant Risk Factors Present on Admission                    # Hypertension: Noted on problem list               Disposition Plan      Expected Discharge Date: 08/01/2023        Discharge Comments: Ortho spine consult.  Pain management.        Might need TCU  Jose Alberto Gamez MD  Hospitalist Service  [facility]  Securely message with [employer] (more info)  Text page via [employer] Paging/Directory   ______________________________________________________________________    Interval History   Patient seen and examined at bedside  Has back pain with ambulation  No bowel or bladder incontinence  Wants to be discharge to TCU        {Physical Exam   Physical Exam  Cardiovascular:      Rate and Rhythm: Normal rate and regular rhythm.      Heart sounds: Normal heart sounds.   Pulmonary:      Effort: No respiratory distress.      Breath sounds: Normal breath sounds. No wheezing.   Abdominal:      General: There is no distension.      Palpations: Abdomen is soft.      Tenderness: There is no abdominal tenderness.            Vital Signs: Temp: 98  F (36.7  C) Temp src: Axillary BP: (!) 145/58 Pulse: 68   Resp: 16 SpO2: 95 % O2 Device: None (Room air)    Weight: 169 lbs 8.54 oz    Medical Decision Making     Data   Recent Labs   Lab 07/31/23  1205 07/31/23  0814 07/30/23  2146 07/30/23  0802 07/30/23  0703 07/29/23  1856 07/29/23  1705   WBC  --   --   --   --  6.4  --  11.0   HGB  --   --   --   --  13.5  --  13.8   MCV  --   --   --   --  98  --  99   PLT  --   --   --   --  252  --  253   NA  --   --   --   --  137  --  139   POTASSIUM  --   --   --   --  4.3  --  4.7   CHLORIDE  --   --   --   --  102  --  100   CO2  --   --   --   --  24  --  24   BUN  --   --   --   --  15.9  --  10.5   CR  --   --   --   --  0.53  --  0.52   ANIONGAP  --   --   --   --  11  --  15   ANDRE  --   --   --    --  9.5  --  9.5   * 120* 184*   < > 160*   < > 99    < > = values in this interval not displayed.

## 2023-07-31 NOTE — PROGRESS NOTES
Observation goals  PRIOR TO DISCHARGE        Comments:   -diagnostic tests and consults completed and resulted; not met      -vital signs normal or at patient baseline; met      -tolerating oral intake to maintain hydration: met      -adequate pain control on oral analgesics : pain with activity. No pain at rest. Prn available.       -returns to baseline functional status: not met      -safe disposition plan has been identified: not met    Nurse to notify provider when observation goals have been met and patient is ready for discharge.

## 2023-07-31 NOTE — PROGRESS NOTES
07/31/23 1904   Appointment Info   Signing Clinician's Name / Credentials (PT) Johnathan Stevens DPT   Quick Adds   Quick Adds Certification   Living Environment   People in Home sibling(s)   Current Living Arrangements apartment   Home Accessibility no concerns   Transportation Anticipated family or friend will provide   Living Environment Comments Reports that she was living alone in an apartment, however, her lease has been up for the past few weeks. Reports that she has been staying w/ sister in an apartment. Plan is for Pt to eventually go to [facility] in [place], however, Pt would first need to tolerate the car ride. Will be going back to sisters on [place].   Self-Care   Usual Activity Tolerance good   Current Activity Tolerance moderate   Equipment Currently Used at Home cane, straight   Fall history within last six months no   Activity/Exercise/Self-Care Comment Typically independent w/ mobility and ADLs prior to her increased back pain on July 5th. Has been using a SEC for mobility.   General Information   Onset of Illness/Injury or Date of Surgery 07/29/23   Referring Physician Augustin Henriquez PA-C   Patient/Family Therapy Goals Statement (PT) Have less pain   Pertinent History of Current Problem (include personal factors and/or comorbidities that impact the POC) Zaira Fierro is a 88 year old female admitted on 7/29/2023 with x-ray of the back completed prior to admission showing age-indeterminate compression fracture deformity of T9 and cortical irregularity at the anterior superior endplate of T10, see report for further details. Patient with intermittent pain in the past week, evaluated previously on July 25 with x-rays revealing compression fractures, patient was on steroid therapy, continues to worsen with pain and seeking further medical evaluation and treatment in the emergency department, patient with doses of Tylenol and gabapentin, patient with MRI scheduled, however continues with worsening  pain. S/p Left SI joint steroid injection   Existing Precautions/Restrictions fall;spinal   Cognition   Affect/Mental Status (Cognition) WFL   Orientation Status (Cognition) oriented x 4   Follows Commands (Cognition) WFL   Pain Assessment   Patient Currently in Pain Yes, see Vital Sign flowsheet  (2/10 back pain at start of session while sitting EOB)   Range of Motion (ROM)   ROM Comment Decreased d/t back pain   Strength (Manual Muscle Testing)   Strength Comments Generalized weakness noted   Bed Mobility   Comment, (Bed Mobility) Sit to supine w/ Mod I   Transfers   Comment, (Transfers) Sit to stand from toilet w/ no AD and SBA   Gait/Stairs (Locomotion)   Comment, (Gait/Stairs) 5 ft w/ no AD and SBA   Balance   Balance Comments No overt LOB noted   Clinical Impression   Criteria for Skilled Therapeutic Intervention Yes, treatment indicated   PT Diagnosis (PT) Impaired ambulation   Influenced by the following impairments Impaired strength, balance and activity tolerance   Functional limitations due to impairments Impaired ADLs, IADLs and functional mobility   Clinical Presentation (PT Evaluation Complexity) Stable/Uncomplicated   Clinical Presentation Rationale Clinical judgment   Clinical Decision Making (Complexity) low complexity   Planned Therapy Interventions (PT) balance training;bed mobility training;gait training;home exercise program;patient/family education;strengthening;ROM (range of motion);transfer training;progressive activity/exercise   Risk & Benefits of therapy have been explained evaluation/treatment results reviewed;care plan/treatment goals reviewed;risks/benefits reviewed;current/potential barriers reviewed;participants voiced agreement with care plan;participants included;patient   PT Total Evaluation Time   PT Eval, Low Complexity Minutes (63885) 10   Therapy Certification   Start of care date 07/31/23   Certification date from 07/31/23   Certification date to 08/10/23   Medical Diagnosis  Compression fx of T10   Physical Therapy Goals   PT Frequency 5x/week   PT Predicted Duration/Target Date for Goal Attainment 08/10/23   PT Goals Bed Mobility;Transfers;Gait   PT: Bed Mobility Independent;Supine to/from sit   PT: Transfers Modified independent;Sit to/from stand;Assistive device   PT: Gait Modified independent;150 feet;Straight cane   Interventions   Interventions Quick Adds Gait Training;Therapeutic Activity   Therapeutic Activity   Therapeutic Activities: dynamic activities to improve functional performance Minutes (50317) 20   Symptoms Noted During/After Treatment Increased pain   Treatment Detail/Skilled Intervention Pt in bathroom at start of session. Agreeable to PT. Pt able to get on/off toilet w/o assistance, wash hands w/o assistance and ambulate back to room w/ SBA and no AD. Pt completing sit to supine w/ SBA w/ HOB elevated. Completing supine <> sit w/ HOB flat and no bed rail w/ SBA. Good tolerance noted. Educated on log roll technique. pt agreeable w/ minimal increase in pain. Pt completing sit to stand x1 from bed height w/ SEC and SBA. Seated BP at end of session elevated w/ SBP in the 180s and diastolic in the 90s. RN notified. Pt reports increased back pain sitting at EOB to 7/10 and needing to lay back down. Once laying in bed Pt w/ improvement in back pain. Long discussion w/ Pt and sister about DC recommendations and how Pt's mobility is to good to qualify for TCU. Educated on recommendations for HH PT and walking as a good exercise. Educated on getting up and moving every hour awake and getting 2-3 good walks per day. Pt w/ all needs met at end of session w/ call light in place and bed alarm on.   Gait Training   Gait Training Minutes (62869) 20   Symptoms Noted During/After Treatment (Gait Training) increased pain   Treatment Detail/Skilled Intervention Pt ambulating ~200 ft w/ SEC and SBA and another ~100 ft w/ no AD and SBA. Slow kosta noted. No overt LOB throughout. Pt  completing dynamic gait w/ good tolerance w/o LOB. Able to complete side stepping and retro gait w/o LOB. Some lightheadedness throughout ambulation. Reports only mild pain w/ ambulation up to a 2/10.   PT Discharge Planning   PT Plan Standing exercises, ambulation distance w/ and w/o AD, dynamic gait   PT Discharge Recommendation (DC Rec) home with home care physical therapy   PT Rationale for DC Rec Pt below baseline mobility but moving well. Pt typically independent w/ mobility w/ SEC. Currently ambulating w/ SEC and SBA and no AD w/ SBA. Limited by pain at this time. Anticipate when medically ready Pt can DC to [place] w/  PT to improve upon functional mobility and strengthening. Pt has most recently been staying at [facility]. No stairs needed to complete.   PT Brief overview of current status SBA w/ SEC vs no AD   Total Session Time   Timed Code Treatment Minutes 40   Total Session Time (sum of timed and untimed services) 50   [facility]  OUTPATIENT PHYSICAL THERAPY EVALUATION  PLAN OF TREATMENT FOR OUTPATIENT REHABILITATION  (COMPLETE FOR INITIAL CLAIMS ONLY)  Patient's Last Name, First Name, M.I.  YOB: 1935  Zaira Fierro                        Provider's Name  [facility] Medical Record No.  [id number]                             Onset Date:  07/29/23   Start of Care Date:  07/31/23   Type:     _X_PT   ___OT   ___SLP Medical Diagnosis:  Compression fx of T10              PT Diagnosis:  Impaired ambulation Visits from SOC:  1     See note for plan of treatment, functional goals and certification details    I CERTIFY THE NEED FOR THESE SERVICES FURNISHED UNDER        THIS PLAN OF TREATMENT AND WHILE UNDER MY CARE     (Physician co-signature of this document indicates review and certification of the therapy plan).

## 2023-07-31 NOTE — PLAN OF CARE
Goal Outcome Evaluation:      Plan of Care Reviewed With: patient      Summary: pt here with lower back pain radiating down to legs.     Orientation/Cognitive: A/o X4  Observation Goals (Met/ Not Met): not met  Mobility Level/Assist Equipment: up with 1 walker/GB  Fall Risk (Y/N): yes  Behavior Concerns: none  Pain Management: pain with activity. Denies pain at rest   Tele/VS/O2: VSS, RA  ABNL Lab/BG: BG checks.   Diet: regular   Bowel/Bladder: continence  Skin Concerns: dry/ flaky, coccyx redness   Drains/Devices: PIV  Tests/Procedures for next shift: MORIAH done. PT consulted recommending home   Anticipated DC date & active delays: 1-2 days  Patient Stated Goal for Today: pain control

## 2023-07-31 NOTE — PROGRESS NOTES
Observation goals  PRIOR TO DISCHARGE        Comments:     -diagnostic tests and consults completed and resulted; not met. MORIAH today fly [id number]      -vital signs normal or at patient baseline; met      -tolerating oral intake to maintain hydration: met      -adequate pain control on oral analgesics : pain with activity. No pain at rest. Prn available.       -returns to baseline functional status: not met      -safe disposition plan has been identified: not met    Nurse to notify provider when observation goals have been met and patient is ready for discharge.

## 2023-07-31 NOTE — PROGRESS NOTES
Orientation/Cognitive: A&O X4  Observation Goals (Met/ Not Met): Not met  Mobility Level/Assist Equipment: A 1 Gb/W  Fall Risk (Y/N): Y  Behavior Concerns: green  Pain Management: Pt declined pain medications  Tele/VS/O2: no tele, VSS, O2 RA  ABNL Lab/BG: A1C 6.1  Diet: mod carb  Bowel/Bladder: Continent   Skin Concerns: generalized Edema +2, rash in perineal skin folds  Drains/Devices: PIV SL  Tests/Procedures for next shift: Steroid injection at SI joint  Anticipated DC date & active delays: TBD

## 2023-07-31 NOTE — PROCEDURES
[facility]    Procedure: Left SI joint steroid injection    Date/Time: 7/31/2023 3:36 PM    Performed by: Edgard Escobar PA-C  Authorized by: Edgard Escobar PA-C      UNIVERSAL PROTOCOL   Site Marked: Yes  Prior Images Obtained and Reviewed:  Yes  Required items: Required blood products, implants, devices and special equipment available    Patient identity confirmed:  Verbally with patient  Patient was reevaluated immediately before administering moderate or deep sedation or anesthesia  Confirmation Checklist:  Patient's identity using two indicators, relevant allergies, procedure was appropriate and matched the consent or emergent situation and correct equipment/implants were available  Time out: Immediately prior to the procedure a time out was called    Universal Protocol: the Joint Commission Universal Protocol was followed    Preparation: Patient was prepped and draped in usual sterile fashion       ANESTHESIA    Local Anesthetic: Lidocaine 1% without epinephrine      SEDATION    Patient Sedated: No    See dictated procedure note for full details.    PROCEDURE    Patient Tolerance:  Patient tolerated the procedure well with no immediate complications  Length of time physician/provider present for 1:1 monitoring during sedation: 0

## 2023-07-31 NOTE — CONSULTS
Care Management Initial Consult    General Information  Assessment completed with: Patient, Friend, Mani  Type of CM/SW Visit: Initial Assessment    Primary Care Provider verified and updated as needed: Yes   Readmission within the last 30 days: no previous admission in last 30 days      Reason for Consult: discharge planning  Advance Care Planning: Advance Care Planning Reviewed: present on chart          Communication Assessment  Patient's communication style: spoken language (English or Bilingual)             Cognitive  Cognitive/Neuro/Behavioral: WDL                      Living Environment:   People in home: friend(s)     Current living Arrangements: apartment      Able to return to prior arrangements: other (see comments)       Family/Social Support:  Care provided by: self, friend  Provides care for:    Marital Status: Single             Description of Support System:           Current Resources:   Patient receiving home care services: No     Community Resources: None  Equipment currently used at home:    Supplies currently used at home: Hearing Aid Batteries    Employment/Financial:  Employment Status: retired        Financial Concerns: No concerns identified           Does the patient's insurance plan have a 3 day qualifying hospital stay waiver?  Yes   Will the waiver be used for post-acute placement? Yes    Lifestyle & Psychosocial Needs:  Social Determinants of Health     Tobacco Use: Low Risk  (7/25/2023)    Patient History     Smoking Tobacco Use: Never     Smokeless Tobacco Use: Never     Passive Exposure: Never   Alcohol Use: Not on file   Financial Resource Strain: Not on file   Food Insecurity: Not on file   Transportation Needs: Not on file   Physical Activity: Not on file   Stress: Not on file   Social Connections: Not on file   Intimate Partner Violence: Not on file   Depression: Not at risk (7/25/2023)    PHQ-2     PHQ-2 Score: 0   Housing Stability: Not on file       Functional  "Status:  Prior to admission patient needed assistance:   Dependent ADLs:: Ambulation-walker  Dependent IADLs:: Transportation       Mental Health Status:  Mental Health Status: No Current Concerns       Chemical Dependency Status:                Values/Beliefs:  Spiritual, Cultural Beliefs, [employer] Practices, Values that affect care: no               Additional Information:  Met with patent and friend, who is also named Zaira. Went over Medicare Outpatient Observation. Patient Deborah\" doesn't;t want to home and have her roommate, Zaira to take care of, \"I want to be able to walk, I was lying down in the car it hurt so bad.\" Writer went over the need for PT to eval her and then get that recommendation. Patient tells writer, \"I would want to go somewhere close by\"Is there one connected to the hospital?' Writer mentioned [facility]. \"Oh that sounds nice.\"   Will follow for discharge planning.    Becca Arroyo RN      "

## 2023-07-31 NOTE — PROGRESS NOTES
Observation goals  PRIOR TO DISCHARGE        Comments:     -diagnostic tests and consults completed and resulted; partially met. MORIAH done. PT consult done.       -vital signs normal or at patient baseline; met      -tolerating oral intake to maintain hydration: met      -adequate pain control on oral analgesics : pain with activity. No pain at rest. Prn available.       -returns to baseline functional status: not met      -safe disposition plan has been identified: not met    Nurse to notify provider when observation goals have been met and patient is ready for discharge.

## 2023-07-31 NOTE — UTILIZATION REVIEW
Continued stay Observation     Concurrent stay review; Secondary Review Determination         Under the authority of the Utilization Management Committee, the utilization review process indicated a secondary review on the above patient.  The review outcome is based on review of the medical records, discussions with staff, and applying clinical experience noted on the date of the review.          (x) Observation Status Appropriate - Concurrent stay review    RATIONALE FOR DETERMINATION   88-year-old female with history of hypertension, hyperlipidemia, restless leg syndrome, diabetes mellitus type 2 was admitted to [facility] on 7/29/2023 with acute on chronic back pain.  Patient was found to have degenerative disc disease with moderate spinal canal stenosis and severe left neuroforaminal stenosis at L5-S1 with compression of the exiting left L5 nerve root.  Neurosurgery was consulted who recommended conservative management.  Pain appears to be adequately controlled on oral regimen.  She will be getting epidural steroid injection and awaiting placement at this time.  She does not meet inpatient criteria at this time.    Patient is clinically improving and there is no clear indication to change patient's status to inpatient. The severity of illness, intensity of service provided, expected LOS and risk for adverse outcome make the care appropriate for observation.      This document was produced using voice recognition software       The information on this document is developed by the utilization review team in order for the business office to ensure compliance.  This only denotes the appropriateness of proper admission status and does not reflect the quality of care rendered.         The definitions of Inpatient Status and Observation Status used in making the determination above are those provided in the CMS Coverage Manual, Chapter 1 and Chapter 6, section 70.4.      Sincerely,     Erik Castro,  MD    Utilization Review  Physician Advisor  [facility].

## 2023-08-01 ENCOUNTER — APPOINTMENT (OUTPATIENT)
Dept: PHYSICAL THERAPY | Facility: CLINIC | Age: 88
End: 2023-08-01
Payer: COMMERCIAL

## 2023-08-01 VITALS
OXYGEN SATURATION: 95 % | WEIGHT: 169.53 LBS | SYSTOLIC BLOOD PRESSURE: 138 MMHG | RESPIRATION RATE: 16 BRPM | TEMPERATURE: 98.1 F | BODY MASS INDEX: 31.01 KG/M2 | DIASTOLIC BLOOD PRESSURE: 59 MMHG | HEART RATE: 65 BPM

## 2023-08-01 LAB — GLUCOSE BLDC GLUCOMTR-MCNC: 150 MG/DL (ref 70–99)

## 2023-08-01 PROCEDURE — 96376 TX/PRO/DX INJ SAME DRUG ADON: CPT

## 2023-08-01 PROCEDURE — 250N000013 HC RX MED GY IP 250 OP 250 PS 637: Performed by: HOSPITALIST

## 2023-08-01 PROCEDURE — G0378 HOSPITAL OBSERVATION PER HR: HCPCS

## 2023-08-01 PROCEDURE — 99239 HOSP IP/OBS DSCHRG MGMT >30: CPT | Performed by: PHYSICIAN ASSISTANT

## 2023-08-01 PROCEDURE — 97116 GAIT TRAINING THERAPY: CPT | Mod: GP

## 2023-08-01 PROCEDURE — 97110 THERAPEUTIC EXERCISES: CPT | Mod: GP

## 2023-08-01 PROCEDURE — 250N000011 HC RX IP 250 OP 636: Mod: JZ | Performed by: HOSPITALIST

## 2023-08-01 PROCEDURE — 82962 GLUCOSE BLOOD TEST: CPT

## 2023-08-01 RX ORDER — GABAPENTIN 300 MG/1
600 CAPSULE ORAL 2 TIMES DAILY
Qty: 60 CAPSULE | Refills: 0 | Status: SHIPPED | OUTPATIENT
Start: 2023-08-01 | End: 2023-08-07

## 2023-08-01 RX ORDER — CALCITONIN SALMON 200 [IU]/.09ML
1 SPRAY, METERED NASAL DAILY
COMMUNITY
Start: 2023-08-02

## 2023-08-01 RX ORDER — LIDOCAINE 4 G/G
1 PATCH TOPICAL EVERY 24 HOURS
Qty: 30 PATCH | Refills: 0 | Status: SHIPPED | OUTPATIENT
Start: 2023-08-01

## 2023-08-01 RX ORDER — OXYCODONE HYDROCHLORIDE 5 MG/1
2.5-5 TABLET ORAL EVERY 4 HOURS PRN
Qty: 12 TABLET | Refills: 0 | Status: SHIPPED | OUTPATIENT
Start: 2023-08-01 | End: 2023-08-16

## 2023-08-01 RX ORDER — AMOXICILLIN 250 MG
1-2 CAPSULE ORAL 2 TIMES DAILY PRN
Qty: 30 TABLET | Refills: 0 | Status: SHIPPED | OUTPATIENT
Start: 2023-08-01

## 2023-08-01 RX ADMIN — CALCITONIN SALMON 1 SPRAY: 200 SPRAY, METERED NASAL at 09:17

## 2023-08-01 RX ADMIN — GABAPENTIN 600 MG: 300 CAPSULE ORAL at 08:42

## 2023-08-01 RX ADMIN — METOPROLOL SUCCINATE 50 MG: 50 TABLET, EXTENDED RELEASE ORAL at 08:42

## 2023-08-01 RX ADMIN — LOSARTAN POTASSIUM 100 MG: 100 TABLET, FILM COATED ORAL at 08:42

## 2023-08-01 RX ADMIN — SENNOSIDES AND DOCUSATE SODIUM 2 TABLET: 50; 8.6 TABLET ORAL at 08:42

## 2023-08-01 RX ADMIN — INSULIN ASPART 1 UNITS: 100 INJECTION, SOLUTION INTRAVENOUS; SUBCUTANEOUS at 11:52

## 2023-08-01 RX ADMIN — POLYETHYLENE GLYCOL 3350 17 G: 17 POWDER, FOR SOLUTION ORAL at 08:42

## 2023-08-01 RX ADMIN — ACETAMINOPHEN 650 MG: 325 TABLET, FILM COATED ORAL at 10:09

## 2023-08-01 RX ADMIN — DEXAMETHASONE SODIUM PHOSPHATE 4 MG: 4 INJECTION, SOLUTION INTRA-ARTICULAR; INTRALESIONAL; INTRAMUSCULAR; INTRAVENOUS; SOFT TISSUE at 08:42

## 2023-08-01 ASSESSMENT — ACTIVITIES OF DAILY LIVING (ADL)
ADLS_ACUITY_SCORE: 38
ADLS_ACUITY_SCORE: 38
ADLS_ACUITY_SCORE: 37
ADLS_ACUITY_SCORE: 38
ADLS_ACUITY_SCORE: 37

## 2023-08-01 NOTE — PLAN OF CARE
Observation goals  PRIOR TO DISCHARGE        Comments: -diagnostic tests and consults completed and resulted: Not Met  -vital signs normal or at patient baseline: Met  -tolerating oral intake to maintain hydration: Met  -adequate pain control on oral analgesics Not Met  -returns to baseline functional status: Not Met  -safe disposition plan has been identified : Not Met  Nurse to notify provider when observation goals have been met and patient is ready for discharge.

## 2023-08-01 NOTE — DISCHARGE SUMMARY
[facility]  Hospitalist Discharge Summary      Date of Admission:  7/29/2023  Date of Discharge:  8/1/23  Discharging Provider: Eliza Fontana PA-C  Discharge Service: Hospitalist Service    Discharge Diagnoses   Acute on chronic back pain with left radicular symptoms s/p MORIAH (7/31/23)  Hx of neuropathic pain  Hypertension  Hyperlipidemia  Steroid induced hyperglycemia   T2DM, controlled, non-insulin dependent  RLS    Follow-ups Needed After Discharge   Follow-up Appointments     Follow-up and recommended labs and tests       Follow up with primary care provider, DEANGELO SIDDIQI, within 7 days for   hospital follow- up.  No follow up labs or test are needed.          Unresulted Labs Ordered in the Past 30 Days of this Admission       No orders found from 6/29/2023 to 7/30/2023.        These results will be followed up by PCP    Discharge Disposition   Discharged to home  Condition at discharge: Stable    Hospital Course   Ms. Fierro is an extremely pleasant 88 year old female with below PMHx who presented to the ER on 7/29/23 for further evaluation of acute on chronic back pain with left radicular symptoms. Registered to the observation unit for further evaluation and treatment. Refer to H&P by Dr. Blanco for complete details on presentation.     Acute on chronic back pain with left radicular symptoms s/p MORIAH (7/31/23)  Hx of neuropathic pain  * CBC and BMP WNL. UA unremarkable   * MRI thoracic spine with multilevel disc degeneration and facet hypertrophy, greatest at T10-T11 where there is moderate spinal canal stenosis with cord signal abnormality representing edema versus myelomalacia. Negative for acute compression fracture.  * MRI lumbar spine with multilevel disc degeneration and facet hypertrophy of lumbar spine as described above. This is greatest at L3-L4 where there is mild to moderate spinal canal stenosis.  Severe left neural foraminal stenosis at L5-S1 with  compression of the exiting left L5 nerve root. Facet hypertrophy at L3-L4 with associated facet effusions and reactive adjacent paraspinal soft tissue edema.  * On day of discharge, pt's pain was significantly improved, she ambulated well with PT. Denied saddle anesthesia, ongoing radicular sx, urinary retention or incontinence.   - Neurosurgery consulted on admission, refer to note from 7/30. MORIAH ordered and completed 7/31  - Analgesic regimen with 650 mg QID, lidoderm patch, gabapentin 600 mg BID (renally adjusted for CrCl 71.8, but can be increased up to 1800 mg, was taking total of 2100 mg prior to admission), oxycodone 2.5-5 mg q4 hrs PRN, calcitonin nasal spray (PTA). Note pt was treated with Decadron 4 mg X4 days during hospital stay, discontinued post MORIAH.   - Senokot BID while taking narcotics   - Close follow-up with PCP   - Educated on reasons to return to the ED      Hypertension  Hyperlipidemia: Continue PTA hydrochlorothiazide 25 mg po every day, Losartan 100 mg po every day, Toprol XL 50 mg po every day, Pravastatin 20 mg po at bedtime      Steroid induced hyperglycemia   T2DM, controlled, non-insulin dependent: A1C 6.1.   - Treated with sliding scale insulin during hospitalization, PTA metformin resumed at discharge      History of restless leg syndrome: Continue prior to admission pramipexole     Consultations This Hospital Stay   CARE MANAGEMENT / SOCIAL WORK IP CONSULT  PHYSICAL THERAPY ADULT IP CONSULT  SPINE SURGERY ADULT IP CONSULT  PHYSICAL THERAPY ADULT IP CONSULT    Code Status   Prior    Time Spent on this Encounter   I, Eliza Fontana PA-C, personally saw the patient today and spent greater than 30 minutes discharging this patient.       Eliza Fontana PA-C  [facility] EXTENDED RECOVERY AND SHORT STAY  [address] [facility] [address]  Phone:  [phone]  ______________________________________________________________________    Physical Exam   Vital Signs: Temp: 98.1  F (36.7  C) Temp src: Oral BP: 138/59 Pulse: 65   Resp: 16 SpO2: 95 % O2 Device: None (Room air)    Weight: 169 lbs 8.54 oz    CONSTITUTIONAL: Pt laying in bed, dressed in hospital garb. Appears comfortable. Cooperative with interview. Accompanied by friend at bedside.   HEENT: Normocephalic, atraumatic.   CARDIOVASCULAR: RRR, no murmurs, rubs, or extra heart sounds appreciated. Pulses +2/4 and regular in upper and lower extremities, bilaterally.   RESPIRATORY: No increased work of breathing. CTA, bilat; no wheezes, rales, or rhonchi appreciated.  GASTROINTESTINAL:  Abdomen soft, non-distended. BS auscultated in all four quadrants. Negative for tenderness to palpation.  No masses or organomegaly noted.  MUSCULOSKELETAL: Strength +5/5 in upper and lower extremities, bilaterally. No radicular sx reported. No gross deformities noted. Normal muscle tone.   HEMATOLOGIC/LYMPHATIC/IMMUNOLOGIC: Negative for lower extremity edema, bilaterally.  NEUROLOGIC: Alert and oriented to person, place, and time. No focal neuro deficits.   SKIN: Warm, dry, intact.        Primary Care Physician   DEANGELO SIDDIQI    Discharge Orders      Home Care Referral      Reason for your hospital stay    You were hospitalized for further evaluation and treatment of acute back pain with radicular symptoms. You were cared for by the Hospitalist and Neurosurgery teams. You underwent epidural steroid injection.     Follow-up and recommended labs and tests     Follow up with primary care provider, DEANGELO SIDDIQI, within 7 days for hospital follow- up.  No follow up labs or test are needed.     Activity    Your activity upon discharge: activity as tolerated     Discharge Instructions    1. Pain control with prior to admission scheduled tylenol, scheduled gabapentin (increased dose 600 mg twice daily), as needed oxycodone  2. Senokot  daily while using oxycodone to prevent drug induced constipation   3. Home PT ordered at discharge   4. Return to the ED with worsening symptoms or inability to manage at home.     Diet    Follow this diet upon discharge: Orders Placed This Encounter      Regular Diet Adult       Significant Results and Procedures   Most Recent 3 CBC's:  Recent Labs   Lab Test 07/30/23  0703 07/29/23  1705 02/06/23  1223 11/03/17  1054 05/27/17  2300   WBC 6.4 11.0  --   --  8.4   HGB 13.5 13.8 14.8   < > 12.6   MCV 98 99  --   --  95    253  --   --  294    < > = values in this interval not displayed.     Most Recent 3 BMP's:  Recent Labs   Lab Test 08/01/23  1144 07/31/23  2212 07/31/23  1726 07/30/23  0802 07/30/23  0703 07/29/23  1856 07/29/23  1705 01/21/23  1312   NA  --   --   --   --  137  --  139 139   POTASSIUM  --   --   --   --  4.3  --  4.7 4.8   CHLORIDE  --   --   --   --  102  --  100 100   CO2  --   --   --   --  24  --  24 25   BUN  --   --   --   --  15.9  --  10.5 16.0   CR  --   --   --   --  0.53  --  0.52 0.57   ANIONGAP  --   --   --   --  11  --  15 14   ANDRE  --   --   --   --  9.5  --  9.5 10.0   * 175* 145*   < > 160*   < > 99 154*    < > = values in this interval not displayed.   ,   Results for orders placed or performed during the hospital encounter of 07/29/23   MR Thoracic Spine w/o & w Contrast    Narrative    EXAM: MR THORACIC SPINE W/O and W CONTRAST, MR LUMBAR SPINE W/O and W CONTRAST  LOCATION: [facility]  DATE: 7/29/2023    INDICATION: compression fractures, low back pain  COMPARISON: None.  CONTRAST: 8mL Gadavist  TECHNIQUE:   1) Routine Thoracic Spine MRI without and with IV contrast.  2) Routine Lumbar Spine MRI without and with IV contrast.    FINDINGS:    THORACIC SPINE:  There is grade 1 anterolisthesis of C7 on T1 and T1 on T2. Mild inferior endplate depression at T7 and superior endplate depression at T8 and inferior endplate depression at T8 and  T9 is favored to be degenerative in nature. Vertebral body heights are   otherwise maintained. Negative for marrow edema. There is multilevel disc height loss and facet hypertrophy. This is most prominent at T10-T11 where there is a diffuse disc bulge with central disc protrusion in addition to bilateral facet hypertrophy   with facet effusions producing moderate spinal canal stenosis. There is flattening of the ventral thoracic cord with apparent cord signal abnormality concerning for edema versus myelomalacia. There is additional multifocal disc bulging and protrusions   including T2-T3, T3-T4, T4-T5, T5-T6, T10-T11 without significant spinal canal stenosis. There is multilevel facet hypertrophy, severe at C7-T1 and T1-T2. There are moderate to severe neural foraminal stenoses at T1-T2, T2-T3, T3-T4, T4-T5, T6-T7,   T9-T10, T10-T11. No pathologic contrast enhancement.    No extraspinal abnormality.    LUMBAR SPINE:   Nomenclature is based on 5 lumbar type vertebral bodies. Lumbar levoscoliosis. Straightening of the normal AP curvature. Grade 1 retrolisthesis of L1 on L2 and L2 on L3. Grade 1 anterolisthesis of L3 on L4. Grade 1 retrolisthesis of L4 on L5 and L5 on   S1. Vertebral body heights are maintained. Negative for marrow edema. Normal distal spinal cord and cauda equina with conus medullaris at L1-L2. There is paraspinal soft tissue muscle edema and enhancement adjacent to the L3-L4 facets. Unremarkable   visualized bony pelvis.    T11-T12: Disc desiccation and disc height loss. Diffuse disc bulge with right foraminal disc protrusion. Bilateral facet hypertrophy. No spinal canal stenosis. Mild right foraminal stenosis. No left foraminal stenosis.    T12-L1: Disc desiccation with diffuse disc bulge. Bilateral facet hypertrophy. No spinal canal or foraminal stenosis.     L1-L2: Disc desiccation and disc height loss. Diffuse disc bulge with right foraminal disc extrusion. The extruded disc fragment measures 6  mm AP by 16 mm transverse with 7 mm caudal migration. Bilateral facet hypertrophy. No spinal canal stenosis.   Moderate right and mild left neural foraminal stenosis. Mild-to-moderate right subarticular stenosis with dorsal displacement of the descending right L2 nerve root.    L2-L3: Disc desiccation and diffuse disc bulge. Bilateral facet hypertrophy. No spinal canal stenosis. Moderate right and mild left neural foraminal stenosis. Disc material contacts the exiting right L2 nerve root.     L3-L4: Disc desiccation with disc height loss and diffuse disc bulge. Moderately advanced bilateral facet hypertrophy with bilateral facet effusions. Mild to moderate spinal canal stenosis. Moderate left and mild right neural foraminal stenosis. Disc   material contacts the exiting left L3 nerve root. Moderate bilateral neural foraminal stenosis with disc material contacting and dorsally displacing the descending bilateral L4 nerve roots.    L4-L5: Disc desiccation and diffuse disc bulge. Posterior osteophytes. Bilateral facet hypertrophy. No spinal canal stenosis. Moderate left and mild right neural foraminal stenosis. Mild left subarticular stenosis.    L5-S1: Disc desiccation and diffuse disc bulge with central disc protrusion. Bilateral facet hypertrophy. No spinal canal stenosis. Severe left and mild right neural foraminal stenosis. Disc material contacts and compresses the exiting left L5 nerve   root.      Impression    IMPRESSION:    THORACIC SPINE MRI:  1.  Multilevel disc degeneration and facet hypertrophy as described above. This is greatest at T10-T11 where there is moderate spinal canal stenosis with cord signal abnormality representing edema versus myelomalacia.  2.  Negative for acute compression fracture.    LUMBAR SPINE MRI:  1.  Multilevel disc degeneration and facet hypertrophy of lumbar spine as described above. This is greatest at L3-L4 where there is mild to moderate spinal canal stenosis.  2.  Severe left  neural foraminal stenosis at L5-S1 with compression of the exiting left L5 nerve root.  3.  Facet hypertrophy at L3-L4 with associated facet effusions and reactive adjacent paraspinal soft tissue edema.    4.  Dr. Aguilar discussed the above findings by telephone with Dr. Diallo at 10:04 PM 07/29/2023.   MR Lumbar Spine w/o & w Contrast    Narrative    EXAM: MR THORACIC SPINE W/O and W CONTRAST, MR LUMBAR SPINE W/O and W CONTRAST  LOCATION: [facility]  DATE: 7/29/2023    INDICATION: compression fractures, low back pain  COMPARISON: None.  CONTRAST: 8mL Gadavist  TECHNIQUE:   1) Routine Thoracic Spine MRI without and with IV contrast.  2) Routine Lumbar Spine MRI without and with IV contrast.    FINDINGS:    THORACIC SPINE:  There is grade 1 anterolisthesis of C7 on T1 and T1 on T2. Mild inferior endplate depression at T7 and superior endplate depression at T8 and inferior endplate depression at T8 and T9 is favored to be degenerative in nature. Vertebral body heights are   otherwise maintained. Negative for marrow edema. There is multilevel disc height loss and facet hypertrophy. This is most prominent at T10-T11 where there is a diffuse disc bulge with central disc protrusion in addition to bilateral facet hypertrophy   with facet effusions producing moderate spinal canal stenosis. There is flattening of the ventral thoracic cord with apparent cord signal abnormality concerning for edema versus myelomalacia. There is additional multifocal disc bulging and protrusions   including T2-T3, T3-T4, T4-T5, T5-T6, T10-T11 without significant spinal canal stenosis. There is multilevel facet hypertrophy, severe at C7-T1 and T1-T2. There are moderate to severe neural foraminal stenoses at T1-T2, T2-T3, T3-T4, T4-T5, T6-T7,   T9-T10, T10-T11. No pathologic contrast enhancement.    No extraspinal abnormality.    LUMBAR SPINE:   Nomenclature is based on 5 lumbar type vertebral bodies. Lumbar  levoscoliosis. Straightening of the normal AP curvature. Grade 1 retrolisthesis of L1 on L2 and L2 on L3. Grade 1 anterolisthesis of L3 on L4. Grade 1 retrolisthesis of L4 on L5 and L5 on   S1. Vertebral body heights are maintained. Negative for marrow edema. Normal distal spinal cord and cauda equina with conus medullaris at L1-L2. There is paraspinal soft tissue muscle edema and enhancement adjacent to the L3-L4 facets. Unremarkable   visualized bony pelvis.    T11-T12: Disc desiccation and disc height loss. Diffuse disc bulge with right foraminal disc protrusion. Bilateral facet hypertrophy. No spinal canal stenosis. Mild right foraminal stenosis. No left foraminal stenosis.    T12-L1: Disc desiccation with diffuse disc bulge. Bilateral facet hypertrophy. No spinal canal or foraminal stenosis.     L1-L2: Disc desiccation and disc height loss. Diffuse disc bulge with right foraminal disc extrusion. The extruded disc fragment measures 6 mm AP by 16 mm transverse with 7 mm caudal migration. Bilateral facet hypertrophy. No spinal canal stenosis.   Moderate right and mild left neural foraminal stenosis. Mild-to-moderate right subarticular stenosis with dorsal displacement of the descending right L2 nerve root.    L2-L3: Disc desiccation and diffuse disc bulge. Bilateral facet hypertrophy. No spinal canal stenosis. Moderate right and mild left neural foraminal stenosis. Disc material contacts the exiting right L2 nerve root.     L3-L4: Disc desiccation with disc height loss and diffuse disc bulge. Moderately advanced bilateral facet hypertrophy with bilateral facet effusions. Mild to moderate spinal canal stenosis. Moderate left and mild right neural foraminal stenosis. Disc   material contacts the exiting left L3 nerve root. Moderate bilateral neural foraminal stenosis with disc material contacting and dorsally displacing the descending bilateral L4 nerve roots.    L4-L5: Disc desiccation and diffuse disc bulge.  Posterior osteophytes. Bilateral facet hypertrophy. No spinal canal stenosis. Moderate left and mild right neural foraminal stenosis. Mild left subarticular stenosis.    L5-S1: Disc desiccation and diffuse disc bulge with central disc protrusion. Bilateral facet hypertrophy. No spinal canal stenosis. Severe left and mild right neural foraminal stenosis. Disc material contacts and compresses the exiting left L5 nerve   root.      Impression    IMPRESSION:    THORACIC SPINE MRI:  1.  Multilevel disc degeneration and facet hypertrophy as described above. This is greatest at T10-T11 where there is moderate spinal canal stenosis with cord signal abnormality representing edema versus myelomalacia.  2.  Negative for acute compression fracture.    LUMBAR SPINE MRI:  1.  Multilevel disc degeneration and facet hypertrophy of lumbar spine as described above. This is greatest at L3-L4 where there is mild to moderate spinal canal stenosis.  2.  Severe left neural foraminal stenosis at L5-S1 with compression of the exiting left L5 nerve root.  3.  Facet hypertrophy at L3-L4 with associated facet effusions and reactive adjacent paraspinal soft tissue edema.    4.  Dr. Aguilar discussed the above findings by telephone with Dr. Diallo at 10:04 PM 07/29/2023.   XR Sacroiliac Therapeutic Injection Left    Narrative    XR SACROILIAC THERAPEUTIC INJECTION LEFT                   7/31/2023  3:37 PM       History:  Left buttocks pain with radiculopathy. Sacroiliitis. Request  for left SI joint steroid injection.     PROCEDURE: The procedure, indications, risks (including the risk of  infection, bleeding, and reaction to contrast material and  medications), and alternative therapies were discussed with the  patient and informed consent was obtained before the procedure.  The  low back was prepped and draped in the usual sterile fashion.   Lidocaine 1% was used for local anesthesia.  Under fluoroscopic  guidance a #22 gauge spinal needle was  advanced into posterior  inferior aspect of the sacroiliac joint.  Contrast was used to confirm  intra-articular position. Subsequently a mixture of 1mL of Kenalog  (40mg/mL) and 2mL Lidocaine 1% were injected.  The needle was removed.   Estimated blood loss during the procedure was less than 5 mL. No  specimens collected. The patient tolerated the procedure well and  there were no immediate complications.      Fluoroscopy time: 0.2 minutes  Images Obtained: 3  Medications used: 40mg Kenalog, 2mL Lidocaine 1%, 3mL Local Lidocaine  1%, 1mL Isovue m200.     The patient's pain levels (1-10 scale) are as follows:    PRE INJECTION     Low back                 10     Right leg                  0    Left leg                     0      POST INJECTION   Low back                 0    Right leg                  0    Left leg                     0        FINDINGS: Technically successful left SI joint injection with  favorable initial pain relief.     SUE HERRERA PA-C         SYSTEM ID:  [id number]       Discharge Medications   Discharge Medication List as of 8/1/2023  2:19 PM        START taking these medications    Details   Lidocaine (LIDOCARE) 4 % Patch Place 1 patch onto the skin every 24 hours To prevent lidocaine toxicity, patient should be patch free for 12 hrs daily.Disp-30 patch, [id number]      oxyCODONE (ROXICODONE) 5 MG tablet Take 0.5-1 tablets (2.5-5 mg) by mouth every 4 hours as needed, Disp-12 tablet, R-0, Local Print      senna-docusate (SENOKOT-S/PERICOLACE) 8.6-50 MG tablet Take 1-2 tablets by mouth 2 times daily as needed for constipation, Disp-30 tablet, R-0, E-Prescribe           CONTINUE these medications which have CHANGED    Details   gabapentin (NEURONTIN) 300 MG capsule Take 2 capsules (600 mg) by mouth 2 times daily, Disp-60 capsule, R-0, E-PrescribeFuture refills by PCP Dr. DEANGELO SIDDIQI with phone number [phone].           CONTINUE these medications which have NOT CHANGED    Details    acetaminophen (TYLENOL) 325 MG tablet Take 2 tablets (650 mg) by mouth 4 times daily, Disp-100 tablet, R-11, E-Prescribe      hydrochlorothiazide (HYDRODIURIL) 25 MG tablet TAKE 1 TABLET(25 MG) BY MOUTH EVERY MORNING, Disp-90 tablet, R-1, E-Prescribe      latanoprost (XALATAN) 0.005 % ophthalmic solution Place 1 drop into both eyes At Bedtime Reported on 5/1/2017, Historical      losartan (COZAAR) 100 MG tablet Take 1 tablet (100 mg) by mouth every morning, Disp-90 tablet, R-3, E-Prescribe      metFORMIN (GLUCOPHAGE) 1000 MG tablet Take 1 tablet (1,000 mg) by mouth 2 times daily (with meals), Disp-180 tablet, R-3, E-Prescribe      metoprolol succinate ER (TOPROL XL) 50 MG 24 hr tablet TAKE 1 TABLET(50 MG) BY MOUTH DAILY, Disp-90 tablet, R-1, E-Prescribe**Patient requests 90 days supply**      Multiple Vitamins-Minerals (EYE VITAMINS PO) Take 1 tablet by mouth daily, Historical      pramipexole (MIRAPEX) 0.125 MG tablet TAKE 1 TABLET BY MOUTH DAILY AT 4:00PM AND 1 TABLET AT BEDTIME, Disp-180 tablet, R-1, E-Prescribe      pravastatin (PRAVACHOL) 20 MG tablet Take 1 tablet (20 mg) by mouth every evening, Disp-90 tablet, R-3, E-Prescribe      blood glucose (NO BRAND SPECIFIED) lancets standard Use to test blood sugar 2 times daily or as directed.Disp-100 lancet., [id number]      blood glucose (NO BRAND SPECIFIED) test strip Use to test blood sugar 2 times daily or as directed., Disp-100 strip, R-3, E-Prescribe      blood glucose monitoring (NO BRAND SPECIFIED) meter device kit Use to test blood sugar 2 times daily or as directed.Disp-1 kit, [id number]           STOP taking these medications       calcitonin, salmon, (MIACALCIN) 200 UNIT/ACT nasal spray Comments:   Reason for Stopping:         LORazepam (ATIVAN) 1 MG tablet Comments:   Reason for Stopping:             Allergies   Allergies   Allergen Reactions    No Known Allergies

## 2023-08-01 NOTE — DISCHARGE SUMMARY
Summary: back pain  Orientation: A&Ox4  Activity Level: assist 1 GB/W  Fall Risk: Yes  Behavior & Aggression: Green  Pain Management: Decreased with oxy and ice  Bowel/Bladder: up to bathroom  Diet: Regular    Pt will be going back home with her friend, and has all of her belongings. She has all of her discharge medications, and understands discharge4 information. All question have been answered.

## 2023-08-01 NOTE — PLAN OF CARE
Observation goals  PRIOR TO DISCHARGE        Comments: -diagnostic tests and consults completed and resulted: Not Met  -vital signs normal or at patient baseline: Met  -tolerating oral intake to maintain hydration: Met  -adequate pain control on oral analgesics: Met  -returns to baseline functional status: Not Met  -safe disposition plan has been identified : Met  Nurse to notify provider when observation goals have been met and patient is ready for discharge.

## 2023-08-01 NOTE — PROGRESS NOTES
Orientation/Cognitive: A&OX4  Observation Goals (Met/ Not Met): Not Met  Mobility Level/Assist Equipment: A1 With GB/W  Fall Risk (Y/N): Yes  Behavior Concerns: None  Pain Management: Denies, Stated only feels pain when she gets out of bed.  Tele/VS/O2: PIV SL  ABNL Lab/BG: Glucose by meter poct- 175  Diet: Reg  Bowel/Bladder: Continent  Skin Concerns: dry/ flaky, coccyx redness  Drains/Devices: PIV SL  Tests/Procedures for next shift: PT Consult  Anticipated DC date & active delays: TBD  Patient Stated Goal for Today: Pain Control          Observation goals  PRIOR TO DISCHARGE        Comments: -diagnostic tests and consults completed and resulted: Not Met  -vital signs normal or at patient baseline: Met  -tolerating oral intake to maintain hydration: Met  -adequate pain control on oral analgesics Not Met  -returns to baseline functional status: Not Met  -safe disposition plan has been identified : Not Met  Nurse to notify provider when observation goals have been met and patient is ready for discharge.

## 2023-08-01 NOTE — PLAN OF CARE
Physical Therapy Discharge Summary    Reason for therapy discharge:    Discharged to home with home therapy.    Progress towards therapy goal(s). See goals on Care Plan in [facility] electronic health record for goal details.  Goals partially met.  Barriers to achieving goals:   discharge from facility.    Therapy recommendation(s):    Continued therapy is recommended.  Rationale/Recommendations:  Pt below baseline mobility but moving well. Pt typically independent w/ mobility w/ SEC. Currently ambulating w/ SEC and SBA and no AD w/ SBA. Limited by pain at this time. Anticipate when medically ready Pt can DC to [facility] w/  PT to improve upon functional mobility and strengthening. Pt has most recently been staying at [facility]. No stairs needed to complete.  PT Brief overview of current status: SBA w/ SEC vs no AD    Recommendation above provided by last treating therapist.

## 2023-08-01 NOTE — PLAN OF CARE
Observation goals  PRIOR TO DISCHARGE        Comments: -diagnostic tests and consults completed and resulted: Not Met  -vital signs normal or at patient baseline: Met  -tolerating oral intake to maintain hydration: Met  -adequate pain control on oral analgesics Met  -returns to baseline functional status: Not Met  -safe disposition plan has been identified : Not Met  Nurse to notify provider when observation goals have been met and patient is ready for discharge.

## 2023-08-01 NOTE — PROGRESS NOTES
Care Management Follow Up    Length of Stay (days): 0    Expected Discharge Date: 08/01/2023     Concerns to be Addressed:       Patient plan of care discussed at interdisciplinary rounds: Yes    Anticipated Discharge Disposition: Skilled Nursing Facility, Transitional Care     Anticipated Discharge Services: None  Anticipated Discharge DME: [facility]    Patient/family educated on Medicare website which has current facility and service quality ratings: yes  Education Provided on the Discharge Plan: Yes  Patient/Family in Agreement with the Plan: yes    Referrals Placed by CM/SW:    Private pay costs discussed: Not applicable    Additional Information:  Writer met with patient. Home care is recommended. Patient is agreeable. Referral sent to [facility] home care hub.     Patient was accepted by [facility] For home PT only. SOC soon.    Becca Arroyo RN

## 2023-08-03 ENCOUNTER — TELEPHONE (OUTPATIENT)
Dept: FAMILY MEDICINE | Facility: CLINIC | Age: 88
End: 2023-08-03
Payer: COMMERCIAL

## 2023-08-03 ENCOUNTER — MYC MEDICAL ADVICE (OUTPATIENT)
Dept: FAMILY MEDICINE | Facility: CLINIC | Age: 88
End: 2023-08-03
Payer: COMMERCIAL

## 2023-08-03 DIAGNOSIS — M54.42 ACUTE LEFT-SIDED LOW BACK PAIN WITH LEFT-SIDED SCIATICA: ICD-10-CM

## 2023-08-07 DIAGNOSIS — M54.42 ACUTE LEFT-SIDED LOW BACK PAIN WITH LEFT-SIDED SCIATICA: ICD-10-CM

## 2023-08-07 RX ORDER — GABAPENTIN 300 MG/1
600 CAPSULE ORAL 2 TIMES DAILY
Qty: 60 CAPSULE | Refills: 0 | Status: CANCELLED | OUTPATIENT
Start: 2023-08-07

## 2023-08-07 RX ORDER — GABAPENTIN 300 MG/1
600 CAPSULE ORAL 2 TIMES DAILY
Qty: 120 CAPSULE | Refills: 4 | Status: SHIPPED | OUTPATIENT
Start: 2023-08-07

## 2023-08-15 ENCOUNTER — TELEPHONE (OUTPATIENT)
Dept: FAMILY MEDICINE | Facility: CLINIC | Age: 88
End: 2023-08-15

## 2023-08-15 NOTE — TELEPHONE ENCOUNTER
Called and spoke with Allie who is calling to make sure Deborah does not forget to have [facility] Crespo tomorrow 8/16/23.     Allie stated that Deborah will have a 6 page PDF but only 2 of the pages need to be filled out by PCP. (She said it should only take about 5 minutes). She then asked that the 2 pages filled out by PCP be faxed to [facility] at [phone].     Allie also requested that PCP fax an updated H&P, the 2 sheets filled out by PCP and verification that she received TB in clinic tomorrow. She wants those items to be faxed to nurse accepting pt at the [facility] in [place] at [phone] attn: Nisa Patino RN

## 2023-08-15 NOTE — TELEPHONE ENCOUNTER
[facility] phone call message- general phone call:    Reason for call: Allie would like a call back to discuss patients appointment tomorrow because she needs forms filled out since pt is [place] another state into assisted living facility, and she wants to make sure patient doesn't forget anything during the visit.     Return call needed: Yes    OK to leave a message on voice mail? Yes    Primary language: English      needed? No    Call taken on August 15, 2023 at 12:56 PM by Adin Santos

## 2023-08-16 ENCOUNTER — OFFICE VISIT (OUTPATIENT)
Dept: FAMILY MEDICINE | Facility: CLINIC | Age: 88
End: 2023-08-16
Payer: COMMERCIAL

## 2023-08-16 DIAGNOSIS — M54.42 ACUTE LEFT-SIDED LOW BACK PAIN WITH LEFT-SIDED SCIATICA: Primary | ICD-10-CM

## 2023-08-16 DIAGNOSIS — Z13.9 SCREENING FOR CONDITION: ICD-10-CM

## 2023-08-16 DIAGNOSIS — M17.12 PRIMARY OSTEOARTHRITIS OF LEFT KNEE: ICD-10-CM

## 2023-08-16 PROCEDURE — 99214 OFFICE O/P EST MOD 30 MIN: CPT | Mod: 25 | Performed by: FAMILY MEDICINE

## 2023-08-16 PROCEDURE — 20610 DRAIN/INJ JOINT/BURSA W/O US: CPT | Performed by: FAMILY MEDICINE

## 2023-08-16 PROCEDURE — 36415 COLL VENOUS BLD VENIPUNCTURE: CPT | Performed by: FAMILY MEDICINE

## 2023-08-16 PROCEDURE — 86481 TB AG RESPONSE T-CELL SUSP: CPT | Performed by: FAMILY MEDICINE

## 2023-08-16 RX ORDER — TRIAMCINOLONE ACETONIDE 40 MG/ML
40 INJECTION, SUSPENSION INTRA-ARTICULAR; INTRAMUSCULAR ONCE
Status: COMPLETED | OUTPATIENT
Start: 2023-08-16 | End: 2023-08-16

## 2023-08-16 RX ORDER — OXYCODONE HYDROCHLORIDE 5 MG/1
2.5-5 TABLET ORAL EVERY 4 HOURS PRN
Qty: 12 TABLET | Refills: 0 | Status: SHIPPED | OUTPATIENT
Start: 2023-08-16

## 2023-08-16 RX ORDER — TRIAMCINOLONE ACETONIDE 40 MG/ML
40 INJECTION, SUSPENSION INTRA-ARTICULAR; INTRAMUSCULAR ONCE
Status: DISCONTINUED | OUTPATIENT
Start: 2023-08-16 | End: 2023-08-16

## 2023-08-16 RX ORDER — BLOOD-GLUCOSE METER
KIT MISCELLANEOUS
COMMUNITY
Start: 2023-03-01

## 2023-08-16 RX ORDER — LANCETS 33 GAUGE
EACH MISCELLANEOUS
COMMUNITY
Start: 2023-08-04

## 2023-08-16 RX ADMIN — TRIAMCINOLONE ACETONIDE 40 MG: 40 INJECTION, SUSPENSION INTRA-ARTICULAR; INTRAMUSCULAR at 13:53

## 2023-08-16 NOTE — PATIENT INSTRUCTIONS
Here is the plan from today's visit    1. Acute left-sided low back pain with left-sided sciatica  Use this for the trip to [place] - plan to take up to 3 pills MAX a day.   - oxyCODONE (ROXICODONE) 5 MG tablet; Take 0.5-1 tablets (2.5-5 mg) by mouth every 4 hours as needed for severe pain  Dispense: 12 tablet; Refill: 0    2. Screening for condition    - Quantiferon TB Gold Plus; Future    3. Primary osteoarthritis of left knee  Injected this today   - DRAIN/INJECT LARGE JOINT/BURSA      Please call or return to clinic if your symptoms don't go away.    Follow up plan  As needed     Thank you for coming to [facility] today.  Lab Testing:  **If you had lab testing today and your results are reassuring or normal they will be mailed to you or sent through [facility] within 7 days.   **If the lab tests need quick action we will call you with the results.  The phone number we will call with results is # [phone] (home) . If this is not the best number please call our clinic and change the number.  Medication Refills:  If you need any refills please call your pharmacy and they will contact us.   If you need to  your refill at a new pharmacy, please contact the new pharmacy directly. The new pharmacy will help you get your medications transferred faster.   Scheduling:  If you have any concerns about today's visit or wish to schedule another appointment please call our office during normal business hours [phone] (8-5:00 M-F)  If a referral was made to a [facility] and you don't get a call from central scheduling please call [phone].  If a Mammogram was ordered for you at [facility] call [phone] to schedule or change your appointment.  If you had an XRay/CT/Ultrasound/MRI ordered the number is [phone] to schedule or change your radiology appointment.   Medical Concerns:  If you have urgent medical concerns please call [phone] at any time of the day.  If  you have a medical emergency please call 911.

## 2023-08-16 NOTE — PROGRESS NOTES
"  Assessment & Plan     Acute left-sided low back pain with left-sided sciatica  Is moving to a new facility - to [place] in [place]. I completed paper work for this today and Oked 12 tabs of Oxy to help her with the move and transition. Discussion around whether better to get initial spine care in our system and then move versus to start treatment there. Sounds like her community needs to move asa, so will start the care in [place].  Aware she needs ASAP primary care and ASAP back care.  Gave her copy of AMBER to complete so we can share information   - oxyCODONE (ROXICODONE) 5 MG tablet; Take 0.5-1 tablets (2.5-5 mg) by mouth every 4 hours as needed for severe pain    Primary osteoarthritis of left knee  Tolerated and felt better post injection   - DRAIN/INJECT LARGE JOINT/BURSA  - triamcinolone (KENALOG-40) injection 40 mg    Screening for condition  Needing this for her new home.   - Quantiferon TB Gold Plus; Future  - Quantiferon TB Gold Plus      I spent a total of 40 minutes on the day of the visit.   Time spent by me doing chart review, history and exam, documentation and further activities per the note     MED REC REQUIRED  Post Medication Reconciliation Status: discharge medications reconciled, continue medications without change  BMI:   Estimated body mass index is 31.01 kg/m  as calculated from the following:    Height as of 4/19/23: 1.575 m (5' 2\").    Weight as of 7/29/23: 76.9 kg (169 lb 8.5 oz).   Weight management plan: limited in her ability to walk.         No follow-ups on file.    Ange Crespo MD  [facility] SELAM Cabrera is a 88 year old, presenting for the following health issues:  RECHECK        8/16/2023     1:00 PM   Additional Questions   Roomed by VALERIA   Accompanied by SELF       HPI     She is here with [facility] - part of her community. The plan is to be moved to [place] [facility].  Many of her community are moving to [place]. It is a long drive to get there. Lots of " people will be helping her there. Is not able to walk at this time herself, regardless of where she is.     Much improved after leaving the hospital, post epidural injection but the pain has returned in the last few days.  Describes as over the left SI area with raditation into her left knee - down the side of her leg.  Also noting that her left knee DJD is also worse.  Continues reasonably comfortable when in bed or even when walking but very uncomfortable when she is sitting.  She had minimal opioids in the hospital but did use the Oxy she was discharged with about once a day that helped with her ability to walk around. Is out of meds.       Using tylenol for pain.              Hospital Follow-up Visit:    Hospital/Nursing Home/IP Rehab Facility: [facility]  Date of Admission: 7/29/2023  Date of Discharge: 8/1/2023  Reason(s) for Admission: acute radicular left back pain     Was your hospitalization related to COVID-19? No   Problems taking medications regularly:  None  Medication changes since discharge: None  Problems adhering to non-medication therapy:  None    Summary of hospitalization:  [facility] discharge summary reviewed  Diagnostic Tests/Treatments reviewed.  Follow up needed: none  Other Healthcare Providers Involved in Patient s Care:         None  Update since discharge: worsened.         Plan of care communicated with patient and other healthcare provider                 Review of Systems         Objective    There were no vitals taken for this visit.  There is no height or weight on file to calculate BMI.  Note that I reviewed her vitals which were reasonable but were not recorded.   Physical Exam   Lying on the table - uncomfortable when she sits.   Back - has no bony tenderness along her back. Possibly slight tenderness over the left SI joint,   Left knee with some mild effusion.     [facility]  Knee Injection  Note    Deborah Fierro is a patient of Ange Alanis here for knee injection for Osteoarthritis of the left knee.    Consent: Affirmation of informed consent was signed and scanned into the medical record. Risks, benefits and alternatives were discussed. Patient's questions were elicited and answered.   Procedure safety checklist was completed:  Yes  Time Out (Pause for the Cause) completed: Yes    Preoperative Diagnosis:DJD   Postoperative Diagnosis: same       The left knee was prepped  in the usual sterile fashion INJECTION:  Using 4 cc of 1% lidocaine mixed with 40 mg of kenalog, the knee joint was successfully injected without complication.  Slight bleeding post that responded with pressure, consistent with bleeding from a superficial vein. Patient did experience some pain relief following injection.          Technique:   Skin prep Technicare  Anesthesia 1% lidocaine  Complications:  No  Tolerance:  Pt tolerated procedure well and was in stable condition.         Follow up: Pt was instructed that there will be a return to pain in a couple hours followed by relief in the next several days to a week. Patient was advised to call in increasing redness and swelling.     Faculty: Ange Crespo MD present for and supervised this entire procedure.

## 2023-08-18 LAB
GAMMA INTERFERON BACKGROUND BLD IA-ACNC: 0.06 IU/ML
M TB IFN-G BLD-IMP: NEGATIVE
M TB IFN-G CD4+ BCKGRND COR BLD-ACNC: 5.05 IU/ML
MITOGEN IGNF BCKGRD COR BLD-ACNC: 0 IU/ML
MITOGEN IGNF BCKGRD COR BLD-ACNC: 0 IU/ML
QUANTIFERON MITOGEN: 5.11 IU/ML
QUANTIFERON NIL TUBE: 0.06 IU/ML
QUANTIFERON TB1 TUBE: 0.06 IU/ML
QUANTIFERON TB2 TUBE: 0.06

## 2023-08-22 ENCOUNTER — DOCUMENTATION ONLY (OUTPATIENT)
Dept: FAMILY MEDICINE | Facility: CLINIC | Age: 88
End: 2023-08-22
Payer: COMMERCIAL

## 2023-08-22 NOTE — PROGRESS NOTES
"When opening a documentation only encounter, be sure to enter in \"Chief Complaint\" Forms and in \" Comments\" Title of form, description if needed.    Deborah is a 88 year old  female  Form received via: Fax  Form now resides in: Provider Alfredito MANZANARES                "

## 2023-08-25 DIAGNOSIS — Z53.9 DIAGNOSIS NOT YET DEFINED: Primary | ICD-10-CM

## 2024-01-06 ENCOUNTER — HEALTH MAINTENANCE LETTER (OUTPATIENT)
Age: 89
End: 2024-01-06

## 2024-03-16 ENCOUNTER — HEALTH MAINTENANCE LETTER (OUTPATIENT)
Age: 89
End: 2024-03-16

## 2024-05-25 ENCOUNTER — HEALTH MAINTENANCE LETTER (OUTPATIENT)
Age: 89
End: 2024-05-25

## 2024-07-10 ENCOUNTER — LAB REQUISITION (OUTPATIENT)
Dept: LAB | Age: 89
End: 2024-07-10

## 2024-07-10 DIAGNOSIS — R53.82 CHRONIC FATIGUE, UNSPECIFIED: ICD-10-CM

## 2024-07-10 DIAGNOSIS — E11.9 TYPE 2 DIABETES MELLITUS WITHOUT COMPLICATIONS  (CMD): ICD-10-CM

## 2024-07-10 DIAGNOSIS — I10 ESSENTIAL (PRIMARY) HYPERTENSION: ICD-10-CM

## 2024-07-10 DIAGNOSIS — E78.5 HYPERLIPIDEMIA, UNSPECIFIED: ICD-10-CM

## 2024-07-10 LAB
HBA1C MFR BLD: 5.5 % (ref 4.5–5.6)
VIT B12 SERPL-MCNC: 275 PG/ML (ref 211–911)

## 2024-07-10 PROCEDURE — 82607 VITAMIN B-12: CPT | Performed by: CLINICAL MEDICAL LABORATORY

## 2024-07-10 PROCEDURE — PSEU8266 GLYCOHEMOGLOBIN: Performed by: CLINICAL MEDICAL LABORATORY

## 2024-07-10 PROCEDURE — PSEU8306 VITAMIN B12: Performed by: CLINICAL MEDICAL LABORATORY

## 2024-07-10 PROCEDURE — 83036 HEMOGLOBIN GLYCOSYLATED A1C: CPT | Performed by: CLINICAL MEDICAL LABORATORY

## 2024-10-12 ENCOUNTER — HEALTH MAINTENANCE LETTER (OUTPATIENT)
Age: 89
End: 2024-10-12

## 2025-01-25 ENCOUNTER — HEALTH MAINTENANCE LETTER (OUTPATIENT)
Age: 89
End: 2025-01-25

## 2025-02-12 ENCOUNTER — LAB REQUISITION (OUTPATIENT)
Dept: LAB | Age: 89
End: 2025-02-12

## 2025-02-12 DIAGNOSIS — I10 ESSENTIAL (PRIMARY) HYPERTENSION: ICD-10-CM

## 2025-02-12 LAB — HBA1C MFR BLD: 5.3 % (ref 4.5–5.6)

## 2025-02-12 PROCEDURE — PSEU8266 GLYCOHEMOGLOBIN: Performed by: CLINICAL MEDICAL LABORATORY

## 2025-02-12 PROCEDURE — 83036 HEMOGLOBIN GLYCOSYLATED A1C: CPT | Performed by: CLINICAL MEDICAL LABORATORY

## 2025-03-22 ENCOUNTER — HEALTH MAINTENANCE LETTER (OUTPATIENT)
Age: 89
End: 2025-03-22

## 2025-05-03 ENCOUNTER — HEALTH MAINTENANCE LETTER (OUTPATIENT)
Age: 89
End: 2025-05-03

## 2025-08-16 ENCOUNTER — HEALTH MAINTENANCE LETTER (OUTPATIENT)
Age: 89
End: 2025-08-16

## (undated) DEVICE — LINEN GOWN X4 5410

## (undated) DEVICE — DRAIN JACKSON PRATT 15FR ROUND SU130-1323

## (undated) DEVICE — DRSG DRAIN 4X4" 7086

## (undated) DEVICE — GLOVE PROTEXIS BLUE W/NEU-THERA 8.0  2D73EB80

## (undated) DEVICE — GOWN IMPERVIOUS SPECIALTY XLG/XLONG 32474

## (undated) DEVICE — CATH TRAY FOLEY SURESTEP 16FR WDRAIN BAG STLK LATEX A300316A

## (undated) DEVICE — SU VICRYL 2-0 CT-1 27" UND J259H

## (undated) DEVICE — SYR 50ML LL W/O NDL 309653

## (undated) DEVICE — SUCTION IRR SYSTEM W/O TIP INTERPULSE HANDPIECE 0210-100-000

## (undated) DEVICE — DRSG STERI STRIP 1/2X4" R1547

## (undated) DEVICE — LINEN BACK PACK 5440

## (undated) DEVICE — LINEN GOWN OVERSIZE 5408

## (undated) DEVICE — BONE CLEANING TIP INTERPULSE  0210-010-000

## (undated) DEVICE — SUCTION TIP YANKAUER STR K87

## (undated) DEVICE — Device

## (undated) DEVICE — BASIN SET MAJOR

## (undated) DEVICE — COVER CAMERA IN-LIGHT DISP LT-C02

## (undated) DEVICE — SUCTION MANIFOLD DORNOCH ULTRA CART UL-CL500

## (undated) DEVICE — GLOVE PROTEXIS W/NEU-THERA 8.5  2D73TE85

## (undated) DEVICE — PREP DURAPREP 26ML APL 8630

## (undated) DEVICE — SOL WATER IRRIG 1000ML BOTTLE 2F7114

## (undated) DEVICE — BONE CEMENT MIXEVAC III HI VAC KIT  0206-015-000

## (undated) DEVICE — GLOVE PROTEXIS BLUE W/NEU-THERA 8.5  2D73EB85

## (undated) DEVICE — SU ETHIBOND 1 CTX CR 8/18" CX30D

## (undated) DEVICE — ESU GROUND PAD UNIVERSAL W/O CORD

## (undated) DEVICE — DRAIN JACKSON PRATT RESERVOIR 400ML SU130-1000

## (undated) DEVICE — SOL NACL 0.9% IRRIG 3000ML BAG 2B7477

## (undated) DEVICE — CAST PADDING 6" STERILE 9046S

## (undated) DEVICE — SOL NACL 0.9% INJ 1000ML BAG 2B1324X

## (undated) DEVICE — NDL SPINAL 18GA 3.5" 405184

## (undated) DEVICE — SU VICRYL 0 CT-1 36" J946H

## (undated) DEVICE — DRAPE STOCKINETTE 8" 8586

## (undated) DEVICE — SU VICRYL 2-0 CT 36" J957H

## (undated) DEVICE — STRAP KNEE/BODY 31143004

## (undated) DEVICE — GLOVE PROTEXIS W/NEU-THERA 7.0  2D73TE70

## (undated) DEVICE — SU PDS II 3-0 PS-1 18" Z683G

## (undated) DEVICE — WIPES FOLEY CARE SURESTEP PROVON DFC100

## (undated) DEVICE — GLOVE PROTEXIS W/NEU-THERA 8.0  2D73TE80

## (undated) DEVICE — BLADE SAW SAGITTAL STRK 25X79.5X1.24MM 4/2000 2108-318-000

## (undated) DEVICE — GLOVE PROTEXIS BLUE W/NEU-THERA 7.5  2D73EB75

## (undated) DEVICE — SU DERMABOND ADVANCED .7ML DNX12

## (undated) DEVICE — DRSG TEGADERM 4X4 3/4" 1626W

## (undated) DEVICE — BLADE KNIFE SURG 15 371115

## (undated) RX ORDER — GABAPENTIN 300 MG/1
CAPSULE ORAL
Status: DISPENSED
Start: 2017-04-07

## (undated) RX ORDER — LIDOCAINE HYDROCHLORIDE 10 MG/ML
INJECTION, SOLUTION EPIDURAL; INFILTRATION; INTRACAUDAL; PERINEURAL
Status: DISPENSED
Start: 2023-07-31

## (undated) RX ORDER — CEFAZOLIN SODIUM 2 G/100ML
INJECTION, SOLUTION INTRAVENOUS
Status: DISPENSED
Start: 2017-04-07

## (undated) RX ORDER — TRIAMCINOLONE ACETONIDE 40 MG/ML
INJECTION, SUSPENSION INTRA-ARTICULAR; INTRAMUSCULAR
Status: DISPENSED
Start: 2023-07-31

## (undated) RX ORDER — OXYCODONE HYDROCHLORIDE 5 MG/1
TABLET ORAL
Status: DISPENSED
Start: 2017-04-07

## (undated) RX ORDER — ACETAMINOPHEN 325 MG/1
TABLET ORAL
Status: DISPENSED
Start: 2017-04-07